# Patient Record
Sex: FEMALE | Race: WHITE | NOT HISPANIC OR LATINO | Employment: OTHER | URBAN - METROPOLITAN AREA
[De-identification: names, ages, dates, MRNs, and addresses within clinical notes are randomized per-mention and may not be internally consistent; named-entity substitution may affect disease eponyms.]

---

## 2023-02-10 PROBLEM — I69.90 LATE EFFECTS OF CEREBROVASCULAR DISEASE: Status: ACTIVE | Noted: 2019-11-13

## 2023-02-10 PROBLEM — F41.1 ANXIETY STATE: Status: ACTIVE | Noted: 2019-11-13

## 2023-02-10 PROBLEM — E66.3 OVERWEIGHT (BMI 25.0-29.9): Status: ACTIVE | Noted: 2023-02-10

## 2023-02-10 PROBLEM — I10 ESSENTIAL HYPERTENSION: Status: ACTIVE | Noted: 2019-11-13

## 2023-02-10 PROBLEM — K21.9 GASTROESOPHAGEAL REFLUX DISEASE: Status: ACTIVE | Noted: 2019-11-13

## 2023-02-10 PROBLEM — J44.9 CHRONIC OBSTRUCTIVE LUNG DISEASE (MULTI): Status: ACTIVE | Noted: 2019-11-13

## 2023-02-10 PROBLEM — E78.2 MIXED HYPERLIPIDEMIA: Status: ACTIVE | Noted: 2019-11-13

## 2023-02-10 RX ORDER — ALLOPURINOL 100 MG/1
TABLET ORAL
COMMUNITY
Start: 2019-11-13 | End: 2023-03-08 | Stop reason: SDUPTHER

## 2023-02-10 RX ORDER — TIOTROPIUM BROMIDE 18 UG/1
CAPSULE ORAL; RESPIRATORY (INHALATION)
COMMUNITY
Start: 2019-11-13 | End: 2023-03-08 | Stop reason: SDUPTHER

## 2023-02-10 RX ORDER — SERTRALINE HYDROCHLORIDE 100 MG/1
TABLET, FILM COATED ORAL
COMMUNITY
Start: 2020-04-28 | End: 2023-03-08 | Stop reason: ALTCHOICE

## 2023-02-10 RX ORDER — ALENDRONATE SODIUM 70 MG/1
TABLET ORAL
COMMUNITY
Start: 2020-02-04 | End: 2023-03-08 | Stop reason: SDUPTHER

## 2023-02-10 RX ORDER — POTASSIUM CHLORIDE 1500 MG/1
TABLET, EXTENDED RELEASE ORAL
COMMUNITY
Start: 2019-11-13 | End: 2023-04-07 | Stop reason: SDUPTHER

## 2023-02-10 RX ORDER — DIBASIC SODIUM PHOSPHATE, MONOBASIC POTASSIUM PHOSPHATE AND MONOBASIC SODIUM PHOSPHATE 852; 155; 130 MG/1; MG/1; MG/1
1 TABLET ORAL 2 TIMES DAILY
Status: ON HOLD | COMMUNITY
Start: 2020-02-13 | End: 2024-04-30 | Stop reason: ENTERED-IN-ERROR

## 2023-02-10 RX ORDER — METOPROLOL SUCCINATE 100 MG/1
TABLET, EXTENDED RELEASE ORAL
COMMUNITY
Start: 2019-11-13 | End: 2023-04-07 | Stop reason: SDUPTHER

## 2023-02-10 RX ORDER — LOSARTAN POTASSIUM 100 MG/1
TABLET ORAL
COMMUNITY
Start: 2019-11-13 | End: 2023-03-08 | Stop reason: SDUPTHER

## 2023-02-10 RX ORDER — TIZANIDINE HYDROCHLORIDE 2 MG/1
CAPSULE, GELATIN COATED ORAL
COMMUNITY
Start: 2019-11-13 | End: 2023-03-08 | Stop reason: SDUPTHER

## 2023-02-10 RX ORDER — ASCORBIC ACID 500 MG
500 TABLET ORAL DAILY
Status: ON HOLD | COMMUNITY
Start: 2019-11-13 | End: 2024-04-30 | Stop reason: ENTERED-IN-ERROR

## 2023-02-10 RX ORDER — CHOLECALCIFEROL (VITAMIN D3) 50 MCG
1 TABLET ORAL DAILY
Status: ON HOLD | COMMUNITY
Start: 2019-11-13 | End: 2024-04-30 | Stop reason: ENTERED-IN-ERROR

## 2023-02-10 RX ORDER — TRIAMCINOLONE ACETONIDE 1 MG/G
CREAM TOPICAL
Status: ON HOLD | COMMUNITY
Start: 2020-11-10 | End: 2024-04-30 | Stop reason: ENTERED-IN-ERROR

## 2023-02-10 RX ORDER — ASPIRIN 81 MG/1
1 TABLET ORAL DAILY
COMMUNITY
Start: 2019-11-13

## 2023-02-10 RX ORDER — ATORVASTATIN CALCIUM 10 MG/1
TABLET, FILM COATED ORAL
COMMUNITY
Start: 2019-11-13 | End: 2023-03-08 | Stop reason: SDUPTHER

## 2023-02-10 RX ORDER — ALBUTEROL SULFATE 90 UG/1
AEROSOL, METERED RESPIRATORY (INHALATION)
COMMUNITY
Start: 2019-11-13 | End: 2023-10-04 | Stop reason: SDUPTHER

## 2023-02-10 RX ORDER — FUROSEMIDE 40 MG/1
TABLET ORAL
COMMUNITY
Start: 2021-09-02 | End: 2023-06-13 | Stop reason: SDUPTHER

## 2023-02-10 RX ORDER — CALCIUM CARBONATE/VITAMIN D3 600MG-5MCG
TABLET ORAL
Status: ON HOLD | COMMUNITY
Start: 2019-11-13 | End: 2024-04-30 | Stop reason: ENTERED-IN-ERROR

## 2023-02-10 RX ORDER — LEVOFLOXACIN 500 MG/1
TABLET, FILM COATED ORAL
COMMUNITY
Start: 2023-01-10 | End: 2023-03-08 | Stop reason: WASHOUT

## 2023-02-10 RX ORDER — BUPROPION HYDROCHLORIDE 150 MG/1
TABLET ORAL
COMMUNITY
Start: 2019-11-13 | End: 2023-03-08 | Stop reason: SDUPTHER

## 2023-02-15 PROBLEM — I63.9 CVA (CEREBRAL VASCULAR ACCIDENT) (MULTI): Status: ACTIVE | Noted: 2023-02-15

## 2023-02-15 PROBLEM — E78.2 MIXED HYPERLIPIDEMIA: Status: RESOLVED | Noted: 2019-11-13 | Resolved: 2023-02-15

## 2023-02-15 PROBLEM — R60.9 2+ PITTING EDEMA: Status: ACTIVE | Noted: 2023-02-15

## 2023-02-15 RX ORDER — ACETAMINOPHEN, DIPHENHYDRAMINE HCL, PHENYLEPHRINE HCL 325; 25; 5 MG/1; MG/1; MG/1
TABLET ORAL
Status: ON HOLD | COMMUNITY
End: 2024-04-30 | Stop reason: ENTERED-IN-ERROR

## 2023-02-15 RX ORDER — EPINEPHRINE 0.3 MG/.3ML
0.3 INJECTION SUBCUTANEOUS ONCE AS NEEDED
COMMUNITY
Start: 2019-11-13

## 2023-02-15 RX ORDER — LOPERAMIDE HYDROCHLORIDE 2 MG/1
CAPSULE ORAL
Status: ON HOLD | COMMUNITY
End: 2024-04-30 | Stop reason: ENTERED-IN-ERROR

## 2023-02-15 RX ORDER — IPRATROPIUM BROMIDE AND ALBUTEROL 20; 100 UG/1; UG/1
SPRAY, METERED RESPIRATORY (INHALATION)
Status: ON HOLD | COMMUNITY
End: 2024-04-30 | Stop reason: ENTERED-IN-ERROR

## 2023-02-15 RX ORDER — HYDROCHLOROTHIAZIDE 12.5 MG/1
CAPSULE ORAL
COMMUNITY
End: 2023-03-08 | Stop reason: WASHOUT

## 2023-02-15 RX ORDER — SERTRALINE HYDROCHLORIDE 50 MG/1
TABLET, FILM COATED ORAL
COMMUNITY
End: 2023-03-08 | Stop reason: ALTCHOICE

## 2023-03-03 LAB — SARS-COV-2 RESULT: NOT DETECTED

## 2023-03-08 ENCOUNTER — LAB (OUTPATIENT)
Dept: LAB | Facility: LAB | Age: 78
End: 2023-03-08
Payer: MEDICARE

## 2023-03-08 ENCOUNTER — OFFICE VISIT (OUTPATIENT)
Dept: PRIMARY CARE | Facility: CLINIC | Age: 78
End: 2023-03-08
Payer: MEDICARE

## 2023-03-08 VITALS
HEIGHT: 62 IN | TEMPERATURE: 97.2 F | SYSTOLIC BLOOD PRESSURE: 124 MMHG | WEIGHT: 137 LBS | OXYGEN SATURATION: 97 % | BODY MASS INDEX: 25.21 KG/M2 | DIASTOLIC BLOOD PRESSURE: 70 MMHG | HEART RATE: 64 BPM

## 2023-03-08 DIAGNOSIS — F17.201 TOBACCO ABUSE, IN REMISSION: ICD-10-CM

## 2023-03-08 DIAGNOSIS — E78.2 MIXED HYPERLIPIDEMIA: ICD-10-CM

## 2023-03-08 DIAGNOSIS — I63.9 CEREBROVASCULAR ACCIDENT (CVA), UNSPECIFIED MECHANISM (MULTI): Primary | ICD-10-CM

## 2023-03-08 DIAGNOSIS — J41.0 SIMPLE CHRONIC BRONCHITIS (MULTI): ICD-10-CM

## 2023-03-08 DIAGNOSIS — I10 PRIMARY HYPERTENSION: ICD-10-CM

## 2023-03-08 DIAGNOSIS — F17.211 CIGARETTE NICOTINE DEPENDENCE IN REMISSION: ICD-10-CM

## 2023-03-08 DIAGNOSIS — F33.42 RECURRENT MAJOR DEPRESSIVE DISORDER, IN FULL REMISSION (CMS-HCC): ICD-10-CM

## 2023-03-08 DIAGNOSIS — M81.0 AGE-RELATED OSTEOPOROSIS WITHOUT CURRENT PATHOLOGICAL FRACTURE: ICD-10-CM

## 2023-03-08 DIAGNOSIS — M1A.9XX0 CHRONIC GOUT WITHOUT TOPHUS, UNSPECIFIED CAUSE, UNSPECIFIED SITE: ICD-10-CM

## 2023-03-08 DIAGNOSIS — M15.9 PRIMARY OSTEOARTHRITIS INVOLVING MULTIPLE JOINTS: ICD-10-CM

## 2023-03-08 LAB
ALANINE AMINOTRANSFERASE (SGPT) (U/L) IN SER/PLAS: 16 U/L (ref 7–45)
ALBUMIN (G/DL) IN SER/PLAS: 3.9 G/DL (ref 3.4–5)
ALKALINE PHOSPHATASE (U/L) IN SER/PLAS: 73 U/L (ref 33–136)
ANION GAP IN SER/PLAS: 14 MMOL/L (ref 10–20)
ASPARTATE AMINOTRANSFERASE (SGOT) (U/L) IN SER/PLAS: 13 U/L (ref 9–39)
BASOPHILS (10*3/UL) IN BLOOD BY AUTOMATED COUNT: 0.09 X10E9/L (ref 0–0.1)
BASOPHILS/100 LEUKOCYTES IN BLOOD BY AUTOMATED COUNT: 0.6 % (ref 0–2)
BILIRUBIN TOTAL (MG/DL) IN SER/PLAS: 0.3 MG/DL (ref 0–1.2)
CALCIUM (MG/DL) IN SER/PLAS: 8.8 MG/DL (ref 8.6–10.3)
CARBON DIOXIDE, TOTAL (MMOL/L) IN SER/PLAS: 26 MMOL/L (ref 21–32)
CHLORIDE (MMOL/L) IN SER/PLAS: 105 MMOL/L (ref 98–107)
CREATININE (MG/DL) IN SER/PLAS: 0.99 MG/DL (ref 0.5–1.05)
EOSINOPHILS (10*3/UL) IN BLOOD BY AUTOMATED COUNT: 0.09 X10E9/L (ref 0–0.4)
EOSINOPHILS/100 LEUKOCYTES IN BLOOD BY AUTOMATED COUNT: 0.6 % (ref 0–6)
ERYTHROCYTE DISTRIBUTION WIDTH (RATIO) BY AUTOMATED COUNT: 14.8 % (ref 11.5–14.5)
ERYTHROCYTE MEAN CORPUSCULAR HEMOGLOBIN CONCENTRATION (G/DL) BY AUTOMATED: 30.8 G/DL (ref 32–36)
ERYTHROCYTE MEAN CORPUSCULAR VOLUME (FL) BY AUTOMATED COUNT: 97 FL (ref 80–100)
ERYTHROCYTES (10*6/UL) IN BLOOD BY AUTOMATED COUNT: 4.46 X10E12/L (ref 4–5.2)
GFR FEMALE: 59 ML/MIN/1.73M2
GLUCOSE (MG/DL) IN SER/PLAS: 71 MG/DL (ref 74–99)
HEMATOCRIT (%) IN BLOOD BY AUTOMATED COUNT: 43.2 % (ref 36–46)
HEMOGLOBIN (G/DL) IN BLOOD: 13.3 G/DL (ref 12–16)
IMMATURE GRANULOCYTES/100 LEUKOCYTES IN BLOOD BY AUTOMATED COUNT: 3 % (ref 0–0.9)
LEUKOCYTES (10*3/UL) IN BLOOD BY AUTOMATED COUNT: 14.7 X10E9/L (ref 4.4–11.3)
LYMPHOCYTES (10*3/UL) IN BLOOD BY AUTOMATED COUNT: 3.82 X10E9/L (ref 0.8–3)
LYMPHOCYTES/100 LEUKOCYTES IN BLOOD BY AUTOMATED COUNT: 26 % (ref 13–44)
MONOCYTES (10*3/UL) IN BLOOD BY AUTOMATED COUNT: 2.06 X10E9/L (ref 0.05–0.8)
MONOCYTES/100 LEUKOCYTES IN BLOOD BY AUTOMATED COUNT: 14 % (ref 2–10)
NEUTROPHILS (10*3/UL) IN BLOOD BY AUTOMATED COUNT: 8.21 X10E9/L (ref 1.6–5.5)
NEUTROPHILS/100 LEUKOCYTES IN BLOOD BY AUTOMATED COUNT: 55.8 % (ref 40–80)
PLATELETS (10*3/UL) IN BLOOD AUTOMATED COUNT: 334 X10E9/L (ref 150–450)
POTASSIUM (MMOL/L) IN SER/PLAS: 4.7 MMOL/L (ref 3.5–5.3)
PROTEIN TOTAL: 6.4 G/DL (ref 6.4–8.2)
SODIUM (MMOL/L) IN SER/PLAS: 140 MMOL/L (ref 136–145)
UREA NITROGEN (MG/DL) IN SER/PLAS: 27 MG/DL (ref 6–23)

## 2023-03-08 PROCEDURE — G0439 PPPS, SUBSEQ VISIT: HCPCS | Performed by: FAMILY MEDICINE

## 2023-03-08 PROCEDURE — 80053 COMPREHEN METABOLIC PANEL: CPT

## 2023-03-08 PROCEDURE — 1036F TOBACCO NON-USER: CPT | Performed by: FAMILY MEDICINE

## 2023-03-08 PROCEDURE — 1170F FXNL STATUS ASSESSED: CPT | Performed by: FAMILY MEDICINE

## 2023-03-08 PROCEDURE — 3078F DIAST BP <80 MM HG: CPT | Performed by: FAMILY MEDICINE

## 2023-03-08 PROCEDURE — 85025 COMPLETE CBC W/AUTO DIFF WBC: CPT

## 2023-03-08 PROCEDURE — 99214 OFFICE O/P EST MOD 30 MIN: CPT | Performed by: FAMILY MEDICINE

## 2023-03-08 PROCEDURE — 36415 COLL VENOUS BLD VENIPUNCTURE: CPT

## 2023-03-08 PROCEDURE — 1159F MED LIST DOCD IN RCRD: CPT | Performed by: FAMILY MEDICINE

## 2023-03-08 PROCEDURE — 3074F SYST BP LT 130 MM HG: CPT | Performed by: FAMILY MEDICINE

## 2023-03-08 RX ORDER — BUPROPION HYDROCHLORIDE 150 MG/1
150 TABLET ORAL EVERY MORNING
Qty: 90 TABLET | Refills: 3 | Status: SHIPPED | OUTPATIENT
Start: 2023-03-08 | End: 2024-04-18 | Stop reason: SDUPTHER

## 2023-03-08 RX ORDER — ATORVASTATIN CALCIUM 10 MG/1
10 TABLET, FILM COATED ORAL DAILY
Qty: 90 TABLET | Refills: 3 | Status: SHIPPED | OUTPATIENT
Start: 2023-03-08 | End: 2023-10-04 | Stop reason: SDUPTHER

## 2023-03-08 RX ORDER — ALLOPURINOL 100 MG/1
100 TABLET ORAL 2 TIMES DAILY
Qty: 180 TABLET | Refills: 3 | Status: SHIPPED | OUTPATIENT
Start: 2023-03-08 | End: 2023-10-04 | Stop reason: SDUPTHER

## 2023-03-08 RX ORDER — TIZANIDINE HYDROCHLORIDE 2 MG/1
2 CAPSULE, GELATIN COATED ORAL 3 TIMES DAILY
Qty: 45 CAPSULE | Refills: 1 | Status: SHIPPED | OUTPATIENT
Start: 2023-03-08 | End: 2024-05-10 | Stop reason: HOSPADM

## 2023-03-08 RX ORDER — ALENDRONATE SODIUM 70 MG/1
70 TABLET ORAL
Qty: 12 TABLET | Refills: 3 | Status: SHIPPED | OUTPATIENT
Start: 2023-03-08 | End: 2023-10-04 | Stop reason: SDUPTHER

## 2023-03-08 RX ORDER — SERTRALINE HYDROCHLORIDE 100 MG/1
100 TABLET, FILM COATED ORAL DAILY
Qty: 90 TABLET | Refills: 3 | Status: SHIPPED | OUTPATIENT
Start: 2023-03-08 | End: 2024-04-18 | Stop reason: SDUPTHER

## 2023-03-08 RX ORDER — LOSARTAN POTASSIUM 100 MG/1
100 TABLET ORAL DAILY
Qty: 90 TABLET | Refills: 3 | Status: SHIPPED | OUTPATIENT
Start: 2023-03-08 | End: 2023-10-04 | Stop reason: SDUPTHER

## 2023-03-08 RX ORDER — TIOTROPIUM BROMIDE 18 UG/1
1 CAPSULE ORAL; RESPIRATORY (INHALATION)
Qty: 90 CAPSULE | Refills: 3 | Status: SHIPPED
Start: 2023-03-08 | End: 2023-10-02 | Stop reason: WASHOUT

## 2023-03-08 ASSESSMENT — ACTIVITIES OF DAILY LIVING (ADL)
DOING_HOUSEWORK: INDEPENDENT
TAKING_MEDICATION: INDEPENDENT
DRESSING: INDEPENDENT
GROCERY_SHOPPING: INDEPENDENT
BATHING: INDEPENDENT
MANAGING_FINANCES: INDEPENDENT

## 2023-03-08 ASSESSMENT — PATIENT HEALTH QUESTIONNAIRE - PHQ9
2. FEELING DOWN, DEPRESSED OR HOPELESS: NOT AT ALL
SUM OF ALL RESPONSES TO PHQ9 QUESTIONS 1 AND 2: 0
1. LITTLE INTEREST OR PLEASURE IN DOING THINGS: NOT AT ALL

## 2023-03-08 NOTE — PROGRESS NOTES
Subjective   Patient ID: Brooklyn Read is a 77 y.o. female who presents for Annual Wellness Exam.  HTN: well controlled    Gout: stable    Depression: stable, taking sertraline.    GERD: well controlled    Lipids: takings meds    OP: stable    Preparing meals - independent  Using telephone - independent  Bladder - continent  Bowel - continent    Recent hospital stays - none    ADLs - able to dress, walk and bath independently  Instrumental ADL's - able to shop, maintain finances, managing medications, housekeeping independently    Depression: PHQ-2 (screening 2 mins) - negative    Opioid use -   none  Exercise -  intermittent  Diet - well balanced  Pain score - mild    Providers - pulmonary    MiniCOG dementia screen - 4/5    Education - educated pt on healthy eating, exercise, weight maintenance    Falls - fell 1x at home.  No issues getting up    MiniCO/5    Counseled pt on living will: pt has the document at home and hasn't signed it yet.    CV screening: checking BW    Colonoscopy: normal in 2018    Diabetes screening:  Negative    Glaucoma screen: declines, has no fhx    CT chest tob screen: screening CT ordered    Mammo:  NA    DEXA: dx w/ osteoporosis, son fosamax    PAP/pelvis: decline    Vaccines: had shingles vaccine, one pneumonia shot.  got flu and COVID shot.  Recommended booster.        Current Outpatient Medications on File Prior to Visit   Medication Sig Dispense Refill    albuterol 90 mcg/actuation inhaler       alendronate (Fosamax) 70 mg tablet       allopurinol (Zyloprim) 100 mg tablet       aspirin 81 mg EC tablet       atorvastatin (Lipitor) 10 mg tablet       buPROPion XL (Wellbutrin XL) 150 mg 24 hr tablet       EPINEPHrine 0.3 mg/0.3 mL injection syringe       furosemide (Lasix) 40 mg tablet       hydroCHLOROthiazide (Microzide) 12.5 mg capsule       ipratropium-albuteroL (Combivent Respimat)  mcg/actuation inhaler       loperamide (Imodium) 2 mg capsule       losartan (Cozaar)  100 mg tablet       melatonin 10 mg tablet       metoprolol succinate XL (Toprol-XL) 100 mg 24 hr tablet       potassium chloride CR (K-Tab) 20 mEq ER tablet       sertraline (Zoloft) 100 mg tablet       tiZANidine (Zanaflex) 2 mg capsule       triamcinolone (Kenalog) 0.1 % cream       ascorbic acid (Vitamin C) 500 mg tablet       calcium carbonate-vitamin D3 600 mg-5 mcg (200 unit) tablet Take by mouth.      cholecalciferol (Vitamin D-3) 50 MCG (2000 UT) tablet Take 1 tablet (50 mcg) by mouth once daily.      levoFLOXacin (Levaquin) 500 mg tablet       sertraline (Zoloft) 50 mg tablet       sod phos di, mono-K phos mono (Phospha 250 Neutral) tablet Take 1 tablet (250 mg) by mouth in the morning and 1 tablet (250 mg) before bedtime.      tiotropium (Spiriva with HandiHaler) 18 mcg inhalation capsule        No current facility-administered medications on file prior to visit.        Review of Systems    Objective   Physical Exam    Orders Only on 03/03/2023   Component Date Value Ref Range Status    SARS-COV-2 RESULT 03/02/2023 NOT DETECTED  Not Detected Final    Comment: .  This assay is designed to detect the ORF1a/b and E genes of SARS-CoV-2 via   nucleic acid amplification. A Not Detected result does not preclude   2019-nCoV infection since the adequacy of sample collection and/or low viral   burden may result in presence of viral nucleic acids below the clinical   sensitivity of this test method.     Fact sheet for providers: https://www.fda.gov/media/043929/download   Fact sheet for patients: https://www.fda.gov/media/918074/download     This test has received FDA Emergency Use Authorization (EUA) and has been   verified for use by [facility] ([facility]).   This test is only authorized for the duration of time that circumstances   exist to justify the authorization of the emergency use of in vitro   diagnostic tests for the detection of SARS-CoV-2 virus and/or diagnosis of    COVID-19 infection under section 564(b)(1) of the Act, 21 U.S.C.   360bbb-3(b)(1), unless the authorization is terminated or revoked sooner.                               [facility] is certified under CLIA-88 as   qualified to perform high complexity testing. Testing is performed in the   [facility] laboratories located at [address] [place] [address].         Assessment/Plan

## 2023-03-09 ENCOUNTER — TELEPHONE (OUTPATIENT)
Dept: PRIMARY CARE | Facility: CLINIC | Age: 78
End: 2023-03-09
Payer: MEDICARE

## 2023-03-09 NOTE — TELEPHONE ENCOUNTER
----- Message from Gianluca Macario MD sent at 3/9/2023  5:47 AM EST -----  Please call the patient regarding her labs.  Her blood count seemed to show some elevations in her white blood count.  Could be something viral.  I'd like to check her blood count again in 2 weeks to make sure it has normalized.  BW orders in.

## 2023-04-03 ENCOUNTER — TELEPHONE (OUTPATIENT)
Dept: PRIMARY CARE | Facility: CLINIC | Age: 78
End: 2023-04-03
Payer: MEDICARE

## 2023-04-03 ENCOUNTER — LAB (OUTPATIENT)
Dept: LAB | Facility: LAB | Age: 78
End: 2023-04-03
Payer: MEDICARE

## 2023-04-03 DIAGNOSIS — J41.0 SIMPLE CHRONIC BRONCHITIS (MULTI): ICD-10-CM

## 2023-04-03 DIAGNOSIS — Z87.891 PERSONAL HISTORY OF TOBACCO USE: Primary | ICD-10-CM

## 2023-04-03 LAB
BASOPHILS (10*3/UL) IN BLOOD BY AUTOMATED COUNT: 0.05 X10E9/L (ref 0–0.1)
BASOPHILS/100 LEUKOCYTES IN BLOOD BY AUTOMATED COUNT: 0.4 % (ref 0–2)
EOSINOPHILS (10*3/UL) IN BLOOD BY AUTOMATED COUNT: 0.5 X10E9/L (ref 0–0.4)
EOSINOPHILS/100 LEUKOCYTES IN BLOOD BY AUTOMATED COUNT: 4.4 % (ref 0–6)
ERYTHROCYTE DISTRIBUTION WIDTH (RATIO) BY AUTOMATED COUNT: 15.8 % (ref 11.5–14.5)
ERYTHROCYTE MEAN CORPUSCULAR HEMOGLOBIN CONCENTRATION (G/DL) BY AUTOMATED: 30.2 G/DL (ref 32–36)
ERYTHROCYTE MEAN CORPUSCULAR VOLUME (FL) BY AUTOMATED COUNT: 98 FL (ref 80–100)
ERYTHROCYTES (10*6/UL) IN BLOOD BY AUTOMATED COUNT: 3.85 X10E12/L (ref 4–5.2)
HEMATOCRIT (%) IN BLOOD BY AUTOMATED COUNT: 37.8 % (ref 36–46)
HEMOGLOBIN (G/DL) IN BLOOD: 11.4 G/DL (ref 12–16)
IMMATURE GRANULOCYTES/100 LEUKOCYTES IN BLOOD BY AUTOMATED COUNT: 1.3 % (ref 0–0.9)
LEUKOCYTES (10*3/UL) IN BLOOD BY AUTOMATED COUNT: 11.3 X10E9/L (ref 4.4–11.3)
LYMPHOCYTES (10*3/UL) IN BLOOD BY AUTOMATED COUNT: 2.59 X10E9/L (ref 0.8–3)
LYMPHOCYTES/100 LEUKOCYTES IN BLOOD BY AUTOMATED COUNT: 23 % (ref 13–44)
MONOCYTES (10*3/UL) IN BLOOD BY AUTOMATED COUNT: 1.15 X10E9/L (ref 0.05–0.8)
MONOCYTES/100 LEUKOCYTES IN BLOOD BY AUTOMATED COUNT: 10.2 % (ref 2–10)
NEUTROPHILS (10*3/UL) IN BLOOD BY AUTOMATED COUNT: 6.84 X10E9/L (ref 1.6–5.5)
NEUTROPHILS/100 LEUKOCYTES IN BLOOD BY AUTOMATED COUNT: 60.7 % (ref 40–80)
PLATELETS (10*3/UL) IN BLOOD AUTOMATED COUNT: 347 X10E9/L (ref 150–450)

## 2023-04-03 PROCEDURE — 85025 COMPLETE CBC W/AUTO DIFF WBC: CPT

## 2023-04-03 PROCEDURE — 36415 COLL VENOUS BLD VENIPUNCTURE: CPT

## 2023-04-03 NOTE — TELEPHONE ENCOUNTER
Per fax from [facility], CT chest lung ca screen has been denied and peer to peer is needed by calling #[phone] option #1 using ID #[id number].  Please advise.  Thanks

## 2023-04-04 ENCOUNTER — TELEPHONE (OUTPATIENT)
Dept: PRIMARY CARE | Facility: CLINIC | Age: 78
End: 2023-04-04
Payer: MEDICARE

## 2023-04-04 DIAGNOSIS — D64.9 ANEMIA, UNSPECIFIED TYPE: Primary | ICD-10-CM

## 2023-04-04 NOTE — TELEPHONE ENCOUNTER
----- Message from Gianluca Macario MD sent at 4/3/2023  8:47 PM EDT -----  Please let pt know her repeat BW showed her white blood count normalized, but now she's a bit anemic compared to 2 weeks ago.  Has she had any bleeding that she knows about?  Black stools?    ----- Message -----  From: Lab, Background User  Sent: 4/3/2023   3:30 PM EDT  To: Gianluca Macario MD

## 2023-04-04 NOTE — TELEPHONE ENCOUNTER
Spoke to pt and informed of results, pt states he has had no bleeding or dark stools [facility] [place] [facility]

## 2023-04-05 NOTE — TELEPHONE ENCOUNTER
Okay, I am going to check her blood count again in 1 month to see if the levels have gone back up or not.  Orders are in  - C      Called Jolene and informed of above [facility]

## 2023-04-07 ENCOUNTER — TELEPHONE (OUTPATIENT)
Dept: PRIMARY CARE | Facility: CLINIC | Age: 78
End: 2023-04-07
Payer: MEDICARE

## 2023-04-07 DIAGNOSIS — I10 HYPERTENSION, UNSPECIFIED TYPE: ICD-10-CM

## 2023-04-07 DIAGNOSIS — E87.6 HYPOKALEMIA: Primary | ICD-10-CM

## 2023-04-07 RX ORDER — POTASSIUM CHLORIDE 1500 MG/1
20 TABLET, EXTENDED RELEASE ORAL DAILY
Qty: 90 TABLET | Refills: 1 | Status: SHIPPED | OUTPATIENT
Start: 2023-04-07 | End: 2023-10-04 | Stop reason: SDUPTHER

## 2023-04-07 RX ORDER — METOPROLOL SUCCINATE 100 MG/1
100 TABLET, EXTENDED RELEASE ORAL DAILY
Qty: 90 TABLET | Refills: 0 | Status: SHIPPED | OUTPATIENT
Start: 2023-04-07 | End: 2023-06-13 | Stop reason: SDUPTHER

## 2023-04-07 NOTE — TELEPHONE ENCOUNTER
Pt was just seen.  These 2 RX were not sent in.  Pt is out of meds.  OK for refill for MKC? Thanks, CG

## 2023-04-17 NOTE — TELEPHONE ENCOUNTER
Per letter from [facility], Low dose CT chest was denied since it has been less than a year since the last low dose CT so pt is not due for yearly check at this time.  I looked on portal and in pt's chart and do not see that we ordered one for her last year.  I do not see option for peer to peer in letter, only appeal process.  Please advise.  Thanks

## 2023-05-17 ENCOUNTER — LAB (OUTPATIENT)
Dept: LAB | Facility: LAB | Age: 78
End: 2023-05-17
Payer: MEDICARE

## 2023-05-17 ENCOUNTER — TELEPHONE (OUTPATIENT)
Dept: PRIMARY CARE | Facility: CLINIC | Age: 78
End: 2023-05-17

## 2023-05-17 DIAGNOSIS — D64.9 ANEMIA, UNSPECIFIED TYPE: ICD-10-CM

## 2023-05-17 LAB
BASOPHILS (10*3/UL) IN BLOOD BY AUTOMATED COUNT: 0.06 X10E9/L (ref 0–0.1)
BASOPHILS/100 LEUKOCYTES IN BLOOD BY AUTOMATED COUNT: 0.8 % (ref 0–2)
EOSINOPHILS (10*3/UL) IN BLOOD BY AUTOMATED COUNT: 0.42 X10E9/L (ref 0–0.4)
EOSINOPHILS/100 LEUKOCYTES IN BLOOD BY AUTOMATED COUNT: 5.5 % (ref 0–6)
ERYTHROCYTE DISTRIBUTION WIDTH (RATIO) BY AUTOMATED COUNT: 14.4 % (ref 11.5–14.5)
ERYTHROCYTE MEAN CORPUSCULAR HEMOGLOBIN CONCENTRATION (G/DL) BY AUTOMATED: 30.2 G/DL (ref 32–36)
ERYTHROCYTE MEAN CORPUSCULAR VOLUME (FL) BY AUTOMATED COUNT: 100 FL (ref 80–100)
ERYTHROCYTES (10*6/UL) IN BLOOD BY AUTOMATED COUNT: 4.05 X10E12/L (ref 4–5.2)
FERRITIN (UG/LL) IN SER/PLAS: 45 UG/L (ref 8–150)
HEMATOCRIT (%) IN BLOOD BY AUTOMATED COUNT: 40.4 % (ref 36–46)
HEMOGLOBIN (G/DL) IN BLOOD: 12.2 G/DL (ref 12–16)
IMMATURE GRANULOCYTES/100 LEUKOCYTES IN BLOOD BY AUTOMATED COUNT: 0.4 % (ref 0–0.9)
IRON (UG/DL) IN SER/PLAS: 89 UG/DL (ref 35–150)
IRON BINDING CAPACITY (UG/DL) IN SER/PLAS: 430 UG/DL (ref 240–445)
IRON SATURATION (%) IN SER/PLAS: 21 % (ref 25–45)
LEUKOCYTES (10*3/UL) IN BLOOD BY AUTOMATED COUNT: 7.7 X10E9/L (ref 4.4–11.3)
LYMPHOCYTES (10*3/UL) IN BLOOD BY AUTOMATED COUNT: 1.92 X10E9/L (ref 0.8–3)
LYMPHOCYTES/100 LEUKOCYTES IN BLOOD BY AUTOMATED COUNT: 25 % (ref 13–44)
MONOCYTES (10*3/UL) IN BLOOD BY AUTOMATED COUNT: 1.03 X10E9/L (ref 0.05–0.8)
MONOCYTES/100 LEUKOCYTES IN BLOOD BY AUTOMATED COUNT: 13.4 % (ref 2–10)
NEUTROPHILS (10*3/UL) IN BLOOD BY AUTOMATED COUNT: 4.21 X10E9/L (ref 1.6–5.5)
NEUTROPHILS/100 LEUKOCYTES IN BLOOD BY AUTOMATED COUNT: 54.9 % (ref 40–80)
PLATELETS (10*3/UL) IN BLOOD AUTOMATED COUNT: 218 X10E9/L (ref 150–450)

## 2023-05-17 PROCEDURE — 36415 COLL VENOUS BLD VENIPUNCTURE: CPT

## 2023-05-17 PROCEDURE — 82728 ASSAY OF FERRITIN: CPT

## 2023-05-17 PROCEDURE — 83550 IRON BINDING TEST: CPT

## 2023-05-17 PROCEDURE — 83540 ASSAY OF IRON: CPT

## 2023-05-17 PROCEDURE — 85025 COMPLETE CBC W/AUTO DIFF WBC: CPT

## 2023-06-13 DIAGNOSIS — I10 HYPERTENSION, UNSPECIFIED TYPE: ICD-10-CM

## 2023-06-13 RX ORDER — METOPROLOL SUCCINATE 100 MG/1
100 TABLET, EXTENDED RELEASE ORAL DAILY
Qty: 90 TABLET | Refills: 0 | Status: SHIPPED | OUTPATIENT
Start: 2023-06-13 | End: 2023-10-04 | Stop reason: SDUPTHER

## 2023-06-13 RX ORDER — FUROSEMIDE 40 MG/1
40 TABLET ORAL DAILY
Qty: 90 TABLET | Refills: 1 | Status: SHIPPED | OUTPATIENT
Start: 2023-06-13 | End: 2024-04-17 | Stop reason: SDUPTHER

## 2023-09-11 ENCOUNTER — APPOINTMENT (OUTPATIENT)
Dept: PRIMARY CARE | Facility: CLINIC | Age: 78
End: 2023-09-11
Payer: MEDICARE

## 2023-10-02 ENCOUNTER — OFFICE VISIT (OUTPATIENT)
Dept: PRIMARY CARE | Facility: CLINIC | Age: 78
End: 2023-10-02
Payer: MEDICARE

## 2023-10-02 VITALS
TEMPERATURE: 98.2 F | OXYGEN SATURATION: 90 % | DIASTOLIC BLOOD PRESSURE: 82 MMHG | HEART RATE: 97 BPM | SYSTOLIC BLOOD PRESSURE: 146 MMHG

## 2023-10-02 DIAGNOSIS — J44.1 CHRONIC OBSTRUCTIVE PULMONARY DISEASE WITH (ACUTE) EXACERBATION (MULTI): Primary | ICD-10-CM

## 2023-10-02 DIAGNOSIS — R68.83 CHILLS: ICD-10-CM

## 2023-10-02 DIAGNOSIS — R05.1 ACUTE COUGH: ICD-10-CM

## 2023-10-02 DIAGNOSIS — J40 BRONCHITIS: ICD-10-CM

## 2023-10-02 DIAGNOSIS — Z20.822 SUSPECTED COVID-19 VIRUS INFECTION: ICD-10-CM

## 2023-10-02 PROCEDURE — 1159F MED LIST DOCD IN RCRD: CPT | Performed by: PHYSICIAN ASSISTANT

## 2023-10-02 PROCEDURE — 1160F RVW MEDS BY RX/DR IN RCRD: CPT | Performed by: PHYSICIAN ASSISTANT

## 2023-10-02 PROCEDURE — 99214 OFFICE O/P EST MOD 30 MIN: CPT | Performed by: PHYSICIAN ASSISTANT

## 2023-10-02 PROCEDURE — 87636 SARSCOV2 & INF A&B AMP PRB: CPT

## 2023-10-02 PROCEDURE — 1036F TOBACCO NON-USER: CPT | Performed by: PHYSICIAN ASSISTANT

## 2023-10-02 RX ORDER — LEVOFLOXACIN 500 MG/1
500 TABLET, FILM COATED ORAL DAILY
Qty: 7 TABLET | Refills: 0 | Status: SHIPPED | OUTPATIENT
Start: 2023-10-02 | End: 2023-10-09

## 2023-10-02 RX ORDER — FLUTICASONE FUROATE, UMECLIDINIUM BROMIDE AND VILANTEROL TRIFENATATE 200; 62.5; 25 UG/1; UG/1; UG/1
1 POWDER RESPIRATORY (INHALATION)
COMMUNITY
Start: 2023-09-03 | End: 2024-04-18 | Stop reason: SDUPTHER

## 2023-10-02 RX ORDER — PREDNISONE 20 MG/1
40 TABLET ORAL DAILY
Qty: 10 TABLET | Refills: 0 | Status: SHIPPED
Start: 2023-10-02 | End: 2023-10-04 | Stop reason: ALTCHOICE

## 2023-10-02 ASSESSMENT — ENCOUNTER SYMPTOMS
SHORTNESS OF BREATH: 1
FEVER: 0
COUGH: 1
VOMITING: 0
DIARRHEA: 0

## 2023-10-02 NOTE — PROGRESS NOTES
"Subjective   Patient ID: Brooklyn Read is a 78 y.o. female who presents for Cough, Nasal Congestion (X 3 days), and Shortness of Breath.    Cough  Associated symptoms include shortness of breath. Pertinent negatives include no fever.   Shortness of Breath  Pertinent negatives include no fever or vomiting.        Patient c/o cough, nasal discharge. Denies fever and sore throat. Getting some chills. No sinus pain.  Present for 3 days. Has worsened since onset.   Cough: Cough is productive of greenish sputum.   Nasal discharge: Described as greening.   Denies associated diarrhea, earache and vomiting.   No \"flu aches.\"  Hasn't taken any meds for this other than her inhalers (Trelegy and albuterol).     Has COPD and feels like it is flared up as a result of illness. . Using Trelegy.     Patient denies recent known COVID exposure or international travel.     Got COVID vaccine.      reports that she quit smoking about 21 months ago. Her smoking use included cigarettes. She started smoking about 63 years ago. She smoked an average of 1 pack per day. She has never used smokeless tobacco.    Review of Systems   Constitutional:  Negative for fever.   Respiratory:  Positive for cough and shortness of breath.    Gastrointestinal:  Negative for diarrhea and vomiting.       Objective   /82   Pulse 97   Temp 36.8 °C (98.2 °F)   SpO2 90%     Physical Exam  Constitutional:       General: She is not in acute distress.     Appearance: Normal appearance.   HENT:      Head: Normocephalic.      Right Ear: Tympanic membrane, ear canal and external ear normal.      Left Ear: Tympanic membrane, ear canal and external ear normal.      Nose: Rhinorrhea present.      Right Sinus: No maxillary sinus tenderness or frontal sinus tenderness.      Left Sinus: No maxillary sinus tenderness or frontal sinus tenderness.      Mouth/Throat:      Mouth: Mucous membranes are moist.      Pharynx: No oropharyngeal exudate or posterior " oropharyngeal erythema.   Eyes:      General: No scleral icterus.  Cardiovascular:      Rate and Rhythm: Normal rate and regular rhythm.   Pulmonary:      Effort: Pulmonary effort is normal.      Breath sounds: Wheezing and rhonchi (diffuse) present.   Lymphadenopathy:      Cervical: No cervical adenopathy.   Neurological:      Mental Status: She is alert.         Assessment/Plan   Diagnoses and all orders for this visit:  Chronic obstructive pulmonary disease with (acute) exacerbation (CMS/HCC)  -     levoFLOXacin (Levaquin) 500 mg tablet; Take 1 tablet (500 mg) by mouth once daily for 7 days.  -     predniSONE (Deltasone) 20 mg tablet; Take 2 tablets (40 mg) by mouth once daily for 5 days.  -     Sars-CoV-2 PCR, Symptomatic; Future  -     Influenza A, and B PCR; Future  Bronchitis  -     levoFLOXacin (Levaquin) 500 mg tablet; Take 1 tablet (500 mg) by mouth once daily for 7 days.  -     predniSONE (Deltasone) 20 mg tablet; Take 2 tablets (40 mg) by mouth once daily for 5 days.  -     Sars-CoV-2 PCR, Symptomatic; Future  -     Influenza A, and B PCR; Future  Acute cough  -     Sars-CoV-2 PCR, Symptomatic; Future  -     Influenza A, and B PCR; Future  Chills  -     Sars-CoV-2 PCR, Symptomatic; Future  -     Influenza A, and B PCR; Future  Suspected COVID-19 virus infection  -     Sars-CoV-2 PCR, Symptomatic; Future  -     Influenza A, and B PCR; Future       Rx levofloxacin 500 mg daily x7 days  Rx prednisone 20 mg 2 daily x5 days.  Can use OTC Mucinex.   Hydrate.  Use her inhalers.   Send COVID and flu tests.   Go to ER if significantly worsens.  Next follow-up with PCP Dr. Valera in 2 days and will be able to recheck at that point.

## 2023-10-03 ENCOUNTER — TELEPHONE (OUTPATIENT)
Dept: PRIMARY CARE | Facility: CLINIC | Age: 78
End: 2023-10-03
Payer: MEDICARE

## 2023-10-03 LAB
FLUAV RNA RESP QL NAA+PROBE: NOT DETECTED
FLUBV RNA RESP QL NAA+PROBE: NOT DETECTED
SARS-COV-2 RNA RESP QL NAA+PROBE: NOT DETECTED

## 2023-10-04 ENCOUNTER — OFFICE VISIT (OUTPATIENT)
Dept: PRIMARY CARE | Facility: CLINIC | Age: 78
End: 2023-10-04
Payer: MEDICARE

## 2023-10-04 VITALS
BODY MASS INDEX: 26.34 KG/M2 | DIASTOLIC BLOOD PRESSURE: 64 MMHG | SYSTOLIC BLOOD PRESSURE: 126 MMHG | OXYGEN SATURATION: 96 % | TEMPERATURE: 98.3 F | WEIGHT: 144 LBS | HEART RATE: 64 BPM

## 2023-10-04 DIAGNOSIS — Z00.00 ROUTINE ADULT HEALTH MAINTENANCE: ICD-10-CM

## 2023-10-04 DIAGNOSIS — J96.01 ACUTE RESPIRATORY FAILURE WITH HYPOXIA (MULTI): ICD-10-CM

## 2023-10-04 DIAGNOSIS — M1A.9XX0 CHRONIC GOUT WITHOUT TOPHUS, UNSPECIFIED CAUSE, UNSPECIFIED SITE: ICD-10-CM

## 2023-10-04 DIAGNOSIS — I10 HYPERTENSION, UNSPECIFIED TYPE: ICD-10-CM

## 2023-10-04 DIAGNOSIS — E78.2 MIXED HYPERLIPIDEMIA: ICD-10-CM

## 2023-10-04 DIAGNOSIS — F03.A0 MILD DEMENTIA WITHOUT BEHAVIORAL DISTURBANCE, PSYCHOTIC DISTURBANCE, MOOD DISTURBANCE, OR ANXIETY, UNSPECIFIED DEMENTIA TYPE (MULTI): ICD-10-CM

## 2023-10-04 DIAGNOSIS — Z00.00 MEDICARE ANNUAL WELLNESS VISIT, SUBSEQUENT: ICD-10-CM

## 2023-10-04 DIAGNOSIS — J18.9 PNEUMONIA OF BOTH LOWER LOBES DUE TO INFECTIOUS ORGANISM: Primary | ICD-10-CM

## 2023-10-04 DIAGNOSIS — Z13.6 SCREENING FOR HEART DISEASE: ICD-10-CM

## 2023-10-04 DIAGNOSIS — R53.1 WEAKNESS: ICD-10-CM

## 2023-10-04 DIAGNOSIS — M81.0 AGE-RELATED OSTEOPOROSIS WITHOUT CURRENT PATHOLOGICAL FRACTURE: ICD-10-CM

## 2023-10-04 DIAGNOSIS — E87.6 HYPOKALEMIA: ICD-10-CM

## 2023-10-04 DIAGNOSIS — I10 PRIMARY HYPERTENSION: ICD-10-CM

## 2023-10-04 DIAGNOSIS — E55.9 VITAMIN D DEFICIENCY, UNSPECIFIED: ICD-10-CM

## 2023-10-04 PROBLEM — R60.9 2+ PITTING EDEMA: Status: RESOLVED | Noted: 2023-02-15 | Resolved: 2023-10-04

## 2023-10-04 PROCEDURE — 1159F MED LIST DOCD IN RCRD: CPT | Performed by: FAMILY MEDICINE

## 2023-10-04 PROCEDURE — 99214 OFFICE O/P EST MOD 30 MIN: CPT | Performed by: FAMILY MEDICINE

## 2023-10-04 PROCEDURE — 3074F SYST BP LT 130 MM HG: CPT | Performed by: FAMILY MEDICINE

## 2023-10-04 PROCEDURE — 3078F DIAST BP <80 MM HG: CPT | Performed by: FAMILY MEDICINE

## 2023-10-04 PROCEDURE — 1160F RVW MEDS BY RX/DR IN RCRD: CPT | Performed by: FAMILY MEDICINE

## 2023-10-04 PROCEDURE — 99397 PER PM REEVAL EST PAT 65+ YR: CPT | Performed by: FAMILY MEDICINE

## 2023-10-04 PROCEDURE — 1036F TOBACCO NON-USER: CPT | Performed by: FAMILY MEDICINE

## 2023-10-04 RX ORDER — METOPROLOL SUCCINATE 100 MG/1
100 TABLET, EXTENDED RELEASE ORAL DAILY
Qty: 90 TABLET | Refills: 0 | Status: SHIPPED | OUTPATIENT
Start: 2023-10-04 | End: 2024-01-04 | Stop reason: SDUPTHER

## 2023-10-04 RX ORDER — ALLOPURINOL 100 MG/1
100 TABLET ORAL 2 TIMES DAILY
Qty: 180 TABLET | Refills: 3 | Status: SHIPPED | OUTPATIENT
Start: 2023-10-04 | End: 2024-04-17 | Stop reason: SDUPTHER

## 2023-10-04 RX ORDER — ALBUTEROL SULFATE 90 UG/1
2 AEROSOL, METERED RESPIRATORY (INHALATION) EVERY 6 HOURS PRN
Qty: 18 G | Refills: 3 | Status: SHIPPED | OUTPATIENT
Start: 2023-10-04 | End: 2024-10-03

## 2023-10-04 RX ORDER — ALENDRONATE SODIUM 70 MG/1
70 TABLET ORAL
Qty: 12 TABLET | Refills: 3 | Status: SHIPPED | OUTPATIENT
Start: 2023-10-04 | End: 2024-04-17 | Stop reason: SDUPTHER

## 2023-10-04 RX ORDER — PREDNISONE 10 MG/1
TABLET ORAL
Qty: 42 TABLET | Refills: 0 | Status: SHIPPED | OUTPATIENT
Start: 2023-10-04 | End: 2023-10-16

## 2023-10-04 RX ORDER — LOSARTAN POTASSIUM 100 MG/1
100 TABLET ORAL DAILY
Qty: 90 TABLET | Refills: 3 | Status: SHIPPED | OUTPATIENT
Start: 2023-10-04 | End: 2024-04-17 | Stop reason: SDUPTHER

## 2023-10-04 RX ORDER — ATORVASTATIN CALCIUM 10 MG/1
10 TABLET, FILM COATED ORAL DAILY
Qty: 90 TABLET | Refills: 3 | Status: SHIPPED
Start: 2023-10-04 | End: 2023-10-26 | Stop reason: ALTCHOICE

## 2023-10-04 RX ORDER — BENZONATATE 100 MG/1
100 CAPSULE ORAL 3 TIMES DAILY PRN
Qty: 42 CAPSULE | Refills: 0 | Status: SHIPPED | OUTPATIENT
Start: 2023-10-04 | End: 2023-11-03

## 2023-10-04 RX ORDER — POTASSIUM CHLORIDE 20 MEQ/1
20 TABLET, EXTENDED RELEASE ORAL DAILY
Qty: 90 TABLET | Refills: 1 | Status: SHIPPED
Start: 2023-10-04 | End: 2023-12-01

## 2023-10-04 ASSESSMENT — ENCOUNTER SYMPTOMS
SHORTNESS OF BREATH: 1
CHEST TIGHTNESS: 1
HYPERTENSION: 1

## 2023-10-04 NOTE — PROGRESS NOTES
Subjective   Patient ID: Brooklyn Read is a 78 y.o. female who presents for Hypertension (Recheck) and Hyperlipidemia.  Hypertension  Associated symptoms include shortness of breath.   Hyperlipidemia  Associated symptoms include shortness of breath.     HPI: Annual Wellness visit. The patient has not felt down over the past 6 weeks. No limitation of movement. No falls.  The home has functioning smoke alarms. The home does not have grab bars in the bathroom, poor lighting or rugs in the hallway. Denies hearing change in the ears bilaterally. No diet restrictions.   Eating healthy,     HTN: well controlled    Gout: stable    Depression: stable, taking sertraline.    GERD: well controlled    Lipids: takings meds    OP: stable, taking fosamax    COPD/PNA: dx yesterday, still wheezing and coughing a lot.      Preparing meals - independent  Using telephone - independent  Bladder - continent  Bowel - continent    Recent hospital stays - none    ADLs - able to dress, walk and bath independently  Instrumental ADL's - able to shop, maintain finances, managing medications, housekeeping independently    Depression: PHQ-2 (screening 2 mins) - negative    Opioid use -   none  Exercise -  not very active  Diet - well balanced  Pain score - minimal    Providers    LORAINE: 13/22    Education - educated pt on healthy eating, exercise, weight loss        Counseled pt on living will: pt has the document at home and hasn't signed it yet.    CV screening: CA score was +    Colonoscopy: normal in 2018    Diabetes screening:  Negative    Glaucoma screen: declines, has no fhx    CT chest tob screen: screening CT ordered    Mammo:  NA    DEXA: dx w/ osteoporosis, starting fosamax for pt    PAP/pelvis: decline    Vaccines: had shingles vaccine, one pneumonia shot.  got flu and COVID shot.  Recommended booster.      Patient Active Problem List   Diagnosis    Chronic obstructive lung disease (CMS/HCC)    CVA (cerebral vascular accident)  (CMS/[facility])       Social Connections: Not on file       Current Outpatient Medications on File Prior to Visit   Medication Sig Dispense Refill    albuterol 90 mcg/actuation inhaler       alendronate (Fosamax) 70 mg tablet Take 1 tablet (70 mg) by mouth every 7 days. 12 tablet 3    allopurinol (Zyloprim) 100 mg tablet Take 1 tablet (100 mg) by mouth in the morning and 1 tablet (100 mg) before bedtime. 180 tablet 3    ascorbic acid (Vitamin C) 500 mg tablet       aspirin 81 mg EC tablet       atorvastatin (Lipitor) 10 mg tablet Take 1 tablet (10 mg) by mouth once daily. 90 tablet 3    buPROPion XL (Wellbutrin XL) 150 mg 24 hr tablet Take 1 tablet (150 mg) by mouth once daily in the morning. 90 tablet 3    calcium carbonate-vitamin D3 600 mg-5 mcg (200 unit) tablet Take by mouth.      cholecalciferol (Vitamin D-3) 50 MCG (2000 UT) tablet Take 1 tablet (50 mcg) by mouth once daily.      EPINEPHrine 0.3 mg/0.3 mL injection syringe       furosemide (Lasix) 40 mg tablet Take 1 tablet (40 mg) by mouth once daily. 90 tablet 1    ipratropium-albuteroL (Combivent Respimat)  mcg/actuation inhaler       levoFLOXacin (Levaquin) 500 mg tablet Take 1 tablet (500 mg) by mouth once daily for 7 days. 7 tablet 0    loperamide (Imodium) 2 mg capsule       melatonin 10 mg tablet       metoprolol succinate XL (Toprol-XL) 100 mg 24 hr tablet Take 1 tablet (100 mg) by mouth once daily. 90 tablet 0    potassium chloride CR (K-Tab) 20 mEq ER tablet Take 1 tablet (20 mEq) by mouth once daily. 90 tablet 1    predniSONE (Deltasone) 20 mg tablet Take 2 tablets (40 mg) by mouth once daily for 5 days. 10 tablet 0    sertraline (Zoloft) 100 mg tablet Take 1 tablet (100 mg) by mouth once daily. 90 tablet 3    sod phos di, mono-K phos mono (Phospha 250 Neutral) tablet Take 1 tablet (250 mg) by mouth 2 times a day.      tiZANidine (Zanaflex) 2 mg capsule Take 1 capsule (2 mg) by mouth in the morning and 1 capsule (2 mg) in the evening and 1 capsule  (2 mg) before bedtime. 45 capsule 1    Trelegy Ellipta 200-62.5-25 mcg blister with device 1 puff.      triamcinolone (Kenalog) 0.1 % cream       losartan (Cozaar) 100 mg tablet Take 1 tablet (100 mg) by mouth once daily. (Patient not taking: Reported on 10/4/2023) 90 tablet 3    [DISCONTINUED] tiotropium (Spiriva with HandiHaler) 18 mcg inhalation capsule Place 1 capsule (18 mcg) into inhaler and inhale once daily. 90 capsule 3     No current facility-administered medications on file prior to visit.        Vitals:    10/04/23 1033   BP: 126/64   Pulse: 64   Temp: 36.8 °C (98.3 °F)   SpO2: 96%     Vitals:    10/04/23 1033   Weight: 65.3 kg (144 lb)       Review of Systems   Respiratory:  Positive for chest tightness and shortness of breath.    All other systems reviewed and are negative.      Objective     Physical Exam  Vitals reviewed.   Constitutional:       General: She is not in acute distress.     Appearance: Normal appearance. She is well-developed. She is not diaphoretic.   HENT:      Head: Normocephalic and atraumatic.      Right Ear: Tympanic membrane normal.      Left Ear: Tympanic membrane normal.      Nose: Nose normal.      Mouth/Throat:      Mouth: Mucous membranes are moist.   Eyes:      Pupils: Pupils are equal, round, and reactive to light.   Cardiovascular:      Rate and Rhythm: Normal rate and regular rhythm.      Heart sounds: Normal heart sounds. No murmur heard.     No friction rub. No gallop.   Pulmonary:      Effort: Pulmonary effort is normal.      Breath sounds: Wheezing, rhonchi and rales present.   Abdominal:      General: Bowel sounds are normal.      Palpations: Abdomen is soft.      Tenderness: There is no abdominal tenderness.   Musculoskeletal:      Cervical back: Normal range of motion and neck supple.   Skin:     General: Skin is warm and dry.   Neurological:      Mental Status: She is alert.   Psychiatric:         Mood and Affect: Mood normal.         Office Visit on 10/02/2023    Component Date Value Ref Range Status    Flu A Result 10/02/2023 Not Detected  Not Detected Final    Flu B Result 10/02/2023 Not Detected  Not Detected Final    Coronavirus 2019, PCR 10/02/2023 Not Detected  Not Detected Final       Assessment/Plan   Problem List Items Addressed This Visit    None  Visit Diagnoses         Codes    Pneumonia of both lower lobes due to infectious organism    -  Primary J18.9    Relevant Medications    albuterol 90 mcg/actuation inhaler    predniSONE (Deltasone) 10 mg tablet    benzonatate (Tessalon) 100 mg capsule    Hypokalemia     E87.6    Relevant Medications    potassium chloride CR 20 mEq ER tablet    Primary hypertension     I10    Relevant Medications    losartan (Cozaar) 100 mg tablet    Hypertension, unspecified type     I10    Relevant Medications    metoprolol succinate XL (Toprol-XL) 100 mg 24 hr tablet    Chronic gout without tophus, unspecified cause, unspecified site     M1A.[id number]    Relevant Medications    allopurinol (Zyloprim) 100 mg tablet    Mixed hyperlipidemia     E78.2    Relevant Medications    atorvastatin (Lipitor) 10 mg tablet    Age-related osteoporosis without current pathological fracture     M81.0    Relevant Medications    alendronate (Fosamax) 70 mg tablet    Screening for heart disease     Z13.6    Relevant Orders    CT cardiac scoring wo IV contrast    Routine adult health maintenance     Z00.00    Medicare annual wellness visit, subsequent     Z00.00          Increasing length of prednisone burst and taper.  Checking screening studies.  Fu in 6 mo

## 2023-10-05 ENCOUNTER — TELEPHONE (OUTPATIENT)
Dept: PRIMARY CARE | Facility: CLINIC | Age: 78
End: 2023-10-05
Payer: MEDICARE

## 2023-10-09 ENCOUNTER — TELEPHONE (OUTPATIENT)
Dept: PRIMARY CARE | Facility: CLINIC | Age: 78
End: 2023-10-09
Payer: MEDICARE

## 2023-10-09 NOTE — TELEPHONE ENCOUNTER
Jolene called back and was informed of provider messages (mild dementia). Pt's will have BW done today. Thanks. NAEEM

## 2023-10-10 ENCOUNTER — TELEPHONE (OUTPATIENT)
Dept: PRIMARY CARE | Facility: CLINIC | Age: 78
End: 2023-10-10

## 2023-10-10 ENCOUNTER — LAB (OUTPATIENT)
Dept: LAB | Facility: LAB | Age: 78
End: 2023-10-10
Payer: MEDICARE

## 2023-10-10 DIAGNOSIS — R53.1 WEAKNESS: ICD-10-CM

## 2023-10-10 DIAGNOSIS — E55.9 VITAMIN D DEFICIENCY, UNSPECIFIED: ICD-10-CM

## 2023-10-10 DIAGNOSIS — F03.A0 MILD DEMENTIA WITHOUT BEHAVIORAL DISTURBANCE, PSYCHOTIC DISTURBANCE, MOOD DISTURBANCE, OR ANXIETY, UNSPECIFIED DEMENTIA TYPE (MULTI): ICD-10-CM

## 2023-10-10 LAB
25(OH)D3 SERPL-MCNC: 30 NG/ML (ref 30–100)
ALBUMIN SERPL BCP-MCNC: 3.8 G/DL (ref 3.4–5)
ALP SERPL-CCNC: 75 U/L (ref 33–136)
ALT SERPL W P-5'-P-CCNC: 11 U/L (ref 7–45)
ANION GAP SERPL CALC-SCNC: 9 MMOL/L (ref 10–20)
AST SERPL W P-5'-P-CCNC: 15 U/L (ref 9–39)
BASOPHILS # BLD MANUAL: 0.13 X10*3/UL (ref 0–0.1)
BASOPHILS NFR BLD MANUAL: 1 %
BILIRUB SERPL-MCNC: 0.3 MG/DL (ref 0–1.2)
BUN SERPL-MCNC: 29 MG/DL (ref 6–23)
CALCIUM SERPL-MCNC: 9.3 MG/DL (ref 8.6–10.3)
CHLORIDE SERPL-SCNC: 107 MMOL/L (ref 98–107)
CO2 SERPL-SCNC: 28 MMOL/L (ref 21–32)
CREAT SERPL-MCNC: 1.16 MG/DL (ref 0.5–1.05)
EOSINOPHIL # BLD MANUAL: 0 X10*3/UL (ref 0–0.4)
EOSINOPHIL NFR BLD MANUAL: 0 %
ERYTHROCYTE [DISTWIDTH] IN BLOOD BY AUTOMATED COUNT: 14.9 % (ref 11.5–14.5)
GFR SERPL CREATININE-BSD FRML MDRD: 49 ML/MIN/1.73M*2
GLUCOSE SERPL-MCNC: 96 MG/DL (ref 74–99)
HCT VFR BLD AUTO: 41.7 % (ref 36–46)
HGB BLD-MCNC: 12.6 G/DL (ref 12–16)
IMM GRANULOCYTES # BLD AUTO: 1.86 X10*3/UL (ref 0–0.5)
IMM GRANULOCYTES NFR BLD AUTO: 14.3 % (ref 0–0.9)
LYMPHOCYTES # BLD MANUAL: 3.9 X10*3/UL (ref 0.8–3)
LYMPHOCYTES NFR BLD MANUAL: 30 %
MAGNESIUM SERPL-MCNC: 2.22 MG/DL (ref 1.6–2.4)
MCH RBC QN AUTO: 30.3 PG (ref 26–34)
MCHC RBC AUTO-ENTMCNC: 30.2 G/DL (ref 32–36)
MCV RBC AUTO: 100 FL (ref 80–100)
MONOCYTES # BLD MANUAL: 1.56 X10*3/UL (ref 0.05–0.8)
MONOCYTES NFR BLD MANUAL: 12 %
NEUTROPHILS # BLD MANUAL: 7.41 X10*3/UL (ref 1.6–5.5)
NEUTS BAND # BLD MANUAL: 0.52 X10*3/UL (ref 0–0.5)
NEUTS BAND NFR BLD MANUAL: 4 %
NEUTS SEG # BLD MANUAL: 6.89 X10*3/UL (ref 1.6–5)
NEUTS SEG NFR BLD MANUAL: 53 %
NRBC BLD-RTO: 0 /100 WBCS (ref 0–0)
OVALOCYTES BLD QL SMEAR: ABNORMAL
PLATELET # BLD AUTO: 292 X10*3/UL (ref 150–450)
PMV BLD AUTO: 10.4 FL (ref 7.5–11.5)
POTASSIUM SERPL-SCNC: 5.3 MMOL/L (ref 3.5–5.3)
PROT SERPL-MCNC: 6 G/DL (ref 6.4–8.2)
RBC # BLD AUTO: 4.16 X10*6/UL (ref 4–5.2)
RBC MORPH BLD: ABNORMAL
SODIUM SERPL-SCNC: 139 MMOL/L (ref 136–145)
TOTAL CELLS COUNTED BLD: 100
TSH SERPL-ACNC: 1.61 MIU/L (ref 0.44–3.98)
VIT B12 SERPL-MCNC: >7500 PG/ML (ref 211–911)
WBC # BLD AUTO: 13 X10*3/UL (ref 4.4–11.3)

## 2023-10-10 PROCEDURE — 85007 BL SMEAR W/DIFF WBC COUNT: CPT

## 2023-10-10 PROCEDURE — 83735 ASSAY OF MAGNESIUM: CPT

## 2023-10-10 PROCEDURE — 36415 COLL VENOUS BLD VENIPUNCTURE: CPT

## 2023-10-10 PROCEDURE — 82306 VITAMIN D 25 HYDROXY: CPT

## 2023-10-10 PROCEDURE — 82607 VITAMIN B-12: CPT

## 2023-10-10 PROCEDURE — 84443 ASSAY THYROID STIM HORMONE: CPT

## 2023-10-10 PROCEDURE — 80053 COMPREHEN METABOLIC PANEL: CPT

## 2023-10-10 PROCEDURE — 85027 COMPLETE CBC AUTOMATED: CPT

## 2023-10-10 NOTE — TELEPHONE ENCOUNTER
"[place] [facility] [place]: \"How is pt feeling, breathing wise? \"    Called pt but mailbox full  Message was written in lab results but no comment on lab results given  "

## 2023-10-10 NOTE — RESULT ENCOUNTER NOTE
Please let pt know her B12 levels are too high.  Lets hold her supplementation for 2 weeks and then restart at 1/4 the frequency.

## 2023-10-11 ENCOUNTER — TELEPHONE (OUTPATIENT)
Dept: PRIMARY CARE | Facility: CLINIC | Age: 78
End: 2023-10-11
Payer: MEDICARE

## 2023-10-11 NOTE — TELEPHONE ENCOUNTER
Gianluca Macario MD  P Do [id number] Clinical Support Staff  Please let pt know her B12 levels are too high.  Lets hold her supplementation for 2 weeks and then restart at 1/4 the frequency.

## 2023-10-23 ENCOUNTER — HOSPITAL ENCOUNTER (OUTPATIENT)
Dept: RADIOLOGY | Facility: HOSPITAL | Age: 78
Discharge: HOME | End: 2023-10-23
Payer: MEDICARE

## 2023-10-23 DIAGNOSIS — Z13.6 SCREENING FOR HEART DISEASE: ICD-10-CM

## 2023-10-23 PROCEDURE — 75571 CT HRT W/O DYE W/CA TEST: CPT | Mod: MG

## 2023-10-26 ENCOUNTER — TELEPHONE (OUTPATIENT)
Dept: PRIMARY CARE | Facility: CLINIC | Age: 78
End: 2023-10-26
Payer: MEDICARE

## 2023-10-26 DIAGNOSIS — I25.10 CORONARY ARTERY DISEASE INVOLVING NATIVE HEART WITHOUT ANGINA PECTORIS, UNSPECIFIED VESSEL OR LESION TYPE: Primary | ICD-10-CM

## 2023-10-26 RX ORDER — ROSUVASTATIN CALCIUM 40 MG/1
40 TABLET, COATED ORAL DAILY
Qty: 90 TABLET | Refills: 1 | Status: SHIPPED | OUTPATIENT
Start: 2023-10-26 | End: 2024-04-18 | Stop reason: SDUPTHER

## 2023-10-26 NOTE — TELEPHONE ENCOUNTER
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  I think we need to titrate her cholesterol medicine to a higher potency 1 to reduce her risk.  I also referred her to cardiology for evaluation.

## 2023-10-26 NOTE — RESULT ENCOUNTER NOTE
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  I think we need to titrate her cholesterol medicine to a higher potency 1 to reduce her risk.  I also referred her to cardiology for evaluation.

## 2023-12-01 ENCOUNTER — OFFICE VISIT (OUTPATIENT)
Dept: CARDIOLOGY | Facility: CLINIC | Age: 78
End: 2023-12-01
Payer: MEDICARE

## 2023-12-01 VITALS
DIASTOLIC BLOOD PRESSURE: 74 MMHG | WEIGHT: 141 LBS | HEART RATE: 66 BPM | HEIGHT: 62 IN | BODY MASS INDEX: 25.95 KG/M2 | SYSTOLIC BLOOD PRESSURE: 130 MMHG

## 2023-12-01 DIAGNOSIS — R93.1 ELEVATED CORONARY ARTERY CALCIUM SCORE: ICD-10-CM

## 2023-12-01 DIAGNOSIS — R60.0 LOCALIZED EDEMA: ICD-10-CM

## 2023-12-01 DIAGNOSIS — I35.1 AORTIC VALVE INSUFFICIENCY, ETIOLOGY OF CARDIAC VALVE DISEASE UNSPECIFIED: ICD-10-CM

## 2023-12-01 DIAGNOSIS — I50.32 CHRONIC DIASTOLIC (CONGESTIVE) HEART FAILURE (MULTI): ICD-10-CM

## 2023-12-01 DIAGNOSIS — J44.9 CHRONIC OBSTRUCTIVE PULMONARY DISEASE, UNSPECIFIED COPD TYPE (MULTI): ICD-10-CM

## 2023-12-01 DIAGNOSIS — I25.10 CORONARY ARTERY DISEASE INVOLVING NATIVE HEART WITHOUT ANGINA PECTORIS, UNSPECIFIED VESSEL OR LESION TYPE: ICD-10-CM

## 2023-12-01 DIAGNOSIS — R06.02 SHORTNESS OF BREATH: ICD-10-CM

## 2023-12-01 DIAGNOSIS — I63.9 CEREBROVASCULAR ACCIDENT (CVA), UNSPECIFIED MECHANISM (MULTI): Primary | ICD-10-CM

## 2023-12-01 DIAGNOSIS — R06.01 ORTHOPNEA: ICD-10-CM

## 2023-12-01 PROCEDURE — 1159F MED LIST DOCD IN RCRD: CPT | Performed by: INTERNAL MEDICINE

## 2023-12-01 PROCEDURE — 1160F RVW MEDS BY RX/DR IN RCRD: CPT | Performed by: INTERNAL MEDICINE

## 2023-12-01 PROCEDURE — 99205 OFFICE O/P NEW HI 60 MIN: CPT | Performed by: INTERNAL MEDICINE

## 2023-12-01 PROCEDURE — 93000 ELECTROCARDIOGRAM COMPLETE: CPT | Performed by: INTERNAL MEDICINE

## 2023-12-01 PROCEDURE — 1036F TOBACCO NON-USER: CPT | Performed by: INTERNAL MEDICINE

## 2023-12-01 RX ORDER — SPIRONOLACTONE 25 MG/1
25 TABLET ORAL DAILY
Status: DISCONTINUED | OUTPATIENT
Start: 2023-12-01 | End: 2024-05-01 | Stop reason: ALTCHOICE

## 2023-12-01 ASSESSMENT — ENCOUNTER SYMPTOMS
DEPRESSION: 0
OCCASIONAL FEELINGS OF UNSTEADINESS: 0
LOSS OF SENSATION IN FEET: 0

## 2023-12-01 NOTE — PROGRESS NOTES
"Chief Complaint:   New Patient Visit     History Of Present Illness:    Brooklyn Read is a 78 y.o. 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    She states that she quit smoking about a year ago.  She can walk half a mile but after that she becomes short of breath.  She has chronic ankle swelling and has been on diuretics for several years although never been told that she has congestive heart failure.    Does not have any chest pain.  She does have orthopnea type of symptoms but no PND.    Her sister  of MI at the age of 50 and daughter developed coronary artery disease in her 40s and subsequently  from it.    Her EKG today shows normal sinus rhythm and is within normal limits.  Personally reviewed recent echocardiogram which shows normal LV function normal left atrial volume index, indeterminate diastology normal PA pressure, mild to moderate aortic valve regurgitation with a trileaflet aortic valve.         Last Recorded Vitals:  Vitals:    23 1049   BP: 130/74   BP Location: Left arm   Pulse: 66   Weight: 64 kg (141 lb)   Height: 1.575 m (5' 2\")       Past Medical History:  She has no past medical history on file.    Past Surgical History:  She has no past surgical history on file.      Social History:  She reports that she quit smoking about 22 months ago. Her smoking use included cigarettes. She started smoking about 63 years ago. She smoked an average of 1 pack per day. She has never used smokeless tobacco. She reports that she does not drink alcohol and does not use drugs.    Family History:  No family history on file.     Allergies:  Epinephrine-chlorpheniramine and Egg derived    Outpatient Medications:  Current Outpatient Medications   Medication Instructions    albuterol 90 mcg/actuation inhaler 2 puffs, inhalation, " Every 6 hours PRN    alendronate (FOSAMAX) 70 mg, oral, Every 7 days    allopurinol (ZYLOPRIM) 100 mg, oral, 2 times daily    ascorbic acid (Vitamin C) 500 mg tablet No dose, route, or frequency recorded.    aspirin 81 mg EC tablet No dose, route, or frequency recorded.    buPROPion XL (WELLBUTRIN XL) 150 mg, oral, Every morning    calcium carbonate-vitamin D3 600 mg-5 mcg (200 unit) tablet oral    cholecalciferol (Vitamin D-3) 50 MCG (2000 UT) tablet 1 tablet, oral, Daily    EPINEPHrine 0.3 mg/0.3 mL injection syringe     furosemide (LASIX) 40 mg, oral, Daily    ipratropium-albuteroL (Combivent Respimat)  mcg/actuation inhaler No dose, route, or frequency recorded.    loperamide (Imodium) 2 mg capsule No dose, route, or frequency recorded.    losartan (COZAAR) 100 mg, oral, Daily    melatonin 10 mg tablet No dose, route, or frequency recorded.    metoprolol succinate XL (TOPROL-XL) 100 mg, oral, Daily    potassium chloride CR 20 mEq ER tablet 20 mEq, oral, Daily    rosuvastatin (CRESTOR) 40 mg, oral, Daily    sertraline (ZOLOFT) 100 mg, oral, Daily    sod phos di, mono-K phos mono (Phospha 250 Neutral) tablet 1 tablet, oral, 2 times daily    tiZANidine (ZANAFLEX) 2 mg, oral, 3 times daily    Trelegy Ellipta 200-62.5-25 mcg blister with device 1 puff    triamcinolone (Kenalog) 0.1 % cream No dose, route, or frequency recorded.       Physical Exam:  Physical Exam  Vitals reviewed.   Constitutional:       Appearance: Normal appearance.   Neck:      Vascular: No carotid bruit or JVD.   Cardiovascular:      Rate and Rhythm: Normal rate and regular rhythm.      Pulses: Normal pulses.      Heart sounds: Normal heart sounds, S1 normal and S2 normal.   Pulmonary:      Effort: Pulmonary effort is normal. No respiratory distress.      Breath sounds: No wheezing or rales.   Abdominal:      General: Abdomen is flat.      Palpations: Abdomen is soft.   Musculoskeletal:      Right lower le+ Pitting Edema present.       "Left lower le+ Pitting Edema present.   Skin:     General: Skin is warm.   Neurological:      Mental Status: She is alert and oriented to person, place, and time. Mental status is at baseline.   Psychiatric:         Mood and Affect: Mood normal.         Behavior: Behavior normal.           Last Labs:  CBC -  Lab Results   Component Value Date    WBC 13.0 (H) 10/10/2023    HGB 12.6 10/10/2023    HCT 41.7 10/10/2023     10/10/2023     10/10/2023       CMP -  Lab Results   Component Value Date    CALCIUM 9.3 10/10/2023    PHOS 3.8 2020    PROT 6.0 (L) 10/10/2023    ALBUMIN 3.8 10/10/2023    AST 15 10/10/2023    ALT 11 10/10/2023    ALKPHOS 75 10/10/2023    BILITOT 0.3 10/10/2023       LIPID PANEL -   Lab Results   Component Value Date    CHOL 126 2021    TRIG 56 2021    HDL 50.2 2021    CHHDL 2.5 2021    LDLF 65 2021    VLDL 11 2021       RENAL FUNCTION PANEL -   Lab Results   Component Value Date    GLUCOSE 96 10/10/2023     10/10/2023    K 5.3 10/10/2023     10/10/2023    CO2 28 10/10/2023    ANIONGAP 9 (L) 10/10/2023    BUN 29 (H) 10/10/2023    CREATININE 1.16 (H) 10/10/2023    CALCIUM 9.3 10/10/2023    PHOS 3.8 2020    ALBUMIN 3.8 10/10/2023        Lab Results   Component Value Date     (H) 10/31/2019    HGBA1C 5.8 2020       Last Cardiology Tests:  ECG:  No results found for this or any previous visit from the past 1095 days.      Echo:  No results found for this or any previous visit from the past 1095 days.      Ejection Fractions:  No results found for: \"EF\"    Cath:  No results found for this or any previous visit from the past 1095 days.      Stress Test:  No results found for this or any previous visit from the past 1095 days.      Cardiac Imaging:  CT cardiac scoring wo IV contrast 10/23/2023          Assessment/Plan   1-acute on likely chronic diastolic heart failure-patient appears volume overloaded.  Even though " echo is indeterminate I suspect she has congestive heart failure.  I discussed sodium restriction with her.  I will add Jardiance and Aldactone and arrange close follow-up in CHF clinic for monitoring.  I suspended the potassium supplement.  We will see what her BMP is in a week.    2-elevated CT calcium score-with shortness of breath-symptoms of shortness of breath are multifactorial partly heart failure related and partly from severe COPD however she has multiple cardiovascular risk factors and I am recommending she undergoes a pharmacological stress test at this time, she is unable to exercise because of shortness of breath.    The-mild to moderate aortic regurgitation-monitor serially with echocardiogram.  Seda Mercer MD

## 2023-12-01 NOTE — PATIENT INSTRUCTIONS
"We recommend that you take less than 2 gram of sodium/day.   What should I do to reduce the amount of sodium in my diet? - Many people think that avoiding the salt shaker and not adding salt to their food means that they are eating a low-sodium diet. This is not true. Not adding salt at the table or when cooking will help a little. But almost all of the sodium you eat is already in the food you buy at the grocery store or at restaurants. The most important thing you can do to cut down on sodium is to eat less processed food. That means that you should avoid most foods that are sold in cans, boxes, jars, and bags. You should also eat in restaurants less often.  Instead of buying pre-made, processed foods, buy fresh or fresh-frozen fruits and vegetables. (Fresh-frozen foods are foods that are frozen without anything added to them.) Buy meats, fish, chicken, and turkey that are fresh instead of canned or sold at the [employer] counter. Then try making meals from scratch at home using these low-sodium ingredients.  If you must buy canned or packaged foods, choose ones that are labeled \"sodium free\" or \"very low sodium\". Or choose foods that have less than 400 milligrams of sodium in each serving. The amount of sodium in each serving appears on the nutrition label that is printed on canned or packaged foods.  What if I really like to eat out? - You can still eat in restaurants once in a while. But choose places that offer healthier choices. Fast-food places are almost always a bad idea. As an example, a typical meal of a hamburger and french fries from a popular fast-food chain has about 1,600 milligrams of sodium. That's more sodium than some people should eat in a day!  When choosing what to order:  1) Ask your  if your meal can be made without salt  2) Avoid foods that come with sauces or dips  3) Choose plain grilled meats or fish and steamed vegetables  4) Ask for oil and vinegar for your salad, rather than " dressing  What if food just does not taste as good without sodium? - First of all, give it time. Your taste buds can get used to having less sodium, but you have to give them a chance to adjust. Also try other flavorings, such as herbs and spices, lemon juice, and vinegar.

## 2023-12-07 ENCOUNTER — LAB (OUTPATIENT)
Dept: LAB | Facility: LAB | Age: 78
End: 2023-12-07
Payer: MEDICARE

## 2023-12-07 DIAGNOSIS — R93.1 ELEVATED CORONARY ARTERY CALCIUM SCORE: ICD-10-CM

## 2023-12-07 DIAGNOSIS — R06.01 ORTHOPNEA: ICD-10-CM

## 2023-12-07 LAB
ANION GAP SERPL CALC-SCNC: 11 MMOL/L (ref 10–20)
BUN SERPL-MCNC: 27 MG/DL (ref 6–23)
CALCIUM SERPL-MCNC: 8.7 MG/DL (ref 8.6–10.3)
CHLORIDE SERPL-SCNC: 108 MMOL/L (ref 98–107)
CO2 SERPL-SCNC: 28 MMOL/L (ref 21–32)
CREAT SERPL-MCNC: 1.05 MG/DL (ref 0.5–1.05)
GFR SERPL CREATININE-BSD FRML MDRD: 55 ML/MIN/1.73M*2
GLUCOSE SERPL-MCNC: 99 MG/DL (ref 74–99)
POTASSIUM SERPL-SCNC: 4.5 MMOL/L (ref 3.5–5.3)
SODIUM SERPL-SCNC: 142 MMOL/L (ref 136–145)

## 2023-12-07 PROCEDURE — 80048 BASIC METABOLIC PNL TOTAL CA: CPT

## 2023-12-07 PROCEDURE — 36415 COLL VENOUS BLD VENIPUNCTURE: CPT

## 2023-12-07 PROCEDURE — 83880 ASSAY OF NATRIURETIC PEPTIDE: CPT

## 2023-12-08 LAB — BNP SERPL-MCNC: 105 PG/ML (ref 0–99)

## 2023-12-26 ENCOUNTER — HOSPITAL ENCOUNTER (OUTPATIENT)
Dept: RADIOLOGY | Facility: HOSPITAL | Age: 78
Discharge: HOME | End: 2023-12-26
Payer: MEDICARE

## 2023-12-26 ENCOUNTER — HOSPITAL ENCOUNTER (OUTPATIENT)
Dept: CARDIOLOGY | Facility: HOSPITAL | Age: 78
Discharge: HOME | End: 2023-12-26
Payer: MEDICARE

## 2023-12-26 DIAGNOSIS — R93.1 ELEVATED CORONARY ARTERY CALCIUM SCORE: ICD-10-CM

## 2023-12-26 DIAGNOSIS — R06.02 SHORTNESS OF BREATH: ICD-10-CM

## 2023-12-26 PROCEDURE — 78452 HT MUSCLE IMAGE SPECT MULT: CPT

## 2023-12-26 PROCEDURE — A9502 TC99M TETROFOSMIN: HCPCS | Performed by: INTERNAL MEDICINE

## 2023-12-26 PROCEDURE — 93017 CV STRESS TEST TRACING ONLY: CPT

## 2023-12-26 PROCEDURE — 78452 HT MUSCLE IMAGE SPECT MULT: CPT | Performed by: INTERNAL MEDICINE

## 2023-12-26 PROCEDURE — 2500000004 HC RX 250 GENERAL PHARMACY W/ HCPCS (ALT 636 FOR OP/ED): Performed by: INTERNAL MEDICINE

## 2023-12-26 PROCEDURE — 93016 CV STRESS TEST SUPVJ ONLY: CPT | Performed by: INTERNAL MEDICINE

## 2023-12-26 PROCEDURE — 3430000001 HC RX 343 DIAGNOSTIC RADIOPHARMACEUTICALS: Performed by: INTERNAL MEDICINE

## 2023-12-26 RX ORDER — REGADENOSON 0.08 MG/ML
0.4 INJECTION, SOLUTION INTRAVENOUS ONCE
Status: COMPLETED | OUTPATIENT
Start: 2023-12-26 | End: 2023-12-26

## 2023-12-26 RX ADMIN — REGADENOSON 0.4 MG: 0.08 INJECTION, SOLUTION INTRAVENOUS at 09:42

## 2023-12-26 RX ADMIN — TETROFOSMIN 30 MILLICURIE: 0.23 INJECTION, POWDER, LYOPHILIZED, FOR SOLUTION INTRAVENOUS at 09:35

## 2023-12-26 RX ADMIN — TETROFOSMIN 10 MILLICURIE: 0.23 INJECTION, POWDER, LYOPHILIZED, FOR SOLUTION INTRAVENOUS at 07:45

## 2023-12-28 NOTE — RESULT ENCOUNTER NOTE
Low risk stress test. Results reviewed. RN to call patient. No critical results, follow up as usual to discuss in details.

## 2024-01-02 ENCOUNTER — TELEPHONE (OUTPATIENT)
Dept: CARDIOLOGY | Facility: CLINIC | Age: 79
End: 2024-01-02
Payer: MEDICARE

## 2024-01-02 NOTE — TELEPHONE ENCOUNTER
Stress test done on 12/26/3.  Low probability of ischemia.  Dr. Mercer wanted her to follow up closely in CHF clinic but I do not see that scheduled.  Called her with results of stress test.  She is feeling better and willing to schedule her follow up in CHF clinic.  Will message Armando to help her get that scheduled.

## 2024-01-02 NOTE — TELEPHONE ENCOUNTER
----- Message from Seda Mercer MD sent at 12/28/2023 10:45 AM EST -----  Low risk stress test. Results reviewed. RN to call patient. No critical results, follow up as usual to discuss in details.

## 2024-01-04 ENCOUNTER — TELEPHONE (OUTPATIENT)
Dept: PRIMARY CARE | Facility: CLINIC | Age: 79
End: 2024-01-04
Payer: MEDICARE

## 2024-01-04 DIAGNOSIS — I10 HYPERTENSION, UNSPECIFIED TYPE: ICD-10-CM

## 2024-01-04 RX ORDER — METOPROLOL SUCCINATE 100 MG/1
100 TABLET, EXTENDED RELEASE ORAL DAILY
Qty: 90 TABLET | Refills: 0 | Status: SHIPPED | OUTPATIENT
Start: 2024-01-04 | End: 2024-04-18 | Stop reason: SDUPTHER

## 2024-01-04 NOTE — TELEPHONE ENCOUNTER
Patient called Rx line needs refill only metoprolol succinate XL (Toprol-XL) 100 mg 24 hr tablet  Please send to [facility]

## 2024-01-08 DIAGNOSIS — D38.1 NEOPLASM OF UNCERTAIN BEHAVIOR OF LUNG: Primary | ICD-10-CM

## 2024-01-10 ENCOUNTER — TELEPHONE (OUTPATIENT)
Dept: PRIMARY CARE | Facility: CLINIC | Age: 79
End: 2024-01-10
Payer: MEDICARE

## 2024-01-10 NOTE — TELEPHONE ENCOUNTER
----- Message from Gianluca Macario MD sent at 1/10/2024 12:46 PM EST -----  Please check with pt if she plans to get her repeat CT scan to look at her lung nodule.  She has not been calling to schedule it.  thanks

## 2024-01-12 ENCOUNTER — TELEPHONE (OUTPATIENT)
Dept: PULMONOLOGY | Facility: HOSPITAL | Age: 79
End: 2024-01-12
Payer: MEDICARE

## 2024-02-06 ENCOUNTER — HOSPITAL ENCOUNTER (OUTPATIENT)
Dept: RADIOLOGY | Facility: HOSPITAL | Age: 79
Discharge: HOME | End: 2024-02-06
Payer: MEDICARE

## 2024-02-06 DIAGNOSIS — D38.1 NEOPLASM OF UNCERTAIN BEHAVIOR OF LUNG: ICD-10-CM

## 2024-02-06 PROCEDURE — 71250 CT THORAX DX C-: CPT | Performed by: RADIOLOGY

## 2024-02-06 PROCEDURE — 71250 CT THORAX DX C-: CPT

## 2024-02-07 ENCOUNTER — TELEPHONE (OUTPATIENT)
Dept: PRIMARY CARE | Facility: CLINIC | Age: 79
End: 2024-02-07
Payer: MEDICARE

## 2024-02-07 DIAGNOSIS — D38.1 NEOPLASM OF UNCERTAIN BEHAVIOR OF HILUS OF LUNG: Primary | ICD-10-CM

## 2024-02-07 NOTE — RESULT ENCOUNTER NOTE
Please let patient know that there are some nodules that are new on her CAT scan.  They recommend doing another CAT scan in 3 months to ensure that the changes are not concerning.  Placed another order

## 2024-02-07 NOTE — TELEPHONE ENCOUNTER
----- Message from Gianluca Macario MD sent at 2/7/2024 10:23 AM EST -----  Please let patient know that there are some nodules that are new on her CAT scan.  They recommend doing another CAT scan in 3 months to ensure that the changes are not concerning.  Placed another order

## 2024-02-09 ENCOUNTER — TELEPHONE (OUTPATIENT)
Dept: PRIMARY CARE | Facility: CLINIC | Age: 79
End: 2024-02-09
Payer: MEDICARE

## 2024-03-27 PROBLEM — F03.A0 UNSPECIFIED DEMENTIA, MILD, WITHOUT BEHAVIORAL DISTURBANCE, PSYCHOTIC DISTURBANCE, MOOD DISTURBANCE, AND ANXIETY (MULTI): Status: ACTIVE | Noted: 2023-10-10

## 2024-03-27 PROBLEM — I63.9 CEREBROVASCULAR ACCIDENT (CVA) (MULTI): Status: ACTIVE | Noted: 2023-02-13

## 2024-03-27 PROBLEM — M81.0 SENILE OSTEOPOROSIS: Status: ACTIVE | Noted: 2024-03-27

## 2024-03-27 PROBLEM — M1A.9XX0 CHRONIC GOUT WITHOUT TOPHUS: Status: ACTIVE | Noted: 2024-03-27

## 2024-03-27 PROBLEM — J18.9 PNEUMONIA DUE TO INFECTIOUS ORGANISM: Status: ACTIVE | Noted: 2024-03-27

## 2024-03-27 PROBLEM — F17.200 NICOTINE DEPENDENCE: Status: ACTIVE | Noted: 2023-06-27

## 2024-03-27 PROBLEM — E78.2 MIXED HYPERLIPIDEMIA: Status: ACTIVE | Noted: 2024-03-27

## 2024-03-27 PROBLEM — E55.9 VITAMIN D DEFICIENCY, UNSPECIFIED: Status: ACTIVE | Noted: 2023-10-10

## 2024-03-27 PROBLEM — J40 BRONCHITIS: Status: ACTIVE | Noted: 2024-03-27

## 2024-03-27 PROBLEM — E87.6 HYPOKALEMIA: Status: ACTIVE | Noted: 2024-03-27

## 2024-03-27 PROBLEM — D38.1 NEOPLASM OF UNCERTAIN BEHAVIOR OF LUNG: Status: ACTIVE | Noted: 2024-03-27

## 2024-03-27 PROBLEM — I25.10 ARTERIOSCLEROSIS OF CORONARY ARTERY: Status: ACTIVE | Noted: 2023-12-01

## 2024-03-27 PROBLEM — R68.83 CHILLS: Status: ACTIVE | Noted: 2024-03-27

## 2024-03-27 PROBLEM — R06.01 ORTHOPNEA: Status: ACTIVE | Noted: 2023-12-01

## 2024-03-27 PROBLEM — R53.1 WEAKNESS: Status: ACTIVE | Noted: 2023-10-10

## 2024-03-27 PROBLEM — Z20.822 SUSPECTED SEVERE ACUTE RESPIRATORY SYNDROME CORONAVIRUS 2 (SARS-COV-2) INFECTION: Status: ACTIVE | Noted: 2024-03-27

## 2024-03-27 PROBLEM — I10 PRIMARY HYPERTENSION: Status: ACTIVE | Noted: 2023-03-08

## 2024-03-27 PROBLEM — J96.00 ACUTE RESPIRATORY FAILURE (MULTI): Status: ACTIVE | Noted: 2023-10-04

## 2024-03-27 PROBLEM — R60.9 EDEMA, UNSPECIFIED: Status: ACTIVE | Noted: 2023-02-13

## 2024-03-27 PROBLEM — D64.9 ANEMIA: Status: ACTIVE | Noted: 2023-05-17

## 2024-03-27 PROBLEM — R05.1 ACUTE COUGH: Status: ACTIVE | Noted: 2024-03-27

## 2024-04-01 ENCOUNTER — APPOINTMENT (OUTPATIENT)
Dept: PRIMARY CARE | Facility: CLINIC | Age: 79
End: 2024-04-01
Payer: MEDICARE

## 2024-04-07 DIAGNOSIS — D38.1 NEOPLASM OF UNCERTAIN BEHAVIOR OF LUNG: Primary | ICD-10-CM

## 2024-04-07 DIAGNOSIS — Z87.891 PERSONAL HISTORY OF NICOTINE DEPENDENCE: ICD-10-CM

## 2024-04-17 ENCOUNTER — LAB (OUTPATIENT)
Dept: LAB | Facility: LAB | Age: 79
End: 2024-04-17
Payer: MEDICARE

## 2024-04-17 ENCOUNTER — OFFICE VISIT (OUTPATIENT)
Dept: PRIMARY CARE | Facility: CLINIC | Age: 79
End: 2024-04-17
Payer: MEDICARE

## 2024-04-17 VITALS
SYSTOLIC BLOOD PRESSURE: 126 MMHG | OXYGEN SATURATION: 98 % | HEART RATE: 97 BPM | TEMPERATURE: 98.1 F | WEIGHT: 160 LBS | BODY MASS INDEX: 29.26 KG/M2 | DIASTOLIC BLOOD PRESSURE: 84 MMHG

## 2024-04-17 DIAGNOSIS — M1A.9XX0 CHRONIC GOUT WITHOUT TOPHUS, UNSPECIFIED CAUSE, UNSPECIFIED SITE: ICD-10-CM

## 2024-04-17 DIAGNOSIS — M81.0 AGE-RELATED OSTEOPOROSIS WITHOUT CURRENT PATHOLOGICAL FRACTURE: ICD-10-CM

## 2024-04-17 DIAGNOSIS — Z00.00 MEDICARE ANNUAL WELLNESS VISIT, SUBSEQUENT: ICD-10-CM

## 2024-04-17 DIAGNOSIS — I50.32 CHRONIC DIASTOLIC HEART FAILURE (MULTI): ICD-10-CM

## 2024-04-17 DIAGNOSIS — F33.42 RECURRENT MAJOR DEPRESSIVE DISORDER, IN FULL REMISSION (CMS-HCC): ICD-10-CM

## 2024-04-17 DIAGNOSIS — L97.919: ICD-10-CM

## 2024-04-17 DIAGNOSIS — E55.9 VITAMIN D DEFICIENCY: ICD-10-CM

## 2024-04-17 DIAGNOSIS — S81.801A WOUND OF RIGHT LOWER EXTREMITY, INITIAL ENCOUNTER: ICD-10-CM

## 2024-04-17 DIAGNOSIS — I83.019: ICD-10-CM

## 2024-04-17 DIAGNOSIS — D64.9 ANEMIA, UNSPECIFIED TYPE: ICD-10-CM

## 2024-04-17 DIAGNOSIS — I10 HYPERTENSION, UNSPECIFIED TYPE: ICD-10-CM

## 2024-04-17 DIAGNOSIS — Z00.00 ROUTINE ADULT HEALTH MAINTENANCE: ICD-10-CM

## 2024-04-17 DIAGNOSIS — N18.31 STAGE 3A CHRONIC KIDNEY DISEASE (MULTI): ICD-10-CM

## 2024-04-17 DIAGNOSIS — F03.A0 MILD DEMENTIA WITHOUT BEHAVIORAL DISTURBANCE, PSYCHOTIC DISTURBANCE, MOOD DISTURBANCE, OR ANXIETY, UNSPECIFIED DEMENTIA TYPE (MULTI): ICD-10-CM

## 2024-04-17 DIAGNOSIS — J44.9 CHRONIC OBSTRUCTIVE PULMONARY DISEASE, UNSPECIFIED COPD TYPE (MULTI): Primary | ICD-10-CM

## 2024-04-17 DIAGNOSIS — I10 PRIMARY HYPERTENSION: ICD-10-CM

## 2024-04-17 DIAGNOSIS — D64.9 ANEMIA, UNSPECIFIED TYPE: Primary | ICD-10-CM

## 2024-04-17 DIAGNOSIS — J96.01 ACUTE RESPIRATORY FAILURE WITH HYPOXIA (MULTI): ICD-10-CM

## 2024-04-17 LAB
25(OH)D3 SERPL-MCNC: 21 NG/ML (ref 30–100)
ALBUMIN SERPL BCP-MCNC: 3.4 G/DL (ref 3.4–5)
ALP SERPL-CCNC: 69 U/L (ref 33–136)
ALT SERPL W P-5'-P-CCNC: 11 U/L (ref 7–45)
ANION GAP SERPL CALC-SCNC: 11 MMOL/L (ref 10–20)
AST SERPL W P-5'-P-CCNC: 18 U/L (ref 9–39)
BASOPHILS # BLD AUTO: 0.05 X10*3/UL (ref 0–0.1)
BASOPHILS NFR BLD AUTO: 0.5 %
BILIRUB SERPL-MCNC: 0.3 MG/DL (ref 0–1.2)
BNP SERPL-MCNC: 77 PG/ML (ref 0–99)
BUN SERPL-MCNC: 29 MG/DL (ref 6–23)
CALCIUM SERPL-MCNC: 8.4 MG/DL (ref 8.6–10.3)
CHLORIDE SERPL-SCNC: 109 MMOL/L (ref 98–107)
CO2 SERPL-SCNC: 21 MMOL/L (ref 21–32)
CREAT SERPL-MCNC: 0.94 MG/DL (ref 0.5–1.05)
EGFRCR SERPLBLD CKD-EPI 2021: 62 ML/MIN/1.73M*2
EOSINOPHIL # BLD AUTO: 0.35 X10*3/UL (ref 0–0.4)
EOSINOPHIL NFR BLD AUTO: 3.8 %
ERYTHROCYTE [DISTWIDTH] IN BLOOD BY AUTOMATED COUNT: 15.5 % (ref 11.5–14.5)
GLUCOSE SERPL-MCNC: 81 MG/DL (ref 74–99)
HCT VFR BLD AUTO: 29.7 % (ref 36–46)
HGB BLD-MCNC: 8.5 G/DL (ref 12–16)
IMM GRANULOCYTES # BLD AUTO: 0.08 X10*3/UL (ref 0–0.5)
IMM GRANULOCYTES NFR BLD AUTO: 0.9 % (ref 0–0.9)
LYMPHOCYTES # BLD AUTO: 1.66 X10*3/UL (ref 0.8–3)
LYMPHOCYTES NFR BLD AUTO: 18 %
MCH RBC QN AUTO: 27.3 PG (ref 26–34)
MCHC RBC AUTO-ENTMCNC: 28.6 G/DL (ref 32–36)
MCV RBC AUTO: 96 FL (ref 80–100)
MONOCYTES # BLD AUTO: 0.86 X10*3/UL (ref 0.05–0.8)
MONOCYTES NFR BLD AUTO: 9.3 %
NEUTROPHILS # BLD AUTO: 6.2 X10*3/UL (ref 1.6–5.5)
NEUTROPHILS NFR BLD AUTO: 67.5 %
NRBC BLD-RTO: 0 /100 WBCS (ref 0–0)
PLATELET # BLD AUTO: 294 X10*3/UL (ref 150–450)
POTASSIUM SERPL-SCNC: 4.2 MMOL/L (ref 3.5–5.3)
PROT SERPL-MCNC: 5.9 G/DL (ref 6.4–8.2)
RBC # BLD AUTO: 3.11 X10*6/UL (ref 4–5.2)
SODIUM SERPL-SCNC: 137 MMOL/L (ref 136–145)
TSH SERPL-ACNC: 1.99 MIU/L (ref 0.44–3.98)
WBC # BLD AUTO: 9.2 X10*3/UL (ref 4.4–11.3)

## 2024-04-17 PROCEDURE — 1123F ACP DISCUSS/DSCN MKR DOCD: CPT | Performed by: FAMILY MEDICINE

## 2024-04-17 PROCEDURE — 36415 COLL VENOUS BLD VENIPUNCTURE: CPT

## 2024-04-17 PROCEDURE — 3074F SYST BP LT 130 MM HG: CPT | Performed by: FAMILY MEDICINE

## 2024-04-17 PROCEDURE — 83540 ASSAY OF IRON: CPT

## 2024-04-17 PROCEDURE — G0439 PPPS, SUBSEQ VISIT: HCPCS | Performed by: FAMILY MEDICINE

## 2024-04-17 PROCEDURE — 83550 IRON BINDING TEST: CPT

## 2024-04-17 PROCEDURE — 85025 COMPLETE CBC W/AUTO DIFF WBC: CPT

## 2024-04-17 PROCEDURE — 99397 PER PM REEVAL EST PAT 65+ YR: CPT | Performed by: FAMILY MEDICINE

## 2024-04-17 PROCEDURE — 83880 ASSAY OF NATRIURETIC PEPTIDE: CPT

## 2024-04-17 PROCEDURE — 82306 VITAMIN D 25 HYDROXY: CPT

## 2024-04-17 PROCEDURE — 84443 ASSAY THYROID STIM HORMONE: CPT

## 2024-04-17 PROCEDURE — 3079F DIAST BP 80-89 MM HG: CPT | Performed by: FAMILY MEDICINE

## 2024-04-17 PROCEDURE — 87040 BLOOD CULTURE FOR BACTERIA: CPT

## 2024-04-17 PROCEDURE — 80053 COMPREHEN METABOLIC PANEL: CPT

## 2024-04-17 PROCEDURE — 87075 CULTR BACTERIA EXCEPT BLOOD: CPT

## 2024-04-17 PROCEDURE — 99214 OFFICE O/P EST MOD 30 MIN: CPT | Performed by: FAMILY MEDICINE

## 2024-04-17 PROCEDURE — 82746 ASSAY OF FOLIC ACID SERUM: CPT

## 2024-04-17 PROCEDURE — G0444 DEPRESSION SCREEN ANNUAL: HCPCS | Performed by: FAMILY MEDICINE

## 2024-04-17 PROCEDURE — 1170F FXNL STATUS ASSESSED: CPT | Performed by: FAMILY MEDICINE

## 2024-04-17 PROCEDURE — 82607 VITAMIN B-12: CPT

## 2024-04-17 PROCEDURE — 1158F ADVNC CARE PLAN TLK DOCD: CPT | Performed by: FAMILY MEDICINE

## 2024-04-17 RX ORDER — ALLOPURINOL 100 MG/1
100 TABLET ORAL 2 TIMES DAILY
Qty: 180 TABLET | Refills: 3 | Status: SHIPPED | OUTPATIENT
Start: 2024-04-17 | End: 2025-04-17

## 2024-04-17 RX ORDER — ALENDRONATE SODIUM 70 MG/1
70 TABLET ORAL
Qty: 12 TABLET | Refills: 3 | Status: SHIPPED
Start: 2024-04-17 | End: 2024-05-10 | Stop reason: HOSPADM

## 2024-04-17 RX ORDER — HYDROCODONE BITARTRATE AND ACETAMINOPHEN 5; 325 MG/1; MG/1
1 TABLET ORAL EVERY 6 HOURS PRN
Qty: 21 TABLET | Refills: 0 | Status: SHIPPED | OUTPATIENT
Start: 2024-04-17 | End: 2024-04-24 | Stop reason: SDUPTHER

## 2024-04-17 RX ORDER — LOSARTAN POTASSIUM 100 MG/1
100 TABLET ORAL DAILY
Qty: 90 TABLET | Refills: 3 | Status: SHIPPED | OUTPATIENT
Start: 2024-04-17 | End: 2025-04-17

## 2024-04-17 RX ORDER — AMOXICILLIN AND CLAVULANATE POTASSIUM 875; 125 MG/1; MG/1
875 TABLET, FILM COATED ORAL 2 TIMES DAILY
Qty: 28 TABLET | Refills: 0 | Status: SHIPPED
Start: 2024-04-17 | End: 2024-05-10 | Stop reason: HOSPADM

## 2024-04-17 RX ORDER — FUROSEMIDE 40 MG/1
40 TABLET ORAL 2 TIMES DAILY
Qty: 180 TABLET | Refills: 1 | Status: SHIPPED
Start: 2024-04-17 | End: 2024-05-10 | Stop reason: HOSPADM

## 2024-04-17 ASSESSMENT — ACTIVITIES OF DAILY LIVING (ADL)
TAKING_MEDICATION: INDEPENDENT
DOING_HOUSEWORK: INDEPENDENT
MANAGING_FINANCES: INDEPENDENT
GROCERY_SHOPPING: INDEPENDENT
BATHING: INDEPENDENT
DRESSING: INDEPENDENT

## 2024-04-17 ASSESSMENT — ENCOUNTER SYMPTOMS
LEG PAIN: 1
HYPERTENSION: 1

## 2024-04-17 NOTE — PROGRESS NOTES
Subjective   Patient ID: Brooklyn Read is a 79 y.o. female who presents for Hypertension (Recheck ), Hyperlipidemia, and Leg Pain (R leg pain and swelling, open wound on R lower leg x2 weeks. ).  HTN: well controlled    Gout: stable    Depression: stable, taking sertraline.    GERD: well controlled    Lipids: takings meds    OP: stable    Leg wound: opened up on leg x 2 weeks.  Has had some chills.       Preparing meals - independent  Using telephone - independent  Bladder - continent  Bowel - continent    Recent hospital stays - none    ADLs - able to dress, walk and bath independently  Instrumental ADL's - able to shop, maintain finances, managing medications, housekeeping independently    Depression: PHQ-2 (screening 2 mins) - negative    Opioid use -   none  Exercise -  intermittent  Diet - well balanced  Pain score - mild    Providers - pulmonary    MiniCOG dementia screen - 4/5    Education - educated pt on healthy eating, exercise, weight maintenance    Falls - fell 1x at home.  No issues getting up    MiniCO/5    Counseled pt on living will: pt has the document at home and hasn't signed it yet.    CV screening: checking BW    Colonoscopy: normal in 2018    Diabetes screening:  Negative    Glaucoma screen: declines, has no fhx    CT chest tob screen: screening CT ordered    Mammo:  NA    DEXA: dx w/ osteoporosis, fosamax    PAP/pelvis: decline    Vaccines: had shingles vaccine, one pneumonia shot.  got flu and COVID shot.  Recommended booster.    Hypertension    Hyperlipidemia  Associated symptoms include leg pain.   Leg Pain         Current Outpatient Medications on File Prior to Visit   Medication Sig Dispense Refill    albuterol 90 mcg/actuation inhaler Inhale 2 puffs every 6 hours if needed for wheezing. 18 g 3    alendronate (Fosamax) 70 mg tablet Take 1 tablet (70 mg) by mouth every 7 days. 12 tablet 3    allopurinol (Zyloprim) 100 mg tablet Take 1 tablet (100 mg) by mouth 2 times a day. 180  tablet 3    ascorbic acid (Vitamin C) 500 mg tablet Take 1 tablet (500 mg) by mouth once daily.      aspirin 81 mg EC tablet Take 1 tablet (81 mg) by mouth once daily.      atorvastatin (Lipitor) 10 mg tablet Take 1 tablet (10 mg) by mouth once daily.      buPROPion XL (Wellbutrin XL) 150 mg 24 hr tablet Take 1 tablet (150 mg) by mouth once daily in the morning. 90 tablet 3    calcium carbonate-vitamin D3 600 mg-5 mcg (200 unit) tablet Take by mouth.      cholecalciferol (Vitamin D-3) 50 MCG (2000 UT) tablet Take 1 tablet (2,000 Units) by mouth once daily.      empagliflozin (Jardiance) 10 mg Take 1 tablet (10 mg) by mouth once daily. 30 tablet 11    EPINEPHrine 0.3 mg/0.3 mL injection syringe Inject 0.3 mL (0.3 mg) into the muscle 1 time if needed for anaphylaxis.      furosemide (Lasix) 40 mg tablet Take 1 tablet (40 mg) by mouth once daily. 90 tablet 1    ipratropium-albuteroL (Combivent Respimat)  mcg/actuation inhaler       loperamide (Imodium) 2 mg capsule       losartan (Cozaar) 100 mg tablet Take 1 tablet (100 mg) by mouth once daily. 90 tablet 3    melatonin 10 mg tablet       metoprolol succinate XL (Toprol-XL) 100 mg 24 hr tablet Take 1 tablet (100 mg) by mouth once daily. 90 tablet 0    rosuvastatin (Crestor) 40 mg tablet Take 1 tablet (40 mg) by mouth once daily. 90 tablet 1    sertraline (Zoloft) 100 mg tablet Take 1 tablet (100 mg) by mouth once daily. 90 tablet 3    sod phos di, mono-K phos mono (Phospha 250 Neutral) tablet Take 1 tablet (250 mg) by mouth 2 times a day.      tiZANidine (Zanaflex) 2 mg capsule Take 1 capsule (2 mg) by mouth in the morning and 1 capsule (2 mg) in the evening and 1 capsule (2 mg) before bedtime. 45 capsule 1    Trelegy Ellipta 200-62.5-25 mcg blister with device 1 puff.      triamcinolone (Kenalog) 0.1 % cream        Current Facility-Administered Medications on File Prior to Visit   Medication Dose Route Frequency Provider Last Rate Last Admin    spironolactone  (Aldactone) tablet 25 mg  25 mg oral Daily Seda Mercer MD            Review of Systems   All other systems reviewed and are negative.      Objective   Physical Exam  Vitals reviewed.   Constitutional:       General: She is not in acute distress.     Appearance: Normal appearance. She is well-developed. She is not diaphoretic.   HENT:      Head: Normocephalic and atraumatic.      Right Ear: Tympanic membrane normal.      Left Ear: Tympanic membrane normal.      Nose: Nose normal.      Mouth/Throat:      Mouth: Mucous membranes are moist.   Eyes:      Pupils: Pupils are equal, round, and reactive to light.   Cardiovascular:      Rate and Rhythm: Normal rate and regular rhythm.      Heart sounds: Normal heart sounds. No murmur heard.     No friction rub. No gallop.   Pulmonary:      Effort: Pulmonary effort is normal.      Breath sounds: Normal breath sounds. No rales.   Abdominal:      General: Bowel sounds are normal.      Palpations: Abdomen is soft.      Tenderness: There is no abdominal tenderness.   Musculoskeletal:      Cervical back: Normal range of motion and neck supple.      Right lower leg: Edema present.      Left lower leg: Edema present.   Skin:     General: Skin is warm and dry.      Comments: Deep wound on R leg, abot 2.7 inches across and 1 cm deep.   Neurological:      Mental Status: She is alert.   Psychiatric:         Mood and Affect: Mood normal.         No visits with results within 2 Month(s) from this visit.   Latest known visit with results is:   Lab on 12/07/2023   Component Date Value Ref Range Status    Glucose 12/07/2023 99  74 - 99 mg/dL Final    Sodium 12/07/2023 142  136 - 145 mmol/L Final    Potassium 12/07/2023 4.5  3.5 - 5.3 mmol/L Final    Chloride 12/07/2023 108 (H)  98 - 107 mmol/L Final    Bicarbonate 12/07/2023 28  21 - 32 mmol/L Final    Anion Gap 12/07/2023 11  10 - 20 mmol/L Final    Urea Nitrogen 12/07/2023 27 (H)  6 - 23 mg/dL Final    Creatinine 12/07/2023 1.05   0.50 - 1.05 mg/dL Final    eGFR 12/07/2023 55 (L)  >60 mL/min/1.73m*2 Final    Calculations of estimated GFR are performed using the 2021 CKD-EPI Study Refit equation without the race variable for the IDMS-Traceable creatinine methods.  https://jasn.asnjournals.org/content/early/2021/09/22/ASN.6059132176    Calcium 12/07/2023 8.7  8.6 - 10.3 mg/dL Final    BNP 12/07/2023 105 (H)  0 - 99 pg/mL Final       Assessment/Plan     Treating wound with Augmentin x 14 days.  Ref to wound care.  If erythema gets worse despite abx, go to the ED.  Follow up in 2 weeks.  Otherwise, refilled pts meds.   Signed DNR sheet with patient.  Checking BW.  Fu in 2 weeks.

## 2024-04-18 ENCOUNTER — TELEPHONE (OUTPATIENT)
Dept: PRIMARY CARE | Facility: CLINIC | Age: 79
End: 2024-04-18
Payer: MEDICARE

## 2024-04-18 DIAGNOSIS — I10 HYPERTENSION, UNSPECIFIED TYPE: ICD-10-CM

## 2024-04-18 DIAGNOSIS — J44.9 CHRONIC OBSTRUCTIVE PULMONARY DISEASE, UNSPECIFIED COPD TYPE (MULTI): Primary | ICD-10-CM

## 2024-04-18 DIAGNOSIS — F33.42 RECURRENT MAJOR DEPRESSIVE DISORDER, IN FULL REMISSION (CMS-HCC): ICD-10-CM

## 2024-04-18 DIAGNOSIS — I25.10 CORONARY ARTERY DISEASE INVOLVING NATIVE HEART WITHOUT ANGINA PECTORIS, UNSPECIFIED VESSEL OR LESION TYPE: ICD-10-CM

## 2024-04-18 RX ORDER — METOPROLOL SUCCINATE 100 MG/1
100 TABLET, EXTENDED RELEASE ORAL DAILY
Qty: 90 TABLET | Refills: 0 | Status: SHIPPED
Start: 2024-04-18 | End: 2024-05-10 | Stop reason: HOSPADM

## 2024-04-18 RX ORDER — ATORVASTATIN CALCIUM 10 MG/1
10 TABLET, FILM COATED ORAL DAILY
Qty: 90 TABLET | Refills: 1 | Status: SHIPPED
Start: 2024-04-18 | End: 2024-05-10 | Stop reason: HOSPADM

## 2024-04-18 RX ORDER — SERTRALINE HYDROCHLORIDE 100 MG/1
100 TABLET, FILM COATED ORAL DAILY
Qty: 90 TABLET | Refills: 3 | Status: SHIPPED | OUTPATIENT
Start: 2024-04-18 | End: 2025-04-18

## 2024-04-18 RX ORDER — ROSUVASTATIN CALCIUM 40 MG/1
40 TABLET, COATED ORAL DAILY
Qty: 90 TABLET | Refills: 1 | Status: SHIPPED
Start: 2024-04-18 | End: 2024-05-10 | Stop reason: HOSPADM

## 2024-04-18 RX ORDER — BUPROPION HYDROCHLORIDE 150 MG/1
150 TABLET ORAL EVERY MORNING
Qty: 90 TABLET | Refills: 3 | Status: SHIPPED | OUTPATIENT
Start: 2024-04-18 | End: 2025-04-18

## 2024-04-18 RX ORDER — FLUTICASONE FUROATE, UMECLIDINIUM BROMIDE AND VILANTEROL TRIFENATATE 200; 62.5; 25 UG/1; UG/1; UG/1
1 POWDER RESPIRATORY (INHALATION) DAILY
Qty: 3 EACH | Refills: 2 | Status: SHIPPED | OUTPATIENT
Start: 2024-04-18

## 2024-04-18 NOTE — TELEPHONE ENCOUNTER
Patient was in to see [facility] [place] told to call back in if any medicine was missed she needs this sent in to [facility]  -.metoprolol succinate XL (Toprol-XL) 100 mg 24 hr tablet   -.atorvastatin (Lipitor) 10 mg tablet   -.rosuvastatin (Crestor) 40 mg tablet    -.buPROPion XL (Wellbutrin XL) 150 mg 24 hr tablet   -.Trelegy Ellipta 200-62.5-25 mcg blister with device   -.sertraline (Zoloft) 100 mg tablet

## 2024-04-18 NOTE — RESULT ENCOUNTER NOTE
Please let patient know that her blood counts were significantly lower than they were 6 months ago.  Has she had any bleeding that she has been aware of?  Her vitamin D levels are also low.  She can start taking 5000 units a day.  I am adding on additional blood work to try to understand where her anemia is coming from.

## 2024-04-18 NOTE — PROGRESS NOTES
Subjective   Patient ID: Brooklyn Read is a 79 y.o. female who presents for No chief complaint on file..  HPI    Patient Active Problem List   Diagnosis    Chronic obstructive pulmonary disease (Multi)    Cerebrovascular accident (CVA) (Multi)    Acute respiratory failure (Multi)    Edema, unspecified    Orthopnea    Chronic diastolic heart failure (Multi)    Arteriosclerosis of coronary artery    Aortic valve regurgitation    Unspecified dementia, mild, without behavioral disturbance, psychotic disturbance, mood disturbance, and anxiety (Multi)    Suspected severe acute respiratory syndrome coronavirus 2 (SARS-CoV-2) infection    Senile osteoporosis    Pneumonia due to infectious organism    Nicotine dependence    Mixed hyperlipidemia    Hypokalemia    Primary hypertension    Chronic gout without tophus    Chills    Bronchitis    Acute cough    Weakness    Anemia    Vitamin D deficiency, unspecified    Neoplasm of uncertain behavior of lung    Recurrent major depressive disorder, in full remission (CMS-[facility])    Stage 3a chronic kidney disease (Multi)       Social Connections: Unknown (5/10/2023)    Received from [facility]     Social Network: Not on file       Current Outpatient Medications on File Prior to Visit   Medication Sig Dispense Refill    albuterol 90 mcg/actuation inhaler Inhale 2 puffs every 6 hours if needed for wheezing. 18 g 3    alendronate (Fosamax) 70 mg tablet Take 1 tablet (70 mg) by mouth every 7 days. 12 tablet 3    allopurinol (Zyloprim) 100 mg tablet Take 1 tablet (100 mg) by mouth 2 times a day. 180 tablet 3    amoxicillin-pot clavulanate (Augmentin) 875-125 mg tablet Take 1 tablet (875 mg) by mouth 2 times a day for 14 days. 28 tablet 0    ascorbic acid (Vitamin C) 500 mg tablet Take 1 tablet (500 mg) by mouth once daily.      aspirin 81 mg EC tablet Take 1 tablet (81 mg) by mouth once daily.      atorvastatin (Lipitor) 10 mg tablet Take 1 tablet (10 mg) by mouth  once daily.      buPROPion XL (Wellbutrin XL) 150 mg 24 hr tablet Take 1 tablet (150 mg) by mouth once daily in the morning. 90 tablet 3    calcium carbonate-vitamin D3 600 mg-5 mcg (200 unit) tablet Take by mouth.      cholecalciferol (Vitamin D-3) 50 MCG (2000 UT) tablet Take 1 tablet (2,000 Units) by mouth once daily.      empagliflozin (Jardiance) 10 mg Take 1 tablet (10 mg) by mouth once daily. 30 tablet 11    EPINEPHrine 0.3 mg/0.3 mL injection syringe Inject 0.3 mL (0.3 mg) into the muscle 1 time if needed for anaphylaxis.      furosemide (Lasix) 40 mg tablet Take 1 tablet (40 mg) by mouth 2 times a day. 180 tablet 1    HYDROcodone-acetaminophen (Norco) 5-325 mg tablet Take 1 tablet by mouth every 6 hours if needed for severe pain (7 - 10) for up to 7 days. 21 tablet 0    ipratropium-albuteroL (Combivent Respimat)  mcg/actuation inhaler       loperamide (Imodium) 2 mg capsule       losartan (Cozaar) 100 mg tablet Take 1 tablet (100 mg) by mouth once daily. 90 tablet 3    melatonin 10 mg tablet       metoprolol succinate XL (Toprol-XL) 100 mg 24 hr tablet Take 1 tablet (100 mg) by mouth once daily. 90 tablet 0    rosuvastatin (Crestor) 40 mg tablet Take 1 tablet (40 mg) by mouth once daily. 90 tablet 1    sertraline (Zoloft) 100 mg tablet Take 1 tablet (100 mg) by mouth once daily. 90 tablet 3    sod phos di, mono-K phos mono (Phospha 250 Neutral) tablet Take 1 tablet (250 mg) by mouth 2 times a day.      tiZANidine (Zanaflex) 2 mg capsule Take 1 capsule (2 mg) by mouth in the morning and 1 capsule (2 mg) in the evening and 1 capsule (2 mg) before bedtime. 45 capsule 1    Trelegy Ellipta 200-62.5-25 mcg blister with device 1 puff.      triamcinolone (Kenalog) 0.1 % cream       [DISCONTINUED] alendronate (Fosamax) 70 mg tablet Take 1 tablet (70 mg) by mouth every 7 days. 12 tablet 3    [DISCONTINUED] allopurinol (Zyloprim) 100 mg tablet Take 1 tablet (100 mg) by mouth 2 times a day. 180 tablet 3     [DISCONTINUED] furosemide (Lasix) 40 mg tablet Take 1 tablet (40 mg) by mouth once daily. 90 tablet 1    [DISCONTINUED] losartan (Cozaar) 100 mg tablet Take 1 tablet (100 mg) by mouth once daily. 90 tablet 3     Current Facility-Administered Medications on File Prior to Visit   Medication Dose Route Frequency Provider Last Rate Last Admin    spironolactone (Aldactone) tablet 25 mg  25 mg oral Daily Seda Mercer MD            There were no vitals filed for this visit.  There were no vitals filed for this visit.    Review of Systems    Objective     Physical Exam    Lab on 04/17/2024   Component Date Value Ref Range Status    WBC 04/17/2024 9.2  4.4 - 11.3 x10*3/uL Final    nRBC 04/17/2024 0.0  0.0 - 0.0 /100 WBCs Final    RBC 04/17/2024 3.11 (L)  4.00 - 5.20 x10*6/uL Final    Hemoglobin 04/17/2024 8.5 (L)  12.0 - 16.0 g/dL Final    Hematocrit 04/17/2024 29.7 (L)  36.0 - 46.0 % Final    MCV 04/17/2024 96  80 - 100 fL Final    MCH 04/17/2024 27.3  26.0 - 34.0 pg Final    MCHC 04/17/2024 28.6 (L)  32.0 - 36.0 g/dL Final    RDW 04/17/2024 15.5 (H)  11.5 - 14.5 % Final    Platelets 04/17/2024 294  150 - 450 x10*3/uL Final    Neutrophils % 04/17/2024 67.5  40.0 - 80.0 % Final    Immature Granulocytes %, Automated 04/17/2024 0.9  0.0 - 0.9 % Final    Immature Granulocyte Count (IG) includes promyelocytes, myelocytes and metamyelocytes but does not include bands. Percent differential counts (%) should be interpreted in the context of the absolute cell counts (cells/UL).    Lymphocytes % 04/17/2024 18.0  13.0 - 44.0 % Final    Monocytes % 04/17/2024 9.3  2.0 - 10.0 % Final    Eosinophils % 04/17/2024 3.8  0.0 - 6.0 % Final    Basophils % 04/17/2024 0.5  0.0 - 2.0 % Final    Neutrophils Absolute 04/17/2024 6.20 (H)  1.60 - 5.50 x10*3/uL Final    Percent differential counts (%) should be interpreted in the context of the absolute cell counts (cells/uL).    Immature Granulocytes Absolute, Au* 04/17/2024 0.08  0.00 - 0.50  x10*3/uL Final    Lymphocytes Absolute 04/17/2024 1.66  0.80 - 3.00 x10*3/uL Final    Monocytes Absolute 04/17/2024 0.86 (H)  0.05 - 0.80 x10*3/uL Final    Eosinophils Absolute 04/17/2024 0.35  0.00 - 0.40 x10*3/uL Final    Basophils Absolute 04/17/2024 0.05  0.00 - 0.10 x10*3/uL Final    Glucose 04/17/2024 81  74 - 99 mg/dL Final    Sodium 04/17/2024 137  136 - 145 mmol/L Final    Potassium 04/17/2024 4.2  3.5 - 5.3 mmol/L Final    Chloride 04/17/2024 109 (H)  98 - 107 mmol/L Final    Bicarbonate 04/17/2024 21  21 - 32 mmol/L Final    Anion Gap 04/17/2024 11  10 - 20 mmol/L Final    Urea Nitrogen 04/17/2024 29 (H)  6 - 23 mg/dL Final    Creatinine 04/17/2024 0.94  0.50 - 1.05 mg/dL Final    eGFR 04/17/2024 62  >60 mL/min/1.73m*2 Final    Calculations of estimated GFR are performed using the 2021 CKD-EPI Study Refit equation without the race variable for the IDMS-Traceable creatinine methods.  https://jasn.asnjournals.org/content/early/2021/09/22/ASN.5164121050    Calcium 04/17/2024 8.4 (L)  8.6 - 10.3 mg/dL Final    Albumin 04/17/2024 3.4  3.4 - 5.0 g/dL Final    Alkaline Phosphatase 04/17/2024 69  33 - 136 U/L Final    Total Protein 04/17/2024 5.9 (L)  6.4 - 8.2 g/dL Final    AST 04/17/2024 18  9 - 39 U/L Final    Bilirubin, Total 04/17/2024 0.3  0.0 - 1.2 mg/dL Final    ALT 04/17/2024 11  7 - 45 U/L Final    Patients treated with Sulfasalazine may generate falsely decreased results for ALT.    Vitamin D, 25-Hydroxy, Total 04/17/2024 21 (L)  30 - 100 ng/mL Final    Thyroid Stimulating Hormone 04/17/2024 1.99  0.44 - 3.98 mIU/L Final    Blood Culture 04/17/2024 Loaded on Instrument - Culture in progress   Preliminary    BNP 04/17/2024 77  0 - 99 pg/mL Final       Assessment/Plan

## 2024-04-19 ENCOUNTER — TELEPHONE (OUTPATIENT)
Dept: PRIMARY CARE | Facility: CLINIC | Age: 79
End: 2024-04-19
Payer: MEDICARE

## 2024-04-19 NOTE — TELEPHONE ENCOUNTER
Called Jolene and informed of [facility] msg, she states pt will not go to ED, so forward msg to [facility] for Monday.

## 2024-04-19 NOTE — TELEPHONE ENCOUNTER
It looks like her recent labs from 2 days ago showed a significant drop in her blood count (hemoglobin dropped from 12.6 to 8.5). If patient is acutely weak, I would recommend going to ER for evaluation and recheck of labs. Otherwise will need to wait for Dr. Macario on Monday.

## 2024-04-19 NOTE — TELEPHONE ENCOUNTER
Daughter Jolene states her mom has been sleeping a lot thinks it from the anemia she may  need a blood transfusion. Also states she thinks she needs a nurse to check on her a couple times a week . Please Advise

## 2024-04-21 ENCOUNTER — DOCUMENTATION (OUTPATIENT)
Dept: HOME HEALTH SERVICES | Facility: HOME HEALTH | Age: 79
End: 2024-04-21
Payer: MEDICARE

## 2024-04-21 ENCOUNTER — HOME HEALTH ADMISSION (OUTPATIENT)
Dept: HOME HEALTH SERVICES | Facility: HOME HEALTH | Age: 79
End: 2024-04-21
Payer: MEDICARE

## 2024-04-21 LAB
BACTERIA BLD CULT: NORMAL
FOLATE SERPL-MCNC: 8.6 NG/ML
IRON SATN MFR SERPL: 6 % (ref 25–45)
IRON SERPL-MCNC: 23 UG/DL (ref 35–150)
TIBC SERPL-MCNC: 404 UG/DL (ref 240–445)
TSH SERPL-ACNC: 2.07 MIU/L (ref 0.44–3.98)
UIBC SERPL-MCNC: 381 UG/DL (ref 110–370)
VIT B12 SERPL-MCNC: 285 PG/ML (ref 211–911)

## 2024-04-21 NOTE — HH CARE COORDINATION
Home Care received a Referral for Nursing. We have processed the referral for a Start of Care on 24-48 HOURS.     If you have any questions or concerns, please feel free to contact us at [phone]. Follow the prompts, enter your five digit zip code, and you will be directed to your care team on [facility].

## 2024-04-21 NOTE — TELEPHONE ENCOUNTER
I am scheduled to see pt on Tuesday.  Agree that going to the ED is the best way to get a blood transfusion and to be more fully evaluated, but if she does not want to go I will see her Tuesday.  Placed home care order for her.

## 2024-04-22 NOTE — RESULT ENCOUNTER NOTE
Pts new BW shows her iron levels and B12 levels are low.  She can restart her B12 supplementation, 1000 mcg daily and 325 mg OTC iron daily as well.

## 2024-04-22 NOTE — TELEPHONE ENCOUNTER
----- Message from Gianluca Macario MD sent at 4/21/2024  9:07 PM EDT -----  Pts new BW shows her iron levels and B12 levels are low.  She can restart her B12 supplementation, 1000 mcg daily and 325 mg OTC iron daily as well.

## 2024-04-23 ENCOUNTER — OFFICE VISIT (OUTPATIENT)
Dept: WOUND CARE | Facility: CLINIC | Age: 79
End: 2024-04-23
Payer: MEDICARE

## 2024-04-23 PROCEDURE — 99204 OFFICE O/P NEW MOD 45 MIN: CPT | Performed by: CLINICAL NURSE SPECIALIST

## 2024-04-23 PROCEDURE — 99213 OFFICE O/P EST LOW 20 MIN: CPT

## 2024-04-23 PROCEDURE — 11042 DBRDMT SUBQ TIS 1ST 20SQCM/<: CPT | Mod: ZK

## 2024-04-23 PROCEDURE — 11042 DBRDMT SUBQ TIS 1ST 20SQCM/<: CPT | Performed by: CLINICAL NURSE SPECIALIST

## 2024-04-24 ENCOUNTER — HOME CARE VISIT (OUTPATIENT)
Dept: HOME HEALTH SERVICES | Facility: HOME HEALTH | Age: 79
End: 2024-04-24
Payer: MEDICARE

## 2024-04-24 ENCOUNTER — TELEPHONE (OUTPATIENT)
Dept: PRIMARY CARE | Facility: CLINIC | Age: 79
End: 2024-04-24
Payer: MEDICARE

## 2024-04-24 VITALS
WEIGHT: 140 LBS | SYSTOLIC BLOOD PRESSURE: 110 MMHG | RESPIRATION RATE: 12 BRPM | DIASTOLIC BLOOD PRESSURE: 54 MMHG | HEART RATE: 76 BPM | HEIGHT: 62 IN | TEMPERATURE: 97.8 F | BODY MASS INDEX: 25.76 KG/M2

## 2024-04-24 DIAGNOSIS — S81.801A WOUND OF RIGHT LOWER EXTREMITY, INITIAL ENCOUNTER: ICD-10-CM

## 2024-04-24 PROCEDURE — 1090000002 HH PPS REVENUE DEBIT

## 2024-04-24 PROCEDURE — G0299 HHS/HOSPICE OF RN EA 15 MIN: HCPCS | Mod: HHH

## 2024-04-24 PROCEDURE — 1090000001 HH PPS REVENUE CREDIT

## 2024-04-24 PROCEDURE — 0023 HH SOC

## 2024-04-24 PROCEDURE — 169592 NO-PAY CLAIM PROCEDURE

## 2024-04-24 RX ORDER — HYDROCODONE BITARTRATE AND ACETAMINOPHEN 5; 325 MG/1; MG/1
1 TABLET ORAL EVERY 6 HOURS PRN
Qty: 10 TABLET | Refills: 0 | Status: SHIPPED
Start: 2024-04-24 | End: 2024-05-10 | Stop reason: HOSPADM

## 2024-04-24 ASSESSMENT — ENCOUNTER SYMPTOMS
PAIN LOCATION - RELIEVING FACTORS: MEDICATION, REST
PAIN: 1
PAIN LOCATION - PAIN FREQUENCY: INTERMITTENT
APPETITE LEVEL: FAIR
PAIN LOCATION - PAIN QUALITY: ACHING, THROBBING
MUSCLE WEAKNESS: 1
FATIGUES EASILY: 1
LOWEST PAIN SEVERITY IN PAST 24 HOURS: 5/10
PAIN LOCATION - PAIN DURATION: VARIES
PAIN LOCATION - PAIN SEVERITY: 8/10
SHORTNESS OF BREATH: 1
DYSPNEA ACTIVITY LEVEL: AFTER AMBULATING 10 - 20 FT
PAIN LOCATION - RELIEVING FACTORS: MEDICATION REST
FATIGUE: 1
PAIN SEVERITY GOAL: 2/10
PAIN LOCATION - PAIN SEVERITY: 9/10
HIGHEST PAIN SEVERITY IN PAST 24 HOURS: 9/10
PAIN LOCATION - PAIN FREQUENCY: INTERMITTENT
PERSON REPORTING PAIN: PATIENT
SUBJECTIVE PAIN PROGRESSION: UNCHANGED
PAIN LOCATION - PAIN QUALITY: SORE, TENDER
PAIN LOCATION: RIGHT LEG
PAIN LOCATION - PAIN DURATION: VARIES
PAIN LOCATION: BACK

## 2024-04-24 ASSESSMENT — ACTIVITIES OF DAILY LIVING (ADL)
AMBULATION ASSISTANCE: INDEPENDENT
ENTERING_EXITING_HOME: SUPERVISION
AMBULATION ASSISTANCE: 1

## 2024-04-24 NOTE — TELEPHONE ENCOUNTER
Next OV 5/2/24  [facility] PT  Would you be willing to send in rx for pt?  Please advise. Thanks. JW

## 2024-04-24 NOTE — TELEPHONE ENCOUNTER
GHANSHYAM gave her pain medicine for her wound. They were out today to clean the wound and she is asking for a refill on  HYDROcodone-acetaminophen (Norco) 5-325 mg tablet  Robguillermo Coronado  Need another doctor to address since ENRIQUE is out of the office.

## 2024-04-25 ENCOUNTER — HOME CARE VISIT (OUTPATIENT)
Dept: HOME HEALTH SERVICES | Facility: HOME HEALTH | Age: 79
End: 2024-04-25
Payer: MEDICARE

## 2024-04-25 VITALS
DIASTOLIC BLOOD PRESSURE: 58 MMHG | TEMPERATURE: 97.1 F | SYSTOLIC BLOOD PRESSURE: 118 MMHG | HEART RATE: 68 BPM | RESPIRATION RATE: 12 BRPM

## 2024-04-25 PROCEDURE — G0299 HHS/HOSPICE OF RN EA 15 MIN: HCPCS | Mod: HHH

## 2024-04-25 PROCEDURE — 1090000002 HH PPS REVENUE DEBIT

## 2024-04-25 PROCEDURE — 1090000001 HH PPS REVENUE CREDIT

## 2024-04-25 SDOH — ECONOMIC STABILITY: GENERAL

## 2024-04-25 ASSESSMENT — ENCOUNTER SYMPTOMS
SUBJECTIVE PAIN PROGRESSION: UNCHANGED
FATIGUES EASILY: 1
SHORTNESS OF BREATH: 1
PAIN LOCATION - PAIN SEVERITY: 4/10
PAIN LOCATION - PAIN DURATION: VARIES
PAIN LOCATION - PAIN QUALITY: ACHING, THROBBING
PAIN SEVERITY GOAL: 1/10
FATIGUE: 1
LOWEST PAIN SEVERITY IN PAST 24 HOURS: 3/10
PAIN LOCATION - EXACERBATING FACTORS: WALKING, STANDING
CHANGE IN APPETITE: UNCHANGED
MUSCLE WEAKNESS: 1
APPETITE LEVEL: GOOD
DYSPNEA ACTIVITY LEVEL: AFTER AMBULATING 10 - 20 FT
HIGHEST PAIN SEVERITY IN PAST 24 HOURS: 6/10
PERSON REPORTING PAIN: PATIENT
PAIN LOCATION - PAIN FREQUENCY: INTERMITTENT
PAIN LOCATION - RELIEVING FACTORS: MEDICATION, REST
PAIN: 1
PAIN LOCATION: RIGHT LEG

## 2024-04-25 ASSESSMENT — ACTIVITIES OF DAILY LIVING (ADL)
MONEY MANAGEMENT (EXPENSES/BILLS): NEEDS ASSISTANCE
AMBULATION ASSISTANCE: 1
AMBULATION ASSISTANCE: INDEPENDENT

## 2024-04-26 ENCOUNTER — LAB (OUTPATIENT)
Dept: LAB | Facility: LAB | Age: 79
End: 2024-04-26
Payer: MEDICARE

## 2024-04-26 DIAGNOSIS — D64.9 ANEMIA, UNSPECIFIED TYPE: ICD-10-CM

## 2024-04-26 LAB
BASOPHILS # BLD AUTO: 0.05 X10*3/UL (ref 0–0.1)
BASOPHILS NFR BLD AUTO: 0.6 %
EOSINOPHIL # BLD AUTO: 0.36 X10*3/UL (ref 0–0.4)
EOSINOPHIL NFR BLD AUTO: 4.3 %
ERYTHROCYTE [DISTWIDTH] IN BLOOD BY AUTOMATED COUNT: 15.5 % (ref 11.5–14.5)
FERRITIN SERPL-MCNC: 17 NG/ML (ref 8–150)
FOLATE SERPL-MCNC: 13 NG/ML
HCT VFR BLD AUTO: 27.4 % (ref 36–46)
HGB BLD-MCNC: 7.9 G/DL (ref 12–16)
HGB RETIC QN: 21 PG (ref 28–38)
IMM GRANULOCYTES # BLD AUTO: 0.03 X10*3/UL (ref 0–0.5)
IMM GRANULOCYTES NFR BLD AUTO: 0.4 % (ref 0–0.9)
IMMATURE RETIC FRACTION: 22.7 %
IRON SATN MFR SERPL: 4 % (ref 25–45)
IRON SERPL-MCNC: 16 UG/DL (ref 35–150)
LDH SERPL L TO P-CCNC: 132 U/L (ref 84–246)
LYMPHOCYTES # BLD AUTO: 1.81 X10*3/UL (ref 0.8–3)
LYMPHOCYTES NFR BLD AUTO: 21.4 %
MCH RBC QN AUTO: 27 PG (ref 26–34)
MCHC RBC AUTO-ENTMCNC: 28.8 G/DL (ref 32–36)
MCV RBC AUTO: 94 FL (ref 80–100)
MONOCYTES # BLD AUTO: 1.11 X10*3/UL (ref 0.05–0.8)
MONOCYTES NFR BLD AUTO: 13.1 %
NEUTROPHILS # BLD AUTO: 5.11 X10*3/UL (ref 1.6–5.5)
NEUTROPHILS NFR BLD AUTO: 60.2 %
NRBC BLD-RTO: 0 /100 WBCS (ref 0–0)
PLATELET # BLD AUTO: 366 X10*3/UL (ref 150–450)
RBC # BLD AUTO: 2.93 X10*6/UL (ref 4–5.2)
RETICS #: 0.05 X10*6/UL (ref 0.02–0.11)
RETICS/RBC NFR AUTO: 1.8 % (ref 0.5–2)
TIBC SERPL-MCNC: 409 UG/DL (ref 240–445)
UIBC SERPL-MCNC: 393 UG/DL (ref 110–370)
VIT B12 SERPL-MCNC: 448 PG/ML (ref 211–911)
WBC # BLD AUTO: 8.5 X10*3/UL (ref 4.4–11.3)

## 2024-04-26 PROCEDURE — 83615 LACTATE (LD) (LDH) ENZYME: CPT

## 2024-04-26 PROCEDURE — 83550 IRON BINDING TEST: CPT

## 2024-04-26 PROCEDURE — 1090000002 HH PPS REVENUE DEBIT

## 2024-04-26 PROCEDURE — 85025 COMPLETE CBC W/AUTO DIFF WBC: CPT

## 2024-04-26 PROCEDURE — 1090000001 HH PPS REVENUE CREDIT

## 2024-04-26 PROCEDURE — 36415 COLL VENOUS BLD VENIPUNCTURE: CPT

## 2024-04-26 PROCEDURE — 83540 ASSAY OF IRON: CPT

## 2024-04-26 PROCEDURE — 82728 ASSAY OF FERRITIN: CPT

## 2024-04-26 PROCEDURE — 85045 AUTOMATED RETICULOCYTE COUNT: CPT

## 2024-04-26 PROCEDURE — 82607 VITAMIN B-12: CPT

## 2024-04-26 PROCEDURE — 82746 ASSAY OF FOLIC ACID SERUM: CPT

## 2024-04-27 ENCOUNTER — HOME CARE VISIT (OUTPATIENT)
Dept: HOME HEALTH SERVICES | Facility: HOME HEALTH | Age: 79
End: 2024-04-27
Payer: MEDICARE

## 2024-04-27 PROCEDURE — 1090000001 HH PPS REVENUE CREDIT

## 2024-04-27 PROCEDURE — G0299 HHS/HOSPICE OF RN EA 15 MIN: HCPCS | Mod: HHH

## 2024-04-27 PROCEDURE — 1090000002 HH PPS REVENUE DEBIT

## 2024-04-28 VITALS
OXYGEN SATURATION: 94 % | HEART RATE: 72 BPM | SYSTOLIC BLOOD PRESSURE: 96 MMHG | DIASTOLIC BLOOD PRESSURE: 72 MMHG | TEMPERATURE: 98.1 F

## 2024-04-28 PROCEDURE — 1090000001 HH PPS REVENUE CREDIT

## 2024-04-28 PROCEDURE — 1090000002 HH PPS REVENUE DEBIT

## 2024-04-28 ASSESSMENT — PAIN SCALES - PAIN ASSESSMENT IN ADVANCED DEMENTIA (PAINAD)
FACIALEXPRESSION: 0
CONSOLABILITY: 1
FACIALEXPRESSION: 0 - SMILING OR INEXPRESSIVE.
NEGVOCALIZATION: 1
TOTALSCORE: 2
BREATHING: 0
CONSOLABILITY: 1 - DISTRACTED OR REASSURED BY VOICE OR TOUCH.
BODYLANGUAGE: 0
BODYLANGUAGE: 0 - RELAXED.
NEGVOCALIZATION: 1 - OCCASIONAL MOAN OR GROAN. LOW-LEVEL SPEECH WITH A NEGATIVE OR DISAPPROVING QUALITY.

## 2024-04-28 ASSESSMENT — ACTIVITIES OF DAILY LIVING (ADL)
AMBULATION ASSISTANCE: STAND BY ASSIST
OASIS_M1830: 03
AMBULATION ASSISTANCE: 1

## 2024-04-28 ASSESSMENT — ENCOUNTER SYMPTOMS
PAIN LOCATION - EXACERBATING FACTORS: DRESSING CHANGES
PAIN LOCATION - PAIN DURATION: SHORT TERM
LOWEST PAIN SEVERITY IN PAST 24 HOURS: 6/10
SUBJECTIVE PAIN PROGRESSION: UNCHANGED
LAST BOWEL MOVEMENT: 66957
CHANGE IN APPETITE: UNCHANGED
PAIN SEVERITY GOAL: 0/10
HIGHEST PAIN SEVERITY IN PAST 24 HOURS: 9/10
PAIN LOCATION: RIGHT LEG
PAIN LOCATION - PAIN SEVERITY: 6/10
STOOL FREQUENCY: DAILY
APPETITE LEVEL: GOOD
PAIN LOCATION - PAIN FREQUENCY: INTERMITTENT
PAIN: 1
BOWEL PATTERN NORMAL: 1
PAIN LOCATION - RELIEVING FACTORS: REST
PAIN LOCATION - PAIN QUALITY: SHARP
OCCASIONAL FEELINGS OF UNSTEADINESS: 1

## 2024-04-29 PROCEDURE — 1090000001 HH PPS REVENUE CREDIT

## 2024-04-29 PROCEDURE — 1090000002 HH PPS REVENUE DEBIT

## 2024-04-30 ENCOUNTER — ANESTHESIA (OUTPATIENT)
Dept: OPERATING ROOM | Facility: HOSPITAL | Age: 79
End: 2024-04-30
Payer: MEDICARE

## 2024-04-30 ENCOUNTER — HOSPITAL ENCOUNTER (INPATIENT)
Facility: HOSPITAL | Age: 79
LOS: 10 days | Discharge: NURSING, REHAB OR LONG-TERM CARE | DRG: 377 | End: 2024-05-10
Attending: INTERNAL MEDICINE | Admitting: INTERNAL MEDICINE
Payer: MEDICARE

## 2024-04-30 ENCOUNTER — HOME CARE VISIT (OUTPATIENT)
Dept: HOME HEALTH SERVICES | Facility: HOME HEALTH | Age: 79
End: 2024-04-30
Payer: MEDICARE

## 2024-04-30 ENCOUNTER — HOSPITAL ENCOUNTER (OUTPATIENT)
Facility: HOSPITAL | Age: 79
Setting detail: OBSERVATION
Discharge: ANOTHER HOSPITAL | End: 2024-04-30
Attending: STUDENT IN AN ORGANIZED HEALTH CARE EDUCATION/TRAINING PROGRAM | Admitting: INTERNAL MEDICINE
Payer: MEDICARE

## 2024-04-30 ENCOUNTER — APPOINTMENT (OUTPATIENT)
Dept: WOUND CARE | Facility: CLINIC | Age: 79
End: 2024-04-30
Payer: MEDICARE

## 2024-04-30 ENCOUNTER — TELEPHONE (OUTPATIENT)
Dept: PHARMACY | Facility: HOSPITAL | Age: 79
End: 2024-04-30

## 2024-04-30 ENCOUNTER — APPOINTMENT (OUTPATIENT)
Dept: OPERATING ROOM | Facility: HOSPITAL | Age: 79
End: 2024-04-30
Payer: MEDICARE

## 2024-04-30 ENCOUNTER — TELEPHONE (OUTPATIENT)
Dept: PRIMARY CARE | Facility: CLINIC | Age: 79
End: 2024-04-30

## 2024-04-30 ENCOUNTER — ANESTHESIA EVENT (OUTPATIENT)
Dept: OPERATING ROOM | Facility: HOSPITAL | Age: 79
End: 2024-04-30
Payer: MEDICARE

## 2024-04-30 ENCOUNTER — APPOINTMENT (OUTPATIENT)
Dept: CARDIOLOGY | Facility: HOSPITAL | Age: 79
End: 2024-04-30
Payer: MEDICARE

## 2024-04-30 ENCOUNTER — APPOINTMENT (OUTPATIENT)
Dept: RADIOLOGY | Facility: HOSPITAL | Age: 79
End: 2024-04-30
Payer: MEDICARE

## 2024-04-30 VITALS
DIASTOLIC BLOOD PRESSURE: 78 MMHG | BODY MASS INDEX: 28.52 KG/M2 | TEMPERATURE: 100.5 F | HEIGHT: 62 IN | WEIGHT: 154.98 LBS | RESPIRATION RATE: 25 BRPM | HEART RATE: 76 BPM | SYSTOLIC BLOOD PRESSURE: 157 MMHG | OXYGEN SATURATION: 100 %

## 2024-04-30 DIAGNOSIS — M79.89 LEFT ARM SWELLING: ICD-10-CM

## 2024-04-30 DIAGNOSIS — I73.9 PAD (PERIPHERAL ARTERY DISEASE) (CMS-HCC): ICD-10-CM

## 2024-04-30 DIAGNOSIS — K92.2 UPPER GI BLEEDING: ICD-10-CM

## 2024-04-30 DIAGNOSIS — D64.9 ANEMIA REQUIRING TRANSFUSIONS: ICD-10-CM

## 2024-04-30 DIAGNOSIS — D62 ACUTE BLOOD LOSS ANEMIA (ABLA): ICD-10-CM

## 2024-04-30 DIAGNOSIS — I73.9 PVD (PERIPHERAL VASCULAR DISEASE) (CMS-HCC): ICD-10-CM

## 2024-04-30 DIAGNOSIS — R60.0 LOCALIZED EDEMA: ICD-10-CM

## 2024-04-30 DIAGNOSIS — R22.33 LOCALIZED SWELLING, MASS AND LUMP, UPPER LIMB, BILATERAL: ICD-10-CM

## 2024-04-30 DIAGNOSIS — K92.2 UPPER GI BLEED: Primary | ICD-10-CM

## 2024-04-30 DIAGNOSIS — R60.9 EDEMA, UNSPECIFIED TYPE: ICD-10-CM

## 2024-04-30 PROBLEM — R93.1 AGATSTON CORONARY ARTERY CALCIUM SCORE GREATER THAN 400: Status: ACTIVE | Noted: 2024-04-30

## 2024-04-30 PROBLEM — N17.9 ACUTE KIDNEY INJURY (CMS-HCC): Status: ACTIVE | Noted: 2024-04-30

## 2024-04-30 LAB
ABO GROUP (TYPE) IN BLOOD: NORMAL
ABO GROUP (TYPE) IN BLOOD: NORMAL
ALBUMIN SERPL BCP-MCNC: 2.9 G/DL (ref 3.4–5)
ALP SERPL-CCNC: 80 U/L (ref 33–136)
ALT SERPL W P-5'-P-CCNC: 14 U/L (ref 7–45)
AMMONIA PLAS-SCNC: 16 UMOL/L (ref 16–53)
ANION GAP SERPL CALC-SCNC: 15 MMOL/L (ref 10–20)
ANTIBODY SCREEN: NORMAL
APTT PPP: 25 SECONDS (ref 27–38)
AST SERPL W P-5'-P-CCNC: 23 U/L (ref 9–39)
ATRIAL RATE: 79 BPM
BASOPHILS # BLD AUTO: 0.04 X10*3/UL (ref 0–0.1)
BASOPHILS NFR BLD AUTO: 0.4 %
BILIRUB SERPL-MCNC: 0.3 MG/DL (ref 0–1.2)
BLOOD EXPIRATION DATE: NORMAL
BUN SERPL-MCNC: 36 MG/DL (ref 6–23)
CALCIUM SERPL-MCNC: 8.1 MG/DL (ref 8.6–10.3)
CARDIAC TROPONIN I PNL SERPL HS: 16 NG/L (ref 0–13)
CHLORIDE SERPL-SCNC: 103 MMOL/L (ref 98–107)
CO2 SERPL-SCNC: 24 MMOL/L (ref 21–32)
CREAT SERPL-MCNC: 1.59 MG/DL (ref 0.5–1.05)
DISPENSE STATUS: NORMAL
EGFRCR SERPLBLD CKD-EPI 2021: 33 ML/MIN/1.73M*2
EOSINOPHIL # BLD AUTO: 0.37 X10*3/UL (ref 0–0.4)
EOSINOPHIL NFR BLD AUTO: 4.1 %
ERYTHROCYTE [DISTWIDTH] IN BLOOD BY AUTOMATED COUNT: 15.7 % (ref 11.5–14.5)
GLUCOSE BLD MANUAL STRIP-MCNC: 131 MG/DL (ref 74–99)
GLUCOSE SERPL-MCNC: 170 MG/DL (ref 74–99)
HCT VFR BLD AUTO: 18.2 % (ref 36–46)
HCT VFR BLD AUTO: 22.3 % (ref 36–46)
HGB BLD-MCNC: 5.9 G/DL (ref 12–16)
HGB BLD-MCNC: 6.7 G/DL (ref 12–16)
IMM GRANULOCYTES # BLD AUTO: 0.05 X10*3/UL (ref 0–0.5)
IMM GRANULOCYTES NFR BLD AUTO: 0.6 % (ref 0–0.9)
INR PPP: 1.1 (ref 0.9–1.1)
LACTATE SERPL-SCNC: 2.1 MMOL/L (ref 0.4–2)
LACTATE SERPL-SCNC: 2.2 MMOL/L (ref 0.4–2)
LIPASE SERPL-CCNC: 27 U/L (ref 9–82)
LYMPHOCYTES # BLD AUTO: 2.09 X10*3/UL (ref 0.8–3)
LYMPHOCYTES NFR BLD AUTO: 23.4 %
MAGNESIUM SERPL-MCNC: 2 MG/DL (ref 1.6–2.4)
MCH RBC QN AUTO: 27 PG (ref 26–34)
MCHC RBC AUTO-ENTMCNC: 30 G/DL (ref 32–36)
MCV RBC AUTO: 90 FL (ref 80–100)
MONOCYTES # BLD AUTO: 0.89 X10*3/UL (ref 0.05–0.8)
MONOCYTES NFR BLD AUTO: 10 %
NEUTROPHILS # BLD AUTO: 5.48 X10*3/UL (ref 1.6–5.5)
NEUTROPHILS NFR BLD AUTO: 61.5 %
NRBC BLD-RTO: 0 /100 WBCS (ref 0–0)
P AXIS: 78 DEGREES
PHOSPHATE SERPL-MCNC: 4.6 MG/DL (ref 2.5–4.9)
PLATELET # BLD AUTO: 341 X10*3/UL (ref 150–450)
POTASSIUM SERPL-SCNC: 3.5 MMOL/L (ref 3.5–5.3)
PR INTERVAL: 164 MS
PRODUCT BLOOD TYPE: 5100
PRODUCT BLOOD TYPE: 5100
PRODUCT BLOOD TYPE: 9500
PRODUCT CODE: NORMAL
PROT SERPL-MCNC: 5.7 G/DL (ref 6.4–8.2)
PROTHROMBIN TIME: 12.5 SECONDS (ref 9.8–12.8)
Q ONSET: 249 MS
QRS COUNT: 13 BEATS
QRS DURATION: 123 MS
QT INTERVAL: 412 MS
QTC CALCULATION(BAZETT): 470 MS
QTC FREDERICIA: 449 MS
R AXIS: 65 DEGREES
RBC # BLD AUTO: 2.48 X10*6/UL (ref 4–5.2)
RBC MORPH BLD: NORMAL
RH FACTOR (ANTIGEN D): NORMAL
RH FACTOR (ANTIGEN D): NORMAL
SODIUM SERPL-SCNC: 138 MMOL/L (ref 136–145)
T AXIS: 59 DEGREES
T OFFSET: 455 MS
UNIT ABO: NORMAL
UNIT NUMBER: NORMAL
UNIT RH: NORMAL
UNIT VOLUME: 350
VENTRICULAR RATE: 78 BPM
WBC # BLD AUTO: 8.9 X10*3/UL (ref 4.4–11.3)
XM INTEP: NORMAL

## 2024-04-30 PROCEDURE — 82140 ASSAY OF AMMONIA: CPT | Performed by: STUDENT IN AN ORGANIZED HEALTH CARE EDUCATION/TRAINING PROGRAM

## 2024-04-30 PROCEDURE — 7100000001 HC RECOVERY ROOM TIME - INITIAL BASE CHARGE: Performed by: NURSE ANESTHETIST, CERTIFIED REGISTERED

## 2024-04-30 PROCEDURE — 7100000002 HC RECOVERY ROOM TIME - EACH INCREMENTAL 1 MINUTE: Performed by: NURSE ANESTHETIST, CERTIFIED REGISTERED

## 2024-04-30 PROCEDURE — 3600000007 HC OR TIME - EACH INCREMENTAL 1 MINUTE - PROCEDURE LEVEL TWO: Performed by: NURSE ANESTHETIST, CERTIFIED REGISTERED

## 2024-04-30 PROCEDURE — 85025 COMPLETE CBC W/AUTO DIFF WBC: CPT | Mod: 91 | Performed by: STUDENT IN AN ORGANIZED HEALTH CARE EDUCATION/TRAINING PROGRAM

## 2024-04-30 PROCEDURE — C9113 INJ PANTOPRAZOLE SODIUM, VIA: HCPCS | Performed by: STUDENT IN AN ORGANIZED HEALTH CARE EDUCATION/TRAINING PROGRAM

## 2024-04-30 PROCEDURE — 99285 EMERGENCY DEPT VISIT HI MDM: CPT | Mod: 25

## 2024-04-30 PROCEDURE — 2500000004 HC RX 250 GENERAL PHARMACY W/ HCPCS (ALT 636 FOR OP/ED): Performed by: STUDENT IN AN ORGANIZED HEALTH CARE EDUCATION/TRAINING PROGRAM

## 2024-04-30 PROCEDURE — 36415 COLL VENOUS BLD VENIPUNCTURE: CPT | Performed by: STUDENT IN AN ORGANIZED HEALTH CARE EDUCATION/TRAINING PROGRAM

## 2024-04-30 PROCEDURE — 84484 ASSAY OF TROPONIN QUANT: CPT | Performed by: STUDENT IN AN ORGANIZED HEALTH CARE EDUCATION/TRAINING PROGRAM

## 2024-04-30 PROCEDURE — 99291 CRITICAL CARE FIRST HOUR: CPT | Performed by: INTERNAL MEDICINE

## 2024-04-30 PROCEDURE — 2500000004 HC RX 250 GENERAL PHARMACY W/ HCPCS (ALT 636 FOR OP/ED)

## 2024-04-30 PROCEDURE — 3700000001 HC GENERAL ANESTHESIA TIME - INITIAL BASE CHARGE: Performed by: NURSE ANESTHETIST, CERTIFIED REGISTERED

## 2024-04-30 PROCEDURE — G0378 HOSPITAL OBSERVATION PER HR: HCPCS

## 2024-04-30 PROCEDURE — 2550000001 HC RX 255 CONTRASTS: Performed by: EMERGENCY MEDICINE

## 2024-04-30 PROCEDURE — 83605 ASSAY OF LACTIC ACID: CPT | Performed by: STUDENT IN AN ORGANIZED HEALTH CARE EDUCATION/TRAINING PROGRAM

## 2024-04-30 PROCEDURE — 84100 ASSAY OF PHOSPHORUS: CPT | Performed by: STUDENT IN AN ORGANIZED HEALTH CARE EDUCATION/TRAINING PROGRAM

## 2024-04-30 PROCEDURE — 1090000002 HH PPS REVENUE DEBIT

## 2024-04-30 PROCEDURE — 2500000005 HC RX 250 GENERAL PHARMACY W/O HCPCS: Performed by: NURSE ANESTHETIST, CERTIFIED REGISTERED

## 2024-04-30 PROCEDURE — 36430 TRANSFUSION BLD/BLD COMPNT: CPT

## 2024-04-30 PROCEDURE — 80053 COMPREHEN METABOLIC PANEL: CPT | Performed by: STUDENT IN AN ORGANIZED HEALTH CARE EDUCATION/TRAINING PROGRAM

## 2024-04-30 PROCEDURE — 85025 COMPLETE CBC W/AUTO DIFF WBC: CPT | Performed by: STUDENT IN AN ORGANIZED HEALTH CARE EDUCATION/TRAINING PROGRAM

## 2024-04-30 PROCEDURE — 2500000005 HC RX 250 GENERAL PHARMACY W/O HCPCS: Performed by: ANESTHESIOLOGY

## 2024-04-30 PROCEDURE — P9016 RBC LEUKOCYTES REDUCED: HCPCS

## 2024-04-30 PROCEDURE — 43235 EGD DIAGNOSTIC BRUSH WASH: CPT | Performed by: INTERNAL MEDICINE

## 2024-04-30 PROCEDURE — 86923 COMPATIBILITY TEST ELECTRIC: CPT | Mod: 91

## 2024-04-30 PROCEDURE — 86920 COMPATIBILITY TEST SPIN: CPT | Mod: 91

## 2024-04-30 PROCEDURE — 99291 CRITICAL CARE FIRST HOUR: CPT | Performed by: STUDENT IN AN ORGANIZED HEALTH CARE EDUCATION/TRAINING PROGRAM

## 2024-04-30 PROCEDURE — 82947 ASSAY GLUCOSE BLOOD QUANT: CPT

## 2024-04-30 PROCEDURE — 2500000004 HC RX 250 GENERAL PHARMACY W/ HCPCS (ALT 636 FOR OP/ED): Performed by: ANESTHESIOLOGY

## 2024-04-30 PROCEDURE — 83605 ASSAY OF LACTIC ACID: CPT | Mod: 91 | Performed by: STUDENT IN AN ORGANIZED HEALTH CARE EDUCATION/TRAINING PROGRAM

## 2024-04-30 PROCEDURE — 96376 TX/PRO/DX INJ SAME DRUG ADON: CPT | Mod: 59

## 2024-04-30 PROCEDURE — 1090000001 HH PPS REVENUE CREDIT

## 2024-04-30 PROCEDURE — 96375 TX/PRO/DX INJ NEW DRUG ADDON: CPT | Mod: 59

## 2024-04-30 PROCEDURE — 2500000004 HC RX 250 GENERAL PHARMACY W/ HCPCS (ALT 636 FOR OP/ED): Performed by: INTERNAL MEDICINE

## 2024-04-30 PROCEDURE — 85610 PROTHROMBIN TIME: CPT | Performed by: STUDENT IN AN ORGANIZED HEALTH CARE EDUCATION/TRAINING PROGRAM

## 2024-04-30 PROCEDURE — 83690 ASSAY OF LIPASE: CPT | Performed by: STUDENT IN AN ORGANIZED HEALTH CARE EDUCATION/TRAINING PROGRAM

## 2024-04-30 PROCEDURE — 2500000005 HC RX 250 GENERAL PHARMACY W/O HCPCS

## 2024-04-30 PROCEDURE — 80069 RENAL FUNCTION PANEL: CPT | Performed by: STUDENT IN AN ORGANIZED HEALTH CARE EDUCATION/TRAINING PROGRAM

## 2024-04-30 PROCEDURE — 85730 THROMBOPLASTIN TIME PARTIAL: CPT | Performed by: STUDENT IN AN ORGANIZED HEALTH CARE EDUCATION/TRAINING PROGRAM

## 2024-04-30 PROCEDURE — 74177 CT ABD & PELVIS W/CONTRAST: CPT

## 2024-04-30 PROCEDURE — 99222 1ST HOSP IP/OBS MODERATE 55: CPT | Performed by: NURSE PRACTITIONER

## 2024-04-30 PROCEDURE — 99222 1ST HOSP IP/OBS MODERATE 55: CPT | Performed by: PATHOLOGY

## 2024-04-30 PROCEDURE — 85384 FIBRINOGEN ACTIVITY: CPT | Performed by: STUDENT IN AN ORGANIZED HEALTH CARE EDUCATION/TRAINING PROGRAM

## 2024-04-30 PROCEDURE — 93005 ELECTROCARDIOGRAM TRACING: CPT

## 2024-04-30 PROCEDURE — 86901 BLOOD TYPING SEROLOGIC RH(D): CPT | Performed by: STUDENT IN AN ORGANIZED HEALTH CARE EDUCATION/TRAINING PROGRAM

## 2024-04-30 PROCEDURE — 2500000004 HC RX 250 GENERAL PHARMACY W/ HCPCS (ALT 636 FOR OP/ED): Performed by: NURSE ANESTHETIST, CERTIFIED REGISTERED

## 2024-04-30 PROCEDURE — 2500000004 HC RX 250 GENERAL PHARMACY W/ HCPCS (ALT 636 FOR OP/ED): Performed by: NURSE PRACTITIONER

## 2024-04-30 PROCEDURE — 2020000001 HC ICU ROOM DAILY

## 2024-04-30 PROCEDURE — 85018 HEMOGLOBIN: CPT | Mod: 59,91 | Performed by: NURSE PRACTITIONER

## 2024-04-30 PROCEDURE — 96374 THER/PROPH/DIAG INJ IV PUSH: CPT | Mod: 59

## 2024-04-30 PROCEDURE — 3700000002 HC GENERAL ANESTHESIA TIME - EACH INCREMENTAL 1 MINUTE: Performed by: NURSE ANESTHETIST, CERTIFIED REGISTERED

## 2024-04-30 PROCEDURE — 74177 CT ABD & PELVIS W/CONTRAST: CPT | Performed by: RADIOLOGY

## 2024-04-30 PROCEDURE — 83735 ASSAY OF MAGNESIUM: CPT | Performed by: STUDENT IN AN ORGANIZED HEALTH CARE EDUCATION/TRAINING PROGRAM

## 2024-04-30 PROCEDURE — 3600000002 HC OR TIME - INITIAL BASE CHARGE - PROCEDURE LEVEL TWO: Performed by: NURSE ANESTHETIST, CERTIFIED REGISTERED

## 2024-04-30 RX ORDER — ONDANSETRON HYDROCHLORIDE 2 MG/ML
4 INJECTION, SOLUTION INTRAVENOUS ONCE
Status: COMPLETED | OUTPATIENT
Start: 2024-04-30 | End: 2024-04-30

## 2024-04-30 RX ORDER — METOCLOPRAMIDE HYDROCHLORIDE 5 MG/ML
10 INJECTION INTRAMUSCULAR; INTRAVENOUS ONCE
Status: COMPLETED | OUTPATIENT
Start: 2024-04-30 | End: 2024-04-30

## 2024-04-30 RX ORDER — MORPHINE SULFATE 2 MG/ML
INJECTION, SOLUTION INTRAMUSCULAR; INTRAVENOUS
Status: COMPLETED
Start: 2024-04-30 | End: 2024-04-30

## 2024-04-30 RX ORDER — IPRATROPIUM BROMIDE AND ALBUTEROL SULFATE 2.5; .5 MG/3ML; MG/3ML
3 SOLUTION RESPIRATORY (INHALATION) EVERY 6 HOURS PRN
Status: DISCONTINUED | OUTPATIENT
Start: 2024-04-30 | End: 2024-04-30 | Stop reason: HOSPADM

## 2024-04-30 RX ORDER — BUPROPION HYDROCHLORIDE 150 MG/1
150 TABLET ORAL DAILY
Status: DISCONTINUED | OUTPATIENT
Start: 2024-04-30 | End: 2024-04-30 | Stop reason: HOSPADM

## 2024-04-30 RX ORDER — ROSUVASTATIN CALCIUM 20 MG/1
40 TABLET, COATED ORAL NIGHTLY
Status: DISCONTINUED | OUTPATIENT
Start: 2024-04-30 | End: 2024-04-30

## 2024-04-30 RX ORDER — DEXTROSE 50 % IN WATER (D50W) INTRAVENOUS SYRINGE
12.5
Status: DISCONTINUED | OUTPATIENT
Start: 2024-04-30 | End: 2024-05-10 | Stop reason: HOSPADM

## 2024-04-30 RX ORDER — PHENYLEPHRINE HYDROCHLORIDE 10 MG/ML
INJECTION INTRAVENOUS AS NEEDED
Status: DISCONTINUED | OUTPATIENT
Start: 2024-04-30 | End: 2024-04-30

## 2024-04-30 RX ORDER — NOREPINEPHRINE BITARTRATE/D5W 8 MG/250ML
.01-.5 PLASTIC BAG, INJECTION (ML) INTRAVENOUS CONTINUOUS
Status: DISCONTINUED | OUTPATIENT
Start: 2024-04-30 | End: 2024-04-30 | Stop reason: HOSPADM

## 2024-04-30 RX ORDER — ROSUVASTATIN CALCIUM 40 MG/1
40 TABLET, COATED ORAL DAILY
Status: DISCONTINUED | OUTPATIENT
Start: 2024-05-01 | End: 2024-04-30

## 2024-04-30 RX ORDER — SODIUM CHLORIDE 9 MG/ML
50 INJECTION, SOLUTION INTRAVENOUS CONTINUOUS
Status: DISCONTINUED | OUTPATIENT
Start: 2024-04-30 | End: 2024-04-30

## 2024-04-30 RX ORDER — SUCCINYLCHOLINE CHLORIDE 100 MG/5ML
SYRINGE (ML) INTRAVENOUS AS NEEDED
Status: DISCONTINUED | OUTPATIENT
Start: 2024-04-30 | End: 2024-04-30

## 2024-04-30 RX ORDER — FENTANYL CITRATE 50 UG/ML
INJECTION, SOLUTION INTRAMUSCULAR; INTRAVENOUS AS NEEDED
Status: DISCONTINUED | OUTPATIENT
Start: 2024-04-30 | End: 2024-04-30

## 2024-04-30 RX ORDER — SERTRALINE HYDROCHLORIDE 100 MG/1
100 TABLET, FILM COATED ORAL DAILY
Status: DISCONTINUED | OUTPATIENT
Start: 2024-05-01 | End: 2024-04-30

## 2024-04-30 RX ORDER — ONDANSETRON HYDROCHLORIDE 2 MG/ML
4 INJECTION, SOLUTION INTRAVENOUS EVERY 8 HOURS PRN
Status: DISCONTINUED | OUTPATIENT
Start: 2024-04-30 | End: 2024-04-30 | Stop reason: HOSPADM

## 2024-04-30 RX ORDER — SODIUM CHLORIDE 9 MG/ML
75 INJECTION, SOLUTION INTRAVENOUS CONTINUOUS
Status: DISCONTINUED | OUTPATIENT
Start: 2024-04-30 | End: 2024-04-30 | Stop reason: HOSPADM

## 2024-04-30 RX ORDER — ROSUVASTATIN CALCIUM 10 MG/1
10 TABLET, COATED ORAL NIGHTLY
Status: DISCONTINUED | OUTPATIENT
Start: 2024-04-30 | End: 2024-04-30 | Stop reason: HOSPADM

## 2024-04-30 RX ORDER — ATORVASTATIN CALCIUM 20 MG/1
10 TABLET, FILM COATED ORAL NIGHTLY
Status: DISCONTINUED | OUTPATIENT
Start: 2024-05-01 | End: 2024-04-30

## 2024-04-30 RX ORDER — PANTOPRAZOLE SODIUM 40 MG/10ML
40 INJECTION, POWDER, LYOPHILIZED, FOR SOLUTION INTRAVENOUS 2 TIMES DAILY
Status: DISCONTINUED | OUTPATIENT
Start: 2024-04-30 | End: 2024-04-30 | Stop reason: HOSPADM

## 2024-04-30 RX ORDER — PROCHLORPERAZINE EDISYLATE 5 MG/ML
10 INJECTION INTRAMUSCULAR; INTRAVENOUS EVERY 6 HOURS PRN
Status: DISCONTINUED | OUTPATIENT
Start: 2024-04-30 | End: 2024-04-30 | Stop reason: HOSPADM

## 2024-04-30 RX ORDER — HYDROMORPHONE HYDROCHLORIDE 1 MG/ML
0.2 INJECTION, SOLUTION INTRAMUSCULAR; INTRAVENOUS; SUBCUTANEOUS EVERY 4 HOURS PRN
Status: DISCONTINUED | OUTPATIENT
Start: 2024-04-30 | End: 2024-05-01

## 2024-04-30 RX ORDER — PROPOFOL 10 MG/ML
INJECTION, EMULSION INTRAVENOUS AS NEEDED
Status: DISCONTINUED | OUTPATIENT
Start: 2024-04-30 | End: 2024-04-30

## 2024-04-30 RX ORDER — KETOROLAC TROMETHAMINE 30 MG/ML
15 INJECTION, SOLUTION INTRAMUSCULAR; INTRAVENOUS ONCE
Status: COMPLETED | OUTPATIENT
Start: 2024-04-30 | End: 2024-04-30

## 2024-04-30 RX ORDER — PANTOPRAZOLE SODIUM 40 MG/10ML
40 INJECTION, POWDER, LYOPHILIZED, FOR SOLUTION INTRAVENOUS DAILY
Status: DISCONTINUED | OUTPATIENT
Start: 2024-05-01 | End: 2024-04-30

## 2024-04-30 RX ORDER — BUPROPION HYDROCHLORIDE 150 MG/1
150 TABLET ORAL EVERY MORNING
Status: DISCONTINUED | OUTPATIENT
Start: 2024-05-01 | End: 2024-04-30

## 2024-04-30 RX ORDER — SODIUM CHLORIDE, SODIUM LACTATE, POTASSIUM CHLORIDE, CALCIUM CHLORIDE 600; 310; 30; 20 MG/100ML; MG/100ML; MG/100ML; MG/100ML
75 INJECTION, SOLUTION INTRAVENOUS CONTINUOUS
Status: DISCONTINUED | OUTPATIENT
Start: 2024-04-30 | End: 2024-05-01

## 2024-04-30 RX ORDER — PROCHLORPERAZINE MALEATE 10 MG
10 TABLET ORAL EVERY 6 HOURS PRN
Status: DISCONTINUED | OUTPATIENT
Start: 2024-04-30 | End: 2024-04-30 | Stop reason: HOSPADM

## 2024-04-30 RX ORDER — ONDANSETRON 4 MG/1
4 TABLET, ORALLY DISINTEGRATING ORAL EVERY 8 HOURS PRN
Status: DISCONTINUED | OUTPATIENT
Start: 2024-04-30 | End: 2024-04-30 | Stop reason: HOSPADM

## 2024-04-30 RX ORDER — FUROSEMIDE 10 MG/ML
20 INJECTION INTRAMUSCULAR; INTRAVENOUS ONCE
Status: DISCONTINUED | OUTPATIENT
Start: 2024-04-30 | End: 2024-04-30

## 2024-04-30 RX ORDER — PROCHLORPERAZINE 25 MG/1
25 SUPPOSITORY RECTAL EVERY 12 HOURS PRN
Status: DISCONTINUED | OUTPATIENT
Start: 2024-04-30 | End: 2024-04-30 | Stop reason: HOSPADM

## 2024-04-30 RX ORDER — PANTOPRAZOLE SODIUM 40 MG/10ML
40 INJECTION, POWDER, LYOPHILIZED, FOR SOLUTION INTRAVENOUS 2 TIMES DAILY
Status: DISCONTINUED | OUTPATIENT
Start: 2024-05-01 | End: 2024-05-01

## 2024-04-30 RX ORDER — FAMOTIDINE 10 MG/ML
20 INJECTION INTRAVENOUS ONCE
Status: COMPLETED | OUTPATIENT
Start: 2024-04-30 | End: 2024-04-30

## 2024-04-30 RX ORDER — PANTOPRAZOLE SODIUM 40 MG/10ML
80 INJECTION, POWDER, LYOPHILIZED, FOR SOLUTION INTRAVENOUS ONCE
Status: COMPLETED | OUTPATIENT
Start: 2024-04-30 | End: 2024-04-30

## 2024-04-30 RX ORDER — METOPROLOL TARTRATE 25 MG/1
25 TABLET, FILM COATED ORAL 2 TIMES DAILY
Status: DISCONTINUED | OUTPATIENT
Start: 2024-04-30 | End: 2024-04-30 | Stop reason: HOSPADM

## 2024-04-30 RX ORDER — INSULIN LISPRO 100 [IU]/ML
0-5 INJECTION, SOLUTION INTRAVENOUS; SUBCUTANEOUS
Status: DISCONTINUED | OUTPATIENT
Start: 2024-05-01 | End: 2024-05-10 | Stop reason: HOSPADM

## 2024-04-30 RX ORDER — ALBUTEROL SULFATE 90 UG/1
2 AEROSOL, METERED RESPIRATORY (INHALATION) EVERY 6 HOURS PRN
Status: DISCONTINUED | OUTPATIENT
Start: 2024-04-30 | End: 2024-05-10 | Stop reason: HOSPADM

## 2024-04-30 RX ORDER — FLUTICASONE FUROATE AND VILANTEROL 200; 25 UG/1; UG/1
1 POWDER RESPIRATORY (INHALATION)
Status: DISCONTINUED | OUTPATIENT
Start: 2024-04-30 | End: 2024-04-30 | Stop reason: HOSPADM

## 2024-04-30 RX ORDER — LIDOCAINE HCL/PF 100 MG/5ML
SYRINGE (ML) INTRAVENOUS AS NEEDED
Status: DISCONTINUED | OUTPATIENT
Start: 2024-04-30 | End: 2024-04-30

## 2024-04-30 RX ORDER — POTASSIUM CHLORIDE 20 MEQ/1
20 TABLET, EXTENDED RELEASE ORAL DAILY
COMMUNITY
End: 2024-05-10 | Stop reason: HOSPADM

## 2024-04-30 RX ADMIN — Medication 3 L/MIN: at 12:40

## 2024-04-30 RX ADMIN — FAMOTIDINE 20 MG: 10 INJECTION, SOLUTION INTRAVENOUS at 12:51

## 2024-04-30 RX ADMIN — MORPHINE SULFATE 2 MG: 2 INJECTION, SOLUTION INTRAMUSCULAR; INTRAVENOUS at 21:30

## 2024-04-30 RX ADMIN — LIDOCAINE HYDROCHLORIDE 100 MG: 20 INJECTION, SOLUTION INTRAVENOUS at 14:19

## 2024-04-30 RX ADMIN — NOREPINEPHRINE BITARTRATE 0.03 MCG/KG/MIN: 8 INJECTION, SOLUTION INTRAVENOUS at 20:00

## 2024-04-30 RX ADMIN — METOCLOPRAMIDE 10 MG: 5 INJECTION, SOLUTION INTRAMUSCULAR; INTRAVENOUS at 12:51

## 2024-04-30 RX ADMIN — PANTOPRAZOLE SODIUM 80 MG: 40 INJECTION, POWDER, FOR SOLUTION INTRAVENOUS at 06:26

## 2024-04-30 RX ADMIN — ONDANSETRON 4 MG: 2 INJECTION INTRAMUSCULAR; INTRAVENOUS at 18:26

## 2024-04-30 RX ADMIN — KETOROLAC TROMETHAMINE 15 MG: 30 INJECTION, SOLUTION INTRAMUSCULAR at 07:43

## 2024-04-30 RX ADMIN — PHENYLEPHRINE HYDROCHLORIDE 100 MCG: 10 INJECTION INTRAVENOUS at 14:26

## 2024-04-30 RX ADMIN — SODIUM CHLORIDE 50 ML/HR: 9 INJECTION, SOLUTION INTRAVENOUS at 12:54

## 2024-04-30 RX ADMIN — SODIUM CHLORIDE: 9 INJECTION, SOLUTION INTRAVENOUS at 14:09

## 2024-04-30 RX ADMIN — Medication 80 MG: at 14:19

## 2024-04-30 RX ADMIN — SODIUM CHLORIDE 75 ML/HR: 9 INJECTION, SOLUTION INTRAVENOUS at 16:20

## 2024-04-30 RX ADMIN — IOHEXOL 75 ML: 350 INJECTION, SOLUTION INTRAVENOUS at 10:00

## 2024-04-30 RX ADMIN — PROPOFOL 100 MG: 10 INJECTION, EMULSION INTRAVENOUS at 14:19

## 2024-04-30 RX ADMIN — NOREPINEPHRINE BITARTRATE 0.02 MCG/KG/MIN: 8 INJECTION, SOLUTION INTRAVENOUS at 21:06

## 2024-04-30 RX ADMIN — PHENYLEPHRINE HYDROCHLORIDE 100 MCG: 10 INJECTION INTRAVENOUS at 14:31

## 2024-04-30 RX ADMIN — FENTANYL CITRATE 100 MCG: 50 INJECTION INTRAMUSCULAR; INTRAVENOUS at 14:19

## 2024-04-30 RX ADMIN — ONDANSETRON 4 MG: 2 INJECTION INTRAMUSCULAR; INTRAVENOUS at 06:24

## 2024-04-30 SDOH — SOCIAL STABILITY: SOCIAL INSECURITY: WERE YOU ABLE TO COMPLETE ALL THE BEHAVIORAL HEALTH SCREENINGS?: YES

## 2024-04-30 SDOH — SOCIAL STABILITY: SOCIAL INSECURITY: ARE THERE ANY APPARENT SIGNS OF INJURIES/BEHAVIORS THAT COULD BE RELATED TO ABUSE/NEGLECT?: NO

## 2024-04-30 SDOH — SOCIAL STABILITY: SOCIAL INSECURITY: ABUSE: ADULT

## 2024-04-30 SDOH — SOCIAL STABILITY: SOCIAL INSECURITY: DO YOU FEEL ANYONE HAS EXPLOITED OR TAKEN ADVANTAGE OF YOU FINANCIALLY OR OF YOUR PERSONAL PROPERTY?: NO

## 2024-04-30 SDOH — HEALTH STABILITY: MENTAL HEALTH: CURRENT SMOKER: 0

## 2024-04-30 SDOH — SOCIAL STABILITY: SOCIAL INSECURITY: HAVE YOU HAD THOUGHTS OF HARMING ANYONE ELSE?: NO

## 2024-04-30 SDOH — SOCIAL STABILITY: SOCIAL INSECURITY: DO YOU FEEL UNSAFE GOING BACK TO THE PLACE WHERE YOU ARE LIVING?: NO

## 2024-04-30 SDOH — SOCIAL STABILITY: SOCIAL INSECURITY: ARE YOU OR HAVE YOU BEEN THREATENED OR ABUSED PHYSICALLY, EMOTIONALLY, OR SEXUALLY BY ANYONE?: NO

## 2024-04-30 SDOH — SOCIAL STABILITY: SOCIAL INSECURITY: HAS ANYONE EVER THREATENED TO HURT YOUR FAMILY OR YOUR PETS?: NO

## 2024-04-30 SDOH — SOCIAL STABILITY: SOCIAL INSECURITY: DOES ANYONE TRY TO KEEP YOU FROM HAVING/CONTACTING OTHER FRIENDS OR DOING THINGS OUTSIDE YOUR HOME?: NO

## 2024-04-30 ASSESSMENT — COGNITIVE AND FUNCTIONAL STATUS - GENERAL
MOBILITY SCORE: 22
PATIENT BASELINE BEDBOUND: NO
DAILY ACTIVITIY SCORE: 24
PATIENT BASELINE BEDBOUND: NO
MOBILITY SCORE: 24
WALKING IN HOSPITAL ROOM: A LITTLE
CLIMB 3 TO 5 STEPS WITH RAILING: A LITTLE
DAILY ACTIVITIY SCORE: 24

## 2024-04-30 ASSESSMENT — COLUMBIA-SUICIDE SEVERITY RATING SCALE - C-SSRS

## 2024-04-30 ASSESSMENT — PAIN DESCRIPTION - LOCATION
LOCATION: LEG
LOCATION: ABDOMEN

## 2024-04-30 ASSESSMENT — PAIN - FUNCTIONAL ASSESSMENT
PAIN_FUNCTIONAL_ASSESSMENT: 0-10

## 2024-04-30 ASSESSMENT — ENCOUNTER SYMPTOMS
PSYCHIATRIC NEGATIVE: 1
BLOOD IN STOOL: 1
ENDOCRINE NEGATIVE: 1
RESPIRATORY NEGATIVE: 1
CARDIOVASCULAR NEGATIVE: 1
CONSTITUTIONAL NEGATIVE: 1
MUSCULOSKELETAL NEGATIVE: 1
WEAKNESS: 1
ABDOMINAL PAIN: 1
EYES NEGATIVE: 1

## 2024-04-30 ASSESSMENT — ACTIVITIES OF DAILY LIVING (ADL)
DRESSING YOURSELF: INDEPENDENT
HEARING - LEFT EAR: FUNCTIONAL
DRESSING YOURSELF: INDEPENDENT
JUDGMENT_ADEQUATE_SAFELY_COMPLETE_DAILY_ACTIVITIES: YES
ADEQUATE_TO_COMPLETE_ADL: YES
PATIENT'S MEMORY ADEQUATE TO SAFELY COMPLETE DAILY ACTIVITIES?: YES
HEARING - RIGHT EAR: FUNCTIONAL
WALKS IN HOME: INDEPENDENT
LACK_OF_TRANSPORTATION: NO
PATIENT'S MEMORY ADEQUATE TO SAFELY COMPLETE DAILY ACTIVITIES?: YES
WALKS IN HOME: INDEPENDENT
BATHING: INDEPENDENT
ADEQUATE_TO_COMPLETE_ADL: YES
FEEDING YOURSELF: INDEPENDENT
GROOMING: INDEPENDENT
GROOMING: INDEPENDENT
HEARING - RIGHT EAR: FUNCTIONAL
TOILETING: INDEPENDENT
BATHING: INDEPENDENT
HEARING - LEFT EAR: FUNCTIONAL
TOILETING: INDEPENDENT
ASSISTIVE_DEVICE: CANE;WALKER
JUDGMENT_ADEQUATE_SAFELY_COMPLETE_DAILY_ACTIVITIES: YES
FEEDING YOURSELF: INDEPENDENT

## 2024-04-30 ASSESSMENT — LIFESTYLE VARIABLES
AUDIT-C TOTAL SCORE: 0
AUDIT-C TOTAL SCORE: 1
SKIP TO QUESTIONS 9-10: 1
SKIP TO QUESTIONS 9-10: 1
HOW OFTEN DO YOU HAVE 6 OR MORE DRINKS ON ONE OCCASION: NEVER
HOW MANY STANDARD DRINKS CONTAINING ALCOHOL DO YOU HAVE ON A TYPICAL DAY: PATIENT DOES NOT DRINK
AUDIT-C TOTAL SCORE: 0
HOW MANY STANDARD DRINKS CONTAINING ALCOHOL DO YOU HAVE ON A TYPICAL DAY: 1 OR 2
HOW OFTEN DO YOU HAVE A DRINK CONTAINING ALCOHOL: MONTHLY OR LESS
HOW OFTEN DO YOU HAVE A DRINK CONTAINING ALCOHOL: NEVER
AUDIT-C TOTAL SCORE: 1
HOW OFTEN DO YOU HAVE 6 OR MORE DRINKS ON ONE OCCASION: NEVER

## 2024-04-30 ASSESSMENT — PAIN SCALES - GENERAL
PAINLEVEL_OUTOF10: 8
PAINLEVEL_OUTOF10: 0 - NO PAIN
PAIN_LEVEL: 0
PAINLEVEL_OUTOF10: 10 - WORST POSSIBLE PAIN
PAINLEVEL_OUTOF10: 0 - NO PAIN
PAINLEVEL_OUTOF10: 8
PAINLEVEL_OUTOF10: 8
PAINLEVEL_OUTOF10: 0 - NO PAIN
PAINLEVEL_OUTOF10: 5 - MODERATE PAIN

## 2024-04-30 ASSESSMENT — PATIENT HEALTH QUESTIONNAIRE - PHQ9
1. LITTLE INTEREST OR PLEASURE IN DOING THINGS: NOT AT ALL
2. FEELING DOWN, DEPRESSED OR HOPELESS: NOT AT ALL
1. LITTLE INTEREST OR PLEASURE IN DOING THINGS: NOT AT ALL
2. FEELING DOWN, DEPRESSED OR HOPELESS: NOT AT ALL
SUM OF ALL RESPONSES TO PHQ9 QUESTIONS 1 & 2: 0
SUM OF ALL RESPONSES TO PHQ9 QUESTIONS 1 & 2: 0

## 2024-04-30 ASSESSMENT — PAIN DESCRIPTION - PAIN TYPE: TYPE: ACUTE PAIN

## 2024-04-30 NOTE — RESULT ENCOUNTER NOTE
Pts BW shows she is continuing to lose blood.  I think it would be best if she went to the ED to be evaluated.  I'm concerned for her safety.

## 2024-04-30 NOTE — PROGRESS NOTES
Brooklyn Read is a 79 y.o. female admitted for Upper GI bleeding. Pharmacy reviewed the patient's nnisu-hl-skoprdxkg medications and allergies for accuracy.    The list below reflects the PTA list prior to pharmacy medication history. A summary a changes to the PTA medication list has been listed below. Please review each medication in order reconciliation for additional clarification and justification.    Source of information:  DAUGHTER    Medications added:  POT 20MEQ 1 QD    Medications modified:  FUROSEMIDE 40MG  1 BID PT TAKES 1QD    Medications to be removed:  VIT C  CALCIUM WITH D  VIT D3  MELATONIN  COMBIVENT  LOPERAMIDE  PHOSPHA  TRIAMCINOLONE CREAM  SPIROLACTONE    Medications of concern:      Prior to Admission Medications   Prescriptions Last Dose Informant Patient Reported? Taking?   EPINEPHrine 0.3 mg/0.3 mL injection syringe   Yes No   Sig: Inject 0.3 mL (0.3 mg) into the muscle 1 time if needed for anaphylaxis.   HYDROcodone-acetaminophen (Norco) 5-325 mg tablet Not Taking  No No   Sig: Take 1 tablet by mouth every 6 hours if needed for severe pain (7 - 10).   Patient not taking: Reported on 4/30/2024   Trelegy Ellipta 200-62.5-25 mcg blister with device 4/29/2024  No Yes   Sig: Inhale 1 puff once daily.   albuterol 90 mcg/actuation inhaler   No No   Sig: Inhale 2 puffs every 6 hours if needed for wheezing.   alendronate (Fosamax) 70 mg tablet 4/29/2024  No Yes   Sig: Take 1 tablet (70 mg) by mouth every 7 days.   allopurinol (Zyloprim) 100 mg tablet 4/29/2024  No Yes   Sig: Take 1 tablet (100 mg) by mouth 2 times a day.   amoxicillin-pot clavulanate (Augmentin) 875-125 mg tablet   No No   Sig: Take 1 tablet (875 mg) by mouth 2 times a day for 14 days.   ascorbic acid (Vitamin C) 500 mg tablet   Yes No   Sig: Take 1 tablet (500 mg) by mouth once daily.   aspirin 81 mg EC tablet 4/29/2024  Yes Yes   Sig: Take 1 tablet (81 mg) by mouth once daily.   atorvastatin (Lipitor) 10 mg tablet 4/29/2024  No  Yes   Sig: Take 1 tablet (10 mg) by mouth once daily.   buPROPion XL (Wellbutrin XL) 150 mg 24 hr tablet 4/29/2024  No Yes   Sig: Take 1 tablet (150 mg) by mouth once daily in the morning.   calcium carbonate-vitamin D3 600 mg-5 mcg (200 unit) tablet   Yes No   Sig: Take by mouth.   cholecalciferol (Vitamin D-3) 50 MCG (2000 UT) tablet   Yes No   Sig: Take 1 tablet (2,000 Units) by mouth once daily.   empagliflozin (Jardiance) 10 mg 4/29/2024  No Yes   Sig: Take 1 tablet (10 mg) by mouth once daily.   fluticasone-umeclidin-vilanter (Trelegy Ellipta) 100-62.5-25 mcg blister with device   Yes No   Sig: Inhale 1 puff once daily. Indications: bronchospasm prevention with COPD   furosemide (Lasix) 40 mg tablet 4/29/2024  No Yes   Sig: Take 1 tablet (40 mg) by mouth 2 times a day.   ipratropium-albuteroL (Combivent Respimat)  mcg/actuation inhaler 4/29/2024  Yes Yes   loperamide (Imodium) 2 mg capsule Unknown  Yes No   losartan (Cozaar) 100 mg tablet 4/29/2024  No Yes   Sig: Take 1 tablet (100 mg) by mouth once daily.   melatonin 10 mg tablet 4/29/2024  Yes Yes   metoprolol succinate XL (Toprol-XL) 100 mg 24 hr tablet 4/29/2024  No Yes   Sig: Take 1 tablet (100 mg) by mouth once daily.   rosuvastatin (Crestor) 40 mg tablet 4/29/2024  No Yes   Sig: Take 1 tablet (40 mg) by mouth once daily.   sertraline (Zoloft) 100 mg tablet 4/29/2024  No Yes   Sig: Take 1 tablet (100 mg) by mouth once daily.   sod phos di, mono-K phos mono (Phospha 250 Neutral) tablet   Yes No   Sig: Take 1 tablet (250 mg) by mouth 2 times a day.   tiZANidine (Zanaflex) 2 mg capsule   No No   Sig: Take 1 capsule (2 mg) by mouth in the morning and 1 capsule (2 mg) in the evening and 1 capsule (2 mg) before bedtime.   triamcinolone (Kenalog) 0.1 % cream   Yes No      Facility-Administered Medications Last Administration Doses Remaining   spironolactone (Aldactone) tablet 25 mg None recorded           Kisha Rodriguez

## 2024-04-30 NOTE — TELEPHONE ENCOUNTER
----- Message from Gianluca Macario MD sent at 4/29/2024  9:53 PM EDT -----  Pts BW shows she is continuing to lose blood.  I think it would be best if she went to the ED to be evaluated.  I'm concerned for her safety.

## 2024-04-30 NOTE — PROGRESS NOTES
Emergency Medicine Transition of Care Note.    I received Brooklyn Read in signout from Dr. Pinon.  Please see the previous ED provider note for all HPI, PE and MDM up to the time of signout. This is in addition to the primary record.    In brief Brooklyn Read is an 79 y.o. female presenting for   Chief Complaint   Patient presents with   • Vomiting Blood     Pt states she went to bed fine and woke this morning with two episodes of vomiting blood. Pt states blood was bright red with clots. Pt with lower abdominal pain, states she is constipated.         Diagnoses as of 04/30/24 1046   Upper GI bleed   Anemia requiring transfusions       Medical Decision Making  Patient was signed out to me pending CAT scan.  It shows haziness around the inferior posterior margin of the gastric pylorus related to peptic ulcer disease.  This could be the source of her bleeding.  Blood pressure is now 106/50.  She has had no further evidence of bleeding here.  She already see blood and IV Protonix.  I discussed with the hospitalist KRYSTINA Lee for admission to the hospital.        Final diagnoses:   None           Procedure  Procedures    Quinton Jenkins MD

## 2024-04-30 NOTE — SIGNIFICANT EVENT
Notified that patient had more emesis of red blood, recent H/H 5.9    Pt seen, appears pale, slightly dyspneic  VS: 97.5 HR 82 RR 26  102/51  Heart RRR no appreciable murmur  Lungs w poor excursion, clear anteriorly,   Abd BS active non distended  Ext pallor noted    Another unit RBC is being transfused, EGD report reviewed no active bleeding but large ulcers w clots  GI anticipated need for repeat EGD in the AM, but there is no GI coverage here  Request to transfer patient to transfer to another facility placed

## 2024-04-30 NOTE — H&P
History Of Present Illness  Brooklyn Read is a 79 y.o. female presented to the emergency room today with complaints of recurrent bright red hematemesis x 3.  On presentation she was noted to be hypotensive and needed to be given IV fluids and also blood transfusion.  She also was noted to have a significant drop of her hemoglobin to 6.7 g%.  On interview she admitted to taking 6 to 8 tablets of ibuprofen per week for management of arthritis..     Past Medical History  She has a past medical history of Anemia, Anxiety, Cerebral vascular accident (Multi), CKD (chronic kidney disease), COPD (chronic obstructive pulmonary disease) (Multi), Depression, GI (gastrointestinal bleed), Hyperlipidemia, and Hypertension.    She has no past medical history of Diabetes mellitus (Multi) or Renal insufficiency.    Surgical History  She has a past surgical history that includes Hysterectomy and Cholecystectomy.     Social History  She reports that she quit smoking about 2 years ago. Her smoking use included cigarettes. She started smoking about 64 years ago. She has a 62 pack-year smoking history. She has never used smokeless tobacco. She reports that she does not drink alcohol and does not use drugs.    Family History  No family history on file.     Allergies  Epinephrine-chlorpheniramine    Review of Systems  Constitutional: no fever, no chills, fatigue,  not feeling tired and no recent weight loss. No reports of dizziness.  SKIN: No skin rash or lesions  EYE: No pain photophobia, diplopia or blurred vision  EAR: No discharge, pain or hearing loss  NOSE: No congestion, epistaxis or obstruction  RESPIRATORY: No cough , dyspnea or  chest pain   CV: No chest pain, lower extremity swelling or palpitations  GE: No abdominal pain, nausea, vomiting, dysphagia or odynophagia. Denied constipation , diarrhea  : No dysuria or hematuria, urinary incontinence or urine frequency  NEURO: Denied weakness, confusion, dizziness, or numbness  "  PSYCH : Denies anxiety, hallucinations or insomnia  HEME/ LYMPH : Denied bleeding , bruising or enlarged nodes  ENDOCRINE: No change in weight, polydipsia, or abnormal bleeding  .     Physical Exam  Sickle examination  Last Recorded Vitals  Blood pressure 106/84, pulse 75, temperature 37.4 °C (99.4 °F), temperature source Temporal, resp. rate (!) 24, height 1.58 m (5' 2.21\"), weight 70.3 kg (154 lb 15.7 oz), SpO2 94%.    Relevant Results             Assessment/Plan  79-year-old patient presented to the emergency department with complaints of recurrent bright hematemesis.  She denied abdominal pain but admitted to regular use of ibuprofen about 6 to 8 tablets/week.  She does take a baby aspirin daily also.  Her abdominal examination is benign.  Proceed with upper GI endoscopy.  Meir Bauer MD  "

## 2024-04-30 NOTE — ANESTHESIA PREPROCEDURE EVALUATION
Patient: Brooklyn Read    Procedure Information       Date/Time: 04/30/24 1300    Procedure: EGD    Location: [facility] OR            Relevant Problems   Anesthesia (within normal limits)      Cardiac   (+) Aortic valve regurgitation   (+) Arteriosclerosis of coronary artery   (+) Mixed hyperlipidemia   (+) Primary hypertension      Pulmonary   (+) Chronic obstructive pulmonary disease (Multi)   (+) Orthopnea   (+) Pneumonia due to infectious organism      Neuro   (+) Cerebrovascular accident (CVA) (Multi)   (+) Recurrent major depressive disorder, in full remission (CMS-HCC)   (+) Unspecified dementia, mild, without behavioral disturbance, psychotic disturbance, mood disturbance, and anxiety (Multi)      GI   (+) Upper GI bleeding      Hematology   (+) Acute blood loss anemia (ABLA)   (+) Anemia      Musculoskeletal  R leg wound with dressing, has been seeing wound care x 2 weeks per pt, still painful per pt      ID   (+) Pneumonia due to infectious organism       Clinical information reviewed:   Tobacco  Allergies  Meds  Problems  Med Hx  Surg Hx   Fam Hx  Soc   Hx        NPO Detail:  NPO/Void Status  Date of Last Liquid: 04/29/24  Date of Last Solid: 04/29/24         Physical Exam    Airway  Mallampati: II     Cardiovascular - normal exam     Dental    Pulmonary - normal exam     Abdominal      Other findings: Left dentures at home      Anesthesia Plan    History of general anesthesia?: yes  History of complications of general anesthesia?: no    ASA 4 - emergent     general     The patient is not a current smoker.    Anesthetic plan and risks discussed with patient.  Use of blood products discussed with who consented to blood products.

## 2024-04-30 NOTE — SIGNIFICANT EVENT
Reached out to the Transfer Center regarding update on call back for transfer.  Per Transfer Center we are still awaiting for a call back from a possible accepting facility within  for GI coverage  Upon reviewing the case and discussion with nursing and the critical care practitioner patient's vitals appear stable at this time with heart rate in the 80s and blood pressure in the low 100s/50s, patient does appear very pale and in notable distress somewhat diaphoretic as well, somewhat tachypneic as well with respiratory rate in the mid 20s.  She was spitting into an emesis bag and noted some blood-tinged sputum.  Patient is currently being transfused a total of 2 units of packed red blood cells but I informed the critical care team to go ahead and order 1/3 units and to give Lasix in between the transfusions.  Acute upper GI bleed, hypertension, history of COPD, CAD, OTONIEL  Earlier documentation from ED team, GI, medicine, critical care and nursing staff documentation was reviewed.  Lab work as noted in the EMR.  Imaging as noted in EMR.

## 2024-04-30 NOTE — ANESTHESIA POSTPROCEDURE EVALUATION
Patient: Brooklyn Read    Procedure Summary       Date: 04/30/24 Room / Location: [facility] OR    Anesthesia Start: 1409 Anesthesia Stop: 1454    Procedure: EGD Diagnosis: Upper GI bleed    Scheduled Providers:  Responsible Provider: SMITH Ibarra    Anesthesia Type: general ASA Status: 4 - Emergent            Anesthesia Type: general    Vitals Value Taken Time   /46 04/30/24 1521   Temp 37 °C (98.6 °F) 04/30/24 1450   Pulse 73 04/30/24 1522   Resp 21 04/30/24 1522   SpO2 100 % 04/30/24 1522   Vitals shown include unfiled device data.    Anesthesia Post Evaluation    Patient location during evaluation: PACU  Patient participation: complete - patient participated  Level of consciousness: awake and alert  Pain score: 0  Pain management: adequate  Airway patency: patent  Cardiovascular status: hemodynamically stable  Respiratory status: nasal cannula  Hydration status: acceptable  Postoperative Nausea and Vomiting: none    There were no known notable events for this encounter.

## 2024-04-30 NOTE — TELEPHONE ENCOUNTER
Wanted [facility] Jolene had to call squad she was vomiting blood she is in the hospital.

## 2024-04-30 NOTE — ED TRIAGE NOTES
Pt states she went to bed fine and woke this morning with two episodes of vomiting blood. Pt states blood was bright red with clots. Pt with lower abdominal pain, states she is constipated.

## 2024-04-30 NOTE — H&P
History Obtained From: Patient    History Of Present Illness:  Brooklyn Read is a 79 y.o. female with PMHx s/f osteopenia on oral bisphosphonate therapy, COPD presenting with hematic emesis and severe anemia.  The patient is 79-year-old female with history of osteopenia or osteoporosis on oral bisphosphonate therapy, she has a history of COPD and tobacco use, and presented to emergency department after episode of vomiting bright red blood on 2 occasions the patient tells me the emesis was bright red with clots.  Her baseline hemoglobin had been around 12 last fall recent lab work hemoglobin was 8.5 today it is 6.7.  Patient's blood pressure was under 100 systolic she was subsequently typed and crossed given any emergent blood transfusion of O- blood.  Her blood pressure has since improved over 100 systolic.  The patient is not aware of having any abdominal pain any nausea or vomiting prior to this she also denies any melena or blood in her stool.  The patient did receive IV Protonix 80 mg in the emergency department.  She is not having any chest pain or shortness of breath no dizziness or near syncope.  Her blood work shows elevation in her BUN of 36 and creatinine from 1.59 versus 0.94 for baseline CBC shows hemoglobin 6.7 hematocrit 22.3.  Patient is to be admitted for further treatment and evaluation of her anemia, she will be kept n.p.o. given IV hydration IV proton pump inhibitor will monitor serial H&H request gastroenterology consultation transfuse if needed.           ED Course:  Diagnoses as of 04/30/24 1126   Upper GI bleed   Anemia requiring transfusions     Relevant Results  Results for orders placed or performed during the hospital encounter of 04/30/24 (from the past 24 hour(s))   CBC and Auto Differential   Result Value Ref Range    WBC 8.9 4.4 - 11.3 x10*3/uL    nRBC 0.0 0.0 - 0.0 /100 WBCs    RBC 2.48 (L) 4.00 - 5.20 x10*6/uL    Hemoglobin 6.7 (L) 12.0 - 16.0 g/dL    Hematocrit 22.3 (L) 36.0 -  46.0 %    MCV 90 80 - 100 fL    MCH 27.0 26.0 - 34.0 pg    MCHC 30.0 (L) 32.0 - 36.0 g/dL    RDW 15.7 (H) 11.5 - 14.5 %    Platelets 341 150 - 450 x10*3/uL    Neutrophils % 61.5 40.0 - 80.0 %    Immature Granulocytes %, Automated 0.6 0.0 - 0.9 %    Lymphocytes % 23.4 13.0 - 44.0 %    Monocytes % 10.0 2.0 - 10.0 %    Eosinophils % 4.1 0.0 - 6.0 %    Basophils % 0.4 0.0 - 2.0 %    Neutrophils Absolute 5.48 1.60 - 5.50 x10*3/uL    Immature Granulocytes Absolute, Automated 0.05 0.00 - 0.50 x10*3/uL    Lymphocytes Absolute 2.09 0.80 - 3.00 x10*3/uL    Monocytes Absolute 0.89 (H) 0.05 - 0.80 x10*3/uL    Eosinophils Absolute 0.37 0.00 - 0.40 x10*3/uL    Basophils Absolute 0.04 0.00 - 0.10 x10*3/uL   Phosphorus   Result Value Ref Range    Phosphorus 4.6 2.5 - 4.9 mg/dL   Magnesium   Result Value Ref Range    Magnesium 2.00 1.60 - 2.40 mg/dL   Comprehensive metabolic panel   Result Value Ref Range    Glucose 170 (H) 74 - 99 mg/dL    Sodium 138 136 - 145 mmol/L    Potassium 3.5 3.5 - 5.3 mmol/L    Chloride 103 98 - 107 mmol/L    Bicarbonate 24 21 - 32 mmol/L    Anion Gap 15 10 - 20 mmol/L    Urea Nitrogen 36 (H) 6 - 23 mg/dL    Creatinine 1.59 (H) 0.50 - 1.05 mg/dL    eGFR 33 (L) >60 mL/min/1.73m*2    Calcium 8.1 (L) 8.6 - 10.3 mg/dL    Albumin 2.9 (L) 3.4 - 5.0 g/dL    Alkaline Phosphatase 80 33 - 136 U/L    Total Protein 5.7 (L) 6.4 - 8.2 g/dL    AST 23 9 - 39 U/L    Bilirubin, Total 0.3 0.0 - 1.2 mg/dL    ALT 14 7 - 45 U/L   Protime-INR   Result Value Ref Range    Protime 12.5 9.8 - 12.8 seconds    INR 1.1 0.9 - 1.1   aPTT   Result Value Ref Range    aPTT 25 (L) 27 - 38 seconds   Troponin I, High Sensitivity   Result Value Ref Range    Troponin I, High Sensitivity 16 (H) 0 - 13 ng/L   Type And Screen   Result Value Ref Range    ABO TYPE O     Rh TYPE POS     ANTIBODY SCREEN NEG    Lipase   Result Value Ref Range    Lipase 27 9 - 82 U/L   Lactate   Result Value Ref Range    Lactate 2.2 (H) 0.4 - 2.0 mmol/L   Ammonia    Result Value Ref Range    Ammonia 16 16 - 53 umol/L   Morphology   Result Value Ref Range    RBC Morphology No significant RBC morphology present    Prepare RBC: 1 Units   Result Value Ref Range    PRODUCT CODE P5268G28     Unit Number [id number]     Unit ABO O     Unit RH NEG     XM INTEP COMP     Dispense Status IS     Blood Expiration Date May 03, 2024 23:59 EDT     PRODUCT BLOOD TYPE 9500     UNIT VOLUME 350    Lactate   Result Value Ref Range    Lactate 2.1 (H) 0.4 - 2.0 mmol/L   VERIFY ABO/Rh Group Test   Result Value Ref Range    ABO TYPE O     Rh TYPE POS       CT abdomen pelvis w IV contrast    Result Date: 4/30/2024  Interpreted By:  Leon Winter, STUDY: CT ABDOMEN PELVIS W IV CONTRAST;  4/30/2024 10:07 am   INDICATION: Signs/Symptoms:vomiting blood.   COMPARISON: None.   ACCESSION NUMBER(S): [id number]   ORDERING CLINICIAN: BERNARD AHUJA   TECHNIQUE: Contiguous axial images were obtained through the abdomen and pelvis after the administration of  75 mL Omnipaque 350 intravenous contrast. Coronal and sagittal reformations were made.   FINDINGS: LOWER CHEST: Lung bases are clear.   ABDOMEN:   LIVER: Within normal limits.   BILE DUCTS: Nondilated.   GALLBLADDER: The gallbladder is not distended and without calcified stones.   PANCREAS: Within normal limits.   SPLEEN: Within normal limits.   ADRENAL GLANDS: Within normal limits.   KIDNEYS, URETERS, and BLADDER: The kidneys enhance symmetrically without focal lesion. Cortical scarring seen bilaterally, likely vascular. No hydroureteronephrosis bilaterally.Bladder unremarkable.   VESSELS: There is no aneurysmal dilatation of the abdominal aorta. Generalized atherosclerotic disease of the aortoiliac tree. The IVC is within normal limits.   BOWEL: Focal haziness along the inferior posterior aspect of the gastric pylorus. There is a tiny high density focus in this region. No marj perforation. High density amorphous material within the gastric lumen can be  seen with clot. No obstruction. Marked colonic diverticulosis about the sigmoid in particular.   PERITONEUM/RETROPERITONEUM/LYMPH NODES: No ascites or free air, no fluid collection.   No retroperitoneal fluid collection or lymphadenopathy.   REPRODUCTIVE ORGANS: Status post hysterectomy.   ABDOMINAL WALL: Unremarkable.   BONE AND SOFT TISSUE: Bones are intact.       1. Haziness along the inferior posterior margin of the gastric pylorus possibly related to focal peptic ulcer disease. No marj perforation. A tiny hyperdense focus in this region is identified which could relate to a small focus of extraluminal contrast. No discrete layering identified to confirm active bleeding, however, this is a single phase study. 2. Cortical scarring bilaterally likely vascular in nature.     Signed by: Leon Winter 4/30/2024 10:24 AM Dictation workstation:   [id number]     Scheduled medications:  tiotropium, 2 puff, inhalation, Daily   And  fluticasone furoate-vilanteroL, 1 puff, inhalation, Daily  [START ON 5/1/2024] pantoprazole, 40 mg, intravenous, Daily  spironolactone, 25 mg, oral, Daily      Continuous medications:  sodium chloride 0.9%, 100 mL/hr      PRN medications:  PRN medications: ondansetron ODT **OR** ondansetron, prochlorperazine **OR** prochlorperazine **OR** prochlorperazine      Past Medical History  She has a past medical history of Hypertension.    She has no past medical history of Diabetes mellitus (Multi) or Renal insufficiency.    Surgical History  She has no past surgical history on file.     Social History  She reports that she quit smoking about 2 years ago. Her smoking use included cigarettes. She started smoking about 64 years ago. She has a 62 pack-year smoking history. She has never used smokeless tobacco. She reports that she does not drink alcohol and does not use drugs.    Family History  No family history on file.     Allergies  Epinephrine-chlorpheniramine    Code Status  Full Code     Review  of Systems    Last Recorded Vitals  BP (!) 118/44   Pulse 78   Temp 36.6 °C (97.9 °F)   Resp 18   Wt 70.3 kg (154 lb 15.7 oz)   SpO2 (!) 92%      Physical Exam    Assessment/Plan   Principal Problem:    Upper GI bleeding  Active Problems:    Chronic obstructive pulmonary disease (Multi)    Cerebrovascular accident (CVA) (Multi)    Orthopnea    Arteriosclerosis of coronary artery    Acute blood loss anemia (ABLA)    Acute kidney injury (CMS-HCC)      Acute blood loss anemia secondary to upper GI bleeding  Patient with bright red blood in her emesis CT scan abnormality suggesting peptic ulcer disease  Patient is receiving 1 unit of packed red blood cells, will check H&H at 2:00 and then serial H&H every 6 hours  Continue patient on IV Protonix, hold aspirin and bisphosphonate therapy  Request GI consultation    Acute kidney injury  Likely related to hypotension and insult from GI blood loss  Hold nephrotoxic agents, support hemoglobin with transfusion as indicated, IV hydration  Consider nephrology consultation    COPD  At present patient is saturating very well in the low 90s without supplemental oxygen  She does not appear to have an exacerbation no cough or shortness of breath  Will continue her routine medications through reconciliation process    Old CVA  Patient appears to be doing well will need to hold her aspirin for now    Elevated Coronary calcium score  Recent stress test 12.2023, Pt deemed low risk  Resume statin therapy when GI status is more stable    No new Assessment & Plan notes have been filed under this hospital service since the last note was generated.  Service: Internal Medicine          Adama Munroe, APRN-CNP    Dragon dictation software was used to dictate this note and thus there may be minor errors in translation/transcription including garbled speech or misspellings. Please contact for clarification if needed.

## 2024-04-30 NOTE — CONSULTS
Critical Care Medicine Consult      Reason For Consult  Acute gastrointestinal bleed    History Of Present Illness  Brooklyn Read is a 79 y.o. female with PMHx s/f osteopenia on oral bisphosphonate therapy, COPD was transferred to the ICU status post EGD as there was a ulcer with acute blood with a high risk of GI bleed.  Patient presented to the hospital upper gastrointestinal bleed with the 2 episodes of hematemesis, 1 episode of melena and 1 episode of blood clots in the stool.  She denies recent NSAID use.  She is only on 81 mg aspirin. She is alert oriented x 4 capacity to make medical decisions.  Currently hemodynamic stable.     On admission 4/30/2024: She presented to the ER with hematic emesis and severe anemia.  The patient is 79-year-old female with history of osteopenia or osteoporosis on oral bisphosphonate therapy, she has a history of COPD and tobacco use, and presented to emergency department after episode of vomiting bright red blood on 2 occasions the patient tells me the emesis was bright red with clots.  Her baseline hemoglobin had been around 12 last fall recent lab work hemoglobin was 8.5 today it is 6.7.  Patient's blood pressure was under 100 systolic she was subsequently typed and crossed given any emergent blood transfusion of O- blood.  Her blood pressure has since improved over 100 systolic.  The patient is not aware of having any abdominal pain any nausea or vomiting prior to this she also denies any melena or blood in her stool.  The patient did receive IV Protonix 80 mg in the emergency department.  She is not having any chest pain or shortness of breath no dizziness or near syncope.  Her blood work shows elevation in her BUN of 36 and creatinine from 1.59 versus 0.94 for baseline CBC shows hemoglobin 6.7 hematocrit 22.3.  Patient is to be admitted for further treatment and evaluation of her anemia, she will be kept n.p.o. given IV hydration IV proton pump inhibitor will monitor  serial H&H .    Past Medical History:   Diagnosis Date    Anemia     Anxiety     Cerebral vascular accident (Multi)     CKD (chronic kidney disease)     COPD (chronic obstructive pulmonary disease) (Multi)     Depression     GI (gastrointestinal bleed)     Hyperlipidemia     Hypertension      Past Surgical History:   Procedure Laterality Date    CHOLECYSTECTOMY      HYSTERECTOMY       Facility-Administered Medications Prior to Admission   Medication Dose Route Frequency Provider Last Rate Last Admin    spironolactone (Aldactone) tablet 25 mg  25 mg oral Daily Seda Mercer MD         Medications Prior to Admission   Medication Sig Dispense Refill Last Dose    alendronate (Fosamax) 70 mg tablet Take 1 tablet (70 mg) by mouth every 7 days. 12 tablet 3 4/29/2024    allopurinol (Zyloprim) 100 mg tablet Take 1 tablet (100 mg) by mouth 2 times a day. 180 tablet 3 4/29/2024    aspirin 81 mg EC tablet Take 1 tablet (81 mg) by mouth once daily.   4/29/2024    atorvastatin (Lipitor) 10 mg tablet Take 1 tablet (10 mg) by mouth once daily. 90 tablet 1 4/29/2024    buPROPion XL (Wellbutrin XL) 150 mg 24 hr tablet Take 1 tablet (150 mg) by mouth once daily in the morning. 90 tablet 3 4/29/2024    empagliflozin (Jardiance) 10 mg Take 1 tablet (10 mg) by mouth once daily. 30 tablet 11 4/29/2024    furosemide (Lasix) 40 mg tablet Take 1 tablet (40 mg) by mouth 2 times a day. 180 tablet 1 4/29/2024    losartan (Cozaar) 100 mg tablet Take 1 tablet (100 mg) by mouth once daily. 90 tablet 3 4/29/2024    metoprolol succinate XL (Toprol-XL) 100 mg 24 hr tablet Take 1 tablet (100 mg) by mouth once daily. 90 tablet 0 4/29/2024    rosuvastatin (Crestor) 40 mg tablet Take 1 tablet (40 mg) by mouth once daily. 90 tablet 1 4/29/2024    sertraline (Zoloft) 100 mg tablet Take 1 tablet (100 mg) by mouth once daily. 90 tablet 3 4/29/2024    Trelegy Ellipta 200-62.5-25 mcg blister with device Inhale 1 puff once daily. 3 each 2 4/29/2024     albuterol 90 mcg/actuation inhaler Inhale 2 puffs every 6 hours if needed for wheezing. 18 g 3     amoxicillin-pot clavulanate (Augmentin) 875-125 mg tablet Take 1 tablet (875 mg) by mouth 2 times a day for 14 days. 28 tablet 0     EPINEPHrine 0.3 mg/0.3 mL injection syringe Inject 0.3 mL (0.3 mg) into the muscle 1 time if needed for anaphylaxis.       HYDROcodone-acetaminophen (Norco) 5-325 mg tablet Take 1 tablet by mouth every 6 hours if needed for severe pain (7 - 10). 10 tablet 0     potassium chloride CR 20 mEq ER tablet Take 1 tablet (20 mEq) by mouth once daily. Do not crush or chew.       tiZANidine (Zanaflex) 2 mg capsule Take 1 capsule (2 mg) by mouth in the morning and 1 capsule (2 mg) in the evening and 1 capsule (2 mg) before bedtime. 45 capsule 1      Epinephrine-chlorpheniramine  Social History     Tobacco Use    Smoking status: Former     Current packs/day: 0.00     Average packs/day: 1 pack/day for 62.0 years (62.0 ttl pk-yrs)     Types: Cigarettes     Start date:      Quit date:      Years since quittin.3    Smokeless tobacco: Never   Vaping Use    Vaping status: Never Used   Substance Use Topics    Alcohol use: Never    Drug use: Never     No family history on file.    Scheduled Medications:   buPROPion XL, 150 mg, oral, Daily  erythromycin, 250 mg, intravenous, Once  tiotropium, 2 puff, inhalation, Daily   And  fluticasone furoate-vilanteroL, 1 puff, inhalation, Daily  metoprolol tartrate, 25 mg, oral, BID  pantoprazole, 40 mg, intravenous, BID  rosuvastatin, 40 mg, oral, Nightly         Continuous Medications:   sodium chloride 0.9%, 75 mL/hr, Last Rate: 75 mL/hr (24 1620)         PRN Medications:   PRN medications: ipratropium-albuteroL, ondansetron ODT **OR** ondansetron, prochlorperazine **OR** prochlorperazine **OR** prochlorperazine    Review of Systems:  Review of Systems   Constitutional: Negative.    HENT: Negative.     Eyes: Negative.    Respiratory: Negative.      Cardiovascular: Negative.    Gastrointestinal:  Positive for abdominal pain and blood in stool.   Endocrine: Negative.    Genitourinary: Negative.    Musculoskeletal: Negative.    Skin: Negative.    Neurological:  Positive for weakness.   Psychiatric/Behavioral: Negative.     All other systems reviewed and are negative.       Objective   Vitals:  Most Recent:  Vitals:    04/30/24 1600   BP: (!) 110/48   Pulse: 73   Resp: 22   Temp:    SpO2: 99%       24hr Min/Max:  Temp  Min: 36.3 °C (97.3 °F)  Max: 37.4 °C (99.4 °F)  Pulse  Min: 71  Max: 82  BP  Min: 95/43  Max: 118/44  Resp  Min: 18  Max: 24  SpO2  Min: 91 %  Max: 100 %    LDA:          Vent settings:  FiO2 (%):  [21 %] 21 %    Hemodynamic parameters for last 24 hours:         Intake/Output Summary (Last 24 hours) at 4/30/2024 1703  Last data filed at 4/30/2024 1520  Gross per 24 hour   Intake 1150 ml   Output --   Net 1150 ml           Physical exam:    Physical Exam  Vitals reviewed.   Constitutional:       Appearance: Normal appearance.   HENT:      Head: Normocephalic and atraumatic.   Eyes:      Conjunctiva/sclera: Conjunctivae normal.      Pupils: Pupils are equal, round, and reactive to light.   Cardiovascular:      Rate and Rhythm: Normal rate and regular rhythm.   Pulmonary:      Effort: Pulmonary effort is normal.      Breath sounds: Normal breath sounds.   Abdominal:      General: Abdomen is flat.      Palpations: Abdomen is soft.   Musculoskeletal:         General: Normal range of motion.   Skin:     General: Skin is warm and dry.   Neurological:      General: No focal deficit present.      Mental Status: She is alert and oriented to person, place, and time. Mental status is at baseline.   Psychiatric:         Mood and Affect: Mood normal.         Behavior: Behavior normal.          Lab/Radiology/Diagnostic Review:  Results for orders placed or performed during the hospital encounter of 04/30/24 (from the past 24 hour(s))   CBC and Auto Differential    Result Value Ref Range    WBC 8.9 4.4 - 11.3 x10*3/uL    nRBC 0.0 0.0 - 0.0 /100 WBCs    RBC 2.48 (L) 4.00 - 5.20 x10*6/uL    Hemoglobin 6.7 (L) 12.0 - 16.0 g/dL    Hematocrit 22.3 (L) 36.0 - 46.0 %    MCV 90 80 - 100 fL    MCH 27.0 26.0 - 34.0 pg    MCHC 30.0 (L) 32.0 - 36.0 g/dL    RDW 15.7 (H) 11.5 - 14.5 %    Platelets 341 150 - 450 x10*3/uL    Neutrophils % 61.5 40.0 - 80.0 %    Immature Granulocytes %, Automated 0.6 0.0 - 0.9 %    Lymphocytes % 23.4 13.0 - 44.0 %    Monocytes % 10.0 2.0 - 10.0 %    Eosinophils % 4.1 0.0 - 6.0 %    Basophils % 0.4 0.0 - 2.0 %    Neutrophils Absolute 5.48 1.60 - 5.50 x10*3/uL    Immature Granulocytes Absolute, Automated 0.05 0.00 - 0.50 x10*3/uL    Lymphocytes Absolute 2.09 0.80 - 3.00 x10*3/uL    Monocytes Absolute 0.89 (H) 0.05 - 0.80 x10*3/uL    Eosinophils Absolute 0.37 0.00 - 0.40 x10*3/uL    Basophils Absolute 0.04 0.00 - 0.10 x10*3/uL   Phosphorus   Result Value Ref Range    Phosphorus 4.6 2.5 - 4.9 mg/dL   Magnesium   Result Value Ref Range    Magnesium 2.00 1.60 - 2.40 mg/dL   Comprehensive metabolic panel   Result Value Ref Range    Glucose 170 (H) 74 - 99 mg/dL    Sodium 138 136 - 145 mmol/L    Potassium 3.5 3.5 - 5.3 mmol/L    Chloride 103 98 - 107 mmol/L    Bicarbonate 24 21 - 32 mmol/L    Anion Gap 15 10 - 20 mmol/L    Urea Nitrogen 36 (H) 6 - 23 mg/dL    Creatinine 1.59 (H) 0.50 - 1.05 mg/dL    eGFR 33 (L) >60 mL/min/1.73m*2    Calcium 8.1 (L) 8.6 - 10.3 mg/dL    Albumin 2.9 (L) 3.4 - 5.0 g/dL    Alkaline Phosphatase 80 33 - 136 U/L    Total Protein 5.7 (L) 6.4 - 8.2 g/dL    AST 23 9 - 39 U/L    Bilirubin, Total 0.3 0.0 - 1.2 mg/dL    ALT 14 7 - 45 U/L   Protime-INR   Result Value Ref Range    Protime 12.5 9.8 - 12.8 seconds    INR 1.1 0.9 - 1.1   aPTT   Result Value Ref Range    aPTT 25 (L) 27 - 38 seconds   Troponin I, High Sensitivity   Result Value Ref Range    Troponin I, High Sensitivity 16 (H) 0 - 13 ng/L   Type And Screen   Result Value Ref Range    ABO  TYPE O     Rh TYPE POS     ANTIBODY SCREEN NEG    Lipase   Result Value Ref Range    Lipase 27 9 - 82 U/L   Lactate   Result Value Ref Range    Lactate 2.2 (H) 0.4 - 2.0 mmol/L   Ammonia   Result Value Ref Range    Ammonia 16 16 - 53 umol/L   Morphology   Result Value Ref Range    RBC Morphology No significant RBC morphology present    Prepare RBC: 1 Units   Result Value Ref Range    PRODUCT CODE Q0478T10     Unit Number [id number]     Unit ABO O     Unit RH NEG     XM INTEP COMP     Dispense Status TR     Blood Expiration Date May 03, 2024 23:59 EDT     PRODUCT BLOOD TYPE 9500     UNIT VOLUME 350    Lactate   Result Value Ref Range    Lactate 2.1 (H) 0.4 - 2.0 mmol/L   VERIFY ABO/Rh Group Test   Result Value Ref Range    ABO TYPE O     Rh TYPE POS    ECG 12 lead   Result Value Ref Range    Ventricular Rate 78 BPM    Atrial Rate 79 BPM    KS Interval 164 ms    QRS Duration 123 ms    QT Interval 412 ms    QTC Calculation(Bazett) 470 ms    P Axis 78 degrees    R Axis 65 degrees    T Axis 59 degrees    QRS Count 13 beats    Q Onset 249 ms    T Offset 455 ms    QTC Fredericia 449 ms     ECG 12 lead    Result Date: 4/30/2024  Sinus rhythm Confirmed by Víctor Regan ([id number]) on 4/30/2024 3:16:02 PM    EGD    Addendum Date: 4/30/2024    Impression The cricopharynx, upper third of the esophagus, middle third of the esophagus, lower third of the esophagus, GE junction, duodenal bulb, 1st part of the duodenum and 2nd part of the duodenum appeared normal. Ulcer in the prepyloric region with adherent clot (Boogie IIB) Large old clot in the Gastric fundus Findings The cricopharynx, upper third of the esophagus, middle third of the esophagus, lower third of the esophagus, GE junction, duodenal bulb, 1st part of the duodenum and 2nd part of the duodenum appeared normal. Deep, irregular ulcer in the prepyloric region with adherent clot (Boogie IIB). Large Deep Ulcer superior aspect of Pre-pyloric Antrum : with large adherent  blood clot . NOT actively bleeding Large old clot in the Gastric fundus Recommendation  Follow up with PCP  Admit to the ICU NPO Avoid use of  NSAIDs Protonix 40mg I.v. Q 12 hours Repeat EGD in a.m.  Indication Upper GI bleed Staff Staff Role Meir Bauer MD Proceduralist Medications See Anesthesia Record. Preprocedure A history and physical has been performed, and patient medication allergies have been reviewed. The patient's tolerance of previous anesthesia has been reviewed. The risks and benefits of the procedure and the sedation options and risks were discussed with the patient. All questions were answered and informed consent obtained. Details of the Procedure The patient underwent general anesthesia, which was administered by an anesthesia professional. The patient's blood pressure, ECG, ETCO2, heart rate, level of consciousness, respirations and oxygen were monitored throughout the procedure. The scope was introduced through the mouth and advanced to the second part of the duodenum. Retroflexion was performed in the cardia. Prior to the procedure, the patient's H. Pylori status was unknown. The patient experienced no blood loss. The procedure was not difficult. The patient tolerated the procedure well. There were no apparent adverse events. Events Procedure Events Event Event Time ENDO SCOPE IN TIME 4/30/2024  2:24 PM ENDO SCOPE OUT TIME 4/30/2024  2:35 PM Specimens No specimens collected Procedure Location [address] [address] [phone] Referring Provider No referring provider defined for this encounter. Procedure Provider No name on file    Result Date: 4/30/2024  Table formatting from the original result was not included. Impression The cricopharynx, upper third of the esophagus, middle third of the esophagus, lower third of the esophagus, GE junction, duodenal bulb, 1st part of the duodenum and 2nd part of the duodenum appeared  normal. Ulcer in the prepyloric region with adherent clot (Boogie IIB) Large old clot in the Gastric fundus Findings The cricopharynx, upper third of the esophagus, middle third of the esophagus, lower third of the esophagus, GE junction, duodenal bulb, 1st part of the duodenum and 2nd part of the duodenum appeared normal. Deep, irregular ulcer in the prepyloric region with adherent clot (Boogie IIB). Large Deep Ulcer superior aspect of Pre-pyloric Antrum : with large adherent blood clot . NOT actively bleeding Large old clot in the Gastric fundus Recommendation  Follow up with PCP  Admit to the ICU NPO Protonix 40mg I.v. Q 12 hours Repeat EGD in a.m.  Indication Upper GI bleed Staff Staff Role Meir Bauer MD Proceduralist Medications See Anesthesia Record. Preprocedure A history and physical has been performed, and patient medication allergies have been reviewed. The patient's tolerance of previous anesthesia has been reviewed. The risks and benefits of the procedure and the sedation options and risks were discussed with the patient. All questions were answered and informed consent obtained. Details of the Procedure The patient underwent general anesthesia, which was administered by an anesthesia professional. The patient's blood pressure, ECG, ETCO2, heart rate, level of consciousness, respirations and oxygen were monitored throughout the procedure. The scope was introduced through the mouth and advanced to the second part of the duodenum. Retroflexion was performed in the cardia. Prior to the procedure, the patient's H. Pylori status was unknown. The patient experienced no blood loss. The procedure was not difficult. The patient tolerated the procedure well. There were no apparent adverse events. Events Procedure Events Event Event Time ENDO SCOPE IN TIME 4/30/2024  2:24 PM ENDO SCOPE OUT TIME 4/30/2024  2:35 PM Specimens No specimens collected Procedure Location [facility]  [place] [address] [facility] [address] [phone] Referring Provider No referring provider defined for this encounter. Procedure Provider No name on file     CT abdomen pelvis w IV contrast    Result Date: 4/30/2024  Interpreted By:  Leon Winter, STUDY: CT ABDOMEN PELVIS W IV CONTRAST;  4/30/2024 10:07 am   INDICATION: Signs/Symptoms:vomiting blood.   COMPARISON: None.   ACCESSION NUMBER(S): [id number]   ORDERING CLINICIAN: BERNARD AHUJA   TECHNIQUE: Contiguous axial images were obtained through the abdomen and pelvis after the administration of  75 mL Omnipaque 350 intravenous contrast. Coronal and sagittal reformations were made.   FINDINGS: LOWER CHEST: Lung bases are clear.   ABDOMEN:   LIVER: Within normal limits.   BILE DUCTS: Nondilated.   GALLBLADDER: The gallbladder is not distended and without calcified stones.   PANCREAS: Within normal limits.   SPLEEN: Within normal limits.   ADRENAL GLANDS: Within normal limits.   KIDNEYS, URETERS, and BLADDER: The kidneys enhance symmetrically without focal lesion. Cortical scarring seen bilaterally, likely vascular. No hydroureteronephrosis bilaterally.Bladder unremarkable.   VESSELS: There is no aneurysmal dilatation of the abdominal aorta. Generalized atherosclerotic disease of the aortoiliac tree. The IVC is within normal limits.   BOWEL: Focal haziness along the inferior posterior aspect of the gastric pylorus. There is a tiny high density focus in this region. No marj perforation. High density amorphous material within the gastric lumen can be seen with clot. No obstruction. Marked colonic diverticulosis about the sigmoid in particular.   PERITONEUM/RETROPERITONEUM/LYMPH NODES: No ascites or free air, no fluid collection.   No retroperitoneal fluid collection or lymphadenopathy.   REPRODUCTIVE ORGANS: Status post hysterectomy.   ABDOMINAL WALL: Unremarkable.   BONE AND SOFT TISSUE: Bones are intact.       1. Haziness along the inferior posterior  margin of the gastric pylorus possibly related to focal peptic ulcer disease. No marj perforation. A tiny hyperdense focus in this region is identified which could relate to a small focus of extraluminal contrast. No discrete layering identified to confirm active bleeding, however, this is a single phase study. 2. Cortical scarring bilaterally likely vascular in nature.     Signed by: Leon Winter 4/30/2024 10:24 AM Dictation workstation:   [id number]      Assessment/Plan   Principal Problem:    Upper GI bleeding  Active Problems:    Chronic obstructive pulmonary disease (Multi)    Cerebrovascular accident (CVA) (Multi)    Orthopnea    Arteriosclerosis of coronary artery    Acute blood loss anemia (ABLA)    Acute kidney injury (CMS-HCC)    Agatston coronary artery calcium score greater than 400    Brooklyn Read is a 79 y.o. female with PMHx s/f osteopenia on oral bisphosphonate therapy, COPD was transferred to the ICU status post EGD as there was a ulcer with acute blood with a high risk of GI bleed.  Patient presented to the hospital upper gastrointestinal bleed with the 2 episodes of hematemesis, 1 episode of melena and 1 episode of blood clots in the stool.  She denies recent NSAID use.  She is only on 81 mg of aspirin. She is alert oriented x 4 capacity to make medical decisions.  Currently hemodynamic stable.     Acute upper gastrointestinal bleed  4/30/2024:  Transferred to the ICU status post EGD as there was a ulcer with acute blood with a high risk of GI bleed.    Patient presented to the hospital upper gastrointestinal bleed with the 2 episodes of hematemesis, 1 episode of melena and 1 episode of blood clots in the stool.  She denies recent NSAID use.  She is only on 81 mg of aspirin  Patient received 1 unit of packed RBC.  Placed 1 more unit of packed RBC on hold  Increased the frequency of IV Protonix 40 mg twice daily  Recommend to transfuse for hemoglobin less than 7 or active bleed with  hemodynamic instability    2.  Hypertension  4/30/2024: Resumed metoprolol 25 mg twice daily with recommendation to hold for MAP less than 65 or systolic less than 90  Held spironolactone Lasix and losartan because of borderline blood pressure    3.  History of COPD  Placed the patient on as needed breathing treatments  Ordered Spiriva and Breo Ellipta as an alternate you for her home scheduled inhalers    4.  Coronary artery disease  4/30/2024: Resumed metoprolol 25 mg twice daily with recommendation to hold for MAP less than 65 or systolic less than 90  Held spironolactone Lasix and losartan because of borderline blood pressure  Held aspirin because of GI bleed    5.  Acute kidney injury probably secondary to acute blood loss anemia and hypotension  Maintain MAP more than 65 to maintain perfusion to the kidney    6.  Counseling regarding advance care directives and goals of care  she is alert oriented x 4 capacity to make medical decisions.  Currently hemodynamic stable.   Explained the difference between full code versus DNR  Patient wants to be DNR but okay to intubate if required    I spent 40 minutes of cumulative critical care time with the patient.  Greater than 50% of that time was spent in the direct collaboration and or coordination of care of the patient.     Dragon dictation software was used to dictate this note and thus there may be minor errors in translation/transcription including garbled speech or misspellings. Please contact for clarification if needed.   Gurinder Castro MD

## 2024-04-30 NOTE — ED PROVIDER NOTES
"HPI   Chief Complaint   Patient presents with    Vomiting Blood     Pt states she went to bed fine and woke this morning with two episodes of vomiting blood. Pt states blood was bright red with clots. Pt with lower abdominal pain, states she is constipated.        This is a 79-year-old female with past medical history of depression, chronic right lower extremity wound presenting to the emergency department with chief complaint of vomiting blood.  Patient states that she felt well all throughout the day yesterday and was in her baseline state of health.  She woke up a couple of hours ago \"feeling hot\" and states that she went to the bathroom and had a bowel movement.  She laid back down and then felt hot again and when she got up she threw up into a trash can.  She states that the throw up was bright red and that there were a few small blood clots in the throw up.  She immediately called 911 and presented for evaluation.  Patient states that now she is feeling somewhat improved.  She feels slightly nauseated but denies any abdominal pain.  Denies lightheadedness, dizziness, chest pain, shortness of breath.  Denies any recent melena or hematochezia.  Denies anticoagulation use.  Denies any alcohol use.       History provided by:  Patient and EMS personnel   used: No                          No data recorded                Patient History   History reviewed. No pertinent past medical history.  History reviewed. No pertinent surgical history.  No family history on file.  Social History     Tobacco Use    Smoking status: Former     Current packs/day: 0.00     Average packs/day: 1 pack/day for 62.0 years (62.0 ttl pk-yrs)     Types: Cigarettes     Start date:      Quit date:      Years since quittin.3    Smokeless tobacco: Never   Substance Use Topics    Alcohol use: Never    Drug use: Never       Physical Exam     Physical Exam  Constitutional:       General: She is not in acute " distress.     Appearance: She is well-developed. She is not toxic-appearing.   HENT:      Head: Normocephalic and atraumatic.      Mouth/Throat:      Mouth: Mucous membranes are moist.   Eyes:      Conjunctiva/sclera: Conjunctivae normal.      Pupils: Pupils are equal, round, and reactive to light.   Cardiovascular:      Rate and Rhythm: Normal rate and regular rhythm.      Pulses: Normal pulses.      Heart sounds: Normal heart sounds.   Pulmonary:      Effort: Pulmonary effort is normal.      Breath sounds: Normal breath sounds.   Abdominal:      General: There is no distension.      Palpations: Abdomen is soft.      Tenderness: There is no abdominal tenderness. There is no guarding or rebound.      Comments: Soft brown bowel movement present in underwear, no melena or hematochezia    Musculoskeletal:         General: Normal range of motion.      Cervical back: Neck supple.   Skin:     General: Skin is warm and dry.   Neurological:      Mental Status: She is alert. Mental status is at baseline.      Motor: Motor function is intact.         Labs Reviewed   CBC WITH AUTO DIFFERENTIAL   PHOSPHORUS   MAGNESIUM   COMPREHENSIVE METABOLIC PANEL   PROTIME-INR   APTT   TROPONIN I, HIGH SENSITIVITY   TYPE AND SCREEN   LIPASE   LACTATE   AMMONIA       ED Course & MDM   Diagnoses as of 05/02/24 1353   Upper GI bleed   Anemia requiring transfusions       Medical Decision Making  This is a 79-year-old female with history of depression, chronic right lower extremity wound who presents to the emergency department with an episode of large-volume hematemesis with clots that occurred just prior to arrival.  Has never happened before, no history of cirrhosis or alcohol use.  On arrival to the emergency department, patient is hemodynamically stable with normal heart rate and BP.  She is afebrile.  Abdomen is soft, not clearly distended, no melena or hematochezia noted.  Labs however are notable for acute on chronic blood loss anemia  with initial hemoglobin of 6.7, down from her baseline.  No leukocytosis or thrombocytopenia present.  Lactate minimally elevated at 2.2.  Patient was consented and typed and crossed for 1 unit of PRBCs.  She was additionally given 4 mg of IV Zofran as well as 80 mg of IV Protonix for upper GI bleed.  She is currently alert, mentating appropriately and protecting her airway at this time.  We will obtain CT abdomen pelvis imaging for further evaluation of her upper GI bleed.  Patient signed out to oncoming physician at 0700 pending CT abdomen pelvis results, will require admission and GI consultation.          Procedure  Procedures    Radha Pinon,   Emergency Medicine     Radha Pinon DO  05/02/24 1733

## 2024-04-30 NOTE — ANESTHESIA PROCEDURE NOTES
Airway  Date/Time: 4/30/2024 2:21 PM  Urgency: elective    Airway not difficult    Staffing  Performed: CRNA   Authorized by: SMITH Ibarra    Performed by: SMITH Ibarra  Patient location during procedure: OR    Indications and Patient Condition  Indications for airway management: anesthesia  Sedation level: deep  Patient position: sniffing  Mask difficulty assessment: 0 - not attempted    Final Airway Details  Final airway type: endotracheal airway      Cuffed: yes   Successful intubation technique: video laryngoscopy  Facilitating devices/methods: intubating stylet  Endotracheal tube insertion site: oral  Blade type: gaming.  Blade size: #3  Cormack-Lehane Classification: grade I - full view of glottis  Placement verified by: chest auscultation, capnometry and palpation of cuff   Placement verification comments: (View of tube going through cords)  Measured from: lips  ETT to lips (cm): 21  Number of attempts at approach: 1  Number of other approaches attempted: 0

## 2024-05-01 ENCOUNTER — APPOINTMENT (OUTPATIENT)
Dept: VASCULAR MEDICINE | Facility: HOSPITAL | Age: 79
DRG: 377 | End: 2024-05-01
Payer: MEDICARE

## 2024-05-01 ENCOUNTER — APPOINTMENT (OUTPATIENT)
Dept: RADIOLOGY | Facility: HOSPITAL | Age: 79
DRG: 377 | End: 2024-05-01
Payer: MEDICARE

## 2024-05-01 ENCOUNTER — APPOINTMENT (OUTPATIENT)
Dept: GASTROENTEROLOGY | Facility: HOSPITAL | Age: 79
DRG: 377 | End: 2024-05-01
Payer: MEDICARE

## 2024-05-01 LAB
ABO GROUP (TYPE) IN BLOOD: NORMAL
ABO GROUP (TYPE) IN BLOOD: NORMAL
ALBUMIN SERPL BCP-MCNC: 2.3 G/DL (ref 3.4–5)
ALBUMIN SERPL BCP-MCNC: 2.4 G/DL (ref 3.4–5)
ANION GAP SERPL CALC-SCNC: 16 MMOL/L (ref 10–20)
ANION GAP SERPL CALC-SCNC: 21 MMOL/L (ref 10–20)
ANTIBODY SCREEN: NORMAL
APPEARANCE UR: CLEAR
APTT PPP: 17 SECONDS (ref 27–38)
BASOPHILS # BLD AUTO: 0.06 X10*3/UL (ref 0–0.1)
BASOPHILS NFR BLD AUTO: 0.2 %
BASOPHILS NFR BLD AUTO: 0.2 %
BASOPHILS NFR BLD AUTO: 0.3 %
BILIRUB UR STRIP.AUTO-MCNC: NEGATIVE MG/DL
BLOOD EXPIRATION DATE: NORMAL
BUN SERPL-MCNC: 62 MG/DL (ref 6–23)
BUN SERPL-MCNC: 62 MG/DL (ref 6–23)
CA-I BLD-SCNC: 0.97 MMOL/L (ref 1.1–1.33)
CALCIUM SERPL-MCNC: 7.3 MG/DL (ref 8.6–10.6)
CALCIUM SERPL-MCNC: 7.3 MG/DL (ref 8.6–10.6)
CHLORIDE SERPL-SCNC: 111 MMOL/L (ref 98–107)
CHLORIDE SERPL-SCNC: 114 MMOL/L (ref 98–107)
CO2 SERPL-SCNC: 16 MMOL/L (ref 21–32)
CO2 SERPL-SCNC: 20 MMOL/L (ref 21–32)
COLOR UR: ABNORMAL
CREAT SERPL-MCNC: 1.57 MG/DL (ref 0.5–1.05)
CREAT SERPL-MCNC: 1.76 MG/DL (ref 0.5–1.05)
DISPENSE STATUS: NORMAL
EGFRCR SERPLBLD CKD-EPI 2021: 29 ML/MIN/1.73M*2
EGFRCR SERPLBLD CKD-EPI 2021: 34 ML/MIN/1.73M*2
EOSINOPHIL # BLD AUTO: 0 X10*3/UL (ref 0–0.4)
EOSINOPHIL # BLD AUTO: 0.01 X10*3/UL (ref 0–0.4)
EOSINOPHIL # BLD AUTO: 0.02 X10*3/UL (ref 0–0.4)
EOSINOPHIL NFR BLD AUTO: 0 %
EOSINOPHIL NFR BLD AUTO: 0 %
EOSINOPHIL NFR BLD AUTO: 0.1 %
ERYTHROCYTE [DISTWIDTH] IN BLOOD BY AUTOMATED COUNT: 15.7 % (ref 11.5–14.5)
ERYTHROCYTE [DISTWIDTH] IN BLOOD BY AUTOMATED COUNT: 15.8 % (ref 11.5–14.5)
ERYTHROCYTE [DISTWIDTH] IN BLOOD BY AUTOMATED COUNT: 15.9 % (ref 11.5–14.5)
ERYTHROCYTE [DISTWIDTH] IN BLOOD BY AUTOMATED COUNT: 16.6 % (ref 11.5–14.5)
FIBRINOGEN PPP-MCNC: 306 MG/DL (ref 200–400)
GLUCOSE BLD MANUAL STRIP-MCNC: 121 MG/DL (ref 74–99)
GLUCOSE BLD MANUAL STRIP-MCNC: 122 MG/DL (ref 74–99)
GLUCOSE BLD MANUAL STRIP-MCNC: 126 MG/DL (ref 74–99)
GLUCOSE BLD MANUAL STRIP-MCNC: 87 MG/DL (ref 74–99)
GLUCOSE SERPL-MCNC: 108 MG/DL (ref 74–99)
GLUCOSE SERPL-MCNC: 108 MG/DL (ref 74–99)
GLUCOSE UR STRIP.AUTO-MCNC: ABNORMAL MG/DL
HCT VFR BLD AUTO: 19.5 % (ref 36–46)
HCT VFR BLD AUTO: 20.1 % (ref 36–46)
HCT VFR BLD AUTO: 23.7 % (ref 36–46)
HCT VFR BLD AUTO: 31.9 % (ref 36–46)
HGB BLD-MCNC: 11 G/DL (ref 12–16)
HGB BLD-MCNC: 6.3 G/DL (ref 12–16)
HGB BLD-MCNC: 7 G/DL (ref 12–16)
HGB BLD-MCNC: 8.3 G/DL (ref 12–16)
HOLD SPECIMEN: NORMAL
IMM GRANULOCYTES # BLD AUTO: 0.14 X10*3/UL (ref 0–0.5)
IMM GRANULOCYTES # BLD AUTO: 0.31 X10*3/UL (ref 0–0.5)
IMM GRANULOCYTES # BLD AUTO: 0.33 X10*3/UL (ref 0–0.5)
IMM GRANULOCYTES NFR BLD AUTO: 0.7 % (ref 0–0.9)
IMM GRANULOCYTES NFR BLD AUTO: 1.1 % (ref 0–0.9)
IMM GRANULOCYTES NFR BLD AUTO: 1.2 % (ref 0–0.9)
INR PPP: 1.2 (ref 0.9–1.1)
KETONES UR STRIP.AUTO-MCNC: ABNORMAL MG/DL
LACTATE SERPL-SCNC: 2.4 MMOL/L (ref 0.4–2)
LEUKOCYTE ESTERASE UR QL STRIP.AUTO: NEGATIVE
LYMPHOCYTES # BLD AUTO: 1.28 X10*3/UL (ref 0.8–3)
LYMPHOCYTES # BLD AUTO: 1.34 X10*3/UL (ref 0.8–3)
LYMPHOCYTES # BLD AUTO: 1.78 X10*3/UL (ref 0.8–3)
LYMPHOCYTES NFR BLD AUTO: 4.7 %
LYMPHOCYTES NFR BLD AUTO: 4.7 %
LYMPHOCYTES NFR BLD AUTO: 8.8 %
MAGNESIUM SERPL-MCNC: 2.05 MG/DL (ref 1.6–2.4)
MCH RBC QN AUTO: 28.8 PG (ref 26–34)
MCH RBC QN AUTO: 29 PG (ref 26–34)
MCH RBC QN AUTO: 29.1 PG (ref 26–34)
MCH RBC QN AUTO: 29.2 PG (ref 26–34)
MCHC RBC AUTO-ENTMCNC: 32.3 G/DL (ref 32–36)
MCHC RBC AUTO-ENTMCNC: 34.5 G/DL (ref 32–36)
MCHC RBC AUTO-ENTMCNC: 34.8 G/DL (ref 32–36)
MCHC RBC AUTO-ENTMCNC: 35 G/DL (ref 32–36)
MCV RBC AUTO: 83 FL (ref 80–100)
MCV RBC AUTO: 84 FL (ref 80–100)
MCV RBC AUTO: 84 FL (ref 80–100)
MCV RBC AUTO: 89 FL (ref 80–100)
MONOCYTES # BLD AUTO: 1.62 X10*3/UL (ref 0.05–0.8)
MONOCYTES # BLD AUTO: 2.03 X10*3/UL (ref 0.05–0.8)
MONOCYTES # BLD AUTO: 2.3 X10*3/UL (ref 0.05–0.8)
MONOCYTES NFR BLD AUTO: 7.1 %
MONOCYTES NFR BLD AUTO: 8 %
MONOCYTES NFR BLD AUTO: 8.4 %
NEUTROPHILS # BLD AUTO: 16.61 X10*3/UL (ref 1.6–5.5)
NEUTROPHILS # BLD AUTO: 23.53 X10*3/UL (ref 1.6–5.5)
NEUTROPHILS # BLD AUTO: 24.69 X10*3/UL (ref 1.6–5.5)
NEUTROPHILS NFR BLD AUTO: 82.1 %
NEUTROPHILS NFR BLD AUTO: 85.5 %
NEUTROPHILS NFR BLD AUTO: 86.9 %
NITRITE UR QL STRIP.AUTO: NEGATIVE
NRBC BLD-RTO: 0 /100 WBCS (ref 0–0)
PH UR STRIP.AUTO: 6 [PH]
PHOSPHATE SERPL-MCNC: 4.5 MG/DL (ref 2.5–4.9)
PHOSPHATE SERPL-MCNC: 4.9 MG/DL (ref 2.5–4.9)
PLATELET # BLD AUTO: 146 X10*3/UL (ref 150–450)
PLATELET # BLD AUTO: 164 X10*3/UL (ref 150–450)
PLATELET # BLD AUTO: 171 X10*3/UL (ref 150–450)
PLATELET # BLD AUTO: 185 X10*3/UL (ref 150–450)
POTASSIUM SERPL-SCNC: 4.1 MMOL/L (ref 3.5–5.3)
POTASSIUM SERPL-SCNC: 4.3 MMOL/L (ref 3.5–5.3)
PRODUCT BLOOD TYPE: 5100
PRODUCT BLOOD TYPE: 6200
PRODUCT BLOOD TYPE: 6200
PRODUCT CODE: NORMAL
PROT UR STRIP.AUTO-MCNC: ABNORMAL MG/DL
PROTHROMBIN TIME: 13.6 SECONDS (ref 9.8–12.8)
RBC # BLD AUTO: 2.19 X10*6/UL (ref 4–5.2)
RBC # BLD AUTO: 2.41 X10*6/UL (ref 4–5.2)
RBC # BLD AUTO: 2.84 X10*6/UL (ref 4–5.2)
RBC # BLD AUTO: 3.78 X10*6/UL (ref 4–5.2)
RBC # UR STRIP.AUTO: ABNORMAL /UL
RBC #/AREA URNS AUTO: NORMAL /HPF
RH FACTOR (ANTIGEN D): NORMAL
RH FACTOR (ANTIGEN D): NORMAL
SODIUM SERPL-SCNC: 143 MMOL/L (ref 136–145)
SODIUM SERPL-SCNC: 147 MMOL/L (ref 136–145)
SP GR UR STRIP.AUTO: 1.03
UNIT ABO: NORMAL
UNIT NUMBER: NORMAL
UNIT RH: NORMAL
UNIT VOLUME: 244
UNIT VOLUME: 272
UNIT VOLUME: 288
UNIT VOLUME: 350
UROBILINOGEN UR STRIP.AUTO-MCNC: NORMAL MG/DL
WBC # BLD AUTO: 20.2 X10*3/UL (ref 4.4–11.3)
WBC # BLD AUTO: 21.7 X10*3/UL (ref 4.4–11.3)
WBC # BLD AUTO: 27.5 X10*3/UL (ref 4.4–11.3)
WBC # BLD AUTO: 28.4 X10*3/UL (ref 4.4–11.3)
WBC #/AREA URNS AUTO: NORMAL /HPF
XM INTEP: NORMAL

## 2024-05-01 PROCEDURE — 82947 ASSAY GLUCOSE BLOOD QUANT: CPT

## 2024-05-01 PROCEDURE — 94640 AIRWAY INHALATION TREATMENT: CPT

## 2024-05-01 PROCEDURE — P9017 PLASMA 1 DONOR FRZ W/IN 8 HR: HCPCS

## 2024-05-01 PROCEDURE — 36415 COLL VENOUS BLD VENIPUNCTURE: CPT

## 2024-05-01 PROCEDURE — 83735 ASSAY OF MAGNESIUM: CPT

## 2024-05-01 PROCEDURE — P9016 RBC LEUKOCYTES REDUCED: HCPCS

## 2024-05-01 PROCEDURE — C9113 INJ PANTOPRAZOLE SODIUM, VIA: HCPCS | Performed by: STUDENT IN AN ORGANIZED HEALTH CARE EDUCATION/TRAINING PROGRAM

## 2024-05-01 PROCEDURE — 87449 NOS EACH ORGANISM AG IA: CPT

## 2024-05-01 PROCEDURE — P9073 PLATELETS PHERESIS PATH REDU: HCPCS

## 2024-05-01 PROCEDURE — 99223 1ST HOSP IP/OBS HIGH 75: CPT | Performed by: SURGERY

## 2024-05-01 PROCEDURE — 85025 COMPLETE CBC W/AUTO DIFF WBC: CPT | Performed by: STUDENT IN AN ORGANIZED HEALTH CARE EDUCATION/TRAINING PROGRAM

## 2024-05-01 PROCEDURE — 81001 URINALYSIS AUTO W/SCOPE: CPT | Performed by: STUDENT IN AN ORGANIZED HEALTH CARE EDUCATION/TRAINING PROGRAM

## 2024-05-01 PROCEDURE — 71045 X-RAY EXAM CHEST 1 VIEW: CPT

## 2024-05-01 PROCEDURE — 2500000001 HC RX 250 WO HCPCS SELF ADMINISTERED DRUGS (ALT 637 FOR MEDICARE OP): Performed by: STUDENT IN AN ORGANIZED HEALTH CARE EDUCATION/TRAINING PROGRAM

## 2024-05-01 PROCEDURE — 1090000002 HH PPS REVENUE DEBIT

## 2024-05-01 PROCEDURE — 36430 TRANSFUSION BLD/BLD COMPNT: CPT

## 2024-05-01 PROCEDURE — 71045 X-RAY EXAM CHEST 1 VIEW: CPT | Performed by: STUDENT IN AN ORGANIZED HEALTH CARE EDUCATION/TRAINING PROGRAM

## 2024-05-01 PROCEDURE — 2720000007 HC OR 272 NO HCPCS

## 2024-05-01 PROCEDURE — 2500000004 HC RX 250 GENERAL PHARMACY W/ HCPCS (ALT 636 FOR OP/ED)

## 2024-05-01 PROCEDURE — 82330 ASSAY OF CALCIUM: CPT

## 2024-05-01 PROCEDURE — 85025 COMPLETE CBC W/AUTO DIFF WBC: CPT | Mod: 91

## 2024-05-01 PROCEDURE — 0W3P8ZZ CONTROL BLEEDING IN GASTROINTESTINAL TRACT, VIA NATURAL OR ARTIFICIAL OPENING ENDOSCOPIC: ICD-10-PCS | Performed by: PATHOLOGY

## 2024-05-01 PROCEDURE — 80069 RENAL FUNCTION PANEL: CPT

## 2024-05-01 PROCEDURE — 43235 EGD DIAGNOSTIC BRUSH WASH: CPT | Performed by: INTERNAL MEDICINE

## 2024-05-01 PROCEDURE — 1090000001 HH PPS REVENUE CREDIT

## 2024-05-01 PROCEDURE — 82947 ASSAY GLUCOSE BLOOD QUANT: CPT | Mod: 91

## 2024-05-01 PROCEDURE — 86901 BLOOD TYPING SEROLOGIC RH(D): CPT

## 2024-05-01 PROCEDURE — 99291 CRITICAL CARE FIRST HOUR: CPT

## 2024-05-01 PROCEDURE — 85027 COMPLETE CBC AUTOMATED: CPT

## 2024-05-01 PROCEDURE — 2020000001 HC ICU ROOM DAILY

## 2024-05-01 PROCEDURE — 2500000004 HC RX 250 GENERAL PHARMACY W/ HCPCS (ALT 636 FOR OP/ED): Performed by: STUDENT IN AN ORGANIZED HEALTH CARE EDUCATION/TRAINING PROGRAM

## 2024-05-01 RX ORDER — MULTIVIT-MIN/IRON FUM/FOLIC AC 7.5 MG-4
1 TABLET ORAL DAILY
COMMUNITY

## 2024-05-01 RX ORDER — HYDROMORPHONE HYDROCHLORIDE 1 MG/ML
0.1 INJECTION, SOLUTION INTRAMUSCULAR; INTRAVENOUS; SUBCUTANEOUS ONCE
Status: COMPLETED | OUTPATIENT
Start: 2024-05-01 | End: 2024-05-01

## 2024-05-01 RX ORDER — FLUTICASONE FUROATE AND VILANTEROL 200; 25 UG/1; UG/1
1 POWDER RESPIRATORY (INHALATION)
Status: DISCONTINUED | OUTPATIENT
Start: 2024-05-01 | End: 2024-05-10 | Stop reason: HOSPADM

## 2024-05-01 RX ORDER — MIDAZOLAM HYDROCHLORIDE 1 MG/ML
3 INJECTION INTRAMUSCULAR; INTRAVENOUS ONCE
Status: COMPLETED | OUTPATIENT
Start: 2024-05-01 | End: 2024-05-01

## 2024-05-01 RX ORDER — ACETAMINOPHEN 500 MG
1 TABLET ORAL DAILY
COMMUNITY

## 2024-05-01 RX ORDER — PHENYLEPHRINE HCL IN 0.9% NACL 0.4MG/10ML
SYRINGE (ML) INTRAVENOUS
Status: DISPENSED
Start: 2024-05-01 | End: 2024-05-02

## 2024-05-01 RX ORDER — HYDROMORPHONE HYDROCHLORIDE 1 MG/ML
0.4 INJECTION, SOLUTION INTRAMUSCULAR; INTRAVENOUS; SUBCUTANEOUS EVERY 4 HOURS PRN
Status: DISCONTINUED | OUTPATIENT
Start: 2024-05-01 | End: 2024-05-02

## 2024-05-01 RX ORDER — CALCIUM GLUCONATE 20 MG/ML
1 INJECTION, SOLUTION INTRAVENOUS EVERY 4 HOURS
Status: DISCONTINUED | OUTPATIENT
Start: 2024-05-01 | End: 2024-05-01

## 2024-05-01 RX ORDER — MIDAZOLAM HYDROCHLORIDE 1 MG/ML
INJECTION INTRAMUSCULAR; INTRAVENOUS
Status: COMPLETED
Start: 2024-05-01 | End: 2024-05-01

## 2024-05-01 RX ORDER — FENTANYL CITRATE 50 UG/ML
75 INJECTION, SOLUTION INTRAMUSCULAR; INTRAVENOUS ONCE
Status: COMPLETED | OUTPATIENT
Start: 2024-05-01 | End: 2024-05-01

## 2024-05-01 RX ORDER — CALCIUM CHLORIDE INJECTION 100 MG/ML
1 INJECTION, SOLUTION INTRAVENOUS ONCE
Status: DISCONTINUED | OUTPATIENT
Start: 2024-05-01 | End: 2024-05-01

## 2024-05-01 RX ORDER — HYDROMORPHONE HYDROCHLORIDE 1 MG/ML
0.2 INJECTION, SOLUTION INTRAMUSCULAR; INTRAVENOUS; SUBCUTANEOUS ONCE
Status: COMPLETED | OUTPATIENT
Start: 2024-05-01 | End: 2024-05-01

## 2024-05-01 RX ORDER — PANTOPRAZOLE SODIUM 40 MG/10ML
80 INJECTION, POWDER, LYOPHILIZED, FOR SOLUTION INTRAVENOUS ONCE
Status: COMPLETED | OUTPATIENT
Start: 2024-05-01 | End: 2024-05-01

## 2024-05-01 RX ORDER — FENTANYL CITRATE 50 UG/ML
INJECTION, SOLUTION INTRAMUSCULAR; INTRAVENOUS
Status: COMPLETED
Start: 2024-05-01 | End: 2024-05-01

## 2024-05-01 RX ADMIN — CALCIUM GLUCONATE 3 G: 98 INJECTION, SOLUTION INTRAVENOUS at 13:08

## 2024-05-01 RX ADMIN — PANTOPRAZOLE SODIUM 80 MG: 40 INJECTION, POWDER, FOR SOLUTION INTRAVENOUS at 02:54

## 2024-05-01 RX ADMIN — HYDROMORPHONE HYDROCHLORIDE 0.4 MG: 1 INJECTION, SOLUTION INTRAMUSCULAR; INTRAVENOUS; SUBCUTANEOUS at 18:21

## 2024-05-01 RX ADMIN — SODIUM CHLORIDE 8 MG/HR: 9 INJECTION, SOLUTION INTRAVENOUS at 20:00

## 2024-05-01 RX ADMIN — TIOTROPIUM BROMIDE INHALATION SPRAY 2 PUFF: 3.12 SPRAY, METERED RESPIRATORY (INHALATION) at 07:00

## 2024-05-01 RX ADMIN — HYDROMORPHONE HYDROCHLORIDE 0.2 MG: 1 INJECTION, SOLUTION INTRAMUSCULAR; INTRAVENOUS; SUBCUTANEOUS at 00:01

## 2024-05-01 RX ADMIN — FENTANYL CITRATE 75 MCG: 50 INJECTION, SOLUTION INTRAMUSCULAR; INTRAVENOUS at 17:17

## 2024-05-01 RX ADMIN — HYDROMORPHONE HYDROCHLORIDE 0.2 MG: 1 INJECTION, SOLUTION INTRAMUSCULAR; INTRAVENOUS; SUBCUTANEOUS at 11:55

## 2024-05-01 RX ADMIN — SODIUM CHLORIDE 8 MG/HR: 9 INJECTION, SOLUTION INTRAVENOUS at 09:34

## 2024-05-01 RX ADMIN — FLUTICASONE FUROATE AND VILANTEROL TRIFENATATE 1 PUFF: 200; 25 POWDER RESPIRATORY (INHALATION) at 07:00

## 2024-05-01 RX ADMIN — HYDROMORPHONE HYDROCHLORIDE 0.2 MG: 1 INJECTION, SOLUTION INTRAMUSCULAR; INTRAVENOUS; SUBCUTANEOUS at 04:19

## 2024-05-01 RX ADMIN — MIDAZOLAM HYDROCHLORIDE 3 MG: 1 INJECTION, SOLUTION INTRAMUSCULAR; INTRAVENOUS at 17:16

## 2024-05-01 RX ADMIN — FENTANYL CITRATE 75 MCG: 50 INJECTION INTRAMUSCULAR; INTRAVENOUS at 17:17

## 2024-05-01 RX ADMIN — CALCIUM GLUCONATE 3 G: 98 INJECTION, SOLUTION INTRAVENOUS at 17:00

## 2024-05-01 RX ADMIN — HYDROMORPHONE HYDROCHLORIDE 0.4 MG: 1 INJECTION, SOLUTION INTRAMUSCULAR; INTRAVENOUS; SUBCUTANEOUS at 23:31

## 2024-05-01 RX ADMIN — HYDROMORPHONE HYDROCHLORIDE 0.1 MG: 1 INJECTION, SOLUTION INTRAMUSCULAR; INTRAVENOUS; SUBCUTANEOUS at 06:57

## 2024-05-01 RX ADMIN — SODIUM CHLORIDE 8 MG/HR: 9 INJECTION, SOLUTION INTRAVENOUS at 02:51

## 2024-05-01 RX ADMIN — HYDROMORPHONE HYDROCHLORIDE 0.2 MG: 1 INJECTION, SOLUTION INTRAMUSCULAR; INTRAVENOUS; SUBCUTANEOUS at 10:16

## 2024-05-01 RX ADMIN — MIDAZOLAM HYDROCHLORIDE 3 MG: 1 INJECTION INTRAMUSCULAR; INTRAVENOUS at 17:16

## 2024-05-01 SDOH — ECONOMIC STABILITY: HOUSING INSECURITY
IN THE LAST 12 MONTHS, WAS THERE A TIME WHEN YOU DID NOT HAVE A STEADY PLACE TO SLEEP OR SLEPT IN A SHELTER (INCLUDING NOW)?: NO

## 2024-05-01 SDOH — ECONOMIC STABILITY: INCOME INSECURITY: IN THE PAST 12 MONTHS, HAS THE ELECTRIC, GAS, OIL, OR WATER COMPANY THREATENED TO SHUT OFF SERVICE IN YOUR HOME?: NO

## 2024-05-01 SDOH — ECONOMIC STABILITY: FOOD INSECURITY: WITHIN THE PAST 12 MONTHS, YOU WORRIED THAT YOUR FOOD WOULD RUN OUT BEFORE YOU GOT MONEY TO BUY MORE.: NEVER TRUE

## 2024-05-01 SDOH — ECONOMIC STABILITY: FOOD INSECURITY: HOW HARD IS IT FOR YOU TO PAY FOR THE VERY BASICS LIKE FOOD, HOUSING, MEDICAL CARE, AND HEATING?: NOT HARD AT ALL

## 2024-05-01 SDOH — ECONOMIC STABILITY: HOUSING INSECURITY: IN THE LAST 12 MONTHS, HOW MANY PLACES HAVE YOU LIVED?: 1

## 2024-05-01 SDOH — SOCIAL STABILITY: SOCIAL INSECURITY
WITHIN THE LAST YEAR, HAVE YOU BEEN KICKED, HIT, SLAPPED, OR OTHERWISE PHYSICALLY HURT BY YOUR PARTNER OR EX-PARTNER?: NO

## 2024-05-01 SDOH — ECONOMIC STABILITY: TRANSPORTATION INSECURITY: IN THE PAST 12 MONTHS, HAS LACK OF TRANSPORTATION KEPT YOU FROM MEDICAL APPOINTMENTS OR FROM GETTING MEDICATIONS?: NO

## 2024-05-01 SDOH — SOCIAL STABILITY: SOCIAL INSECURITY: WITHIN THE LAST YEAR, HAVE YOU BEEN HUMILIATED OR EMOTIONALLY ABUSED IN OTHER WAYS BY YOUR PARTNER OR EX-PARTNER?: NO

## 2024-05-01 SDOH — ECONOMIC STABILITY: FOOD INSECURITY: WITHIN THE PAST 12 MONTHS, THE FOOD YOU BOUGHT JUST DIDN'T LAST AND YOU DIDN'T HAVE MONEY TO GET MORE.: NEVER TRUE

## 2024-05-01 SDOH — SOCIAL STABILITY: SOCIAL INSECURITY
WITHIN THE LAST YEAR, HAVE YOU BEEN RAPED OR FORCED TO HAVE ANY KIND OF SEXUAL ACTIVITY BY YOUR PARTNER OR EX-PARTNER?: NO

## 2024-05-01 SDOH — SOCIAL STABILITY: SOCIAL INSECURITY
WITHIN THE LAST YEAR, HAVE TO BEEN RAPED OR FORCED TO HAVE ANY KIND OF SEXUAL ACTIVITY BY YOUR PARTNER OR EX-PARTNER?: NO

## 2024-05-01 SDOH — SOCIAL STABILITY: SOCIAL INSECURITY: WITHIN THE LAST YEAR, HAVE YOU BEEN AFRAID OF YOUR PARTNER OR EX-PARTNER?: NO

## 2024-05-01 SDOH — ECONOMIC STABILITY: INCOME INSECURITY: IN THE LAST 12 MONTHS, WAS THERE A TIME WHEN YOU WERE NOT ABLE TO PAY THE MORTGAGE OR RENT ON TIME?: NO

## 2024-05-01 SDOH — ECONOMIC STABILITY: INCOME INSECURITY: IN THE PAST 12 MONTHS HAS THE ELECTRIC, GAS, OIL, OR WATER COMPANY THREATENED TO SHUT OFF SERVICES IN YOUR HOME?: NO

## 2024-05-01 SDOH — ECONOMIC STABILITY: INCOME INSECURITY: HOW HARD IS IT FOR YOU TO PAY FOR THE VERY BASICS LIKE FOOD, HOUSING, MEDICAL CARE, AND HEATING?: NOT HARD AT ALL

## 2024-05-01 SDOH — ECONOMIC STABILITY: TRANSPORTATION INSECURITY
IN THE PAST 12 MONTHS, HAS THE LACK OF TRANSPORTATION KEPT YOU FROM MEDICAL APPOINTMENTS OR FROM GETTING MEDICATIONS?: NO

## 2024-05-01 SDOH — ECONOMIC STABILITY: TRANSPORTATION INSECURITY
IN THE PAST 12 MONTHS, HAS LACK OF TRANSPORTATION KEPT YOU FROM MEETINGS, WORK, OR FROM GETTING THINGS NEEDED FOR DAILY LIVING?: NO

## 2024-05-01 SDOH — ECONOMIC STABILITY: HOUSING INSECURITY: IN THE LAST 12 MONTHS, WAS THERE A TIME WHEN YOU WERE NOT ABLE TO PAY THE MORTGAGE OR RENT ON TIME?: NO

## 2024-05-01 SDOH — ECONOMIC STABILITY: FOOD INSECURITY: WITHIN THE PAST 12 MONTHS, YOU WORRIED THAT YOUR FOOD WOULD RUN OUT BEFORE YOU GOT THE MONEY TO BUY MORE.: NEVER TRUE

## 2024-05-01 ASSESSMENT — PAIN SCALES - GENERAL
PAINLEVEL_OUTOF10: 6
PAINLEVEL_OUTOF10: 10 - WORST POSSIBLE PAIN
PAINLEVEL_OUTOF10: 8
PAINLEVEL_OUTOF10: 8
PAINLEVEL_OUTOF10: 0 - NO PAIN
PAINLEVEL_OUTOF10: 0 - NO PAIN
PAINLEVEL_OUTOF10: 8
PAINLEVEL_OUTOF10: 0 - NO PAIN
PAINLEVEL_OUTOF10: 8
PAINLEVEL_OUTOF10: 8
PAINLEVEL_OUTOF10: 7

## 2024-05-01 ASSESSMENT — PAIN DESCRIPTION - LOCATION
LOCATION: LEG

## 2024-05-01 ASSESSMENT — ENCOUNTER SYMPTOMS
FATIGUE: 1
COLOR CHANGE: 1
EYES NEGATIVE: 1
NAUSEA: 1
ABDOMINAL PAIN: 1
WOUND: 1
SHORTNESS OF BREATH: 0

## 2024-05-01 ASSESSMENT — PAIN - FUNCTIONAL ASSESSMENT
PAIN_FUNCTIONAL_ASSESSMENT: 0-10

## 2024-05-01 ASSESSMENT — ACTIVITIES OF DAILY LIVING (ADL): LACK_OF_TRANSPORTATION: NO

## 2024-05-01 ASSESSMENT — PAIN DESCRIPTION - ORIENTATION: ORIENTATION: RIGHT

## 2024-05-01 NOTE — TELEPHONE ENCOUNTER
Population Health: Outreach by Ambulatory Pharmacy Team    Payor: Ayanna VALDEZ  Reason: Adherence  Medication: Jardiance 10mg  Outcome: No Answer/Invalid Number    Noemi Cunha RPh

## 2024-05-01 NOTE — PROGRESS NOTES
Pharmacy Medication History Review    Brooklyn Read is a 79 y.o. female admitted for Upper GI bleed. Pharmacy reviewed the patient's [id number] medications and allergies for accuracy.    The list below reflects the updated PTA list. Comments regarding how patient may be taking medications differently can be found in the Admit Orders Activity  Prior to Admission Medications   Prescriptions Last Dose Informant Patient Reported? Taking?   EPINEPHrine 0.3 mg/0.3 mL injection syringe  Self Yes Yes   Sig: Inject 0.3 mL (0.3 mg) into the muscle 1 time if needed for anaphylaxis.   HYDROcodone-acetaminophen (Norco) 5-325 mg tablet Not Taking at completes course fo therpay Self No No   Sig: Take 1 tablet by mouth every 6 hours if needed for severe pain (7 - 10).   Patient not taking: Reported on 5/1/2024   Trelegy Ellipta 200-62.5-25 mcg blister with device 4/30/2024 Self No Yes   Sig: Inhale 1 puff once daily.   albuterol 90 mcg/actuation inhaler 5/1/2024 Self No Yes   Sig: Inhale 2 puffs every 6 hours if needed for wheezing.   alendronate (Fosamax) 70 mg tablet 4/29/2024 Self No Yes   Sig: Take 1 tablet (70 mg) by mouth every 7 days.   Patient taking differently: Take 1 tablet (70 mg) by mouth every 7 days. Takes on Mondays   allopurinol (Zyloprim) 100 mg tablet 4/30/2024 Self No Yes   Sig: Take 1 tablet (100 mg) by mouth 2 times a day.   amoxicillin-pot clavulanate (Augmentin) 875-125 mg tablet  Self No Yes   Sig: Take 1 tablet (875 mg) by mouth 2 times a day for 14 days.   aspirin 81 mg EC tablet 4/30/2024 Self Yes Yes   Sig: Take 1 tablet (81 mg) by mouth once daily.   atorvastatin (Lipitor) 10 mg tablet Not Taking Self No No   Sig: Take 1 tablet (10 mg) by mouth once daily.   Patient not taking: Reported on 5/1/2024   buPROPion XL (Wellbutrin XL) 150 mg 24 hr tablet 4/30/2024 Self No Yes   Sig: Take 1 tablet (150 mg) by mouth once daily in the morning.   cholecalciferol (Vitamin D-3) 5,000 Units tablet  4/30/2024 Self Yes Yes   Sig: Take 1 tablet (5,000 Units) by mouth once daily.   empagliflozin (Jardiance) 10 mg 4/30/2024 Self No Yes   Sig: Take 1 tablet (10 mg) by mouth once daily.   furosemide (Lasix) 40 mg tablet 4/30/2024 Self No Yes   Sig: Take 1 tablet (40 mg) by mouth 2 times a day.   losartan (Cozaar) 100 mg tablet 4/30/2024 Self No Yes   Sig: Take 1 tablet (100 mg) by mouth once daily.   metoprolol succinate XL (Toprol-XL) 100 mg 24 hr tablet 4/30/2024 Self No Yes   Sig: Take 1 tablet (100 mg) by mouth once daily.   multivitamin with minerals tablet Unknown Self Yes Yes   Sig: Take 1 tablet by mouth once daily.   potassium chloride CR 20 mEq ER tablet  Self Yes Yes   Sig: Take 1 tablet (20 mEq) by mouth once daily. Do not crush or chew.   rosuvastatin (Crestor) 40 mg tablet 4/30/2024 Self No Yes   Sig: Take 1 tablet (40 mg) by mouth once daily.   sertraline (Zoloft) 100 mg tablet 4/30/2024 Self No Yes   Sig: Take 1 tablet (100 mg) by mouth once daily.   tiZANidine (Zanaflex) 2 mg capsule Not Taking  No No   Sig: Take 1 capsule (2 mg) by mouth in the morning and 1 capsule (2 mg) in the evening and 1 capsule (2 mg) before bedtime.   Patient not taking: Reported on 5/1/2024      Facility-Administered Medications: None        The list below reflects the updated allergy list. Please review each documented allergy for additional clarification and justification.  Allergies  Reviewed by Reyna Persaud RN on 4/30/2024        Severity Reactions Comments    Epinephrine-chlorpheniramine High Anaphylaxis Throat swells and hives, passed out            Patient declines M2B at discharge. Pharmacy has been updated to [facility] in [place], [place].    Sources used to complete the med history include   Patient Interview - good historian able to confirm all medications from verbalyl presented list, and independently state accurate directions for administration  Admission MedRec Grid  [facility] - hydrocodone-acetaminophen 5-325mg  LF: 4/24/24, #10, 2DS  [facility] medication dispense report    Below are additional concerns with the patient's PTA list.  None    Kamala Collins, PharmD   Transitions of Care Pharmacist  [facility]  Please reach out via Secure Chat for questions, or if no response call [facility] or [facility]

## 2024-05-01 NOTE — PROGRESS NOTES
[facility]  [facility] [place]  CONSULT FOLLOW-UP         SUBJECTIVE     Reason for Consult: Hematemesis/Melena      Interval Events/Subjective:   Received 2U pRBC overnight. Had 4 BM. Remained HDS.     ROS: Complete ROS obtained, negative unless otherwise indicated above.     Medications:  tiotropium, 2 puff, inhalation, Daily   And  fluticasone furoate-vilanteroL, 1 puff, inhalation, Daily  insulin lispro, 0-5 Units, subcutaneous, TID with meals      PRN medications: albuterol, dextrose, glucagon, HYDROmorphone      EXAM     Physical Exam       5/1/2024     5:00 AM 5/1/2024     6:12 AM 5/1/2024     6:27 AM 5/1/2024     6:28 AM 5/1/2024     6:45 AM 5/1/2024     6:46 AM 5/1/2024     7:11 AM   Vitals   Systolic 113 146 119 119 107 107 118   Diastolic 52 53 82 82 57 57 41   Heart Rate 83 87 89 87 89 90 85   Temp  36.4 °C (97.5 °F) 36.4 °C (97.5 °F) 36.4 °C (97.5 °F) 36.8 °C (98.2 °F) 36.8 °C (98.2 °F) 36.4 °C (97.5 °F)   Resp 22 18 18 13 29 14 25   Weight (lb) 155.42         BMI 27.53 kg/m2         BSA (m2) 1.77 m2             General: NAD, AA&O x 3  Eyes: EOMI, PERRLA  ENT: MMM  Heart: RRR  Lungs: No respiratory distress  Abdomen: Soft, non tender, non distended  Skin: No jaundice   Neuro: Appropriately responds to questions/commands         DATA                                                                            Labs     Results for orders placed or performed during the hospital encounter of 04/30/24 (from the past 24 hour(s))   Prepare RBC: 2 Units   Result Value Ref Range    PRODUCT CODE F6566H88     Unit Number [id number]     Unit ABO O     Unit RH POS     XM INTEP COMP     Dispense Status XM     Blood Expiration Date May 07, 2024 23:59 EDT     PRODUCT BLOOD TYPE 5100     UNIT VOLUME 350     PRODUCT CODE F1016V77     Unit Number [id number]     Unit ABO O     Unit RH POS     XM INTEP COMP     Dispense Status XM     Blood Expiration Date May 14, 2024  23:59 EDT     PRODUCT BLOOD TYPE 5100     UNIT VOLUME 275    CBC and Auto Differential   Result Value Ref Range    WBC 27.5 (H) 4.4 - 11.3 x10*3/uL    nRBC 0.0 0.0 - 0.0 /100 WBCs    RBC 3.78 (L) 4.00 - 5.20 x10*6/uL    Hemoglobin 11.0 (L) 12.0 - 16.0 g/dL    Hematocrit 31.9 (L) 36.0 - 46.0 %    MCV 84 80 - 100 fL    MCH 29.1 26.0 - 34.0 pg    MCHC 34.5 32.0 - 36.0 g/dL    RDW 15.9 (H) 11.5 - 14.5 %    Platelets 171 150 - 450 x10*3/uL    Neutrophils % 85.5 40.0 - 80.0 %    Immature Granulocytes %, Automated 1.2 (H) 0.0 - 0.9 %    Lymphocytes % 4.7 13.0 - 44.0 %    Monocytes % 8.4 2.0 - 10.0 %    Eosinophils % 0.0 0.0 - 6.0 %    Basophils % 0.2 0.0 - 2.0 %    Neutrophils Absolute 23.53 (H) 1.60 - 5.50 x10*3/uL    Immature Granulocytes Absolute, Automated 0.33 0.00 - 0.50 x10*3/uL    Lymphocytes Absolute 1.28 0.80 - 3.00 x10*3/uL    Monocytes Absolute 2.30 (H) 0.05 - 0.80 x10*3/uL    Eosinophils Absolute 0.01 0.00 - 0.40 x10*3/uL    Basophils Absolute 0.06 0.00 - 0.10 x10*3/uL   Type and Screen   Result Value Ref Range    ABO TYPE O     Rh TYPE POS     ANTIBODY SCREEN NEG    Coagulation Screen   Result Value Ref Range    Protime 13.6 (H) 9.8 - 12.8 seconds    INR 1.2 (H) 0.9 - 1.1    aPTT 17 (L) 27 - 38 seconds   Fibrinogen   Result Value Ref Range    Fibrinogen 306 200 - 400 mg/dL   Renal function panel   Result Value Ref Range    Glucose 108 (H) 74 - 99 mg/dL    Sodium 143 136 - 145 mmol/L    Potassium 4.3 3.5 - 5.3 mmol/L    Chloride 111 (H) 98 - 107 mmol/L    Bicarbonate 20 (L) 21 - 32 mmol/L    Anion Gap 16 10 - 20 mmol/L    Urea Nitrogen 62 (H) 6 - 23 mg/dL    Creatinine 1.57 (H) 0.50 - 1.05 mg/dL    eGFR 34 (L) >60 mL/min/1.73m*2    Calcium 7.3 (L) 8.6 - 10.6 mg/dL    Phosphorus 4.5 2.5 - 4.9 mg/dL    Albumin 2.4 (L) 3.4 - 5.0 g/dL   Lactate   Result Value Ref Range    Lactate 2.4 (H) 0.4 - 2.0 mmol/L   POCT GLUCOSE   Result Value Ref Range    POCT Glucose 131 (H) 74 - 99 mg/dL   Prepare Platelets: 2 Units    Result Value Ref Range    PRODUCT CODE U4810J48     Unit Number [id number]     Unit ABO A     Unit RH POS     Dispense Status TR     Blood Expiration Date May 02, 2024 23:59 EDT     PRODUCT BLOOD TYPE 6200     UNIT VOLUME 244     PRODUCT CODE E2591P68     Unit Number [id number]     Unit ABO A     Unit RH POS     Dispense Status IS     Blood Expiration Date May 02, 2024 23:59 EDT     PRODUCT BLOOD TYPE 6200     UNIT VOLUME 272    Prepare Plasma: 4 Units   Result Value Ref Range    PRODUCT CODE V3695Q53     Unit Number [id number]     Unit ABO O     Unit RH POS     Dispense Status TR     Blood Expiration Date May 02, 2024 20:45 EDT     PRODUCT BLOOD TYPE 5100     UNIT VOLUME 216     PRODUCT CODE P7429I78     Unit Number [id number]     Unit ABO O     Unit RH POS     Dispense Status IS     Blood Expiration Date May 02, 2024 20:45 EDT     PRODUCT BLOOD TYPE 5100     UNIT VOLUME 215     PRODUCT CODE D8749R37     Unit Number [id number]     Unit ABO O     Unit RH POS     Dispense Status XM     Blood Expiration Date May 05, 2024 13:56 EDT     PRODUCT BLOOD TYPE 5100     UNIT VOLUME 310     PRODUCT CODE Z7978G51     Unit Number [id number]     Unit ABO O     Unit RH NEG     Dispense Status XM     Blood Expiration Date May 05, 2024 13:46 EDT     PRODUCT BLOOD TYPE 9500     UNIT VOLUME 322    ABO/Rh   Result Value Ref Range    ABO TYPE O     Rh TYPE POS    Urinalysis with Reflex Culture and Microscopic   Result Value Ref Range    Color, Urine Light-Yellow Light-Yellow, Yellow, Dark-Yellow    Appearance, Urine Clear Clear    Specific Gravity, Urine 1.031 1.005 - 1.035    pH, Urine 6.0 5.0, 5.5, 6.0, 6.5, 7.0, 7.5, 8.0    Protein, Urine 30 (1+) (A) NEGATIVE, 10 (TRACE), 20 (TRACE) mg/dL    Glucose, Urine 500 (3+) (A) Normal mg/dL    Blood, Urine 0.06 (1+) (A) NEGATIVE    Ketones, Urine TRACE (A) NEGATIVE mg/dL    Bilirubin, Urine NEGATIVE NEGATIVE    Urobilinogen, Urine Normal Normal mg/dL     Nitrite, Urine NEGATIVE NEGATIVE    Leukocyte Esterase, Urine NEGATIVE NEGATIVE   Urinalysis Microscopic   Result Value Ref Range    WBC, Urine NONE 1-5, NONE /HPF    RBC, Urine 1-2 NONE, 1-2, 3-5 /HPF                                                                                Imaging           XR chest 1 view    Result Date: 5/1/2024  Interpreted By:  Riley Singer and Marshall Colin STUDY: XR CHEST 1 VIEW;  5/1/2024 1:46 am   INDICATION: Signs/Symptoms:C/f PNA vs fluid overload.   COMPARISON: Chest radiograph dated 10/31/2019.CT abdomen and pelvis dated 04/30/2024.   ACCESSION NUMBER(S): [id number]   ORDERING CLINICIAN: JOHN SIMMONS   FINDINGS: AP radiograph of the chest was provided.   CARDIOMEDIASTINAL SILHOUETTE: Cardiomediastinal silhouette is normal in size and configuration. Aortic knob calcifications again noted.   LUNGS: No focal consolidation, pleural effusion, or pneumothorax.   ABDOMEN: No remarkable upper abdominal findings.   BONES: No acute osseous changes.       No evidence of acute cardiopulmonary process.   I personally reviewed the images/study and I agree with the resident findings as stated. This study was interpreted at [place], [place].   MACRO: None   Signed by: Riley Singer 5/1/2024 8:54 AM Dictation workstation:   [id number]    ECG 12 lead    Result Date: 4/30/2024  Sinus rhythm Confirmed by Víctor Regan ([id number]) on 4/30/2024 3:16:02 PM    EGD    Addendum Date: 4/30/2024    Impression The cricopharynx, upper third of the esophagus, middle third of the esophagus, lower third of the esophagus, GE junction, duodenal bulb, 1st part of the duodenum and 2nd part of the duodenum appeared normal. Ulcer in the prepyloric region with adherent clot (Boogie IIB) Large old clot in the Gastric fundus Findings The cricopharynx, upper third of the esophagus, middle third of the esophagus, lower third of the esophagus, GE junction, duodenal bulb, 1st  part of the duodenum and 2nd part of the duodenum appeared normal. Deep, irregular ulcer in the prepyloric region with adherent clot (Boogie IIB). Large Deep Ulcer superior aspect of Pre-pyloric Antrum : with large adherent blood clot . NOT actively bleeding Large old clot in the Gastric fundus Recommendation  Follow up with PCP  Admit to the ICU NPO Avoid use of  NSAIDs Protonix 40mg I.v. Q 12 hours Repeat EGD in a.m.  Indication Upper GI bleed Staff Staff Role Meir Bauer MD Proceduralist Medications See Anesthesia Record. Preprocedure A history and physical has been performed, and patient medication allergies have been reviewed. The patient's tolerance of previous anesthesia has been reviewed. The risks and benefits of the procedure and the sedation options and risks were discussed with the patient. All questions were answered and informed consent obtained. Details of the Procedure The patient underwent general anesthesia, which was administered by an anesthesia professional. The patient's blood pressure, ECG, ETCO2, heart rate, level of consciousness, respirations and oxygen were monitored throughout the procedure. The scope was introduced through the mouth and advanced to the second part of the duodenum. Retroflexion was performed in the cardia. Prior to the procedure, the patient's H. Pylori status was unknown. The patient experienced no blood loss. The procedure was not difficult. The patient tolerated the procedure well. There were no apparent adverse events. Events Procedure Events Event Event Time ENDO SCOPE IN TIME 4/30/2024  2:24 PM ENDO SCOPE OUT TIME 4/30/2024  2:35 PM Specimens No specimens collected Procedure Location [facility] [place] [address] [facility] [address] [phone] Referring Provider No referring provider defined for this encounter. Procedure Provider No name on file    Result Date: 4/30/2024  Table formatting from the original result was  not included. Impression The cricopharynx, upper third of the esophagus, middle third of the esophagus, lower third of the esophagus, GE junction, duodenal bulb, 1st part of the duodenum and 2nd part of the duodenum appeared normal. Ulcer in the prepyloric region with adherent clot (Boogie IIB) Large old clot in the Gastric fundus Findings The cricopharynx, upper third of the esophagus, middle third of the esophagus, lower third of the esophagus, GE junction, duodenal bulb, 1st part of the duodenum and 2nd part of the duodenum appeared normal. Deep, irregular ulcer in the prepyloric region with adherent clot (Boogie IIB). Large Deep Ulcer superior aspect of Pre-pyloric Antrum : with large adherent blood clot . NOT actively bleeding Large old clot in the Gastric fundus Recommendation  Follow up with PCP  Admit to the ICU NPO Protonix 40mg I.v. Q 12 hours Repeat EGD in a.m.  Indication Upper GI bleed Staff Staff Role Meir Bauer MD Proceduralist Medications See Anesthesia Record. Preprocedure A history and physical has been performed, and patient medication allergies have been reviewed. The patient's tolerance of previous anesthesia has been reviewed. The risks and benefits of the procedure and the sedation options and risks were discussed with the patient. All questions were answered and informed consent obtained. Details of the Procedure The patient underwent general anesthesia, which was administered by an anesthesia professional. The patient's blood pressure, ECG, ETCO2, heart rate, level of consciousness, respirations and oxygen were monitored throughout the procedure. The scope was introduced through the mouth and advanced to the second part of the duodenum. Retroflexion was performed in the cardia. Prior to the procedure, the patient's H. Pylori status was unknown. The patient experienced no blood loss. The procedure was not difficult. The patient tolerated the procedure well. There were no apparent  adverse events. Events Procedure Events Event Event Time ENDO SCOPE IN TIME 4/30/2024  2:24 PM ENDO SCOPE OUT TIME 4/30/2024  2:35 PM Specimens No specimens collected Procedure Location [facility] [place] [address] [place] [id number] [phone] Referring Provider No referring provider defined for this encounter. Procedure Provider No name on file     CT abdomen pelvis w IV contrast    Result Date: 4/30/2024  Interpreted By:  Leon Winter, STUDY: CT ABDOMEN PELVIS W IV CONTRAST;  4/30/2024 10:07 am   INDICATION: Signs/Symptoms:vomiting blood.   COMPARISON: None.   ACCESSION NUMBER(S): [id number]   ORDERING CLINICIAN: BERNARD AHUJA   TECHNIQUE: Contiguous axial images were obtained through the abdomen and pelvis after the administration of  75 mL Omnipaque 350 intravenous contrast. Coronal and sagittal reformations were made.   FINDINGS: LOWER CHEST: Lung bases are clear.   ABDOMEN:   LIVER: Within normal limits.   BILE DUCTS: Nondilated.   GALLBLADDER: The gallbladder is not distended and without calcified stones.   PANCREAS: Within normal limits.   SPLEEN: Within normal limits.   ADRENAL GLANDS: Within normal limits.   KIDNEYS, URETERS, and BLADDER: The kidneys enhance symmetrically without focal lesion. Cortical scarring seen bilaterally, likely vascular. No hydroureteronephrosis bilaterally.Bladder unremarkable.   VESSELS: There is no aneurysmal dilatation of the abdominal aorta. Generalized atherosclerotic disease of the aortoiliac tree. The IVC is within normal limits.   BOWEL: Focal haziness along the inferior posterior aspect of the gastric pylorus. There is a tiny high density focus in this region. No marj perforation. High density amorphous material within the gastric lumen can be seen with clot. No obstruction. Marked colonic diverticulosis about the sigmoid in particular.   PERITONEUM/RETROPERITONEUM/LYMPH NODES: No ascites or free air, no fluid collection.   No  retroperitoneal fluid collection or lymphadenopathy.   REPRODUCTIVE ORGANS: Status post hysterectomy.   ABDOMINAL WALL: Unremarkable.   BONE AND SOFT TISSUE: Bones are intact.       1. Haziness along the inferior posterior margin of the gastric pylorus possibly related to focal peptic ulcer disease. No marj perforation. A tiny hyperdense focus in this region is identified which could relate to a small focus of extraluminal contrast. No discrete layering identified to confirm active bleeding, however, this is a single phase study. 2. Cortical scarring bilaterally likely vascular in nature.     Signed by: Leon Winter 4/30/2024 10:24 AM Dictation workstation:   [id number]                                                                            GI Procedures     4/30/2024   Impression  The cricopharynx, upper third of the esophagus, middle third of the esophagus, lower third of the esophagus, GE junction, duodenal bulb, 1st part of the duodenum and 2nd part of the duodenum appeared normal.  Ulcer in the prepyloric region with adherent clot (Boogie IIB)  Large old clot in the Gastric fundus     5/1/2024   Findings  The esophagus appeared normal. No esophageal varices. No signs of GI bleed.  Regular Z-line 35 cm from the incisors  Single deep, irregular ulcer in the prepyloric region with adherent clot (Boogie IIB); injected epinephrine to address bleeding. Large deep ulcer with large adherent blood clot (20~25mm) at inferior posterior aspect of pre-pylorus. Epinephrine was injected into four quadrants around the ulcer. Clot retrieval with snares were attempted but unsuccessful due to difficult location. One clip was placed for identification.  The duodenal bulb, 1st part of the duodenum and 2nd part of the duodenum appeared normal. No ulcers or AVM. No signs of GI bleed.       5/2/2024   The esophagus appeared normal. No evidence of esophagitis, hiatal hernia, or Lugo's esophagus.  Moderate, patchy atrophic and  erythematous mucosa in the body of the stomach and antrum, consistent with gastritis; performed cold forceps biopsy to rule out H. pylori;  Single large, cratered, round ulcer in the prepyloric region with adherent clot (Boogie IIB); placed 1 clip successfully (clip is MRI compatible); hemostasis achieved; induced coagulation and hemostasis achieved with 4 applications of with heater probe; injected 5.5 mL of epinephrine to address bleeding; hemostasis achieved. A previously placed clip was seen the outside margin of the ulcer. Another previously placed clip was seen at the interior margin of the ulcer. A hemoclip was used to guillotine the adherent clot off the ulcer by placing it at the white fibrin base and pulling off. There was mild oozing noted consistent with underlying vessel. Epinephrine injection was used on either side of the oozing with significant slowing of the bleeding. Heater probe coagulation resulted in complete cessation. A Doppler probe was used to interrogate the ulcer and area treated with no signal at low and medium settings.  The cardia and fundus of the stomach appeared normal.  Mild, patchy erythematous mucosa in the duodenal bulb;  The 2nd part of the duodenum appeared normal.  Due to [employer] - Epic error with account number, several of the images from the procedure were not attached to this procedure.       ASSESSMENT / PLAN     Assessment and Recommendations:     Brooklyn Read is a 79 y.o. female w/PMH of COPD, osteopenia, HTN and HLD who is transferred from [facility] for upper GI bleeding.      #Upper GI bleeding   #Acute blood loss anemia   Ms Read initially presented to OSH with symptoms of upper GI bleeding including hematemesis and melena. She was hemodynamically unstable and  Her laboratory work up was pertinent for acute blood loss anemia with hgb 6.7 which failed to correct with one unit of PRBC. EGD (4/30) done at [facility] appeared to show two large forest IIB ulcers in  the prepyloric/antral area. No active bleeding was noted and no intervention was done.  She was transferred to [facility] after she experienced an additional episode of hematemesis associated with hypotension and requiring pressors. Following transfer, her pressors were weaned off and repeat Hgb was 11 (appropriately corrected). Her PUD likely due to NSAID use. Pt underwent EGD 5/1/2024 which showed single deep, irregular ulcer in the prepyloric region with adherent clot (Boogie IIB). Unable to retrieve clot. Epinephrine was injected. Patient continued to have signs of active GI bleed. Repeat EGD 5/2/2024 again showed single large, cratered, round ulcer in the prepyloric region with adherent clot (Boogie IIB). A hemoclip was used to guillotine the adherent clot off the ulcer by placing it at the white fibrin base and pulling off. There was mild oozing noted consistent with underlying vessel. Epinephrine injection was used on either side of the oozing with significant slowing of the bleeding. Heater probe coagulation resulted in complete cessation. A Doppler probe was used to interrogate the ulcer and area treated with no signal at low and medium settings.     Recommendations:   - Continue with PPI IV   - Can resume CLD. Do not advance until GI notifies.   - Follow stool HP studies/gastric biopsies   - Trend CBC, transfuse to goal Hgb >7 and platelets >50   - Maintain two large bore IVs and active type and screen   - Hold anticoagulation and antiplatelet agents; Avoid NSAIDs    - Monitor for bleeding, if patient develops significant bleeding with hemodynamic instability, please call GI fellow on call   - Ensure Hb > 7, platelets > 50, K > 4, and Mg > 2 on day of procedure     BRITTA per primary team     ------------------------------------------------------------------------  Flora Ryan MD  Gastroenterology Fellow  After 5PM and on Weekends, please page on-call fellow.    Case discussed with service attending Dr. Abbott

## 2024-05-01 NOTE — PROGRESS NOTES
Occupational Therapy                 Therapy Communication Note    Patient Name: Brooklyn Read  MRN: [id number]  Today's Date: 5/1/2024     Discipline: Occupational Therapy    Missed Visit Reason: Missed Visit Reason:  (Patient admitted with UGIB, pending GI scope today; will attempt OT next visit as appropriate.)    Missed Time: Attempt    Comment:  Kisha Walker OTR/L  Inpatient Occupational Therapist   Rehab Office: [phone]

## 2024-05-01 NOTE — SIGNIFICANT EVENT
EGD again showed arge deep ulcer with large adherent blood clot (20~25mm) at inferior posterior aspect of pre-pylorus. Epinephrine was injected into four quadrants around the ulcer. Clot retrieval with snares were attempted but unsuccessful due to difficult location. One clip was placed for identification. Esophagus/duodenum were unremarkable.    Plan  -c/w IV PPI BID  -send stool H. Pylori  -CLD for now  -Please call GI with signs of active GI bleed in which case we will potentially re-scope. If failed plan for IR embolization.

## 2024-05-01 NOTE — CONSULTS
Reason For Consult  Concern for ischemic limb    History Of Present Illness  Brooklyn Read is a 79 y.o. female with history of HTN, HLD, COPD (no baseline oxygen requirement) and tobacco use, DM, CKD, prior CVA, osteopenia on oral bisphosphonates, arthritis with recent heavy Ibuprofen use, who presents as a transfer from [facility] with upper GI bleed. Vascular surgery consulted due to no dopplerable pulses in BL LE (R>L).     Patient reports that on 4/30 she got up to use the restroom and began feeling diaphoretic. She had a bout of bright red hematemesis which brought her to [place] [facility]. She received multiple units of pRBCs and underwent EGD which showed evidence of an ulcer with clots but no active bleeding. After EGD patient had multiple subsequent episodes of hematemesis, became hypotensive, and required multiple units of PRBCs as well as pressor support. Patient was transferred to [facility] for further management. On arrival to [facility] it was noted that patient did not have signals on doppler in BL LE.     Patient reports no previous PAD. Patient does have a chronic wound on her RLE that has been there several weeks and managed by wound care per patient. Patient does reports history of 3 strokes, denies cardiac history. She denies SOB, cough, fever, chills.     Past Medical History  She has a past medical history of Anemia, Anxiety, Cerebral vascular accident (Multi), CKD (chronic kidney disease), COPD (chronic obstructive pulmonary disease) (Multi), Depression, GI (gastrointestinal bleed), Hyperlipidemia, and Hypertension.    She has no past medical history of Diabetes mellitus (Multi) or Renal insufficiency.    Surgical History  She has a past surgical history that includes Hysterectomy and Cholecystectomy.     Social History  She reports that she quit smoking about 2 years ago. Her smoking use included cigarettes. She started smoking about 64 years ago. She has a 62 pack-year smoking history. She  "has never used smokeless tobacco. She reports that she does not drink alcohol and does not use drugs.    Family History  No family history on file.     Allergies  Epinephrine-chlorpheniramine    Review of Systems  ROS negative except for what is stated in HPI     Physical Exam  Physical Exam  Constitutional:       General: She is not in acute distress.     Appearance: She is not toxic-appearing.   HENT:      Head: Normocephalic and atraumatic.      Mouth/Throat:      Mouth: Mucous membranes are dry.   Eyes:      Conjunctiva/sclera: Conjunctivae normal.   Cardiovascular:      Rate and Rhythm: Normal rate.      Comments: Not currently on pressors  Pulmonary:      Effort: Pulmonary effort is normal.   Abdominal:      General: There is no distension.      Palpations: Abdomen is soft.      Tenderness: There is no abdominal tenderness.   Musculoskeletal:      Comments: RLE chronic wound approximately 4 cm in diameter at lateral lower leg. BL feet warm to touch, L >R. Some pallor noted in R toes. BL palpable femoral pulses. L AT detected on doppler, unable to detect R AT, DP, PT. BL LE motor and sensation intact.    Skin:     General: Skin is warm and dry.   Neurological:      General: No focal deficit present.      Mental Status: She is alert.   Psychiatric:         Mood and Affect: Mood normal.         Behavior: Behavior normal.            Last Recorded Vitals  Blood pressure (!) 113/48, pulse 79, temperature 36.2 °C (97.2 °F), resp. rate 18, height 1.6 m (5' 3\"), weight 70.5 kg (155 lb 6.8 oz), SpO2 97%.    Relevant Results  Results for orders placed or performed during the hospital encounter of 04/30/24 (from the past 24 hour(s))   Coagulation Screen   Result Value Ref Range    Protime 13.6 (H) 9.8 - 12.8 seconds    INR 1.2 (H) 0.9 - 1.1    aPTT 17 (L) 27 - 38 seconds   Fibrinogen   Result Value Ref Range    Fibrinogen 306 200 - 400 mg/dL   POCT GLUCOSE   Result Value Ref Range    POCT Glucose 131 (H) 74 - 99 mg/dL "          Assessment/Plan     Brooklyn Read is a 79 y.o. female with history of HTN, HLD, COPD, DM, CKD, prior CVA, osteopenia, arthritis who presents as a transfer from [facility] with upper GI bleed. Vascular surgery consulted due to no dopplerable pulses in BL LE (R>L) noted on arrival to [facility]. BL LE motor and sensation is intact, both feet are warm, unable to detect signals in RLE. Suspect hypotension and recent pressor support along with chronic disease is contributing to the diminished signals. Will reassess tomorrow AM. Would not recommend anticoagulation at this time as patient is being actively treated for GI bleed requiring blood products and pressor support. Please contact the vascular team if there are further changes to the appearance of the RLE or worsening exam.       Patient discussed with chief resident Dr. Mckee, further plans pending discussion with attending Dr. Landeros.       Nikia Jamil MD  Vascular Surgery [id number]    3 AM update: Patient re-examined. Pulse exam is unchanged with AT signal found in L and unable to detect in R. Motor and sensation intact bilaterally. Patient R side is overall weaker but patient states this is her baseline. Both feet equally warm.     Nikia Jamil MD  General Surgery

## 2024-05-01 NOTE — PROGRESS NOTES
Physical Therapy                 Therapy Communication Note    Patient Name: Brooklyn Read  MRN: [id number]  Today's Date: 5/1/2024     Discipline: Physical Therapy    Missed Visit Reason: Missed Visit Reason:  (presents with UGIB; plan for scope this date. PT will hold and re-attempt as time and schedule allows and as medically appropriate.)    Missed Time: Attempt    Comment:

## 2024-05-01 NOTE — CONSULTS
Wound Care Consult     Visit Date: 5/1/2024      Patient Name: Brooklyn Read         MRN: [id number]           YOB: 1945     Reason for Consult: RLE wound        Pertinent Labs:   Albumin   Date Value Ref Range Status   05/01/2024 2.3 (L) 3.4 - 5.0 g/dL Final       Wound Assessment:  Wound 04/24/24 Venous Ulcer Leg Other (Comment) (Active)   Wound Image   04/30/24 9272   Site Assessment Hyperpigmentation;[place] 05/01/24 1200   Ca-Wound Assessment Yellow-brown (Hemosiderin staining);Red 05/01/24 1200   Non-staged Wound Description Full thickness 05/01/24 1200   Wound Length (cm) 4 cm 05/01/24 1200   Wound Width (cm) 8 cm 05/01/24 1200   Wound Surface Area (cm^2) 32 cm^2 05/01/24 1200   State of Healing Slough 05/01/24 1200   Margins Well-defined edges 05/01/24 1200   Drainage Description None 05/01/24 1200   Drainage Amount None 05/01/24 1200   Dressing Other (Comment) 05/01/24 1200   Dressing Changed Changed 05/01/24 1200   Dressing Status Clean;Dry 05/01/24 1200       Wound Team Summary Assessment: Wound is dry with old drainage.  Due to concern for vascular insufficiency, recommend to keep wound dry.  Paint wound with betadine, cover with gauze and wrap with kerlix.  Change daily and as needed.  Recommend ABIs/PVRs to rule out vascular insufficiency.  Recommend continued care with vascular surgery.    Provider: If you agree with recommendations above, please write orders for care.      Wound Team Plan: Wound care to sign off at this time.  Wound care may be reconsulted if wound worsens or new area of concern appears.      Reyna Salamanca, MSN, RN, CWCN, COCN   5/1/2024  1:29 PM

## 2024-05-01 NOTE — H&P
Brooklyn Read is a 79 y.o. female on day 1 of admission presenting with Upper GI bleed.    Subjective   The pt is a 78 yo woman presenting from [facility] due to GI bleed. Pt has a PMH of osteopenia, COPD, anemia, CVA, CKD, Depression, HLD and HTN, Diastolic HF, chronic RLE wound. Pt presented to [place] ED yesterday due to hematemesis that started on 4/30 in the early morning. In the ED she was noted to have an anemia of 6.7 and new OTONIEL. Was given blood and admitted to the floor for GI consultation. GI saw the patient and suspected the GI bleed was 2/2 to ibuprofen use and planned to proceed with UGI endoscopy while inpatient. Pt become unstable overnight requiring MTP, and vasopressors thus was transferred to [facility] for further care.     Here she states that she is having significant RLE pain and some abdominal pain however her RLE pain is worse. She states she has has a sore on the extremity for 3 wks. She has been taking ibuprofen 6-8 pills/wk to treat this pain. She states that she woke up having abdominal pain with large volume hematemesis yesterday morning. This had never happened to this degree before. Has a had a small amount of hematemesis several months prior after a bout of food poisoning. No frequent alcohol use, only a drink every few months. No recent falls or traumas.     Review of Systems   Constitutional:  Positive for fatigue.   HENT: Negative.     Eyes: Negative.    Respiratory:  Negative for shortness of breath.    Cardiovascular:  Negative for chest pain.   Gastrointestinal:  Positive for abdominal pain and nausea.   Genitourinary: Negative.    Musculoskeletal:         RLE pain    Skin:  Positive for color change and wound.     OSH Course:  Interventions:   - 7u pRBCs  - Pantoprazole 40mg BID     Results:  - H/H: 6.7/22.3 --> 5.9/18.2  - Cr: (bl 0.9) 1.59   - CT A/P w/ con: Haziness along the inferior posterior margin of the gastric pylorus ?PUD.No marj perforation. A tiny hyperdense  "focus identified which could relate to a small focus of extraluminal contrast. No discrete layering identified to confirm active bleeding  - Endoscopy:   The cricopharynx, upper third of the esophagus, middle third of the esophagus, lower third of the esophagus, GE junction, duodenal bulb, 1st part of the duodenum and 2nd part of the duodenum appeared normal.  Deep, irregular ulcer in the prepyloric region with adherent clot (Boogie IIB). Large Deep Ulcer superior aspect of Pre-pyloric Antrum : with large adherent blood clot . NOT actively bleeding  Large old clot in the Gastric fundus      Past Medical History:  She has a past medical history of Anemia, Anxiety, Cerebral vascular accident (Multi), CKD (chronic kidney disease), COPD (chronic obstructive pulmonary disease) (Multi), Depression, GI (gastrointestinal bleed), Hyperlipidemia, and Hypertension.     Surgical History:  She has a past surgical history that includes Hysterectomy and Cholecystectomy.    Social History:  Tobacco use,     Allergies:  Allergies   Allergen Reactions    Epinephrine-Chlorpheniramine Anaphylaxis     Throat swells and hives, passed out           Objective   Vitals 24 hour ranges:  Heart Rate:  [67-88]   Temp:  [35.7 °C (96.3 °F)-38.1 °C (100.5 °F)]   Resp:  [18-37]   BP: ()/(25-84)   Height:  [158 cm (5' 2.21\")-160 cm (5' 3\")]   Weight:  [70.3 kg (154 lb 15.7 oz)-70.5 kg (155 lb 6.8 oz)]   SpO2:  [91 %-100 %]     Physical Exam:  Physical Exam  Vitals and nursing note reviewed.   Constitutional:       General: She is not in acute distress.     Appearance: Normal appearance. She is not toxic-appearing.   HENT:      Head: Normocephalic.      Mouth/Throat:      Mouth: Mucous membranes are moist.   Eyes:      Conjunctiva/sclera: Conjunctivae normal.      Pupils: Pupils are equal, round, and reactive to light.   Cardiovascular:      Rate and Rhythm: Normal rate and regular rhythm.      Pulses:           Radial pulses are 2+ on the " right side and 2+ on the left side.   Pulmonary:      Effort: Pulmonary effort is normal. No respiratory distress.      Breath sounds: Normal breath sounds.   Abdominal:      General: Abdomen is flat. There is no distension.      Palpations: Abdomen is soft.      Tenderness: There is abdominal tenderness. There is no guarding or rebound.   Musculoskeletal:      Cervical back: Normal range of motion and neck supple.      Right lower leg: No edema.      Left lower leg: No edema.      Comments: Sore on the posterior aspect of the RLE per image below, Unable to palpate pulses on bl DPs however, dopplerable pulse on L DP, no dopplerable pulse on R foot, R toes pale and discolored compared to L, R toes with decreased sensation, normal temperature.    Skin:     General: Skin is warm.      Capillary Refill: Capillary refill takes less than 2 seconds.   Neurological:      Mental Status: She is alert and oriented to person, place, and time.   Psychiatric:         Mood and Affect: Mood normal.         Intake/Output last 3 Shifts:  No intake or output data in the 24 hours ending 05/01/24 0125    Medications  tiotropium, 2 puff, inhalation, Daily   And  fluticasone furoate-vilanteroL, 1 puff, inhalation, Daily  insulin lispro, 0-5 Units, subcutaneous, TID with meals  pantoprazole, 40 mg, intravenous, BID       PRN medications: albuterol, dextrose, glucagon, HYDROmorphone    Lab Results  Results for orders placed or performed during the hospital encounter of 04/30/24 (from the past 24 hour(s))   CBC and Auto Differential   Result Value Ref Range    WBC 8.9 4.4 - 11.3 x10*3/uL    nRBC 0.0 0.0 - 0.0 /100 WBCs    RBC 2.48 (L) 4.00 - 5.20 x10*6/uL    Hemoglobin 6.7 (L) 12.0 - 16.0 g/dL    Hematocrit 22.3 (L) 36.0 - 46.0 %    MCV 90 80 - 100 fL    MCH 27.0 26.0 - 34.0 pg    MCHC 30.0 (L) 32.0 - 36.0 g/dL    RDW 15.7 (H) 11.5 - 14.5 %    Platelets 341 150 - 450 x10*3/uL    Neutrophils % 61.5 40.0 - 80.0 %    Immature Granulocytes %,  Automated 0.6 0.0 - 0.9 %    Lymphocytes % 23.4 13.0 - 44.0 %    Monocytes % 10.0 2.0 - 10.0 %    Eosinophils % 4.1 0.0 - 6.0 %    Basophils % 0.4 0.0 - 2.0 %    Neutrophils Absolute 5.48 1.60 - 5.50 x10*3/uL    Immature Granulocytes Absolute, Automated 0.05 0.00 - 0.50 x10*3/uL    Lymphocytes Absolute 2.09 0.80 - 3.00 x10*3/uL    Monocytes Absolute 0.89 (H) 0.05 - 0.80 x10*3/uL    Eosinophils Absolute 0.37 0.00 - 0.40 x10*3/uL    Basophils Absolute 0.04 0.00 - 0.10 x10*3/uL   Phosphorus   Result Value Ref Range    Phosphorus 4.6 2.5 - 4.9 mg/dL   Magnesium   Result Value Ref Range    Magnesium 2.00 1.60 - 2.40 mg/dL   Comprehensive metabolic panel   Result Value Ref Range    Glucose 170 (H) 74 - 99 mg/dL    Sodium 138 136 - 145 mmol/L    Potassium 3.5 3.5 - 5.3 mmol/L    Chloride 103 98 - 107 mmol/L    Bicarbonate 24 21 - 32 mmol/L    Anion Gap 15 10 - 20 mmol/L    Urea Nitrogen 36 (H) 6 - 23 mg/dL    Creatinine 1.59 (H) 0.50 - 1.05 mg/dL    eGFR 33 (L) >60 mL/min/1.73m*2    Calcium 8.1 (L) 8.6 - 10.3 mg/dL    Albumin 2.9 (L) 3.4 - 5.0 g/dL    Alkaline Phosphatase 80 33 - 136 U/L    Total Protein 5.7 (L) 6.4 - 8.2 g/dL    AST 23 9 - 39 U/L    Bilirubin, Total 0.3 0.0 - 1.2 mg/dL    ALT 14 7 - 45 U/L   Protime-INR   Result Value Ref Range    Protime 12.5 9.8 - 12.8 seconds    INR 1.1 0.9 - 1.1   aPTT   Result Value Ref Range    aPTT 25 (L) 27 - 38 seconds   Troponin I, High Sensitivity   Result Value Ref Range    Troponin I, High Sensitivity 16 (H) 0 - 13 ng/L   Type And Screen   Result Value Ref Range    ABO TYPE O     Rh TYPE POS     ANTIBODY SCREEN NEG    Lipase   Result Value Ref Range    Lipase 27 9 - 82 U/L   Lactate   Result Value Ref Range    Lactate 2.2 (H) 0.4 - 2.0 mmol/L   Ammonia   Result Value Ref Range    Ammonia 16 16 - 53 umol/L      Lactate   Result Value Ref Range    Lactate 2.1 (H) 0.4 - 2.0 mmol/L   VERIFY ABO/Rh Group Test   Result Value Ref Range    ABO TYPE O     Rh TYPE POS             Imaging Results:       Assessment/Plan     Principal Problem:    Upper GI bleed    The pt is a 78 yo woman with a PMH of COPD, osteopenia, CVA, CKD, Depression, HTN, HLD, diastolic HF presenting due to multiple episodes of hematemesis with hypotension and anemia requiring MTP and vasopressors. Arrives via [place] on RA and off pressors.      NEUROLOGIC  #Hx of CVA, Depression  - hold home aspirin iso GI bleed  - Hold home sertraline and Bupropion  - Pain control with PRN Dilaudid    CARDIOVASCULAR  #hx of HTN, HLD, diastolic HF  #hemorrhagic shock  - hold home losartan, atorvastatin, rosuvastatin  - hold home jardiance, spironolactone  - Off norepinephrine on arrival   - Goal MAP >65    PULMONARY  #Hx of COPD, tobacco use  - cont home PRN albuterol and trelegy ellipta  - breathing comfortably on RA    RENAL/  #hx of CKD with bl Cr 0.94   # OTONIEL likely 2/2 shock  - Cr 1.57 on admission   - daily RFP    FEN/GI  #Peptic ulcer with hematemesis   - EGD done 4/30 in [place] with multiple deep ulcers, no active bleeding  - worsening hematemesis, hypotension 4/30 ovn   - GI consulted, appreciate recs  - continue protonix BID  - NPO     ENDOCRINE  - Hold home jardiance (for diastolic HF)   - insulin SSI     HEME/ONC  #hemorrhagic shock   - s/p 7 pRBCs at OSH   - will transfuse 2 platelet, 4 FFP  - 2u pRBC on hold  - Q6H RFP  - follow fibrinogen and coags  - maintain active T&S, last 4/30/24    INFECTIOUS DISEASE  #Leukocytosis  - WBC 27.5  - no active signs of infection, likely 2/2 shock  - continue monitor for signs of infection     MSK  # RLE ulcer, and pain   - unable to doppler pulses on R foot, discolored skin, normal temperature, decreased sensation.   - Vascular surgery consulted, appreciate recs    N: NPO  A: PIV x2  DVT ppx: SCDs, hold heparin in setting of GIB  GI ppx: Protonix    Code Status: DNR/DNI, on hold for procedure   Surrogate Medical Decision-maker: Jolene Waldrop (child) [phone]       Annamarie  MD Jamie  [facility] Emergency Medicine   PGY-3

## 2024-05-01 NOTE — PROGRESS NOTES
PATIENT NAME: Brooklyn Read  MRN: [id number]    SERVICE DATE:  5/1/2024  SERVICE TIME:  7:04 AM        Brooklyn eRad is a 79 y.o. female on day 1 of admission presenting with Upper GI bleed.      SUBJECTIVE    Seen and examined. Pt noted multiple marj red bloody stool with clots this morning.     OBJECTIVE    Vitals:    05/01/24 0627 05/01/24 0628 05/01/24 0645 05/01/24 0646   BP: 119/82 119/82 107/57 107/57   Pulse: 89 87 89 90   Resp: 18 13 (!) 29 14   Temp: 36.4 °C (97.5 °F) 36.4 °C (97.5 °F) 36.8 °C (98.2 °F) 36.8 °C (98.2 °F)   TempSrc: Temporal Temporal  Temporal   SpO2: 97% 97% 96% 96%   Weight:       Height:          Results from last 7 days   Lab Units 04/30/24  2320   WBC AUTO x10*3/uL 27.5*   HEMOGLOBIN g/dL 11.0*   HEMATOCRIT % 31.9*   PLATELETS AUTO x10*3/uL 171   NEUTROS PCT AUTO % 85.5   LYMPHS PCT AUTO % 4.7   MONOS PCT AUTO % 8.4   EOS PCT AUTO % 0.0     Results from last 7 days   Lab Units 04/30/24  2322 04/30/24  0612   SODIUM mmol/L 143 138   POTASSIUM mmol/L 4.3 3.5   CHLORIDE mmol/L 111* 103   CO2 mmol/L 20* 24   BUN mg/dL 62* 36*   CREATININE mg/dL 1.57* 1.59*   CALCIUM mg/dL 7.3* 8.1*   PROTEIN TOTAL g/dL  --  5.7*   BILIRUBIN TOTAL mg/dL  --  0.3   ALK PHOS U/L  --  80   ALT U/L  --  14   AST U/L  --  23   GLUCOSE mg/dL 108* 170*       Scheduled Medications  tiotropium, 2 puff, inhalation, Daily   And  fluticasone furoate-vilanteroL, 1 puff, inhalation, Daily  insulin lispro, 0-5 Units, subcutaneous, TID with meals         Physical Exam  Physical Exam  Constitutional:       Appearance: She is ill-appearing.   HENT:      Mouth/Throat:      Mouth: Mucous membranes are dry.   Eyes:      Extraocular Movements: Extraocular movements intact.      Pupils: Pupils are equal, round, and reactive to light.   Cardiovascular:      Rate and Rhythm: Normal rate and regular rhythm.   Pulmonary:      Effort: Pulmonary effort is normal.      Breath sounds: Normal breath sounds.   Abdominal:      General:  There is no distension.      Palpations: Abdomen is soft.      Tenderness: There is no abdominal tenderness. There is no rebound.   Musculoskeletal:         General: Normal range of motion.   Skin:     General: Skin is dry.      Capillary Refill: Capillary refill takes 2 to 3 seconds.      Comments: Diminished R pedal pulse    Neurological:      General: No focal deficit present.      Mental Status: She is alert.            ASSESSMENT & PLAN    The pt is a 80 yo woman with a PMH of COPD, osteopenia, CVA, CKD, Depression, HTN, HLD, diastolic HF presenting due to multiple episodes of hematemesis with hypotension and anemia requiring MTP and vasopressors. Arrives via [place] on RA and off pressors.      5/1: Pt received 7 units PRBC at [facility]. This AM receiving platelets , FFP  . Plan for repeat EGD with GI today.      NEUROLOGIC  #Hx of CVA, Depression  - hold home aspirin iso GI bleed  - Hold home sertraline and Bupropion  - Pain control with PRN Dilaudid     CARDIOVASCULAR  #hx of HTN, HLD, diastolic HF  #hemorrhagic shock, resolved   - hold home losartan, atorvastatin, rosuvastatin  - hold home jardiance, spironolactone  - Off norepinephrine on arrival   - Goal MAP >65     PULMONARY  #Hx of COPD, tobacco use  - cont home PRN albuterol and trelegy ellipta  - breathing comfortably on RA     RENAL/  #hx of CKD with bl Cr 0.94   # OTONIEL likely 2/2 shock  - Cr 1.57 on admission   - daily RFP     FEN/GI  #Peptic ulcer with hematemesis   - EGD done 4/30 in [place] with multiple deep ulcers, no active bleeding  - worsening hematemesis, hypotension 4/30 ovn   - GI consulted, Plan for EGD today   - continue protonix BID  - NPO      ENDOCRINE  - Hold home jardiance (for diastolic HF)   - insulin SSI      HEME/ONC  #hemorrhagic shock , resolved   - s/p 7 pRBCs at OSH   - will transfuse 2 platelet, 4 FFP  - 2u pRBC on hold  - Q6H RFP  - Fibrinogen 306. INR: 1.2, PT 13.6 , PTT: 17   - maintain active T&S, last  5/1/24     INFECTIOUS DISEASE  #Leukocytosis  - WBC 27.5  - no active signs of infection, likely 2/2 shock  - continue monitor for signs of infection      MSK/Vascular  # RLE ulcer, and pain   #Concern for limb ischemia   - unable to doppler pulses on R foot, discolored skin, normal temperature, decreased sensation.   -Continue with neurovascular check q1h to ensure there is no signs of acute limb ischemia   - Vascular surgery consulted, Team is aware pulses are not appreciated in right foot. Currently no acute changes in her skin findings. Will keep the area warm and notify vascular with any acute changes.   -Pt will need revascularization but given her acute GIB cannot safely anticoagulate the patient at this time.   -Will check RONALD and PVR     #Wound RLE at ankle   -Wound care consulted       Vent/O2: FiO2 (%):  [21 %] 21 %   Lines/Devices: Ext catheter, 20g x 2 , 18g x 1   Drips: PPI drip    ATBx: -  Fluids: -  Diet: NPO  PPX: Protonix  DVT PPX: -  Code status: DNR/DNI - on hold for procedures   Dispo: ICU      Dr. Blade Bartlett  Internal Medicine PGY-2  May 1, 2024 7:04 AM

## 2024-05-01 NOTE — PROGRESS NOTES
SW intern met with patient at bedside. See flowsheet for assessment details. Patient confirmed she lives at home with her daughter. Patient reports having DME at home including a cane, walker and a rollater. When prompted, patient does not report SW needs or concerns. SW will continue to follow.    LYN Hernandez

## 2024-05-01 NOTE — PROGRESS NOTES
Was notified by nursing staff that patient's blood pressures were in the 60s/40s  I then received a call about 20 minutes ago from the gastroenterologist ([facility] = Dr. Castillo) and had discussed the case and and it was recommended that the patient be admitted to the MICU which I feel is an absolute necessity at this time.  Given the patient's acute now hemodynamic instability massive transfusion protocol was activated with the third unit being transfused and the fourth unit pending from the blood bank.  Critical care practitioner was notified of the need for additional IV access.  There may be a need for central access as well.  About 5 to 10 minutes after receiving a call from gastroenterology I did receive a call from pulmonary/critical care ([facility] = Dr. Velasquez).  She had recommending having at least 2 stable minimum 18-gauge IV access and further central access if needed.  Patient to be transferred to the MICU by critical care transport.  With accepting physician being the MICU attending.  Upon reevaluation of the patient's blood pressure was 104/49, heart rate 70s  Patient was noted to be nauseous but not necessarily dry heaving at this time.  AOx4, appears anxious, somewhat diaphoretic, dry mucous membranes, very pale and ill-appearing, pale mucous membranes, regular rate and rhythm without any noted murmurs, clear to auscultation throughout lung fields, abdomen is soft and nontender with audible bowel sounds, range of motion/strength appears symmetric but globally diminished likely around 4 out of 5, finger-nose/heel-to-shin, intact sensation to light touch, fluent speech, cranial nerves appear grossly intact, no lateralizing motor/sensory deficits, no noted facial droop, no noted limb drift, patient was able to hold bilateral lower extremities against gravity for greater than 10 seconds, anxious in appearance  Patient is currently awaiting critical care transfer to [facility] MICU  Patient is currently  receiving her third unit of packed red blood cells per massive transfusion protocol, she is also about to start her fourth unit once received from the blood bank.  Will ensure that the minimum 2-3 IV access with 18-gauge prior to transfer.  If not stable access then we will obtain central access via IJ or femoral line.  I did discuss the case with the critical care attending ([facility] = Dr. Castro) with regards to the case and he recommended obtaining a CTA abdomen.  But likely in the setting of her OTONIEL and hemodynamic instability this was held until the patient could be further stabilized and given her renal dysfunction.  Acute upper GI bleed, hypotension suspect in setting of blood loss, diuretics on hold, antihypertensives on hold, OTONIEL likely in setting of acute blood loss anemia and hypotension, CAD, COPD.  Will try to maintain maps of greater than 65 to help with perfusion of the and kidney and continue with aggressive transfusion protocol.  Should the patient have any worsening hemodynamics then we will likely need to be started on pressor support.  Patient currently on Levophed as ordered by the Critical Care Team with them pending placement of a central line. Unfortunately Critical Care could not place the line in time prior to arrival of the helicopter, but they are placing an additional peripheral access via ultrasound. Critical Care at [facility] attending was notified by myself of need for pressor support as originally ordered by the np and this was also communicated to the [facility] MICU attending as well.  .   Notified Transfer Center and [facility] Dr. Velasquez of the need for pressor support and it was elected that the patient by transported by [facility] to expedite transfer.   Critical care time: 70 minutes

## 2024-05-01 NOTE — DISCHARGE SUMMARY
Discharge Diagnosis  Upper GI Bleeding  Hemorrhagic shock secondary to UGIB  Acute blood loss anemia due to UGIB     Issues Requiring Follow-Up  Per [facility]    Discharge Meds  Per [facility]    Test Results Pending At Discharge  Per [facility]    Hospital Course   79-year-old female with a past medical history significant for HTN, HLD, COPD (no baseline oxygen requirement) and tobacco use, DM, CKD, prior CVA, osteopenia on oral bisphosphonates, arthritis with recent heavy Ibuprofen use, who presented to [facility] on 04/30/2024 with recurrent bright red hematemesis x 3 and associated acute blood loss anemia.  Her hemoglobin on presentation was 6.7 (down from 8.5 on 04/17/2024, and had been in the 12's prior to that).  She was initially hypotensive on presentation and subsequently transfused 1 unit PRBCs, given a liter of fluids, and given 80 mg IV pantoprazole and started on twice daily dosing.  Her blood pressure had improved following this.  She underwent an EGD which showed evidence of an ulcer with clots but no overt active bleeding; post EGD she was transferred to the ICU for closer monitoring.  At that time it was recommended the patient undergo repeat EGD in the morning; however, due to unforeseen circumstances with staffing availability at this facility, we suddenly lost coverage for GI until 05/06/2024.  Transfer request was placed for higher level of care with GI coverage at that time. Shortly after arrival to the ICU post EGD and request for transfer, the patient had multiple subsequent episodes of hematemesis, became hypotensive, and unfortunately dropped her hemoglobin once again.  She subsequently received 2 more units of PRBCs (units 2 and 3), and despite that she continued to become hypotensive with a blood pressure 60s over 40s; transfer line was reengaged and the patient was accepted at the MICU.  In the interim, patient received an additional fourth unit of blood, but was also started on vasopressor  support due to hypotension and shock per discussion and review of chart per critical care NP and Dr. Correa.  Transfer line was notified of this change by Dr. Correa given that the patient was was now requiring pressor support.  Patient was subsequently taken via air transport to [facility] MICU under Dr. Velasquez ~2200 04/30/24.     Primary Service: General Medicine   Consultants: GI, Critical Care   Procedures: EGD (results in procedures tab in the chart)  Interventions: Total 4 units PRBCs, started on Levophed, 80mg IV Protonix, 20mg IV Pepcid, 4mg Zofran, 10mg Reglan, 1x dose 15mg Toradol (in the ED)    Pertinent Physical Exam At Time of Discharge  Please see 04/30/24 H&P and subsequent progress notes and consults for the physical exams and evaluations    Outpatient Follow-Up  Future Appointments   Date Time Provider Department Center   5/2/2024 10:10 AM Gianluca Macario MD [id number] [facility]   5/2/2024 To Be Determined Maria De Jesus Martines RN [facility]   5/4/2024 To Be Determined Maria De Jesus Martines RN [facility]   5/9/2024 To Be Determined Caridad Ruano LPN [facility]   5/11/2024 To Be Determined Maria De Jesus Martines RN [facility]   5/16/2024 To Be Determined Maria De Jesus Martines RN [facility]   5/18/2024 To Be Determined Maria De Jesus Martines RN [facility]   5/23/2024 To Be Determined Maria De Jesus Martines RN [facility]   5/25/2024 To Be Determined Maria De Jesus Martines RN [facility]   5/30/2024 To Be Determined Maria De Jesus Martines RN [facility]   6/1/2024 To Be Determined Maria De Jesus Martines RN [facility]   6/6/2024 To Be Determined Maria De Jesus Martines RN [facility]   6/8/2024 To Be Determined Maria De Jesus Martines RN [facility]   6/12/2024 To Be Determined Maria De Jesus Martines RN [facility]   6/15/2024 To Be Determined Maria De Jesus Martines RN [facility]       Laura Buckley PA-C    *Disclaimer: please refer to H&P, consult notes, progress notes, and procedure notes for formal review of rationale behind orders and discussions in addition to getting the most up to date information about transfer.

## 2024-05-01 NOTE — SIGNIFICANT EVENT
Patient with continued hematemesis and melenic stools with hemodynamic instability  Hemoglobin 5.9  Suspect likely ongoing acute upper GI bleeding, hypovolemic shock  Dr. Correa at bedside. Discussed case. Plan to transfer patient to [facility] for recurrent GI bleeding and no GI coverage available at  [place]. Patient understanding and agreeable  Discussed case with on call ICU attending (Dr. Castro). Recommend consideration of CTA abdomen and consult to IR for possible need for embolization. Dr. Castro discussed recommendations with Dr. Correa.   Patient A+Ox4, pale, diaphroetic, slightly anxious. BP initially 100s/50s, dropping to 60s-70s/40s-50s  Currently receiving 2nd unit of PRBC  Will place order for 2 additional PRBCs to be rapidly transfused for a total of 4 units of PRBCs.  Patient with 2 x 20g peripheral IV; notified nursing to attempt to obtain additional peripheral IV access  Will place order for vasopressors to maintain MAP>65  Patient accepted to [facility] MICU. Air transport en route with ETA 20-25 minutes  Patient was started on norepinephrine via peripheral IV.  Discussed with ICU attending over the phone the potential need for CVC if patient remains on pressors.   Patient hemodynamics continuing to improve with administration of PRBCs with MAP 60-70s. Pressors additionally being weaned down.  Will hold on placement of CVC as patient with adequate peripheral IV access.  Will continue aggressive transfusion as needed. Continue to wean pressors for MAP>65.

## 2024-05-01 NOTE — CONSULTS
"    [facility]   [facility]  INITIAL CONSULT NOTE     Consult requested by: Service: MICU    Reason for Consult: Upper GI bleeding    Admission Chief Complaint: Hematemesis       SUBJECTIVE     HPI: Brooklyn Read is a 79 y.o. female w/PMH of COPD, osteopenia, HTN and HLD who is transferred from [facility] for upper GI bleeding.     Per chart review, Ms Read initially presented to [facility] after she had two episodes of bright red bloody emesis and one episode of dark stool. On presentation she was hemodynamically stable with BP 110s/50s and HR 70-80s.  Hgb was 6.7. She received 1 unit of PRBC and was started on IV PPI. Other laboratory work up was pertinent for Cr 1.59 (from 0.96), BUN 36, lactate 2.2 -> 2.1, lipase 27, ALT 14, AST 23, T bili 0.3 and INR 1.1. CT a/p at OSH (4/30) showed haziness along the inferior posterior margin of the gastric pylorus possibly related to focal peptic ulcer disease without marj perforation. Tiny hyperdense focus in this region was identified which could related to a small focus of extraluminal contrast  Repeat Hgb following transfusion was 5.9. EGD (4/30) which showed \"deep, irregular ulcer in the prepyloric region with adherent clot (wu IIB) and large deep ulcer in the superior aspect of Pre-pyloric antrum with large adherent clot. No active bleeding was noted. Patient had an additional hematemesis episode and per documented vitals her lowest BP at that time was  88/42. She may have had a BP as low as 60s/30s (not charted).  Patient was transferred to [facility] with additional 3 PRBC transfused and on pressors. No repeat Hgb since.    Following transfer to [facility] patient is HDS and off pressors. She denies abdominal pain, abdominal bloating, chills, dysphagia, odynophagia or additional episodes of nausea/vomiting. Her she claims that her last BM was yesterday evening.     ROS: Complete review of systems obtained, negative " unless otherwise indicated above.     Allergies   Allergen Reactions    Epinephrine-Chlorpheniramine Anaphylaxis     Throat swells and hives, passed out     Past Medical History:   Diagnosis Date    Anemia     Anxiety     Cerebral vascular accident (Multi)     CKD (chronic kidney disease)     COPD (chronic obstructive pulmonary disease) (Multi)     Depression     GI (gastrointestinal bleed)     Hyperlipidemia     Hypertension      Past Surgical History:   Procedure Laterality Date    CHOLECYSTECTOMY      HYSTERECTOMY       No family history on file.  Social History     Social History Narrative    Not on file     [unfilled]    Medications:  Scheduled medications  [START ON 5/1/2024] buPROPion XL, 150 mg, oral, q AM  [START ON 5/1/2024] insulin lispro, 0-5 Units, subcutaneous, TID with meals  [START ON 5/1/2024] pantoprazole, 40 mg, intravenous, BID  [START ON 5/1/2024] rosuvastatin, 40 mg, oral, Daily  [START ON 5/1/2024] sertraline, 100 mg, oral, Daily      Continuous medications  lactated Ringer's, 75 mL/hr      PRN medications  PRN medications: albuterol, dextrose, glucagon       EXAM     Vital signs:  [unfilled]    Physical Exam  Eyes:      General: No scleral icterus.     Pupils: Pupils are equal, round, and reactive to light.   Cardiovascular:      Rate and Rhythm: Normal rate and regular rhythm.      Pulses: Normal pulses.      Heart sounds: No murmur heard.  Pulmonary:      Effort: Pulmonary effort is normal.      Breath sounds: Normal breath sounds.   Abdominal:      General: Abdomen is flat. There is no distension.      Palpations: Abdomen is soft.      Tenderness: There is no abdominal tenderness.   Skin:     General: Skin is warm.      Capillary Refill: Capillary refill takes 2 to 3 seconds.   Neurological:      General: No focal deficit present.      Mental Status: She is alert.   Psychiatric:         Mood and Affect: Mood normal.         Thought Content: Thought content normal.         Judgment: Judgment  normal.           DATA                                                                            Labs     Lab Results   Component Value Date    WBC 8.9 04/30/2024    WBC 8.5 04/26/2024    WBC 9.2 04/17/2024    HGB 5.9 (LL) 04/30/2024    HGB 6.7 (L) 04/30/2024    HGB 7.9 (L) 04/26/2024    MCV 90 04/30/2024    MCV 94 04/26/2024    MCV 96 04/17/2024     04/30/2024     04/26/2024     04/17/2024       Lab Results   Component Value Date    GLUCOSE 170 (H) 04/30/2024    CALCIUM 8.1 (L) 04/30/2024     04/30/2024    K 3.5 04/30/2024    CO2 24 04/30/2024     04/30/2024    BUN 36 (H) 04/30/2024    CREATININE 1.59 (H) 04/30/2024       Lab Results   Component Value Date    ALT 14 04/30/2024    ALT 11 04/17/2024    ALT 11 10/10/2023    AST 23 04/30/2024    AST 18 04/17/2024    AST 15 10/10/2023    ALKPHOS 80 04/30/2024    ALKPHOS 69 04/17/2024    ALKPHOS 75 10/10/2023    BILITOT 0.3 04/30/2024    BILITOT 0.3 04/17/2024    BILITOT 0.3 10/10/2023                                                                                  Imaging             === 04/30/24 ===    CT ABDOMEN PELVIS W IV CONTRAST    - Impression -  1. Haziness along the inferior posterior margin of the gastric  pylorus possibly related to focal peptic ulcer disease. No marj  perforation. A tiny hyperdense focus in this region is identified  which could relate to a small focus of extraluminal contrast. No  discrete layering identified to confirm active bleeding, however,  this is a single phase study.  2. Cortical scarring bilaterally likely vascular in nature.      Signed by: Leon Winter 4/30/2024 10:24 AM  Dictation workstation:   [id number]                                                                           GI Procedures       ASSESSMENT / PLAN                  Brooklyn Read is a 79 y.o. female w/PMH of COPD, osteopenia, HTN and HLD who is transferred from [facility] for upper GI bleeding.     #Upper GI bleeding    #Acute blood loss anemia   Ms Read initially presented to OSH with symptoms of upper GI bleeding including hematemesis and melena. She was hemodynamically unstable and  Her laboratory work up was pertinent for acute blood loss anemia with hgb 6.7 which failed to correct with one unit of PRBC. EGD (4/30) done at  [facility] appeared to show two large forest IIB ulcers in the prepyloric/antral area. No active bleeding was noted and no intervention was done.  She was transferred to [facility] after she experienced an additional episode of hematemesis associated with hypotension and requiring pressors. Following transfer, her pressors were weaned off and repeat Hgb was 11 (appropriately corrected). Her PUD could be secondary to H pylori vs NSAID use. She will need repeat EGD for evaluation and treatment of ulcers.     Recommendations:   - Plan for EGD on 5/1  - Make NPO   - Recommend IV PPI drip   - Trend CBC, transfuse to goal Hgb >7 and platelets >50   - Maintain two large bore IVs and active type and screen   - Hold anticoagulation and antiplatelet agents; Avoid NSAIDs    - Monitor for bleeding, if patient develops significant bleeding with hemodynamic instability, please call GI fellow on call   - Ensure Hb > 7, platelets > 50, K > 4, and Mg > 2 on day of procedure     BRITTA per primary team.   ------------------------------------------------------------------------  Bulmaro Estrada MD   Gastroenterology Fellow    After 5PM and on Weekends, please page on-call fellow.    To be discussed with service attending Dr. Castillo  Final recommendations pending attending attestation.

## 2024-05-02 ENCOUNTER — APPOINTMENT (OUTPATIENT)
Dept: VASCULAR MEDICINE | Facility: HOSPITAL | Age: 79
DRG: 377 | End: 2024-05-02
Payer: MEDICARE

## 2024-05-02 ENCOUNTER — APPOINTMENT (OUTPATIENT)
Dept: PRIMARY CARE | Facility: CLINIC | Age: 79
End: 2024-05-02
Payer: MEDICARE

## 2024-05-02 ENCOUNTER — APPOINTMENT (OUTPATIENT)
Dept: GASTROENTEROLOGY | Facility: HOSPITAL | Age: 79
DRG: 377 | End: 2024-05-02
Payer: MEDICARE

## 2024-05-02 ENCOUNTER — HOME CARE VISIT (OUTPATIENT)
Dept: HOME HEALTH SERVICES | Facility: HOME HEALTH | Age: 79
End: 2024-05-02
Payer: MEDICARE

## 2024-05-02 ENCOUNTER — APPOINTMENT (OUTPATIENT)
Dept: RADIOLOGY | Facility: HOSPITAL | Age: 79
DRG: 377 | End: 2024-05-02
Payer: MEDICARE

## 2024-05-02 LAB
ALBUMIN SERPL BCP-MCNC: 2.3 G/DL (ref 3.4–5)
ANION GAP SERPL CALC-SCNC: 13 MMOL/L (ref 10–20)
APTT PPP: 22 SECONDS (ref 27–38)
BASOPHILS # BLD AUTO: 0.04 X10*3/UL (ref 0–0.1)
BASOPHILS # BLD AUTO: 0.04 X10*3/UL (ref 0–0.1)
BASOPHILS # BLD AUTO: 0.05 X10*3/UL (ref 0–0.1)
BASOPHILS # BLD AUTO: 0.06 X10*3/UL (ref 0–0.1)
BASOPHILS NFR BLD AUTO: 0.2 %
BASOPHILS NFR BLD AUTO: 0.2 %
BASOPHILS NFR BLD AUTO: 0.3 %
BASOPHILS NFR BLD AUTO: 0.3 %
BLOOD EXPIRATION DATE: NORMAL
BUN SERPL-MCNC: 60 MG/DL (ref 6–23)
CA-I BLD-SCNC: 1.19 MMOL/L (ref 1.1–1.33)
CALCIUM SERPL-MCNC: 7.8 MG/DL (ref 8.6–10.6)
CHLORIDE SERPL-SCNC: 113 MMOL/L (ref 98–107)
CHOLEST SERPL-MCNC: 65 MG/DL (ref 0–199)
CHOLESTEROL/HDL RATIO: 3.6
CK SERPL-CCNC: 1792 U/L (ref 0–215)
CO2 SERPL-SCNC: 23 MMOL/L (ref 21–32)
CREAT SERPL-MCNC: 1.38 MG/DL (ref 0.5–1.05)
DISPENSE STATUS: NORMAL
EGFRCR SERPLBLD CKD-EPI 2021: 39 ML/MIN/1.73M*2
EOSINOPHIL # BLD AUTO: 0.08 X10*3/UL (ref 0–0.4)
EOSINOPHIL # BLD AUTO: 0.18 X10*3/UL (ref 0–0.4)
EOSINOPHIL # BLD AUTO: 0.18 X10*3/UL (ref 0–0.4)
EOSINOPHIL # BLD AUTO: 0.19 X10*3/UL (ref 0–0.4)
EOSINOPHIL NFR BLD AUTO: 0.4 %
EOSINOPHIL NFR BLD AUTO: 1 %
EOSINOPHIL NFR BLD AUTO: 1.1 %
EOSINOPHIL NFR BLD AUTO: 1.1 %
ERYTHROCYTE [DISTWIDTH] IN BLOOD BY AUTOMATED COUNT: 15.7 % (ref 11.5–14.5)
ERYTHROCYTE [DISTWIDTH] IN BLOOD BY AUTOMATED COUNT: 15.8 % (ref 11.5–14.5)
ERYTHROCYTE [DISTWIDTH] IN BLOOD BY AUTOMATED COUNT: 15.8 % (ref 11.5–14.5)
ERYTHROCYTE [DISTWIDTH] IN BLOOD BY AUTOMATED COUNT: 16.2 % (ref 11.5–14.5)
ERYTHROCYTE [DISTWIDTH] IN BLOOD BY AUTOMATED COUNT: 17.3 % (ref 11.5–14.5)
EST. AVERAGE GLUCOSE BLD GHB EST-MCNC: 108 MG/DL
GLUCOSE BLD MANUAL STRIP-MCNC: 102 MG/DL (ref 74–99)
GLUCOSE BLD MANUAL STRIP-MCNC: 103 MG/DL (ref 74–99)
GLUCOSE BLD MANUAL STRIP-MCNC: 109 MG/DL (ref 74–99)
GLUCOSE BLD MANUAL STRIP-MCNC: 111 MG/DL (ref 74–99)
GLUCOSE SERPL-MCNC: 112 MG/DL (ref 74–99)
HBA1C MFR BLD: 5.4 %
HCT VFR BLD AUTO: 18.2 % (ref 36–46)
HCT VFR BLD AUTO: 20.1 % (ref 36–46)
HCT VFR BLD AUTO: 20.6 % (ref 36–46)
HCT VFR BLD AUTO: 21.7 % (ref 36–46)
HCT VFR BLD AUTO: 25 % (ref 36–46)
HDLC SERPL-MCNC: 18.3 MG/DL
HGB BLD-MCNC: 6.1 G/DL (ref 12–16)
HGB BLD-MCNC: 6.7 G/DL (ref 12–16)
HGB BLD-MCNC: 7 G/DL (ref 12–16)
HGB BLD-MCNC: 7.7 G/DL (ref 12–16)
HGB BLD-MCNC: 8 G/DL (ref 12–16)
IMM GRANULOCYTES # BLD AUTO: 0.25 X10*3/UL (ref 0–0.5)
IMM GRANULOCYTES # BLD AUTO: 0.29 X10*3/UL (ref 0–0.5)
IMM GRANULOCYTES # BLD AUTO: 0.4 X10*3/UL (ref 0–0.5)
IMM GRANULOCYTES # BLD AUTO: 0.41 X10*3/UL (ref 0–0.5)
IMM GRANULOCYTES NFR BLD AUTO: 1.4 % (ref 0–0.9)
IMM GRANULOCYTES NFR BLD AUTO: 1.4 % (ref 0–0.9)
IMM GRANULOCYTES NFR BLD AUTO: 2.2 % (ref 0–0.9)
IMM GRANULOCYTES NFR BLD AUTO: 2.4 % (ref 0–0.9)
INR PPP: 1.1 (ref 0.9–1.1)
LACTATE SERPL-SCNC: 1 MMOL/L (ref 0.4–2)
LDLC SERPL CALC-MCNC: 18 MG/DL
LYMPHOCYTES # BLD AUTO: 1.2 X10*3/UL (ref 0.8–3)
LYMPHOCYTES # BLD AUTO: 1.26 X10*3/UL (ref 0.8–3)
LYMPHOCYTES # BLD AUTO: 1.44 X10*3/UL (ref 0.8–3)
LYMPHOCYTES # BLD AUTO: 1.76 X10*3/UL (ref 0.8–3)
LYMPHOCYTES NFR BLD AUTO: 6.4 %
LYMPHOCYTES NFR BLD AUTO: 7.5 %
LYMPHOCYTES NFR BLD AUTO: 8.3 %
LYMPHOCYTES NFR BLD AUTO: 8.3 %
MAGNESIUM SERPL-MCNC: 1.84 MG/DL (ref 1.6–2.4)
MCH RBC QN AUTO: 27.6 PG (ref 26–34)
MCH RBC QN AUTO: 29 PG (ref 26–34)
MCH RBC QN AUTO: 29.1 PG (ref 26–34)
MCH RBC QN AUTO: 29.1 PG (ref 26–34)
MCH RBC QN AUTO: 29.3 PG (ref 26–34)
MCHC RBC AUTO-ENTMCNC: 32 G/DL (ref 32–36)
MCHC RBC AUTO-ENTMCNC: 32.5 G/DL (ref 32–36)
MCHC RBC AUTO-ENTMCNC: 33.5 G/DL (ref 32–36)
MCHC RBC AUTO-ENTMCNC: 34.8 G/DL (ref 32–36)
MCHC RBC AUTO-ENTMCNC: 35.5 G/DL (ref 32–36)
MCV RBC AUTO: 82 FL (ref 80–100)
MCV RBC AUTO: 82 FL (ref 80–100)
MCV RBC AUTO: 84 FL (ref 80–100)
MCV RBC AUTO: 89 FL (ref 80–100)
MCV RBC AUTO: 91 FL (ref 80–100)
MONOCYTES # BLD AUTO: 2.02 X10*3/UL (ref 0.05–0.8)
MONOCYTES # BLD AUTO: 2.06 X10*3/UL (ref 0.05–0.8)
MONOCYTES # BLD AUTO: 2.21 X10*3/UL (ref 0.05–0.8)
MONOCYTES # BLD AUTO: 2.53 X10*3/UL (ref 0.05–0.8)
MONOCYTES NFR BLD AUTO: 11.7 %
MONOCYTES NFR BLD AUTO: 11.8 %
MONOCYTES NFR BLD AUTO: 12 %
MONOCYTES NFR BLD AUTO: 12.3 %
NEUTROPHILS # BLD AUTO: 12.83 X10*3/UL (ref 1.6–5.5)
NEUTROPHILS # BLD AUTO: 13.35 X10*3/UL (ref 1.6–5.5)
NEUTROPHILS # BLD AUTO: 14.59 X10*3/UL (ref 1.6–5.5)
NEUTROPHILS # BLD AUTO: 16.38 X10*3/UL (ref 1.6–5.5)
NEUTROPHILS NFR BLD AUTO: 76.4 %
NEUTROPHILS NFR BLD AUTO: 77.3 %
NEUTROPHILS NFR BLD AUTO: 77.7 %
NEUTROPHILS NFR BLD AUTO: 78.3 %
NON HDL CHOLESTEROL: 47 MG/DL (ref 0–149)
NRBC BLD-RTO: 0 /100 WBCS (ref 0–0)
NRBC BLD-RTO: 0.1 /100 WBCS (ref 0–0)
NRBC BLD-RTO: 0.1 /100 WBCS (ref 0–0)
NRBC BLD-RTO: 0.2 /100 WBCS (ref 0–0)
NRBC BLD-RTO: 0.3 /100 WBCS (ref 0–0)
PHOSPHATE SERPL-MCNC: 3.3 MG/DL (ref 2.5–4.9)
PLATELET # BLD AUTO: 101 X10*3/UL (ref 150–450)
PLATELET # BLD AUTO: 110 X10*3/UL (ref 150–450)
PLATELET # BLD AUTO: 115 X10*3/UL (ref 150–450)
PLATELET # BLD AUTO: 119 X10*3/UL (ref 150–450)
PLATELET # BLD AUTO: 130 X10*3/UL (ref 150–450)
POTASSIUM SERPL-SCNC: 3.8 MMOL/L (ref 3.5–5.3)
PRODUCT BLOOD TYPE: 5100
PRODUCT CODE: NORMAL
PROTHROMBIN TIME: 12.2 SECONDS (ref 9.8–12.8)
RBC # BLD AUTO: 2.21 X10*6/UL (ref 4–5.2)
RBC # BLD AUTO: 2.31 X10*6/UL (ref 4–5.2)
RBC # BLD AUTO: 2.39 X10*6/UL (ref 4–5.2)
RBC # BLD AUTO: 2.65 X10*6/UL (ref 4–5.2)
RBC # BLD AUTO: 2.75 X10*6/UL (ref 4–5.2)
SODIUM SERPL-SCNC: 145 MMOL/L (ref 136–145)
TRIGL SERPL-MCNC: 146 MG/DL (ref 0–149)
UNIT ABO: NORMAL
UNIT NUMBER: NORMAL
UNIT RH: NORMAL
UNIT VOLUME: 275
UNIT VOLUME: 350
UNIT VOLUME: 350
VLDL: 29 MG/DL (ref 0–40)
WBC # BLD AUTO: 16.8 X10*3/UL (ref 4.4–11.3)
WBC # BLD AUTO: 17.3 X10*3/UL (ref 4.4–11.3)
WBC # BLD AUTO: 18.7 X10*3/UL (ref 4.4–11.3)
WBC # BLD AUTO: 19.3 X10*3/UL (ref 4.4–11.3)
WBC # BLD AUTO: 21.1 X10*3/UL (ref 4.4–11.3)
XM INTEP: NORMAL

## 2024-05-02 PROCEDURE — 0W3P8ZZ CONTROL BLEEDING IN GASTROINTESTINAL TRACT, VIA NATURAL OR ARTIFICIAL OPENING ENDOSCOPIC: ICD-10-PCS | Performed by: STUDENT IN AN ORGANIZED HEALTH CARE EDUCATION/TRAINING PROGRAM

## 2024-05-02 PROCEDURE — 0753T DGTZ GLS MCRSCP SLD LEVEL IV: CPT | Mod: TC,SUR | Performed by: INTERNAL MEDICINE

## 2024-05-02 PROCEDURE — 99291 CRITICAL CARE FIRST HOUR: CPT

## 2024-05-02 PROCEDURE — 75635 CT ANGIO ABDOMINAL ARTERIES: CPT

## 2024-05-02 PROCEDURE — 82947 ASSAY GLUCOSE BLOOD QUANT: CPT

## 2024-05-02 PROCEDURE — 85025 COMPLETE CBC W/AUTO DIFF WBC: CPT | Mod: 91

## 2024-05-02 PROCEDURE — 2720000007 HC OR 272 NO HCPCS

## 2024-05-02 PROCEDURE — 36415 COLL VENOUS BLD VENIPUNCTURE: CPT | Performed by: STUDENT IN AN ORGANIZED HEALTH CARE EDUCATION/TRAINING PROGRAM

## 2024-05-02 PROCEDURE — 2500000004 HC RX 250 GENERAL PHARMACY W/ HCPCS (ALT 636 FOR OP/ED)

## 2024-05-02 PROCEDURE — 36430 TRANSFUSION BLD/BLD COMPNT: CPT

## 2024-05-02 PROCEDURE — 83605 ASSAY OF LACTIC ACID: CPT

## 2024-05-02 PROCEDURE — 83735 ASSAY OF MAGNESIUM: CPT

## 2024-05-02 PROCEDURE — 85027 COMPLETE CBC AUTOMATED: CPT | Performed by: STUDENT IN AN ORGANIZED HEALTH CARE EDUCATION/TRAINING PROGRAM

## 2024-05-02 PROCEDURE — 93922 UPR/L XTREMITY ART 2 LEVELS: CPT | Performed by: SURGERY

## 2024-05-02 PROCEDURE — 43239 EGD BIOPSY SINGLE/MULTIPLE: CPT | Performed by: INTERNAL MEDICINE

## 2024-05-02 PROCEDURE — 36415 COLL VENOUS BLD VENIPUNCTURE: CPT

## 2024-05-02 PROCEDURE — 2500000004 HC RX 250 GENERAL PHARMACY W/ HCPCS (ALT 636 FOR OP/ED): Performed by: STUDENT IN AN ORGANIZED HEALTH CARE EDUCATION/TRAINING PROGRAM

## 2024-05-02 PROCEDURE — 85610 PROTHROMBIN TIME: CPT

## 2024-05-02 PROCEDURE — 1090000001 HH PPS REVENUE CREDIT

## 2024-05-02 PROCEDURE — 94640 AIRWAY INHALATION TREATMENT: CPT

## 2024-05-02 PROCEDURE — C9113 INJ PANTOPRAZOLE SODIUM, VIA: HCPCS

## 2024-05-02 PROCEDURE — 99232 SBSQ HOSP IP/OBS MODERATE 35: CPT | Performed by: STUDENT IN AN ORGANIZED HEALTH CARE EDUCATION/TRAINING PROGRAM

## 2024-05-02 PROCEDURE — 93922 UPR/L XTREMITY ART 2 LEVELS: CPT

## 2024-05-02 PROCEDURE — 88342 IMHCHEM/IMCYTCHM 1ST ANTB: CPT | Performed by: PATHOLOGY

## 2024-05-02 PROCEDURE — 85025 COMPLETE CBC W/AUTO DIFF WBC: CPT

## 2024-05-02 PROCEDURE — 2020000001 HC ICU ROOM DAILY

## 2024-05-02 PROCEDURE — 75635 CT ANGIO ABDOMINAL ARTERIES: CPT | Performed by: RADIOLOGY

## 2024-05-02 PROCEDURE — 83718 ASSAY OF LIPOPROTEIN: CPT

## 2024-05-02 PROCEDURE — 88305 TISSUE EXAM BY PATHOLOGIST: CPT | Performed by: PATHOLOGY

## 2024-05-02 PROCEDURE — 1090000002 HH PPS REVENUE DEBIT

## 2024-05-02 PROCEDURE — 83036 HEMOGLOBIN GLYCOSYLATED A1C: CPT

## 2024-05-02 PROCEDURE — 82550 ASSAY OF CK (CPK): CPT

## 2024-05-02 PROCEDURE — P9016 RBC LEUKOCYTES REDUCED: HCPCS

## 2024-05-02 PROCEDURE — 2550000001 HC RX 255 CONTRASTS: Performed by: STUDENT IN AN ORGANIZED HEALTH CARE EDUCATION/TRAINING PROGRAM

## 2024-05-02 PROCEDURE — C9113 INJ PANTOPRAZOLE SODIUM, VIA: HCPCS | Performed by: STUDENT IN AN ORGANIZED HEALTH CARE EDUCATION/TRAINING PROGRAM

## 2024-05-02 PROCEDURE — 84100 ASSAY OF PHOSPHORUS: CPT

## 2024-05-02 PROCEDURE — 82330 ASSAY OF CALCIUM: CPT

## 2024-05-02 RX ORDER — HYDROMORPHONE HYDROCHLORIDE 1 MG/ML
INJECTION, SOLUTION INTRAMUSCULAR; INTRAVENOUS; SUBCUTANEOUS
Status: COMPLETED
Start: 2024-05-02 | End: 2024-05-02

## 2024-05-02 RX ORDER — FENTANYL CITRATE 50 UG/ML
INJECTION, SOLUTION INTRAMUSCULAR; INTRAVENOUS CODE/TRAUMA/SEDATION MEDICATION
Status: COMPLETED | OUTPATIENT
Start: 2024-05-02 | End: 2024-05-02

## 2024-05-02 RX ORDER — MORPHINE SULFATE 4 MG/ML
2 INJECTION INTRAVENOUS ONCE
Status: DISCONTINUED | OUTPATIENT
Start: 2024-04-30 | End: 2024-05-02

## 2024-05-02 RX ORDER — POTASSIUM CHLORIDE 14.9 MG/ML
20 INJECTION INTRAVENOUS ONCE
Status: COMPLETED | OUTPATIENT
Start: 2024-05-02 | End: 2024-05-02

## 2024-05-02 RX ORDER — ACETAMINOPHEN 10 MG/ML
1000 INJECTION, SOLUTION INTRAVENOUS EVERY 6 HOURS SCHEDULED
Status: COMPLETED | OUTPATIENT
Start: 2024-05-02 | End: 2024-05-03

## 2024-05-02 RX ORDER — PANTOPRAZOLE SODIUM 40 MG/10ML
40 INJECTION, POWDER, LYOPHILIZED, FOR SOLUTION INTRAVENOUS 2 TIMES DAILY
Status: DISCONTINUED | OUTPATIENT
Start: 2024-05-02 | End: 2024-05-06

## 2024-05-02 RX ORDER — MORPHINE SULFATE 2 MG/ML
2 INJECTION, SOLUTION INTRAMUSCULAR; INTRAVENOUS ONCE
Status: ACTIVE | OUTPATIENT
Start: 2024-04-30

## 2024-05-02 RX ORDER — HYDROMORPHONE HYDROCHLORIDE 1 MG/ML
0.4 INJECTION, SOLUTION INTRAMUSCULAR; INTRAVENOUS; SUBCUTANEOUS EVERY 2 HOUR PRN
Status: DISCONTINUED | OUTPATIENT
Start: 2024-05-02 | End: 2024-05-03

## 2024-05-02 RX ORDER — HYDROMORPHONE HYDROCHLORIDE 1 MG/ML
0.4 INJECTION, SOLUTION INTRAMUSCULAR; INTRAVENOUS; SUBCUTANEOUS
Status: DISCONTINUED | OUTPATIENT
Start: 2024-05-02 | End: 2024-05-02

## 2024-05-02 RX ORDER — MAGNESIUM SULFATE HEPTAHYDRATE 40 MG/ML
2 INJECTION, SOLUTION INTRAVENOUS ONCE
Status: COMPLETED | OUTPATIENT
Start: 2024-05-02 | End: 2024-05-02

## 2024-05-02 RX ORDER — MIDAZOLAM HYDROCHLORIDE 1 MG/ML
INJECTION INTRAMUSCULAR; INTRAVENOUS CODE/TRAUMA/SEDATION MEDICATION
Status: COMPLETED | OUTPATIENT
Start: 2024-05-02 | End: 2024-05-02

## 2024-05-02 RX ORDER — HYDROMORPHONE HYDROCHLORIDE 1 MG/ML
0.4 INJECTION, SOLUTION INTRAMUSCULAR; INTRAVENOUS; SUBCUTANEOUS ONCE
Status: COMPLETED | OUTPATIENT
Start: 2024-05-02 | End: 2024-05-02

## 2024-05-02 RX ORDER — FENTANYL CITRATE 50 UG/ML
100 INJECTION, SOLUTION INTRAMUSCULAR; INTRAVENOUS ONCE
Status: DISCONTINUED | OUTPATIENT
Start: 2024-05-02 | End: 2024-05-03

## 2024-05-02 RX ORDER — MIDAZOLAM HYDROCHLORIDE 1 MG/ML
8 INJECTION INTRAMUSCULAR; INTRAVENOUS ONCE
Status: DISCONTINUED | OUTPATIENT
Start: 2024-05-02 | End: 2024-05-03

## 2024-05-02 RX ADMIN — HYDROMORPHONE HYDROCHLORIDE 0.4 MG: 1 INJECTION, SOLUTION INTRAMUSCULAR; INTRAVENOUS; SUBCUTANEOUS at 02:10

## 2024-05-02 RX ADMIN — MAGNESIUM SULFATE HEPTAHYDRATE 2 G: 40 INJECTION, SOLUTION INTRAVENOUS at 07:40

## 2024-05-02 RX ADMIN — HYDROMORPHONE HYDROCHLORIDE 0.4 MG: 1 INJECTION, SOLUTION INTRAMUSCULAR; INTRAVENOUS; SUBCUTANEOUS at 20:39

## 2024-05-02 RX ADMIN — FENTANYL CITRATE 25 MCG: 50 INJECTION, SOLUTION INTRAMUSCULAR; INTRAVENOUS at 15:22

## 2024-05-02 RX ADMIN — HYDROMORPHONE HYDROCHLORIDE 0.4 MG: 1 INJECTION, SOLUTION INTRAMUSCULAR; INTRAVENOUS; SUBCUTANEOUS at 10:11

## 2024-05-02 RX ADMIN — PANTOPRAZOLE SODIUM 40 MG: 40 INJECTION, POWDER, FOR SOLUTION INTRAVENOUS at 20:39

## 2024-05-02 RX ADMIN — SODIUM CHLORIDE 8 MG/HR: 9 INJECTION, SOLUTION INTRAVENOUS at 03:00

## 2024-05-02 RX ADMIN — MIDAZOLAM HYDROCHLORIDE 1 MG: 1 INJECTION, SOLUTION INTRAMUSCULAR; INTRAVENOUS at 15:22

## 2024-05-02 RX ADMIN — ACETAMINOPHEN 1000 MG: 10 INJECTION INTRAVENOUS at 23:00

## 2024-05-02 RX ADMIN — IOHEXOL 120 ML: 350 INJECTION, SOLUTION INTRAVENOUS at 10:09

## 2024-05-02 RX ADMIN — HYDROMORPHONE HYDROCHLORIDE 0.4 MG: 1 INJECTION, SOLUTION INTRAMUSCULAR; INTRAVENOUS; SUBCUTANEOUS at 10:28

## 2024-05-02 RX ADMIN — MIDAZOLAM HYDROCHLORIDE 1 MG: 1 INJECTION, SOLUTION INTRAMUSCULAR; INTRAVENOUS at 15:26

## 2024-05-02 RX ADMIN — FLUTICASONE FUROATE AND VILANTEROL TRIFENATATE 1 PUFF: 200; 25 POWDER RESPIRATORY (INHALATION) at 07:31

## 2024-05-02 RX ADMIN — HYDROMORPHONE HYDROCHLORIDE 0.4 MG: 1 INJECTION, SOLUTION INTRAMUSCULAR; INTRAVENOUS; SUBCUTANEOUS at 07:41

## 2024-05-02 RX ADMIN — METHOCARBAMOL 500 MG: 1000 INJECTION, SOLUTION INTRAMUSCULAR; INTRAVENOUS at 12:11

## 2024-05-02 RX ADMIN — ACETAMINOPHEN 1000 MG: 10 INJECTION INTRAVENOUS at 11:24

## 2024-05-02 RX ADMIN — SODIUM CHLORIDE, POTASSIUM CHLORIDE, SODIUM LACTATE AND CALCIUM CHLORIDE 500 ML: 600; 310; 30; 20 INJECTION, SOLUTION INTRAVENOUS at 10:29

## 2024-05-02 RX ADMIN — POTASSIUM CHLORIDE 20 MEQ: 14.9 INJECTION, SOLUTION INTRAVENOUS at 07:40

## 2024-05-02 RX ADMIN — FENTANYL CITRATE 25 MCG: 50 INJECTION, SOLUTION INTRAMUSCULAR; INTRAVENOUS at 15:18

## 2024-05-02 RX ADMIN — HYDROMORPHONE HYDROCHLORIDE 0.4 MG: 1 INJECTION, SOLUTION INTRAMUSCULAR; INTRAVENOUS; SUBCUTANEOUS at 12:30

## 2024-05-02 RX ADMIN — FENTANYL CITRATE 25 MCG: 50 INJECTION, SOLUTION INTRAMUSCULAR; INTRAVENOUS at 15:26

## 2024-05-02 RX ADMIN — TIOTROPIUM BROMIDE INHALATION SPRAY 2 PUFF: 3.12 SPRAY, METERED RESPIRATORY (INHALATION) at 07:32

## 2024-05-02 RX ADMIN — ACETAMINOPHEN 1000 MG: 10 INJECTION INTRAVENOUS at 17:27

## 2024-05-02 RX ADMIN — MIDAZOLAM HYDROCHLORIDE 1 MG: 1 INJECTION, SOLUTION INTRAMUSCULAR; INTRAVENOUS at 15:18

## 2024-05-02 ASSESSMENT — PAIN DESCRIPTION - ORIENTATION: ORIENTATION: RIGHT

## 2024-05-02 ASSESSMENT — PAIN SCALES - PAIN ASSESSMENT IN ADVANCED DEMENTIA (PAINAD)
TOTALSCORE: MEDICATION (SEE MAR)
TOTALSCORE: MEDICATION (SEE MAR)
TOTALSCORE: MEDICATION (SEE MAR);HEAT APPLIED;ELEVATED

## 2024-05-02 ASSESSMENT — PAIN SCALES - GENERAL
PAINLEVEL_OUTOF10: 5 - MODERATE PAIN
PAINLEVEL_OUTOF10: 10 - WORST POSSIBLE PAIN
PAINLEVEL_OUTOF10: 7
PAINLEVEL_OUTOF10: 6

## 2024-05-02 ASSESSMENT — PAIN - FUNCTIONAL ASSESSMENT
PAIN_FUNCTIONAL_ASSESSMENT: 0-10
PAIN_FUNCTIONAL_ASSESSMENT: CPOT (CRITICAL CARE PAIN OBSERVATION TOOL)
PAIN_FUNCTIONAL_ASSESSMENT: 0-10

## 2024-05-02 ASSESSMENT — PAIN DESCRIPTION - LOCATION: LOCATION: FOOT

## 2024-05-02 NOTE — SIGNIFICANT EVENT
Pt will require a CTA of abdomen today for GIB . Per IR recs recommending a CTA this AM to evaluate for identifying source of bleed and determine if there is an area to target from IR stand point. Pt labs reviewed will give her fluids to optimize her for contrast studies.

## 2024-05-02 NOTE — PROGRESS NOTES
SW intern met with team for updates. Patient admitted for GI bleed and is expected to get a scope today. Pending therapy evals. Patient is not medically ready for discharge. KB will continue to follow.    LYN Hernandez

## 2024-05-02 NOTE — PROGRESS NOTES
PATIENT NAME: Brooklyn Read  MRN: [id number]    SERVICE DATE:  5/2/2024  SERVICE TIME:  11:21 AM        Brooklyn Read is a 79 y.o. female on day 2 of admission presenting with Upper GI bleed.      SUBJECTIVE    Seen and examined. Pt noted multiple marj red bloody stool with clots this morning.     OBJECTIVE    Vitals:    05/02/24 0900 05/02/24 0913 05/02/24 0928 05/02/24 1100   BP:  169/70 162/86 155/59   Pulse: 92 90 89 97   Resp: 20 23 (!) 2 20   Temp:  36.8 °C (98.2 °F) 36.9 °C (98.4 °F)    TempSrc:  Temporal Temporal    SpO2: 95% 96% 96% 91%   Weight:       Height:          Results from last 7 days   Lab Units 05/02/24  0730   WBC AUTO x10*3/uL 17.3*   HEMOGLOBIN g/dL 6.7*   HEMATOCRIT % 20.6*   PLATELETS AUTO x10*3/uL 115*   NEUTROS PCT AUTO % 77.3   LYMPHS PCT AUTO % 8.3   MONOS PCT AUTO % 11.7   EOS PCT AUTO % 1.1     Results from last 7 days   Lab Units 05/02/24  0535 04/30/24  2322 04/30/24  0612   SODIUM mmol/L 145   < > 138   POTASSIUM mmol/L 3.8   < > 3.5   CHLORIDE mmol/L 113*   < > 103   CO2 mmol/L 23   < > 24   BUN mg/dL 60*   < > 36*   CREATININE mg/dL 1.38*   < > 1.59*   CALCIUM mg/dL 7.8*   < > 8.1*   PROTEIN TOTAL g/dL  --   --  5.7*   BILIRUBIN TOTAL mg/dL  --   --  0.3   ALK PHOS U/L  --   --  80   ALT U/L  --   --  14   AST U/L  --   --  23   GLUCOSE mg/dL 112*   < > 170*    < > = values in this interval not displayed.       Scheduled Medications  acetaminophen, 1,000 mg, intravenous, q6h JORDAN  tiotropium, 2 puff, inhalation, Daily   And  fluticasone furoate-vilanteroL, 1 puff, inhalation, Daily  insulin lispro, 0-5 Units, subcutaneous, TID with meals  lactated Ringer's, 500 mL, intravenous, Once  methocarbamol (Robaxin) 500 mg in sodium chloride 0.9% 100 mL IVPB, 500 mg, intravenous, Once         Physical Exam  Physical Exam  Constitutional:       Appearance: She is ill-appearing.   HENT:      Mouth/Throat:      Mouth: Mucous membranes are dry.   Eyes:      Extraocular Movements:  Extraocular movements intact.      Pupils: Pupils are equal, round, and reactive to light.   Cardiovascular:      Rate and Rhythm: Normal rate and regular rhythm.   Pulmonary:      Effort: Pulmonary effort is normal.      Breath sounds: Normal breath sounds.   Abdominal:      General: There is no distension.      Palpations: Abdomen is soft.      Tenderness: There is no abdominal tenderness. There is no rebound.   Musculoskeletal:         General: Normal range of motion.   Skin:     General: Skin is dry.      Capillary Refill: Capillary refill takes 2 to 3 seconds.      Comments: Diminished R pedal pulse    Neurological:      General: No focal deficit present.      Mental Status: She is alert.            ASSESSMENT & PLAN    The pt is a 80 yo woman with a PMH of COPD, osteopenia, CVA, CKD, Depression, HTN, HLD, diastolic HF presenting due to multiple episodes of hematemesis with hypotension and anemia requiring MTP and vasopressors. Arrives via [place] on RA and off pressors.      5/1: Pt received 7 units [facility]BC at [facility]. This AM receiving platelets , FFP  . Plan for repeat EGD with GI today.     5/2: Underwent EGD which showed large deep ulcer with large adherent blood however clot retreival was unsuccessful. Given pt still bleeding this AM plan for repeat EGD today. IR consulted for consideration for Embolization      NEUROLOGIC  #Hx of CVA, Depression  - hold home aspirin iso GI bleed  - Hold home sertraline and Bupropion  - Pain control with PRN Dilaudid, IV tylenol      CARDIOVASCULAR  #hx of HTN, HLD, diastolic HF  #hemorrhagic shock, resolved   - hold home losartan, atorvastatin, rosuvastatin  - hold home jardiance, spironolactone  - Off norepinephrine on arrival   - Goal MAP >65     PULMONARY  #Hx of COPD, tobacco use  - cont home PRN albuterol and trelegy ellipta  - breathing comfortably on RA     RENAL/  #hx of CKD with bl Cr 0.94   # OTONIEL likely 2/2 shock  - Cr 1.57 on admission   - daily  RFP     FEN/GI  #Peptic ulcer with hematemesis 2/2 to Ibuprofen use   ::EGD done 4/30 in [place] with multiple deep ulcers, no active bleeding  ::EGD done 5/1 shows Lrge deep ulcer with large adherent blood clot (20~25mm) at inferior posterior aspect of pre-pylorus. Clot retrieval with snares were attempted but unsuccessful due to difficult location.   -Plan to repeat EGD today as pt is still actively bleeding.   -Stool H. Pylori sent   -IR consulted as well for embolization if GI interventions unsuccessful.  - GI consulted, Plan for EGD today   - continue protonix BID  - NPO      ENDOCRINE  - Hold home jardiance (for diastolic HF)   - insulin SSI      HEME/ONC  #hemorrhagic shock , resolved   - s/p 7 pRBCs at OSH   - Q6H RFP  - Fibrinogen 306. INR: 1.2, PT 13.6 , PTT: 17   - maintain active T&S, last 5/1/24     INFECTIOUS DISEASE  #Leukocytosis  - WBC Down trending   - no active signs of infection, likely 2/2 shock  - continue monitor for signs of infection      MSK/Vascular  # RLE ulcer, and pain   #Concern for limb ischemia   - unable to doppler pulses on R foot, discolored skin, normal temperature, decreased sensation.   -Continue with neurovascular check q1h to ensure there is no signs of acute limb ischemia   - Vascular surgery consulted, Team is aware pulses are not appreciated in right foot. Will keep the area warm and notify vascular with any acute changes.   -Pt will need revascularization but given her acute GIB cannot safely anticoagulate the patient at this time.   -Right Lower PVR: Evidence of severe arterial occlusive disease in the right lower extremity at rest. Decreased digital perfusion noted. Biphasic flow is noted in the right common femoral artery.  Left Lower PVR: Evidence of moderate arterial occlusive disease in the left lower extremity at rest. Decreased digital perfusion noted. Monophasic flow is noted in the left dorsalis pedis artery and left posterior tibial artery. Biphasic flow is  noted in the left common femoral artery.     #Wound RLE at ankle   -Wound care consulted       Vent/O2: FiO2 (%):  Room Air   Lines/Devices: Ext catheter, 20g x 2 , 18g x 2   Drips: -  ATBx: -  Fluids: -  Diet: NPO  PPX: Protonix BID   DVT PPX: -  Code status: DNR/DNI - on hold for procedures   Dispo: ICU      Dr. Blade Bartlett  Internal Medicine PGY-2  May 2, 2024 11:21 AM

## 2024-05-02 NOTE — PROGRESS NOTES
Communication Note    Patient Name: Brooklyn Read  MRN: [id number]  Today's Date: 5/2/2024     Discipline: Physical Therapy      Missed Visit: Yes  Missed Visit Reason:  (PT evaluation received and reviewed, per team patient with possible re-bleed and pending EGD.  Will follow up as medically appropriate.)      05/02/24 at 8:27 AM   Tahir Gtz, PT   Rehab Office: [phone]

## 2024-05-02 NOTE — H&P
Pre Procedure History Of Present Illness  Brooklyn Read is a 79 y.o. female presenting with history of hematemesis with anemia acute blood loss in setting of ibuprofen use with recent EGD 5/1/24 revealing a large deep prepyloric ulcer with adherent clot treated with epinephrine for repeat inpatient EGD in MICU this afternoon.     Past Medical History  Past Medical History:   Diagnosis Date    Anemia     Anxiety     Cerebral vascular accident (Multi)     CKD (chronic kidney disease)     COPD (chronic obstructive pulmonary disease) (Multi)     Depression     GI (gastrointestinal bleed)     Hyperlipidemia     Hypertension      Surgical History  Past Surgical History:   Procedure Laterality Date    CHOLECYSTECTOMY      HYSTERECTOMY       Social History  She reports that she quit smoking about 2 years ago. Her smoking use included cigarettes. She started smoking about 64 years ago. She has a 62 pack-year smoking history. She has never used smokeless tobacco. She reports that she does not drink alcohol and does not use drugs.    Family History  No family history on file.     Allergies  Allergies   Allergen Reactions    Epinephrine-Chlorpheniramine Anaphylaxis     Throat swells and hives, passed out       Pre-sedation Evaluation:  ASA Classification - ASA 3 - Patient with moderate systemic disease with functional limitations  Mallampati Score - II (hard and soft palate, upper portion of tonsils and uvula visible)    Physical Exam  Constitutional:       Appearance: She is obese.   HENT:      Mouth/Throat:      Mouth: Mucous membranes are moist.   Eyes:      Conjunctiva/sclera: Conjunctivae normal.   Cardiovascular:      Rate and Rhythm: Normal rate.   Pulmonary:      Effort: Pulmonary effort is normal.   Abdominal:      Palpations: Abdomen is soft.   Skin:     General: Skin is warm.   Neurological:      Mental Status: She is oriented to person, place, and time.   Psychiatric:         Mood and Affect: Mood normal.         "  Last Recorded Vitals  Blood pressure (!) 166/150, pulse 96, temperature 36.3 °C (97.3 °F), resp. rate 19, height 1.6 m (5' 3\"), weight 70.6 kg (155 lb 10.3 oz), SpO2 94%.     Assessment/Plan   history of hematemesis with anemia acute blood loss in setting of ibuprofen use with recent EGD 5/1/24 revealing a large deep prepyloric ulcer with adherent clot treated with epinephrine for repeat inpatient EGD in MICU this afternoon     PTA/Current Medications:  Facility-Administered Medications Prior to Admission   Medication Dose Route Frequency Provider Last Rate Last Admin    [DISCONTINUED] spironolactone (Aldactone) tablet 25 mg  25 mg oral Daily Seda Mercer MD         Medications Prior to Admission   Medication Sig Dispense Refill Last Dose    albuterol 90 mcg/actuation inhaler Inhale 2 puffs every 6 hours if needed for wheezing. 18 g 3 5/1/2024    allopurinol (Zyloprim) 100 mg tablet Take 1 tablet (100 mg) by mouth 2 times a day. 180 tablet 3 4/30/2024    aspirin 81 mg EC tablet Take 1 tablet (81 mg) by mouth once daily.   4/30/2024    buPROPion XL (Wellbutrin XL) 150 mg 24 hr tablet Take 1 tablet (150 mg) by mouth once daily in the morning. 90 tablet 3 4/30/2024    cholecalciferol (Vitamin D-3) 5,000 Units tablet Take 1 tablet (5,000 Units) by mouth once daily.   4/30/2024    empagliflozin (Jardiance) 10 mg Take 1 tablet (10 mg) by mouth once daily. 30 tablet 11 4/30/2024    furosemide (Lasix) 40 mg tablet Take 1 tablet (40 mg) by mouth 2 times a day. 180 tablet 1 4/30/2024    losartan (Cozaar) 100 mg tablet Take 1 tablet (100 mg) by mouth once daily. 90 tablet 3 4/30/2024    metoprolol succinate XL (Toprol-XL) 100 mg 24 hr tablet Take 1 tablet (100 mg) by mouth once daily. 90 tablet 0 4/30/2024    multivitamin with minerals tablet Take 1 tablet by mouth once daily.   4/30/2024    rosuvastatin (Crestor) 40 mg tablet Take 1 tablet (40 mg) by mouth once daily. 90 tablet 1 4/30/2024    sertraline (Zoloft) 100 " mg tablet Take 1 tablet (100 mg) by mouth once daily. 90 tablet 3 2024    Trelegy Ellipta 200-62.5-25 mcg blister with device Inhale 1 puff once daily. 3 each 2 2024    alendronate (Fosamax) 70 mg tablet Take 1 tablet (70 mg) by mouth every 7 days. (Patient taking differently: Take 1 tablet (70 mg) by mouth every 7 days. Takes on ) 12 tablet 3 2024    [] amoxicillin-pot clavulanate (Augmentin) 875-125 mg tablet Take 1 tablet (875 mg) by mouth 2 times a day for 14 days. 28 tablet 0     atorvastatin (Lipitor) 10 mg tablet Take 1 tablet (10 mg) by mouth once daily. (Patient not taking: Reported on 2024) 90 tablet 1 Not Taking    EPINEPHrine 0.3 mg/0.3 mL injection syringe Inject 0.3 mL (0.3 mg) into the muscle 1 time if needed for anaphylaxis.       HYDROcodone-acetaminophen (Norco) 5-325 mg tablet Take 1 tablet by mouth every 6 hours if needed for severe pain (7 - 10). (Patient not taking: Reported on 2024) 10 tablet 0 Not Taking at completes course fo therpay    potassium chloride CR 20 mEq ER tablet Take 1 tablet (20 mEq) by mouth once daily. Do not crush or chew.       tiZANidine (Zanaflex) 2 mg capsule Take 1 capsule (2 mg) by mouth in the morning and 1 capsule (2 mg) in the evening and 1 capsule (2 mg) before bedtime. (Patient not taking: Reported on 2024) 45 capsule 1 Not Taking     Current Facility-Administered Medications   Medication Dose Route Frequency Provider Last Rate Last Admin    acetaminophen (Ofirmev) injection 1,000 mg  1,000 mg intravenous q6h [facility] Maeve Haas MD   Stopped at 24 1139    albuterol 90 mcg/actuation inhaler 2 puff  2 puff inhalation q6h PRN Annamarie Ayala MD        dextrose 50 % injection 12.5 g  12.5 g intravenous q15 min PRN Latasha Gross MD        fentaNYL PF (Sublimaze) injection 100 mcg  100 mcg intravenous Once Blade TriDO        tiotropium (Spiriva Respimat) 2.5 mcg/actuation inhaler 2 puff  2 puff inhalation Daily  Annamarie Ayala MD   2 puff at 05/02/24 0732    And    fluticasone furoate-vilanteroL (Breo Ellipta) 200-25 mcg/dose inhaler 1 puff  1 puff inhalation Daily Annamarie Ayala MD   1 puff at 05/02/24 0731    glucagon (Glucagen) injection 1 mg  1 mg intramuscular q15 min PRN Latasha Gross MD        HYDROmorphone (Dilaudid) injection 0.4 mg  0.4 mg intravenous q2h PRN Maeve Haas MD   0.4 mg at 05/02/24 1230    insulin lispro (HumaLOG) injection 0-5 Units  0-5 Units subcutaneous TID with meals Latasha Gross MD        midazolam (Versed) injection 8 mg  8 mg intravenous Once Blade Bartlett DO        pantoprazole (ProtoNix) injection 40 mg  40 mg intravenous BID DO Romel Monge MD

## 2024-05-02 NOTE — SIGNIFICANT EVENT
79 year-old female with history of COPD, CVA, CKD who presents with multiple episodes of hematemesis with hypotension and anemia s/p 7U pRBC and pressors at [facility]. S/p EGD on 4/30 and 5/1. EGD 5/1 showed large deep ulcer with large adherent blood clot at the inferior posterior aspect of pre-pylorus. The ulcer was treated with epinephrine, but unable to be retrieved. IR was consulted for possible embolization.    Discussed with team this morning to obtain CTA. CTA 5/2 demonstrated no evidence of active gastrointestinal bleeding. Patient is currently hemodynamically stable. Possible embolization of the gastric arteries could be attempted as a temporizing measure in the event of rebleeding with failed GI intervention, however, would be extremely difficult due to patient's vascular disease and anatomy. Please reach out with any questions or concerns.    Plan was discussed with Dr. Bradshaw.    Hal Walsh MD

## 2024-05-02 NOTE — CARE PLAN
The patient's goals for the shift include      The clinical goals for the shift include VS within MD ordered parameters    Over the shift, the patient did not make progress toward the following goals. Barriers to progression include critical illness. Recommendations to address these barriers include per MD order.

## 2024-05-02 NOTE — CARE PLAN
The patient's goals for the shift include      The clinical goals for the shift include patients hgb will be above 7      Problem: Pain  Goal: Takes deep breaths with improved pain control throughout the shift  Outcome: Progressing  Goal: Turns in bed with improved pain control throughout the shift  Outcome: Progressing  Goal: Walks with improved pain control throughout the shift  Outcome: Progressing  Goal: Performs ADL's with improved pain control throughout shift  Outcome: Progressing     Problem: Skin  Goal: Promote skin healing  Flowsheets (Taken 5/2/2024 0081)  Promote skin healing: Turn/reposition every 2 hours/use positioning/transfer devices

## 2024-05-03 LAB
ALBUMIN SERPL BCP-MCNC: 2.5 G/DL (ref 3.4–5)
ANION GAP SERPL CALC-SCNC: 14 MMOL/L (ref 10–20)
APTT PPP: 24 SECONDS (ref 27–38)
BASOPHILS # BLD AUTO: 0.04 X10*3/UL (ref 0–0.1)
BASOPHILS # BLD AUTO: 0.05 X10*3/UL (ref 0–0.1)
BASOPHILS NFR BLD AUTO: 0.2 %
BASOPHILS NFR BLD AUTO: 0.3 %
BLOOD EXPIRATION DATE: NORMAL
BLOOD EXPIRATION DATE: NORMAL
BUN SERPL-MCNC: 43 MG/DL (ref 6–23)
CA-I BLD-SCNC: 1.18 MMOL/L (ref 1.1–1.33)
CALCIUM SERPL-MCNC: 8 MG/DL (ref 8.6–10.6)
CHLORIDE SERPL-SCNC: 115 MMOL/L (ref 98–107)
CO2 SERPL-SCNC: 24 MMOL/L (ref 21–32)
CREAT SERPL-MCNC: 1.06 MG/DL (ref 0.5–1.05)
DISPENSE STATUS: NORMAL
DISPENSE STATUS: NORMAL
EGFRCR SERPLBLD CKD-EPI 2021: 54 ML/MIN/1.73M*2
EOSINOPHIL # BLD AUTO: 0.15 X10*3/UL (ref 0–0.4)
EOSINOPHIL # BLD AUTO: 0.16 X10*3/UL (ref 0–0.4)
EOSINOPHIL NFR BLD AUTO: 0.9 %
EOSINOPHIL NFR BLD AUTO: 1 %
ERYTHROCYTE [DISTWIDTH] IN BLOOD BY AUTOMATED COUNT: 16.4 % (ref 11.5–14.5)
ERYTHROCYTE [DISTWIDTH] IN BLOOD BY AUTOMATED COUNT: 17.2 % (ref 11.5–14.5)
ERYTHROCYTE [DISTWIDTH] IN BLOOD BY AUTOMATED COUNT: 17.4 % (ref 11.5–14.5)
FIBRINOGEN PPP-MCNC: 377 MG/DL (ref 200–400)
GLUCOSE BLD MANUAL STRIP-MCNC: 122 MG/DL (ref 74–99)
GLUCOSE BLD MANUAL STRIP-MCNC: 129 MG/DL (ref 74–99)
GLUCOSE BLD MANUAL STRIP-MCNC: 88 MG/DL (ref 74–99)
GLUCOSE SERPL-MCNC: 86 MG/DL (ref 74–99)
H PYLORI AG STL QL IA: NEGATIVE
HCT VFR BLD AUTO: 21.6 % (ref 36–46)
HCT VFR BLD AUTO: 21.6 % (ref 36–46)
HCT VFR BLD AUTO: 23.1 % (ref 36–46)
HGB BLD-MCNC: 7.2 G/DL (ref 12–16)
HGB BLD-MCNC: 7.6 G/DL (ref 12–16)
HGB BLD-MCNC: 8.1 G/DL (ref 12–16)
IMM GRANULOCYTES # BLD AUTO: 0.25 X10*3/UL (ref 0–0.5)
IMM GRANULOCYTES # BLD AUTO: 0.32 X10*3/UL (ref 0–0.5)
IMM GRANULOCYTES NFR BLD AUTO: 1.6 % (ref 0–0.9)
IMM GRANULOCYTES NFR BLD AUTO: 1.9 % (ref 0–0.9)
INR PPP: 1 (ref 0.9–1.1)
LYMPHOCYTES # BLD AUTO: 1.52 X10*3/UL (ref 0.8–3)
LYMPHOCYTES # BLD AUTO: 1.52 X10*3/UL (ref 0.8–3)
LYMPHOCYTES NFR BLD AUTO: 8.9 %
LYMPHOCYTES NFR BLD AUTO: 9.6 %
MAGNESIUM SERPL-MCNC: 2.15 MG/DL (ref 1.6–2.4)
MCH RBC QN AUTO: 29.1 PG (ref 26–34)
MCH RBC QN AUTO: 29.5 PG (ref 26–34)
MCH RBC QN AUTO: 29.7 PG (ref 26–34)
MCHC RBC AUTO-ENTMCNC: 33.3 G/DL (ref 32–36)
MCHC RBC AUTO-ENTMCNC: 35.1 G/DL (ref 32–36)
MCHC RBC AUTO-ENTMCNC: 35.2 G/DL (ref 32–36)
MCV RBC AUTO: 84 FL (ref 80–100)
MCV RBC AUTO: 85 FL (ref 80–100)
MCV RBC AUTO: 87 FL (ref 80–100)
MONOCYTES # BLD AUTO: 2.02 X10*3/UL (ref 0.05–0.8)
MONOCYTES # BLD AUTO: 2.26 X10*3/UL (ref 0.05–0.8)
MONOCYTES NFR BLD AUTO: 12.8 %
MONOCYTES NFR BLD AUTO: 13.2 %
NEUTROPHILS # BLD AUTO: 11.79 X10*3/UL (ref 1.6–5.5)
NEUTROPHILS # BLD AUTO: 12.87 X10*3/UL (ref 1.6–5.5)
NEUTROPHILS NFR BLD AUTO: 74.7 %
NEUTROPHILS NFR BLD AUTO: 74.9 %
NRBC BLD-RTO: 0.1 /100 WBCS (ref 0–0)
NRBC BLD-RTO: 0.1 /100 WBCS (ref 0–0)
NRBC BLD-RTO: 0.3 /100 WBCS (ref 0–0)
PHOSPHATE SERPL-MCNC: 3 MG/DL (ref 2.5–4.9)
PLATELET # BLD AUTO: 112 X10*3/UL (ref 150–450)
PLATELET # BLD AUTO: 129 X10*3/UL (ref 150–450)
PLATELET # BLD AUTO: 99 X10*3/UL (ref 150–450)
POTASSIUM SERPL-SCNC: 3.5 MMOL/L (ref 3.5–5.3)
PRODUCT BLOOD TYPE: 5100
PRODUCT BLOOD TYPE: 5100
PRODUCT CODE: NORMAL
PRODUCT CODE: NORMAL
PROTHROMBIN TIME: 10.8 SECONDS (ref 9.8–12.8)
RBC # BLD AUTO: 2.47 X10*6/UL (ref 4–5.2)
RBC # BLD AUTO: 2.58 X10*6/UL (ref 4–5.2)
RBC # BLD AUTO: 2.73 X10*6/UL (ref 4–5.2)
SODIUM SERPL-SCNC: 149 MMOL/L (ref 136–145)
UNIT ABO: NORMAL
UNIT ABO: NORMAL
UNIT NUMBER: NORMAL
UNIT NUMBER: NORMAL
UNIT RH: NORMAL
UNIT RH: NORMAL
UNIT VOLUME: 289
UNIT VOLUME: 350
WBC # BLD AUTO: 15.8 X10*3/UL (ref 4.4–11.3)
WBC # BLD AUTO: 17.2 X10*3/UL (ref 4.4–11.3)
WBC # BLD AUTO: 17.2 X10*3/UL (ref 4.4–11.3)
XM INTEP: NORMAL
XM INTEP: NORMAL

## 2024-05-03 PROCEDURE — 82947 ASSAY GLUCOSE BLOOD QUANT: CPT

## 2024-05-03 PROCEDURE — 80069 RENAL FUNCTION PANEL: CPT

## 2024-05-03 PROCEDURE — 99232 SBSQ HOSP IP/OBS MODERATE 35: CPT | Performed by: NURSE PRACTITIONER

## 2024-05-03 PROCEDURE — C9113 INJ PANTOPRAZOLE SODIUM, VIA: HCPCS

## 2024-05-03 PROCEDURE — 85730 THROMBOPLASTIN TIME PARTIAL: CPT

## 2024-05-03 PROCEDURE — 1090000002 HH PPS REVENUE DEBIT

## 2024-05-03 PROCEDURE — 1090000001 HH PPS REVENUE CREDIT

## 2024-05-03 PROCEDURE — 2500000004 HC RX 250 GENERAL PHARMACY W/ HCPCS (ALT 636 FOR OP/ED)

## 2024-05-03 PROCEDURE — 2500000001 HC RX 250 WO HCPCS SELF ADMINISTERED DRUGS (ALT 637 FOR MEDICARE OP): Performed by: NURSE PRACTITIONER

## 2024-05-03 PROCEDURE — 2500000006 HC RX 250 W HCPCS SELF ADMINISTERED DRUGS (ALT 637 FOR ALL PAYERS): Performed by: NURSE PRACTITIONER

## 2024-05-03 PROCEDURE — 85384 FIBRINOGEN ACTIVITY: CPT

## 2024-05-03 PROCEDURE — 85025 COMPLETE CBC W/AUTO DIFF WBC: CPT

## 2024-05-03 PROCEDURE — 36415 COLL VENOUS BLD VENIPUNCTURE: CPT

## 2024-05-03 PROCEDURE — 94640 AIRWAY INHALATION TREATMENT: CPT

## 2024-05-03 PROCEDURE — 85027 COMPLETE CBC AUTOMATED: CPT

## 2024-05-03 PROCEDURE — 2500000004 HC RX 250 GENERAL PHARMACY W/ HCPCS (ALT 636 FOR OP/ED): Performed by: STUDENT IN AN ORGANIZED HEALTH CARE EDUCATION/TRAINING PROGRAM

## 2024-05-03 PROCEDURE — 83735 ASSAY OF MAGNESIUM: CPT

## 2024-05-03 PROCEDURE — 85610 PROTHROMBIN TIME: CPT

## 2024-05-03 PROCEDURE — 2500000006 HC RX 250 W HCPCS SELF ADMINISTERED DRUGS (ALT 637 FOR ALL PAYERS): Mod: MUE

## 2024-05-03 PROCEDURE — 1100000001 HC PRIVATE ROOM DAILY

## 2024-05-03 PROCEDURE — C9113 INJ PANTOPRAZOLE SODIUM, VIA: HCPCS | Performed by: STUDENT IN AN ORGANIZED HEALTH CARE EDUCATION/TRAINING PROGRAM

## 2024-05-03 PROCEDURE — 82330 ASSAY OF CALCIUM: CPT

## 2024-05-03 PROCEDURE — 97162 PT EVAL MOD COMPLEX 30 MIN: CPT | Mod: GP

## 2024-05-03 PROCEDURE — 97165 OT EVAL LOW COMPLEX 30 MIN: CPT | Mod: GO

## 2024-05-03 PROCEDURE — 2500000001 HC RX 250 WO HCPCS SELF ADMINISTERED DRUGS (ALT 637 FOR MEDICARE OP)

## 2024-05-03 RX ORDER — HYDROMORPHONE HYDROCHLORIDE 1 MG/ML
0.4 INJECTION, SOLUTION INTRAMUSCULAR; INTRAVENOUS; SUBCUTANEOUS EVERY 4 HOURS PRN
Status: DISCONTINUED | OUTPATIENT
Start: 2024-05-03 | End: 2024-05-07

## 2024-05-03 RX ORDER — SERTRALINE HYDROCHLORIDE 100 MG/1
100 TABLET, FILM COATED ORAL DAILY
Status: DISCONTINUED | OUTPATIENT
Start: 2024-05-03 | End: 2024-05-10 | Stop reason: HOSPADM

## 2024-05-03 RX ORDER — BUPROPION HYDROCHLORIDE 150 MG/1
150 TABLET ORAL DAILY
Status: DISCONTINUED | OUTPATIENT
Start: 2024-05-03 | End: 2024-05-10 | Stop reason: HOSPADM

## 2024-05-03 RX ORDER — POVIDONE-IODINE 10 MG/G
OINTMENT TOPICAL DAILY
Status: DISCONTINUED | OUTPATIENT
Start: 2024-05-03 | End: 2024-05-10 | Stop reason: HOSPADM

## 2024-05-03 RX ORDER — POTASSIUM CHLORIDE 20 MEQ/1
40 TABLET, EXTENDED RELEASE ORAL ONCE
Status: COMPLETED | OUTPATIENT
Start: 2024-05-03 | End: 2024-05-03

## 2024-05-03 RX ORDER — OXYCODONE HYDROCHLORIDE 5 MG/1
5 TABLET ORAL EVERY 6 HOURS PRN
Status: DISCONTINUED | OUTPATIENT
Start: 2024-05-03 | End: 2024-05-10 | Stop reason: HOSPADM

## 2024-05-03 RX ADMIN — PANTOPRAZOLE SODIUM 40 MG: 40 INJECTION, POWDER, FOR SOLUTION INTRAVENOUS at 11:03

## 2024-05-03 RX ADMIN — FLUTICASONE FUROATE AND VILANTEROL TRIFENATATE 1 PUFF: 200; 25 POWDER RESPIRATORY (INHALATION) at 07:33

## 2024-05-03 RX ADMIN — OXYCODONE HYDROCHLORIDE 5 MG: 5 TABLET ORAL at 15:08

## 2024-05-03 RX ADMIN — SERTRALINE 100 MG: 100 TABLET, FILM COATED ORAL at 11:29

## 2024-05-03 RX ADMIN — PANTOPRAZOLE SODIUM 40 MG: 40 INJECTION, POWDER, FOR SOLUTION INTRAVENOUS at 21:15

## 2024-05-03 RX ADMIN — HYDROMORPHONE HYDROCHLORIDE 0.4 MG: 1 INJECTION, SOLUTION INTRAMUSCULAR; INTRAVENOUS; SUBCUTANEOUS at 21:14

## 2024-05-03 RX ADMIN — BUPROPION HYDROCHLORIDE 150 MG: 150 TABLET, EXTENDED RELEASE ORAL at 11:29

## 2024-05-03 RX ADMIN — ACETAMINOPHEN 1000 MG: 10 INJECTION INTRAVENOUS at 08:31

## 2024-05-03 RX ADMIN — TIOTROPIUM BROMIDE INHALATION SPRAY 2 PUFF: 3.12 SPRAY, METERED RESPIRATORY (INHALATION) at 07:33

## 2024-05-03 RX ADMIN — POTASSIUM CHLORIDE 40 MEQ: 1500 TABLET, EXTENDED RELEASE ORAL at 14:53

## 2024-05-03 RX ADMIN — HYDROMORPHONE HYDROCHLORIDE 0.4 MG: 1 INJECTION, SOLUTION INTRAMUSCULAR; INTRAVENOUS; SUBCUTANEOUS at 09:05

## 2024-05-03 RX ADMIN — POVIDONE-IODINE: 100 OINTMENT TOPICAL at 11:03

## 2024-05-03 ASSESSMENT — PAIN - FUNCTIONAL ASSESSMENT
PAIN_FUNCTIONAL_ASSESSMENT: 0-10

## 2024-05-03 ASSESSMENT — COGNITIVE AND FUNCTIONAL STATUS - GENERAL
DRESSING REGULAR LOWER BODY CLOTHING: A LOT
MOBILITY SCORE: 11
TOILETING: A LITTLE
MOVING FROM LYING ON BACK TO SITTING ON SIDE OF FLAT BED WITH BEDRAILS: A LITTLE
TURNING FROM BACK TO SIDE WHILE IN FLAT BAD: A LOT
PERSONAL GROOMING: A LITTLE
TOILETING: TOTAL
STANDING UP FROM CHAIR USING ARMS: A LOT
MOVING TO AND FROM BED TO CHAIR: A LOT
HELP NEEDED FOR BATHING: A LITTLE
MOVING FROM LYING ON BACK TO SITTING ON SIDE OF FLAT BED WITH BEDRAILS: A LITTLE
WALKING IN HOSPITAL ROOM: A LOT
HELP NEEDED FOR BATHING: A LOT
DRESSING REGULAR UPPER BODY CLOTHING: A LITTLE
PERSONAL GROOMING: A LITTLE
TOILETING: TOTAL
MOVING TO AND FROM BED TO CHAIR: A LOT
CLIMB 3 TO 5 STEPS WITH RAILING: A LOT
DRESSING REGULAR LOWER BODY CLOTHING: A LOT
CLIMB 3 TO 5 STEPS WITH RAILING: TOTAL
CLIMB 3 TO 5 STEPS WITH RAILING: TOTAL
DRESSING REGULAR LOWER BODY CLOTHING: A LOT
DAILY ACTIVITIY SCORE: 15
DAILY ACTIVITIY SCORE: 17
DRESSING REGULAR UPPER BODY CLOTHING: A LITTLE
PERSONAL GROOMING: A LITTLE
STANDING UP FROM CHAIR USING ARMS: A LOT
MOBILITY SCORE: 13
MOBILITY SCORE: 11
TURNING FROM BACK TO SIDE WHILE IN FLAT BAD: A LOT
DAILY ACTIVITIY SCORE: 15
WALKING IN HOSPITAL ROOM: TOTAL
STANDING UP FROM CHAIR USING ARMS: A LOT
WALKING IN HOSPITAL ROOM: TOTAL
MOVING TO AND FROM BED TO CHAIR: A LOT
HELP NEEDED FOR BATHING: A LOT
EATING MEALS: A LITTLE
TURNING FROM BACK TO SIDE WHILE IN FLAT BAD: A LOT
MOVING FROM LYING ON BACK TO SITTING ON SIDE OF FLAT BED WITH BEDRAILS: A LITTLE
DRESSING REGULAR UPPER BODY CLOTHING: A LITTLE

## 2024-05-03 ASSESSMENT — PAIN SCALES - GENERAL
PAINLEVEL_OUTOF10: 10 - WORST POSSIBLE PAIN
PAINLEVEL_OUTOF10: 8
PAINLEVEL_OUTOF10: 7
PAINLEVEL_OUTOF10: 0 - NO PAIN
PAINLEVEL_OUTOF10: 0 - NO PAIN
PAINLEVEL_OUTOF10: 8

## 2024-05-03 ASSESSMENT — ACTIVITIES OF DAILY LIVING (ADL): BATHING_ASSISTANCE: MODERATE

## 2024-05-03 ASSESSMENT — PAIN SCALES - WONG BAKER: WONGBAKER_NUMERICALRESPONSE: HURTS WHOLE LOT

## 2024-05-03 NOTE — PROGRESS NOTES
[facility]  [facility] [place]  CONSULT FOLLOW-UP         SUBJECTIVE     Reason for Consult: Hematemesis/Melena      Interval Events/Subjective:   Hb remained stable. No requirement of further blood products. No BM overnight.     ROS: Complete ROS obtained, negative unless otherwise indicated above.     Medications:  buPROPion XL, 150 mg, oral, Daily  tiotropium, 2 puff, inhalation, Daily   And  fluticasone furoate-vilanteroL, 1 puff, inhalation, Daily  insulin lispro, 0-5 Units, subcutaneous, TID with meals  pantoprazole, 40 mg, intravenous, BID  povidone-iodine, , Topical, Daily  sertraline, 100 mg, oral, Daily      PRN medications: albuterol, dextrose, glucagon, HYDROmorphone, oxyCODONE      EXAM     Physical Exam       5/3/2024     4:00 AM 5/3/2024     5:00 AM 5/3/2024     6:00 AM 5/3/2024     7:00 AM 5/3/2024     8:00 AM 5/3/2024     9:00 AM 5/3/2024    10:00 AM   Vitals   Systolic 154 157 143 153 148 140 123   Diastolic 63 76 72 67 73 51 60   Heart Rate 88 88 83 100 93 92 90   Temp 36.8 °C (98.2 °F)   37 °C (98.6 °F)      Resp 18 22 18 24 24 23 21   Weight (lb) 164.46         BMI 24.29 kg/m2         BSA (m2) 1.91 m2             General: NAD, AA&O x 3  Eyes: EOMI, PERRLA  ENT: MMM  Heart: RRR  Lungs: No respiratory distress  Abdomen: Soft, non tender, non distended  Skin: No jaundice   Neuro: Appropriately responds to questions/commands         DATA                                                                            Labs     Results for orders placed or performed during the hospital encounter of 04/30/24 (from the past 24 hour(s))   CBC and Auto Differential   Result Value Ref Range    WBC 16.8 (H) 4.4 - 11.3 x10*3/uL    nRBC 0.2 (H) 0.0 - 0.0 /100 WBCs    RBC 2.65 (L) 4.00 - 5.20 x10*6/uL    Hemoglobin 7.7 (L) 12.0 - 16.0 g/dL    Hematocrit 21.7 (L) 36.0 - 46.0 %    MCV 82 80 - 100 fL    MCH 29.1 26.0 - 34.0 pg    MCHC 35.5 32.0 - 36.0 g/dL    RDW 15.7 (H)  11.5 - 14.5 %    Platelets 101 (L) 150 - 450 x10*3/uL    Neutrophils % 76.4 40.0 - 80.0 %    Immature Granulocytes %, Automated 2.4 (H) 0.0 - 0.9 %    Lymphocytes % 7.5 13.0 - 44.0 %    Monocytes % 12.3 2.0 - 10.0 %    Eosinophils % 1.1 0.0 - 6.0 %    Basophils % 0.3 0.0 - 2.0 %    Neutrophils Absolute 12.83 (H) 1.60 - 5.50 x10*3/uL    Immature Granulocytes Absolute, Automated 0.40 0.00 - 0.50 x10*3/uL    Lymphocytes Absolute 1.26 0.80 - 3.00 x10*3/uL    Monocytes Absolute 2.06 (H) 0.05 - 0.80 x10*3/uL    Eosinophils Absolute 0.18 0.00 - 0.40 x10*3/uL    Basophils Absolute 0.05 0.00 - 0.10 x10*3/uL   Lactate   Result Value Ref Range    Lactate 1.0 0.4 - 2.0 mmol/L   Hemoglobin A1c   Result Value Ref Range    Hemoglobin A1C 5.4 see below %    Estimated Average Glucose 108 Not Established mg/dL   POCT GLUCOSE   Result Value Ref Range    POCT Glucose 103 (H) 74 - 99 mg/dL   POCT GLUCOSE   Result Value Ref Range    POCT Glucose 109 (H) 74 - 99 mg/dL   CBC and Auto Differential   Result Value Ref Range    WBC 18.7 (H) 4.4 - 11.3 x10*3/uL    nRBC 0.3 (H) 0.0 - 0.0 /100 WBCs    RBC 2.75 (L) 4.00 - 5.20 x10*6/uL    Hemoglobin 8.0 (L) 12.0 - 16.0 g/dL    Hematocrit 25.0 (L) 36.0 - 46.0 %    MCV 91 80 - 100 fL    MCH 29.1 26.0 - 34.0 pg    MCHC 32.0 32.0 - 36.0 g/dL    RDW 17.3 (H) 11.5 - 14.5 %    Platelets 110 (L) 150 - 450 x10*3/uL    Neutrophils % 78.3 40.0 - 80.0 %    Immature Granulocytes %, Automated 2.2 (H) 0.0 - 0.9 %    Lymphocytes % 6.4 13.0 - 44.0 %    Monocytes % 11.8 2.0 - 10.0 %    Eosinophils % 1.0 0.0 - 6.0 %    Basophils % 0.3 0.0 - 2.0 %    Neutrophils Absolute 14.59 (H) 1.60 - 5.50 x10*3/uL    Immature Granulocytes Absolute, Automated 0.41 0.00 - 0.50 x10*3/uL    Lymphocytes Absolute 1.20 0.80 - 3.00 x10*3/uL    Monocytes Absolute 2.21 (H) 0.05 - 0.80 x10*3/uL    Eosinophils Absolute 0.18 0.00 - 0.40 x10*3/uL    Basophils Absolute 0.06 0.00 - 0.10 x10*3/uL   POCT GLUCOSE   Result Value Ref Range    POCT  Glucose 102 (H) 74 - 99 mg/dL   CBC and Auto Differential   Result Value Ref Range    WBC 17.2 (H) 4.4 - 11.3 x10*3/uL    nRBC 0.3 (H) 0.0 - 0.0 /100 WBCs    RBC 2.58 (L) 4.00 - 5.20 x10*6/uL    Hemoglobin 7.6 (L) 12.0 - 16.0 g/dL    Hematocrit 21.6 (L) 36.0 - 46.0 %    MCV 84 80 - 100 fL    MCH 29.5 26.0 - 34.0 pg    MCHC 35.2 32.0 - 36.0 g/dL    RDW 16.4 (H) 11.5 - 14.5 %    Platelets 112 (L) 150 - 450 x10*3/uL    Neutrophils % 74.9 40.0 - 80.0 %    Immature Granulocytes %, Automated 1.9 (H) 0.0 - 0.9 %    Lymphocytes % 8.9 13.0 - 44.0 %    Monocytes % 13.2 2.0 - 10.0 %    Eosinophils % 0.9 0.0 - 6.0 %    Basophils % 0.2 0.0 - 2.0 %    Neutrophils Absolute 12.87 (H) 1.60 - 5.50 x10*3/uL    Immature Granulocytes Absolute, Automated 0.32 0.00 - 0.50 x10*3/uL    Lymphocytes Absolute 1.52 0.80 - 3.00 x10*3/uL    Monocytes Absolute 2.26 (H) 0.05 - 0.80 x10*3/uL    Eosinophils Absolute 0.15 0.00 - 0.40 x10*3/uL    Basophils Absolute 0.04 0.00 - 0.10 x10*3/uL   Calcium, ionized   Result Value Ref Range    POCT Calcium, Ionized 1.18 1.1 - 1.33 mmol/L   aPTT   Result Value Ref Range    aPTT 24 (L) 27 - 38 seconds   Fibrinogen   Result Value Ref Range    Fibrinogen 377 200 - 400 mg/dL   Protime-INR   Result Value Ref Range    Protime 10.8 9.8 - 12.8 seconds    INR 1.0 0.9 - 1.1   Magnesium   Result Value Ref Range    Magnesium 2.15 1.60 - 2.40 mg/dL   Renal Function Panel   Result Value Ref Range    Glucose 86 74 - 99 mg/dL    Sodium 149 (H) 136 - 145 mmol/L    Potassium 3.5 3.5 - 5.3 mmol/L    Chloride 115 (H) 98 - 107 mmol/L    Bicarbonate 24 21 - 32 mmol/L    Anion Gap 14 10 - 20 mmol/L    Urea Nitrogen 43 (H) 6 - 23 mg/dL    Creatinine 1.06 (H) 0.50 - 1.05 mg/dL    eGFR 54 (L) >60 mL/min/1.73m*2    Calcium 8.0 (L) 8.6 - 10.6 mg/dL    Phosphorus 3.0 2.5 - 4.9 mg/dL    Albumin 2.5 (L) 3.4 - 5.0 g/dL   CBC   Result Value Ref Range    WBC 17.2 (H) 4.4 - 11.3 x10*3/uL    nRBC 0.1 (H) 0.0 - 0.0 /100 WBCs    RBC 2.73 (L) 4.00  - 5.20 x10*6/uL    Hemoglobin 8.1 (L) 12.0 - 16.0 g/dL    Hematocrit 23.1 (L) 36.0 - 46.0 %    MCV 85 80 - 100 fL    MCH 29.7 26.0 - 34.0 pg    MCHC 35.1 32.0 - 36.0 g/dL    RDW 17.2 (H) 11.5 - 14.5 %    Platelets 129 (L) 150 - 450 x10*3/uL   POCT GLUCOSE   Result Value Ref Range    POCT Glucose 88 74 - 99 mg/dL                                                                                Imaging           XR chest 1 view    Result Date: 5/1/2024  Interpreted By:  Riley Singer  and Isaiah Ryan STUDY: XR CHEST 1 VIEW;  5/1/2024 1:46 am   INDICATION: Signs/Symptoms:C/f PNA vs fluid overload.   COMPARISON: Chest radiograph dated 10/31/2019.CT abdomen and pelvis dated 04/30/2024.   ACCESSION NUMBER(S): [id number]   ORDERING CLINICIAN: JOHN SIMMONS   FINDINGS: AP radiograph of the chest was provided.   CARDIOMEDIASTINAL SILHOUETTE: Cardiomediastinal silhouette is normal in size and configuration. Aortic knob calcifications again noted.   LUNGS: No focal consolidation, pleural effusion, or pneumothorax.   ABDOMEN: No remarkable upper abdominal findings.   BONES: No acute osseous changes.       No evidence of acute cardiopulmonary process.   I personally reviewed the images/study and I agree with the resident findings as stated. This study was interpreted at [place], [place].   MACRO: None   Signed by: Riley Singer 5/1/2024 8:54 AM Dictation workstation:   [id number]    ECG 12 lead    Result Date: 4/30/2024  Sinus rhythm Confirmed by Víctor Regan ([id number]) on 4/30/2024 3:16:02 PM    EGD    Addendum Date: 4/30/2024    Impression The cricopharynx, upper third of the esophagus, middle third of the esophagus, lower third of the esophagus, GE junction, duodenal bulb, 1st part of the duodenum and 2nd part of the duodenum appeared normal. Ulcer in the prepyloric region with adherent clot (Boogie IIB) Large old clot in the Gastric fundus Findings The cricopharynx, upper third  of the esophagus, middle third of the esophagus, lower third of the esophagus, GE junction, duodenal bulb, 1st part of the duodenum and 2nd part of the duodenum appeared normal. Deep, irregular ulcer in the prepyloric region with adherent clot (Boogie IIB). Large Deep Ulcer superior aspect of Pre-pyloric Antrum : with large adherent blood clot . NOT actively bleeding Large old clot in the Gastric fundus Recommendation  Follow up with PCP  Admit to the ICU NPO Avoid use of  NSAIDs Protonix 40mg I.v. Q 12 hours Repeat EGD in a.m.  Indication Upper GI bleed Staff Staff Role Meir Bauer MD Proceduralist Medications See Anesthesia Record. Preprocedure A history and physical has been performed, and patient medication allergies have been reviewed. The patient's tolerance of previous anesthesia has been reviewed. The risks and benefits of the procedure and the sedation options and risks were discussed with the patient. All questions were answered and informed consent obtained. Details of the Procedure The patient underwent general anesthesia, which was administered by an anesthesia professional. The patient's blood pressure, ECG, ETCO2, heart rate, level of consciousness, respirations and oxygen were monitored throughout the procedure. The scope was introduced through the mouth and advanced to the second part of the duodenum. Retroflexion was performed in the cardia. Prior to the procedure, the patient's H. Pylori status was unknown. The patient experienced no blood loss. The procedure was not difficult. The patient tolerated the procedure well. There were no apparent adverse events. Events Procedure Events Event Event Time ENDO SCOPE IN TIME 4/30/2024  2:24 PM ENDO SCOPE OUT TIME 4/30/2024  2:35 PM Specimens No specimens collected Procedure Location [facility] [place] [address] [facility] [id number] [phone] Referring Provider No referring provider defined for this  encounter. Procedure Provider No name on file    Result Date: 4/30/2024  Table formatting from the original result was not included. Impression The cricopharynx, upper third of the esophagus, middle third of the esophagus, lower third of the esophagus, GE junction, duodenal bulb, 1st part of the duodenum and 2nd part of the duodenum appeared normal. Ulcer in the prepyloric region with adherent clot (Boogie IIB) Large old clot in the Gastric fundus Findings The cricopharynx, upper third of the esophagus, middle third of the esophagus, lower third of the esophagus, GE junction, duodenal bulb, 1st part of the duodenum and 2nd part of the duodenum appeared normal. Deep, irregular ulcer in the prepyloric region with adherent clot (Boogie IIB). Large Deep Ulcer superior aspect of Pre-pyloric Antrum : with large adherent blood clot . NOT actively bleeding Large old clot in the Gastric fundus Recommendation  Follow up with PCP  Admit to the ICU NPO Protonix 40mg I.v. Q 12 hours Repeat EGD in a.m.  Indication Upper GI bleed Staff Staff Role Meir Bauer MD Proceduralist Medications See Anesthesia Record. Preprocedure A history and physical has been performed, and patient medication allergies have been reviewed. The patient's tolerance of previous anesthesia has been reviewed. The risks and benefits of the procedure and the sedation options and risks were discussed with the patient. All questions were answered and informed consent obtained. Details of the Procedure The patient underwent general anesthesia, which was administered by an anesthesia professional. The patient's blood pressure, ECG, ETCO2, heart rate, level of consciousness, respirations and oxygen were monitored throughout the procedure. The scope was introduced through the mouth and advanced to the second part of the duodenum. Retroflexion was performed in the cardia. Prior to the procedure, the patient's H. Pylori status was unknown. The patient  experienced no blood loss. The procedure was not difficult. The patient tolerated the procedure well. There were no apparent adverse events. Events Procedure Events Event Event Time ENDO SCOPE IN TIME 4/30/2024  2:24 PM ENDO SCOPE OUT TIME 4/30/2024  2:35 PM Specimens No specimens collected Procedure Location [facility] [place] [address] [facility] [address] [phone] Referring Provider No referring provider defined for this encounter. Procedure Provider No name on file     CT abdomen pelvis w IV contrast    Result Date: 4/30/2024  Interpreted By:  Leon Winter, STUDY: CT ABDOMEN PELVIS W IV CONTRAST;  4/30/2024 10:07 am   INDICATION: Signs/Symptoms:vomiting blood.   COMPARISON: None.   ACCESSION NUMBER(S): [id number]   ORDERING CLINICIAN: BERNARD AHUJA   TECHNIQUE: Contiguous axial images were obtained through the abdomen and pelvis after the administration of  75 mL Omnipaque 350 intravenous contrast. Coronal and sagittal reformations were made.   FINDINGS: LOWER CHEST: Lung bases are clear.   ABDOMEN:   LIVER: Within normal limits.   BILE DUCTS: Nondilated.   GALLBLADDER: The gallbladder is not distended and without calcified stones.   PANCREAS: Within normal limits.   SPLEEN: Within normal limits.   ADRENAL GLANDS: Within normal limits.   KIDNEYS, URETERS, and BLADDER: The kidneys enhance symmetrically without focal lesion. Cortical scarring seen bilaterally, likely vascular. No hydroureteronephrosis bilaterally.Bladder unremarkable.   VESSELS: There is no aneurysmal dilatation of the abdominal aorta. Generalized atherosclerotic disease of the aortoiliac tree. The IVC is within normal limits.   BOWEL: Focal haziness along the inferior posterior aspect of the gastric pylorus. There is a tiny high density focus in this region. No marj perforation. High density amorphous material within the gastric lumen can be seen with clot. No obstruction. Marked colonic diverticulosis  about the sigmoid in particular.   PERITONEUM/RETROPERITONEUM/LYMPH NODES: No ascites or free air, no fluid collection.   No retroperitoneal fluid collection or lymphadenopathy.   REPRODUCTIVE ORGANS: Status post hysterectomy.   ABDOMINAL WALL: Unremarkable.   BONE AND SOFT TISSUE: Bones are intact.       1. Haziness along the inferior posterior margin of the gastric pylorus possibly related to focal peptic ulcer disease. No marj perforation. A tiny hyperdense focus in this region is identified which could relate to a small focus of extraluminal contrast. No discrete layering identified to confirm active bleeding, however, this is a single phase study. 2. Cortical scarring bilaterally likely vascular in nature.     Signed by: Leon Winter 4/30/2024 10:24 AM Dictation workstation:   [id number]                                                                            GI Procedures     4/30/2024   Impression  The cricopharynx, upper third of the esophagus, middle third of the esophagus, lower third of the esophagus, GE junction, duodenal bulb, 1st part of the duodenum and 2nd part of the duodenum appeared normal.  Ulcer in the prepyloric region with adherent clot (Boogie IIB)  Large old clot in the Gastric fundus     5/1/2024   Findings  The esophagus appeared normal. No esophageal varices. No signs of GI bleed.  Regular Z-line 35 cm from the incisors  Single deep, irregular ulcer in the prepyloric region with adherent clot (Boogie IIB); injected epinephrine to address bleeding. Large deep ulcer with large adherent blood clot (20~25mm) at inferior posterior aspect of pre-pylorus. Epinephrine was injected into four quadrants around the ulcer. Clot retrieval with snares were attempted but unsuccessful due to difficult location. One clip was placed for identification.  The duodenal bulb, 1st part of the duodenum and 2nd part of the duodenum appeared normal. No ulcers or AVM. No signs of GI bleed.       5/2/2024   The  esophagus appeared normal. No evidence of esophagitis, hiatal hernia, or Lugo's esophagus.  Moderate, patchy atrophic and erythematous mucosa in the body of the stomach and antrum, consistent with gastritis; performed cold forceps biopsy to rule out H. pylori;  Single large, cratered, round ulcer in the prepyloric region with adherent clot (Boogie IIB); placed 1 clip successfully (clip is MRI compatible); hemostasis achieved; induced coagulation and hemostasis achieved with 4 applications of with heater probe; injected 5.5 mL of epinephrine to address bleeding; hemostasis achieved. A previously placed clip was seen the outside margin of the ulcer. Another previously placed clip was seen at the interior margin of the ulcer. A hemoclip was used to guillotine the adherent clot off the ulcer by placing it at the white fibrin base and pulling off. There was mild oozing noted consistent with underlying vessel. Epinephrine injection was used on either side of the oozing with significant slowing of the bleeding. Heater probe coagulation resulted in complete cessation. A Doppler probe was used to interrogate the ulcer and area treated with no signal at low and medium settings.  The cardia and fundus of the stomach appeared normal.  Mild, patchy erythematous mucosa in the duodenal bulb;  The 2nd part of the duodenum appeared normal.  Due to ARIEL - Epic error with account number, several of the images from the procedure were not attached to this procedure.       ASSESSMENT / PLAN     Assessment and Recommendations:     Brooklyn Read is a 79 y.o. female w/PMH of COPD, osteopenia, HTN and HLD who is transferred from [facility] for upper GI bleeding.      #Upper GI bleeding   #Acute blood loss anemia   Ms Read initially presented to OSH with symptoms of upper GI bleeding including hematemesis and melena. She was hemodynamically unstable and  Her laboratory work up was pertinent for acute blood loss anemia with hgb 6.7  which failed to correct with one unit of PRBC. EGD (4/30) done at  [place] appeared to show two large forest IIB ulcers in the prepyloric/antral area. No active bleeding was noted and no intervention was done.  She was transferred to [facility] after she experienced an additional episode of hematemesis associated with hypotension and requiring pressors. Following transfer, her pressors were weaned off and repeat Hgb was 11 (appropriately corrected). Her PUD likely due to NSAID use. Pt underwent EGD 5/1/2024 which showed single deep, irregular ulcer in the prepyloric region with adherent clot (Boogie IIB). Unable to retrieve clot. Epinephrine was injected. Patient continued to have signs of active GI bleed. Repeat EGD 5/2/2024 again showed single large, cratered, round ulcer in the prepyloric region with adherent clot (Boogie IIB). A hemoclip was used to guillotine the adherent clot off the ulcer by placing it at the white fibrin base and pulling off. There was mild oozing noted consistent with underlying vessel. Epinephrine injection was used on either side of the oozing with significant slowing of the bleeding. Heater probe coagulation resulted in complete cessation. A Doppler probe was used to interrogate the ulcer and area treated with no signal at low and medium settings. Post-EGD, patient has been doing great. No signs of GI bleed.     Recommendations:   - Continue with PPI IV   - Can advance diet   - Follow stool HP studies/gastric biopsies   - Trend CBC, transfuse to goal Hgb >7 and platelets >50   - Maintain two large bore IVs and active type and screen   - Hold anticoagulation and antiplatelet agents; Avoid NSAIDs    - Monitor for bleeding, if patient develops significant bleeding with hemodynamic instability, please call GI fellow on call   - Ensure Hb > 7, platelets > 50, K > 4, and Mg > 2 on day of procedure     BRITTA per primary team      ------------------------------------------------------------------------  Flora Ryan MD  Gastroenterology Fellow  After 5PM and on Weekends, please page on-call fellow.    Case discussed with service attending Dr. Castillo

## 2024-05-03 NOTE — CARE PLAN
The patient's goals for the shift include      The clinical goals for the shift include patients hgb will be above 7    Over the shift, the patient did not make progress toward the following goals. Barriers to progression include    Problem: Pain  Goal: My pain/discomfort is manageable  Outcome: Progressing      . Recommendations to address these barriers include   Problem: Pain  Goal: My pain/discomfort is manageable  Outcome: Progressing     Problem: Safety  Goal: Patient will be injury free during hospitalization  Outcome: Progressing  Goal: I will remain free of falls  Outcome: Progressing     Problem: Daily Care  Goal: Daily care needs are met  Outcome: Progressing   .

## 2024-05-03 NOTE — HOSPITAL COURSE
The pt is a 78 yo woman presenting from [facility] due to GI bleed. Pt has a PMH of osteopenia, COPD, anemia, CVA, CKD, Depression, HLD and HTN, Diastolic HF, chronic RLE wound. Pt presented to [place] ED yesterday due to hematemesis that started on 4/30 in the early morning. In the ED she was noted to have an anemia of 6.7 and new OTONIEL. Was given blood and admitted to the floor for GI consultation. GI saw the patient and suspected the GI bleed was 2/2 to ibuprofen use and planned to proceed with UGI endoscopy while inpatient. Pt become unstable overnight requiring MTP, and vasopressors thus was transferred to [facility]  MICU for further care.      Here she states that she is having significant RLE pain and some abdominal pain however her RLE pain is worse. She states she has has a sore on the extremity for 3 wks. She has been taking ibuprofen 6-8 pills/wk to treat this pain. Vascular surgery formally consulted 5/1 d/t no dopplerable pulses in BL LE (R>L) noted on arrival to [facility]. Re-engaged 5/6 after tx of GI bleed; recommended outpatient follow-up 6/3 for revascularization and discharge on oral anticoagulants.     EGD on 5/1 showed large deep ulcer with large adherent blood clot (20~25mm) at inferior posterior aspect of pre-pylorus. Epinephrine was injected into four quadrants around the ulcer. Clot retrieval with snares were attempted but unsuccessful due to difficult location. One clip was placed for identification. Esophagus/duodenum were unremarkable.      CTA 5/2 demonstrated no evidence of active gastrointestinal bleeding. Patient transferred to medicine floor on 5/3. Patient had Hgb drop from 7.2 to 6.9 5/4/2024 and received one unit pRBC. Swelling of the LUE was noted 5/4; subsequent LUE US unremarkable. 5/6 GI recommended 12 weeks of PO PPI and follow up outpt EGD in 3 months. GI approved anticoagulation trial 5/7 with IV heparin and aspirin for chronic ischemia of RLE, which pt tolerated well. Weaned off of  oxygen 5/8. RUE vascular US 5/8 revealed non-occlusive superficial venous thrombosis in the R basilic vein. 5/9 transitioned pt to PO anticoagulants per vascular surgery recs. Pt's hemoglobin and vitals remained stable. After 4 days of constipation, passed several non-bloody stools on 5/9, with Hgb and vitals remaining reassuring. Hgb 8.6 on day of discharge. Stopped atorvastatin upon admission d/t pt's increased CK, which remained elevated throughout her admission. Follow up restarting statin outpatient.

## 2024-05-03 NOTE — CARE PLAN
Problem: Pain  Goal: My pain/discomfort is manageable  Outcome: Progressing     Problem: Safety  Goal: Patient will be injury free during hospitalization  Outcome: Progressing  Goal: I will remain free of falls  Outcome: Progressing     Problem: Daily Care  Goal: Daily care needs are met  Outcome: Progressing   The patient's goals for the shift include      The clinical goals for the shift include VS within MD ordered parameters    Over the shift, the patient did not make progress toward the following goals. Barriers to progression include chronic illness. Recommendations to address these barriers include per MD order.

## 2024-05-03 NOTE — SIGNIFICANT EVENT
Floor Readiness Note       I, personally, evaluated Brooklyn Read prior to transfer to the floor, including reviewing all current laboratory and imaging studies. The patient remains appropriate for transfer to the floor. Bedside nurse and respiratory therapy are also in agreement of patient's readiness for the floor.     Brief summary:  Brooklyn Read is a 79 y.o. female who was admitted to the MICU on 4/30 for hemorrhagic shock. They have been treated with blood transfusion and EGD with epi injection.     Updated focused Physical Exam:  Constitutional: pt in NAD, alert and cooperative  Eyes: no icterus   ENMT: mucous membranes moist, no apparent injury, no lesions seen  Head/Neck: Neck supple, no apparent injury  Respiratory/Thorax: Lungs CTA bilaterally  Cardiovascular: Regular, rate and rhythm, no murmurs  Gastrointestinal: Nondistended, soft, non-tender, BS present x 4  Musculoskeletal: ROM intact, no joint swelling, normal strength  Extremities: no doppler signal RLE in DP, cool, cap refill <3sec  Neurological: alert and oriented x 3, speech clear, follows commands appropriately  Skin: Warm and dry, no lesions, no rashes       Current Vital Signs:  Heart Rate: 93 (05/03/24 1200 : Laureano Scott, RN)  BP: 153/60 (05/03/24 1200 : Laureano Scott, RN)  Temp: 36.4 °C (97.5 °F) (05/03/24 1500 : Anoop Reinoso)  Resp: 23 (05/03/24 1200 : Laureano Scott, RN)  SpO2: 100 % (05/03/24 1200 : Laureano Scott, ARLYN)    Relevant updates since rounds:  Advanced diet    Accepting team, Yue, received verbal sign out and the Provider Care team/Attending has been updated. Bedside nurse will now call accepting nurse for report and patient will be transferred to [place] [id number].    Maria De Jesus Sanchez, PEGGY-SANDRO

## 2024-05-03 NOTE — PROGRESS NOTES
Critical Care Daily Progress        Subjective   Brooklyn Read is a 79 y.o. female on day 3 of admission presenting with Upper GI bleed.     Interval History:   ICU to [facility] Transfer Summary     I:  ICU Admission Reason & Brief ICU Course:    The pt is a 80 yo woman presenting from [facility] due to GI bleed. Pt has a PMH of osteopenia, COPD, anemia, CVA, CKD, Depression, HLD and HTN, Diastolic HF, chronic RLE wound. Pt presented to [facility] ED yesterday due to hematemesis that started on 4/30 in the early morning. In the ED she was noted to have an anemia of 6.7 and new OTONIEL. Was given blood and admitted to the floor for GI consultation. GI saw the patient and suspected the GI bleed was 2/2 to ibuprofen use and planned to proceed with UGI endoscopy while inpatient. Pt become unstable overnight requiring MTP, and vasopressors thus was transferred to [facility]  MICU for further care.      Here she states that she is having significant RLE pain and some abdominal pain however her RLE pain is worse. She states she has has a sore on the extremity for 3 wks. She has been taking ibuprofen 6-8 pills/wk to treat this pain. Vascular surgery consulted due to no dopplerable pulses in BL LE (R>L) noted on arrival to [facility].    EGD on 5/1 showed large deep ulcer with large adherent blood clot (20~25mm) at inferior posterior aspect of pre-pylorus. Epinephrine was injected into four quadrants around the ulcer. Clot retrieval with snares were attempted but unsuccessful due to difficult location. One clip was placed for identification. Esophagus/duodenum were unremarkable.      CTA 5/2 demonstrated no evidence of active gastrointestinal bleeding.       C: Code Status/DPOA Info/Goals of Care/ACP Note    DNR  DPOA/Contact Number: Jolene Waldrop (daughter) [phone]    U: Unprescribing & Pertinent High-Risk Medications    Changes to home meds: holding home losartan, spironolactone, jardiance, rosuvastatin     Anticoagulation: No Reason  for no VTE prophylaxis:medical contraindication due to GIB    Antibiotics:   [x] N/A - no current planned antimicrobioals      P: Pending Tests at the Time of Transfer   NA      A: Active consultants, including Rehab:   [x]  Subspecialty Consultants: gastroenterology, vascular surgery  [x]  PT  [x]  OT  []  SLP  [x]  Wound Care    U: Uncertainty Measure/Diagnostic Pause:    Working diagnosis at the time of transfer acute blood loss anemia 2/2 UGIB     Diagnosis Degree of Certainty: 1. High degree of certainty about the clinical diagnosis.     S: Summary of Major Problems and To-Dos:   #slowly resume home meds  #follow up with vascular surgery what is needed for RLE limb ischemia    To-do list prior to transfer:  [x]Advance diet   [x]Oral pain management regimen     E: Exam, including Lines/Drains/Airways & Data Review:     Extremities: no doppler signal RLE in DP, cool, cap refill <3sec    Difficult airway? N/A  Lines/drains assessed for removal? No    Within 30 minutes of the patient physically leaving the floor, a Floor Readiness Note needs to be placed with updated vitals.       .     Complaints: has no complaint of pain in RLE..     Scheduled Medications:   buPROPion XL, 150 mg, oral, Daily  tiotropium, 2 puff, inhalation, Daily   And  fluticasone furoate-vilanteroL, 1 puff, inhalation, Daily  insulin lispro, 0-5 Units, subcutaneous, TID with meals  pantoprazole, 40 mg, intravenous, BID  povidone-iodine, , Topical, Daily  sertraline, 100 mg, oral, Daily         Continuous Medications:         PRN Medications:   PRN medications: albuterol, dextrose, glucagon, HYDROmorphone, oxyCODONE    Objective   Vitals:  Most Recent:  Vitals:    05/03/24 1100   BP:    Pulse:    Resp:    Temp: 37.1 °C (98.8 °F)   SpO2:        24hr Min/Max:  Temp  Min: 36.1 °C (97 °F)  Max: 37.1 °C (98.8 °F)  Pulse  Min: 83  Max: 106  BP  Min: 98/63  Max: 246/110  Resp  Min: 17  Max: 35  SpO2  Min: 90 %  Max: 100 %    LDA:         Vent  settings:       Hemodynamic parameters for last 24 hours:       I/O:    Intake/Output Summary (Last 24 hours) at 5/3/2024 1115  Last data filed at 5/3/2024 0600  Gross per 24 hour   Intake 778.5 ml   Output 400 ml   Net 378.5 ml       Physical Exam:   Constitutional: pt in NAD, alert and cooperative  Eyes: no icterus   ENMT: mucous membranes moist, no apparent injury, no lesions seen  Head/Neck: Neck supple, no apparent injury  Respiratory/Thorax: Lungs CTA bilaterally  Cardiovascular: Regular, rate and rhythm, no murmurs  Gastrointestinal: Nondistended, soft, non-tender, BS present x 4  Musculoskeletal: ROM intact, no joint swelling, normal strength  Extremities: no doppler signal RLE in DP, cool, cap refill <3sec  Neurological: alert and oriented x 3, speech clear, follows commands appropriately  Skin: Warm and dry, no lesions, no rashes    Lab/Radiology/Diagnostic Review:  Results for orders placed or performed during the hospital encounter of 04/30/24 (from the past 24 hour(s))   POCT GLUCOSE   Result Value Ref Range    POCT Glucose 103 (H) 74 - 99 mg/dL   POCT GLUCOSE   Result Value Ref Range    POCT Glucose 109 (H) 74 - 99 mg/dL   CBC and Auto Differential   Result Value Ref Range    WBC 18.7 (H) 4.4 - 11.3 x10*3/uL    nRBC 0.3 (H) 0.0 - 0.0 /100 WBCs    RBC 2.75 (L) 4.00 - 5.20 x10*6/uL    Hemoglobin 8.0 (L) 12.0 - 16.0 g/dL    Hematocrit 25.0 (L) 36.0 - 46.0 %    MCV 91 80 - 100 fL    MCH 29.1 26.0 - 34.0 pg    MCHC 32.0 32.0 - 36.0 g/dL    RDW 17.3 (H) 11.5 - 14.5 %    Platelets 110 (L) 150 - 450 x10*3/uL    Neutrophils % 78.3 40.0 - 80.0 %    Immature Granulocytes %, Automated 2.2 (H) 0.0 - 0.9 %    Lymphocytes % 6.4 13.0 - 44.0 %    Monocytes % 11.8 2.0 - 10.0 %    Eosinophils % 1.0 0.0 - 6.0 %    Basophils % 0.3 0.0 - 2.0 %    Neutrophils Absolute 14.59 (H) 1.60 - 5.50 x10*3/uL    Immature Granulocytes Absolute, Automated 0.41 0.00 - 0.50 x10*3/uL    Lymphocytes Absolute 1.20 0.80 - 3.00 x10*3/uL     Monocytes Absolute 2.21 (H) 0.05 - 0.80 x10*3/uL    Eosinophils Absolute 0.18 0.00 - 0.40 x10*3/uL    Basophils Absolute 0.06 0.00 - 0.10 x10*3/uL   POCT GLUCOSE   Result Value Ref Range    POCT Glucose 102 (H) 74 - 99 mg/dL   CBC and Auto Differential   Result Value Ref Range    WBC 17.2 (H) 4.4 - 11.3 x10*3/uL    nRBC 0.3 (H) 0.0 - 0.0 /100 WBCs    RBC 2.58 (L) 4.00 - 5.20 x10*6/uL    Hemoglobin 7.6 (L) 12.0 - 16.0 g/dL    Hematocrit 21.6 (L) 36.0 - 46.0 %    MCV 84 80 - 100 fL    MCH 29.5 26.0 - 34.0 pg    MCHC 35.2 32.0 - 36.0 g/dL    RDW 16.4 (H) 11.5 - 14.5 %    Platelets 112 (L) 150 - 450 x10*3/uL    Neutrophils % 74.9 40.0 - 80.0 %    Immature Granulocytes %, Automated 1.9 (H) 0.0 - 0.9 %    Lymphocytes % 8.9 13.0 - 44.0 %    Monocytes % 13.2 2.0 - 10.0 %    Eosinophils % 0.9 0.0 - 6.0 %    Basophils % 0.2 0.0 - 2.0 %    Neutrophils Absolute 12.87 (H) 1.60 - 5.50 x10*3/uL    Immature Granulocytes Absolute, Automated 0.32 0.00 - 0.50 x10*3/uL    Lymphocytes Absolute 1.52 0.80 - 3.00 x10*3/uL    Monocytes Absolute 2.26 (H) 0.05 - 0.80 x10*3/uL    Eosinophils Absolute 0.15 0.00 - 0.40 x10*3/uL    Basophils Absolute 0.04 0.00 - 0.10 x10*3/uL   Calcium, ionized   Result Value Ref Range    POCT Calcium, Ionized 1.18 1.1 - 1.33 mmol/L   aPTT   Result Value Ref Range    aPTT 24 (L) 27 - 38 seconds   Fibrinogen   Result Value Ref Range    Fibrinogen 377 200 - 400 mg/dL   Protime-INR   Result Value Ref Range    Protime 10.8 9.8 - 12.8 seconds    INR 1.0 0.9 - 1.1   Magnesium   Result Value Ref Range    Magnesium 2.15 1.60 - 2.40 mg/dL   Renal Function Panel   Result Value Ref Range    Glucose 86 74 - 99 mg/dL    Sodium 149 (H) 136 - 145 mmol/L    Potassium 3.5 3.5 - 5.3 mmol/L    Chloride 115 (H) 98 - 107 mmol/L    Bicarbonate 24 21 - 32 mmol/L    Anion Gap 14 10 - 20 mmol/L    Urea Nitrogen 43 (H) 6 - 23 mg/dL    Creatinine 1.06 (H) 0.50 - 1.05 mg/dL    eGFR 54 (L) >60 mL/min/1.73m*2    Calcium 8.0 (L) 8.6 - 10.6  mg/dL    Phosphorus 3.0 2.5 - 4.9 mg/dL    Albumin 2.5 (L) 3.4 - 5.0 g/dL   CBC   Result Value Ref Range    WBC 17.2 (H) 4.4 - 11.3 x10*3/uL    nRBC 0.1 (H) 0.0 - 0.0 /100 WBCs    RBC 2.73 (L) 4.00 - 5.20 x10*6/uL    Hemoglobin 8.1 (L) 12.0 - 16.0 g/dL    Hematocrit 23.1 (L) 36.0 - 46.0 %    MCV 85 80 - 100 fL    MCH 29.7 26.0 - 34.0 pg    MCHC 35.1 32.0 - 36.0 g/dL    RDW 17.2 (H) 11.5 - 14.5 %    Platelets 129 (L) 150 - 450 x10*3/uL   POCT GLUCOSE   Result Value Ref Range    POCT Glucose 88 74 - 99 mg/dL   POCT GLUCOSE   Result Value Ref Range    POCT Glucose 129 (H) 74 - 99 mg/dL                            Assessment/Plan   Principal Problem:    Upper GI bleed    79 year old female acute blood loss anemia 2/2 GIB, found to have peptic ulcer.  C/f RLE limb ischemia.    Neuro:  Hx of CVA, Depression.  Acute pain in RLE.  - start PRN oxy, Dilaudid for breakthrough  - PT/OT  - holding home ASA in setting of GIB  - resume home sertraline and bupropion    CV:  Hx of HTN, DLD, diastolic HF  - hold home losartan, jardiance, rosuvastatin, and spironolactone 1 more day    PULM:  Hx of COPD  - cont. Tiotropium/fluticasone  - PRN albuterol    GI:  Peptic ulcer disease with hematemesis.    - advance diet today  - replete electrolytes  - PPI BID    RENAL:  CKD  - strict I/O  - daily RFP    ENDO:  HgbA1c WNL  - SSI can be discontinued    HEME/ONC:  Acute blood loss anemia.  - daily CBC  - Hgb >7    MSK/VASC:  C/f RLE limb ischemia  - follow up vasc surgery recs  - not candidate for anticoag at this time    F PO  E Replete  N low fiber  G not indicated   DVT: SCDs  Abx: NA  Lines: PIVx2  Drips: None  Pressors: None  Code status: DNR/DNI  NoK:  Primary Emergency Contact Cary Waldrop [phone]    I spent 35 minutes in the professional and overall care of this patient.

## 2024-05-03 NOTE — PROGRESS NOTES
Occupational Therapy    Evaluation    Patient Name: Brooklyn Read  MRN: [id number]  Today's Date: 5/3/2024  Time Calculation  Start Time: 1128  Stop Time: 1156  Time Calculation (min): 28 min    Assessment  IP OT Assessment  OT Assessment: Patient requiring assist x2 for safe standing attempt; unsafe to progress mobility due to RLE pain. Recommend MOD intensity OT services when medically appropriate for discharge and continue OT services in acute care setting.  Prognosis: Good  Evaluation/Treatment Tolerance: Patient limited by pain  End of Session Communication: Bedside nurse  End of Session Patient Position: Bed, 3 rail up, Alarm on  Plan:  Treatment Interventions: ADL retraining, UE strengthening/ROM, Endurance training, Patient/family training, Neuromuscular reeducation, Visual perceptual retraining, Functional transfer training, Cognitive reorientation, Equipment evaluation/education, Fine motor coordination activities, Compensatory technique education  OT Frequency: 3 times per week  OT Discharge Recommendations: Moderate intensity level of continued care  OT Recommended Transfer Status: Assist of 2  OT - OK to Discharge: Yes (when medically appropriate)    Subjective   Current Problem:  1. Upper GI bleed  EGD    EGD    Surgical Pathology Exam    Surgical Pathology Exam      2. Acute blood loss anemia (ABLA)  EGD    EGD    Surgical Pathology Exam    Surgical Pathology Exam      3. PAD (peripheral artery disease) (CMS-HCC)  Surgical Pathology Exam    Surgical Pathology Exam    CANCELED: Vascular US lower extremity arterial duplex bilateral with RONALD    CANCELED: Vascular US PVR without exercise    CANCELED: Vascular US lower extremity arterial duplex bilateral with RONALD    CANCELED: Vascular US PVR without exercise      4. PVD (peripheral vascular disease) (CMS-HCC)  Vascular US Ankle Brachial Index (RONALD) Without Exercise    Vascular US Ankle Brachial Index (RONALD) Without Exercise    Surgical Pathology Exam     Surgical Pathology Exam    CANCELED: Vascular US lower extremity arterial duplex bilateral with RONALD    CANCELED: Vascular US PVR without exercise    CANCELED: Vascular US lower extremity arterial duplex bilateral with RONALD    CANCELED: Vascular US PVR without exercise        General:  General  Reason for Referral: 80 y/o F to OSH with increased RLE pain and abdominal pain; [place] [facility] [place].  Past Medical History Relevant to Rehab: osteopenia, COPD, anemia, CVA, CKD, Depression, HLD and HTN, Diastolic HF, chronic RLE wound  Family/Caregiver Present: No  Co-Treatment: PT  Co-Treatment Reason: co-treat with PT to maximize patient/caregiver safety with OOB activity.  Prior to Session Communication: Bedside nurse  Patient Position Received: Bed, 3 rail up, Alarm on  Preferred Learning Style: auditory, verbal, visual  General Comment: Agreeable to therapy evaluation. Patient hesitant about RLE being touched and moved yet agreeable to attempt OOB activity.  Precautions:  Hearing/Visual Limitations: WFL with reading glasses.  Precautions Comment: RLE wound; no WB restrictions upon chart review.  Vital Signs:  Heart Rate: 99 (post: 93)  SpO2: 98 % (post: 100% on 4L NC)  BP: 132/59 (post: 147/67)  MAP (mmHg): 79 (post: 91)  Pain:  Pain Assessment  Pain Assessment: 0-10  Pain Score: 0 - No pain (no pain at rest; unidentified level of pain in RLE with touch and movement.)  Pain Interventions: Repositioned, Distraction, Emotional support  Response to Interventions: tolerated OT    Objective   Cognition:  Overall Cognitive Status: Within Functional Limits  Arousal/Alertness: Appropriate responses to stimuli  Orientation Level: Oriented X4  Following Commands: Follows one step commands with increased time  Attention:  (Min v/c for attention to task)     Rivas Agitation Sedation Scale  Rivas Agitation Sedation Scale (RASS): Alert and calm  Home Living:  Type of Home:  (Modular Home)  Lives With: Adult children  (Oldest Daughter)  Home Layout: One level  Bathroom Shower/Tub: Tub/shower unit (or walk in shower; no grab bars)   Prior Function:  Level of [place]: Independent with ADLs and functional transfers, Independent with homemaking with ambulation  Receives Help From:  (daughter)  Hand Dominance: Ambidextrous  Prior Function Comments: Patient reporting IND with ADLs, IADLs, no driving (daughter picks up groceries). History of 2 falls in last 6 mo.     ADL:  Eating Assistance: Modified independent (Device)  Eating Deficit:  (Dentures)  Grooming Assistance: Stand by  Grooming Deficit: Supervision/safety  Bathing Assistance: Moderate  UE Dressing Assistance: Minimal  LE Dressing Assistance: Maximal  Toileting Assistance with Device: Total       Bed Mobility/Transfers: Bed Mobility  Bed Mobility: Yes  Bed Mobility 1  Bed Mobility 1: Supine to sitting, Sitting to supine  Level of Assistance 1:  (MIN A x1, MOD A x1)  Bed Mobility Comments 1: MIN A x1 for trunk and MOD A x1 for BLE assist.    Transfers  Transfer: Yes  Transfer 1  Transfer Level of Assistance 1: Moderate assistance, +2  Trials/Comments 1: sit<>Stand with MOD A x2 with R lean and B knee buckling with 75% stand achieved.    Functional Mobility:  Functional Mobility  Functional Mobility Performed: No (unsafe to progress)  Sitting Balance:  Static Sitting Balance  Static Sitting-Comment/Number of Minutes: SBA seated EOB for at least ~8 min.  Standing Balance:  Static Standing Balance  Static Standing-Comment/Number of Minutes: MOD A x2 for 75% stand.  Vision: Vision - Basic Assessment  Current Vision: Wears glasses only for reading       Strength:  Strength Comments: LUE grossly 3+/5; RUE grossly 3/5 except shoulder flexion limited       Coordination:  Movements are Fluid and Coordinated: Yes   Hand Function:  Hand Function  Gross Grasp: Functional  Coordination: Functional  Extremities: RUE   RUE :  (R shoulder to ~45 degrees with hx of CVA.) and LUE   LUE:  Within Functional Limits    Outcome Measures: [facility] Daily Activity  Putting on and taking off regular lower body clothing: A lot  Bathing (including washing, rinsing, drying): A lot  Putting on and taking off regular upper body clothing: A little  Toileting, which includes using toilet, bedpan or urinal: Total  Taking care of personal grooming such as brushing teeth: A little  Eating Meals: None  Daily Activity - Total Score: 15     Confusion Assessment Method-ICU (CAM-ICU)  Feature 1: Acute Onset or Fluctuating Course: Negative  Feature 3: Altered Level of Consciousness: Negative  Overall CAM-ICU: Negative  Education Documentation  Body Mechanics, taught by Deepthi Albright OT at 5/3/2024  1:20 PM.  Learner: Patient  Readiness: Acceptance  Method: Explanation  Response: Verbalizes Understanding  Comment: Receptive to education regarding safety parameters in place.    Precautions, taught by Deepthi Albright OT at 5/3/2024  1:20 PM.  Learner: Patient  Readiness: Acceptance  Method: Explanation  Response: Verbalizes Understanding  Comment: Receptive to education regarding safety parameters in place.    ADL Training, taught by Deepthi Albright OT at 5/3/2024  1:20 PM.  Learner: Patient  Readiness: Acceptance  Method: Explanation  Response: Verbalizes Understanding  Comment: Receptive to education regarding safety parameters in place.    Education Comments  No comments found.      Goals:   Encounter Problems       Encounter Problems (Active)       ADLs       Patient will complete toileting with MIN assist and min cues.   (Progressing)       Start:  05/03/24    Expected End:  05/17/24            Patient will complete LB dressing with MIN assist and min cues.   (Progressing)       Start:  05/03/24    Expected End:  05/17/24            Patient will complete UB dressing with set up assist and min cues.   (Progressing)       Start:  05/03/24    Expected End:  05/17/24            Patient will complete grooming with set  up assist.   (Progressing)       Start:  05/03/24    Expected End:  05/17/24                   BALANCE       Patient will demo standing balance with MIN A for at least 3 min in prep for standing ADLs.  (Progressing)       Start:  05/03/24    Expected End:  05/17/24               MOBILITY       Patient will complete bed mobility with MIN A and min cues.    (Progressing)       Start:  05/03/24    Expected End:  05/17/24            Pt. Will demo household distance functional mobility with MIN A using LRAD.   (Progressing)       Start:  05/03/24    Expected End:  05/17/24               TRANSFERS       Pt. Will complete stand pivot transfer with MIN assist with min cues and using LRAD.   (Progressing)       Start:  05/03/24    Expected End:  05/17/24                 Deepthi Albright, OT  05/03/2024

## 2024-05-03 NOTE — PROGRESS NOTES
PATIENT NAME: Brooklyn Read  MRN: [id number]    Brooklyn Read is a 79 y.o. female on day 3 of admission presenting with Upper GI bleed.    SUBJECTIVE    Seen and examined. Pt continues to endorse pain the right lower extremity. She denies recent hemoptysis or hematochezia.     OBJECTIVE    Vitals:    05/03/24 1128 05/03/24 1131 05/03/24 1200 05/03/24 1500   BP: 132/59  153/60    BP Location:  Right arm     Pulse: 99  93    Resp:   23    Temp:    36.4 °C (97.5 °F)   TempSrc:    Temporal   SpO2: 98%  100%    Weight:       Height:          Scheduled medications  [Transfer Hold] buPROPion XL, 150 mg, oral, Daily  [Transfer Hold] tiotropium, 2 puff, inhalation, Daily   And  [Transfer Hold] fluticasone furoate-vilanteroL, 1 puff, inhalation, Daily  [Transfer Hold] insulin lispro, 0-5 Units, subcutaneous, TID with meals  [Transfer Hold] pantoprazole, 40 mg, intravenous, BID  povidone-iodine, , Topical, Daily  [Transfer Hold] sertraline, 100 mg, oral, Daily      Results for orders placed or performed during the hospital encounter of 04/30/24 (from the past 24 hour(s))   CBC and Auto Differential   Result Value Ref Range    WBC 18.7 (H) 4.4 - 11.3 x10*3/uL    nRBC 0.3 (H) 0.0 - 0.0 /100 WBCs    RBC 2.75 (L) 4.00 - 5.20 x10*6/uL    Hemoglobin 8.0 (L) 12.0 - 16.0 g/dL    Hematocrit 25.0 (L) 36.0 - 46.0 %    MCV 91 80 - 100 fL    MCH 29.1 26.0 - 34.0 pg    MCHC 32.0 32.0 - 36.0 g/dL    RDW 17.3 (H) 11.5 - 14.5 %    Platelets 110 (L) 150 - 450 x10*3/uL    Neutrophils % 78.3 40.0 - 80.0 %    Immature Granulocytes %, Automated 2.2 (H) 0.0 - 0.9 %    Lymphocytes % 6.4 13.0 - 44.0 %    Monocytes % 11.8 2.0 - 10.0 %    Eosinophils % 1.0 0.0 - 6.0 %    Basophils % 0.3 0.0 - 2.0 %    Neutrophils Absolute 14.59 (H) 1.60 - 5.50 x10*3/uL    Immature Granulocytes Absolute, Automated 0.41 0.00 - 0.50 x10*3/uL    Lymphocytes Absolute 1.20 0.80 - 3.00 x10*3/uL    Monocytes Absolute 2.21 (H) 0.05 - 0.80 x10*3/uL    Eosinophils Absolute  0.18 0.00 - 0.40 x10*3/uL    Basophils Absolute 0.06 0.00 - 0.10 x10*3/uL   POCT GLUCOSE   Result Value Ref Range    POCT Glucose 102 (H) 74 - 99 mg/dL   CBC and Auto Differential   Result Value Ref Range    WBC 17.2 (H) 4.4 - 11.3 x10*3/uL    nRBC 0.3 (H) 0.0 - 0.0 /100 WBCs    RBC 2.58 (L) 4.00 - 5.20 x10*6/uL    Hemoglobin 7.6 (L) 12.0 - 16.0 g/dL    Hematocrit 21.6 (L) 36.0 - 46.0 %    MCV 84 80 - 100 fL    MCH 29.5 26.0 - 34.0 pg    MCHC 35.2 32.0 - 36.0 g/dL    RDW 16.4 (H) 11.5 - 14.5 %    Platelets 112 (L) 150 - 450 x10*3/uL    Neutrophils % 74.9 40.0 - 80.0 %    Immature Granulocytes %, Automated 1.9 (H) 0.0 - 0.9 %    Lymphocytes % 8.9 13.0 - 44.0 %    Monocytes % 13.2 2.0 - 10.0 %    Eosinophils % 0.9 0.0 - 6.0 %    Basophils % 0.2 0.0 - 2.0 %    Neutrophils Absolute 12.87 (H) 1.60 - 5.50 x10*3/uL    Immature Granulocytes Absolute, Automated 0.32 0.00 - 0.50 x10*3/uL    Lymphocytes Absolute 1.52 0.80 - 3.00 x10*3/uL    Monocytes Absolute 2.26 (H) 0.05 - 0.80 x10*3/uL    Eosinophils Absolute 0.15 0.00 - 0.40 x10*3/uL    Basophils Absolute 0.04 0.00 - 0.10 x10*3/uL   Calcium, ionized   Result Value Ref Range    POCT Calcium, Ionized 1.18 1.1 - 1.33 mmol/L   aPTT   Result Value Ref Range    aPTT 24 (L) 27 - 38 seconds   Fibrinogen   Result Value Ref Range    Fibrinogen 377 200 - 400 mg/dL   Protime-INR   Result Value Ref Range    Protime 10.8 9.8 - 12.8 seconds    INR 1.0 0.9 - 1.1   Magnesium   Result Value Ref Range    Magnesium 2.15 1.60 - 2.40 mg/dL   Renal Function Panel   Result Value Ref Range    Glucose 86 74 - 99 mg/dL    Sodium 149 (H) 136 - 145 mmol/L    Potassium 3.5 3.5 - 5.3 mmol/L    Chloride 115 (H) 98 - 107 mmol/L    Bicarbonate 24 21 - 32 mmol/L    Anion Gap 14 10 - 20 mmol/L    Urea Nitrogen 43 (H) 6 - 23 mg/dL    Creatinine 1.06 (H) 0.50 - 1.05 mg/dL    eGFR 54 (L) >60 mL/min/1.73m*2    Calcium 8.0 (L) 8.6 - 10.6 mg/dL    Phosphorus 3.0 2.5 - 4.9 mg/dL    Albumin 2.5 (L) 3.4 - 5.0 g/dL    CBC   Result Value Ref Range    WBC 17.2 (H) 4.4 - 11.3 x10*3/uL    nRBC 0.1 (H) 0.0 - 0.0 /100 WBCs    RBC 2.73 (L) 4.00 - 5.20 x10*6/uL    Hemoglobin 8.1 (L) 12.0 - 16.0 g/dL    Hematocrit 23.1 (L) 36.0 - 46.0 %    MCV 85 80 - 100 fL    MCH 29.7 26.0 - 34.0 pg    MCHC 35.1 32.0 - 36.0 g/dL    RDW 17.2 (H) 11.5 - 14.5 %    Platelets 129 (L) 150 - 450 x10*3/uL   POCT GLUCOSE   Result Value Ref Range    POCT Glucose 88 74 - 99 mg/dL   POCT GLUCOSE   Result Value Ref Range    POCT Glucose 129 (H) 74 - 99 mg/dL   CBC and Auto Differential   Result Value Ref Range    WBC 15.8 (H) 4.4 - 11.3 x10*3/uL    nRBC 0.1 (H) 0.0 - 0.0 /100 WBCs    RBC 2.47 (L) 4.00 - 5.20 x10*6/uL    Hemoglobin 7.2 (L) 12.0 - 16.0 g/dL    Hematocrit 21.6 (L) 36.0 - 46.0 %    MCV 87 80 - 100 fL    MCH 29.1 26.0 - 34.0 pg    MCHC 33.3 32.0 - 36.0 g/dL    RDW 17.4 (H) 11.5 - 14.5 %    Platelets 99 (L) 150 - 450 x10*3/uL    Neutrophils % 74.7 40.0 - 80.0 %    Immature Granulocytes %, Automated 1.6 (H) 0.0 - 0.9 %    Lymphocytes % 9.6 13.0 - 44.0 %    Monocytes % 12.8 2.0 - 10.0 %    Eosinophils % 1.0 0.0 - 6.0 %    Basophils % 0.3 0.0 - 2.0 %    Neutrophils Absolute 11.79 (H) 1.60 - 5.50 x10*3/uL    Immature Granulocytes Absolute, Automated 0.25 0.00 - 0.50 x10*3/uL    Lymphocytes Absolute 1.52 0.80 - 3.00 x10*3/uL    Monocytes Absolute 2.02 (H) 0.05 - 0.80 x10*3/uL    Eosinophils Absolute 0.16 0.00 - 0.40 x10*3/uL    Basophils Absolute 0.05 0.00 - 0.10 x10*3/uL   POCT GLUCOSE   Result Value Ref Range    POCT Glucose 122 (H) 74 - 99 mg/dL      EGD    Result Date: 5/2/2024  Table formatting from the original result was not included. Findings The esophagus appeared normal. No evidence of esophagitis, hiatal hernia, or Lugo's esophagus. Moderate, patchy atrophic and erythematous mucosa in the body of the stomach and antrum, consistent with gastritis; performed cold forceps biopsy to rule out H. pylori; Single large, cratered, round ulcer in the  prepyloric region with adherent clot (Boogie IIB); placed 1 clip successfully (clip is MRI compatible); hemostasis achieved; induced coagulation and hemostasis achieved with 4 applications of with heater probe; injected 5.5 mL of epinephrine to address bleeding; hemostasis achieved. A previously placed clip was seen the outside margin of the ulcer. Another previously placed clip was seen at the interior margin of the ulcer. A hemoclip was used to guillotine the adherent clot off the ulcer by placing it at the white fibrin base and pulling off. There was mild oozing noted consistent with underlying vessel. Epinephrine injection was used on either side of the oozing with significant slowing of the bleeding. Heater probe coagulation resulted in complete cessation. A Doppler probe was used to interrogate the ulcer and area treated with no signal at low and medium settings. The cardia and fundus of the stomach appeared normal. Mild, patchy erythematous mucosa in the duodenal bulb; The 2nd part of the duodenum appeared normal. Due to [employer] - Epic error with account number, several of the images from the procedure were not attached to this procedure. Recommendation  Await pathology results  Follow up with primary gastroenterologist, Meir Bauer MD.  Follow up with primary care physician, Gianluca Macario MD.  Avoid NSAIDs. Continue PPI. Follow up with Myah Arango MD - Inpatient Medicine Service. Follow up with Bassem Abbott MD and Flora Ryan MD - Inpatient GI Consult Service.  Indication Upper GI bleed Staff Staff Role No Staff Documented Medications Totals unavailable because the procedure time range is not set Preprocedure A history and physical has been performed, and patient medication allergies have been reviewed. The patient's tolerance of previous anesthesia has been reviewed. The risks and benefits of the procedure and the sedation options and risks were discussed with the patient. All questions were  answered and informed consent obtained. Details of the Procedure The patient underwent moderate sedation, which was administered by a sedation nurse. The patient's blood pressure, ECG, ETCO2, heart rate, level of consciousness, oxygen and respirations were monitored throughout the procedure. The scope was introduced through the mouth and advanced to the second part of the duodenum. Retroflexion was performed in the cardia. Prior to the procedure, the patient's H. Pylori status was unknown. The patient's estimated blood loss was minimal (<5 mL). The procedure was moderately difficult due to difficult position for endoscopic bleeding control. The patient tolerated the procedure well. There were no apparent adverse events. Events Procedure Events Event Event Time Specimens No specimens collected Procedure Location [facility] [address] [facility] [id number] [phone] Referring Provider [place]ango [place] [id number] [place] [address] Procedure Provider MD Romel Rodriguez MD     CT angio aorta and bilateral iliofemoral runoff w and or wo IV contrast    Result Date: 5/2/2024  Interpreted By:  Moo Bradshaw,  Carol Bennett STUDY: CT ANGIO AORTA AND BILATERAL ILIOFEMORAL RUNOFF W AND OR WO IV CONTRAST;  5/2/2024 10:06 am   INDICATION: Signs/Symptoms:GIB w/antral ulcer. Please perform along w/ CTA RLE w/ run off for c/f CLI.   COMPARISON: CT abdomen pelvis 04/30/2024   ACCESSION NUMBER(S): [id number]   ORDERING CLINICIAN: ANDREW JENSEN   PROCEDURE: CT ANGIOGRAM OF THE ABDOMEN, PELVIS, AND BILATERAL LOWER EXTREMITIES   TECHNIQUE: High-resolution contrast-enhanced helical CT of the  abdomen, pelvis and both lower extremities  was performed,  without contrast, timed to arterial phase, and timed to venous phase (three phases).  3-D processing was performed by the physician on an independent work station, with MIP and volume-rendering techniques.   Total of 120 ml of Omnipaque 350 was injected  intravenously during the examination. The study was performed without oral contrast.  The patient tolerated the injection without complications.   FINDINGS: VASCULAR:   Please note that evaluation is limited due to suboptimal contrast bolus timing.   Images of the aorta demonstrate  diffuse atherosclerotic change without significant focal stenosis or aneurysm.   Celiac artery demonstrates  demonstrates mild atherosclerosis at its origin without significant focal stenosis. The splenic artery is diminutive in caliber and its mid to distal segments are not well seen. The common hepatic artery arises directly from the aorta, just superiorly to the celiac artery.   Superior mesenteric artery demonstrates  demonstrates mild atherosclerosis at its origin without significant focal stenosis.   Inferior mesenteric artery demonstrates  no significant focal stenosis.   There  are two right renal arteries.  Right renal artery demonstrates no significant focal stenosis.  There  is a single renal vein which is patent.   There  is a single left renal artery.  Left renal artery demonstrates no significant focal stenosis.  There  is a single renal vein which is patent.     RIGHT LEG: Right common iliac artery  demonstrates diffuse atherosclerotic change resulting in at least mild-to-moderate stenosis. Right external iliac artery  is widely patent with no significant stenosis. Right internal iliac artery  demonstrates diffuse atherosclerotic change resulting in at least mild-to-moderate multifocal stenosis. Right common femoral artery  demonstrates diffuse atherosclerotic change resulting in at least mild stenosis. Right profunda femoris artery  demonstrates atherosclerotic change, but is otherwise patent with no significant stenosis. Right superficial femoral artery demonstrates multiple foci of moderate to high grade stenoses.   Right popliteal artery  demonstrates multiple foci of  moderate to high grade stenoses. Right anterior tibial artery  is patent with no significant stenosis.  Dorsalis pedis  is poorly opacified, but patent. Right tibioperoneal trunk  demonstrates focal moderate (40-60 %) stenosis. The right posterior tibial and peroneal arteries are not well seen, possibly related to a combination of atherosclerosis and venous contamination.   LEFT LEG: Left common iliac artery  demonstrates atherosclerotic change at its distal segment resulting in at least mild stenosis. Left external iliac artery  is widely patent with no significant stenosis. Left internal iliac artery  demonstrates diffuse atherosclerotic change resulting in at least mild-to-moderate multifocal stenosis. Left common femoral artery  demonstrates diffuse atherosclerotic change resulting in at least mild stenosis. Left profunda femoris artery demonstrates multifocal atherosclerosis, but is otherwise patent with no significant stenosis. Left superficial femoral artery demonstrates multiple foci of moderate to high grade stenoses.     Left popliteal artery  demonstrates multiple foci of moderate to high grade stenoses. Left anterior tibial artery  is widely patent with no significant stenosis.  Dorsalis pedis  is seen and normal in appearance. Left tibioperoneal trunk  demonstrates focal moderate (40-60 %) stenosis. The left posterior tibial and peroneal arteries are not well seen, possibly related to a combination of atherosclerosis and venous contamination.   Multiple varices are noted throughout the lower extremities bilaterally.   VENOUS STRUCTURES: The inferior vena cava, hepatic veins, and portal veins are patent.   NONVASCULAR FINDINGS: Images through the lung bases demonstrate bibasilar atelectasis. Patchy ground-glass opacities are noted in the partially visualized right middle lobe. Decrease in size of a now 4 mm nodule in the right middle lobe, previously 5 mm.   The gallbladder is surgically absent. Multiple  bilateral simple renal cysts are noted measuring up to 1.7 cm at the left midpole region. No hydroureteronephrosis or nephroureterolithiasis.   The pancreas and adrenal glands are unremarkable.   The uterus is surgically absent.   Interval decompression of the stomach. New multifocal hyperdensities are noted along the posteroinferior pyloric region, likely postprocedural. Beam hardening artifact from these hyperdensities somewhat limits evaluation of the adjacent lumen. Within these limitations, there is no contrast extravasation to suggest active bleeding.   The small and large bowel are normal caliber without wall thickening. Scattered colonic diverticula, most pronounced in the sigmoid colon are noted. No evidence of acute diverticulitis. The appendix is not definitively visualized, however there is no evidence of pericecal fluid or fat stranding.   No peritoneal free fluid or focal fluid collection, or pneumoperitoneum.   Multilevel degenerative changes of the thoracolumbar spine are noted. No suspicious osseous lesion.       1. No evidence of active gastrointestinal bleeding. 2. Severe atherosclerosis of the bilateral lower extremity arteries, most pronounced in the superficial femoral and popliteal arteries bilaterally where there is moderate to high-grade stenosis. Evaluation of the arteries below the knees is limited due to venous contamination. 3. No acute abnormality within the abdomen or pelvis. 4. Patchy ground-glass opacities within the partially visualized right middle lobe which may be seen in the setting of an infectious or inflammatory process. 5. Additional findings as above.   Findings were discussed with Dr. Bartlett via [employer] secure chat on 05/02/2024 at 11:14 a.m..   I personally reviewed the images/study and I agree with the findings as stated by Donald Walsh MD. This study was interpreted at [facility], [place], [place].   MACRO: None.   Signed by: Moo  Leeann 5/2/2024 3:19 PM Dictation workstation:   [id number]    Vascular US Ankle Brachial Index (RONALD) Without Exercise    Result Date: 5/2/2024            [place] [address]   Tel [phone] and Fax [phone]  Vascular Lab Report [facility] US ANKLE BRACHIAL INDEX (RONALD) WITHOUT EXERCISE  Patient Name:      DORIAN RODRIGES CHRISTI     Reading Physician:  [id number] Jose Cazares DO Study Date:        5/2/2024             Ordering Physician: [id number] SUZETTE ARANGO MRN/PID:           [id number]             Technologist:       Ambrosio De La Rosa RVT Accession#:        [id number]         Technologist 2: Date of Birth/Age: 1945 / 79 years Encounter#:         [id number] Gender:            F Admission Status:  Inpatient            Location Performed: [facility]  Diagnosis/ICD: Peripheral vascular disease, unspecified-I73.9 CPT Codes:     96910 Peripheral artery RONALD Only  **CRITICAL RESULT** Critical Result: Severe PAD in right leg. Notification called to Suzette Arango MD on 5/2/2024 at 10:59:13 AM by Ambrosio De La Rosa RVT.  CONCLUSIONS: Right Lower PVR: Evidence of severe arterial occlusive disease in the right lower extremity at rest. Decreased digital perfusion noted. Biphasic flow is noted in the right common femoral artery. Left Lower PVR: Evidence of moderate arterial occlusive disease in the left lower extremity at rest. Decreased digital perfusion noted. Monophasic flow is noted in the left dorsalis pedis artery and left posterior tibial artery. Biphasic flow is noted in the left common femoral artery.  Imaging & Doppler Findings:  RIGHT Lower PVR                Pressures Ratios Right Posterior Tibial (Ankle) 0 mmHg    0.00 Right Dorsalis Pedis (Ankle)   0 mmHg    0.00 Right Digit (Great Toe)        31 mmHg   0.19   LEFT Lower PVR                Pressures Ratios Left Posterior Tibial (Ankle) 57 mmHg   0.35 Left Dorsalis Pedis (Ankle)   85 mmHg   0.52 Left Digit (Great Toe)         49 mmHg   0.30                      Left Brachial Pressure 162 mmHg   [id number] Jose Cazares DO Electronically signed by [id number] Jose Cazares DO on 5/2/2024 at 1:39:14 PM  ** Final **     Continuous medications     PRN medications  PRN medications: [Transfer Hold] albuterol, [Transfer Hold] dextrose, [Transfer Hold] glucagon, [Transfer Hold] HYDROmorphone, [Transfer Hold] oxyCODONE       Physical Exam  Constitutional:       Appearance: She is well appearing  HENT:      Mouth/Throat:      Mouth: Mucous membranes are moist.  Eyes:      Extraocular Movements: Extraocular movements intact.      Pupils: Pupils are equal, round, and reactive to light.   Cardiovascular:      Rate and Rhythm: Normal rate and regular rhythm.   Pulmonary:      Effort: Pulmonary effort is normal.      Breath sounds: Normal breath sounds.   Abdominal:      General: There is no distension.      Palpations: Abdomen is soft.      Tenderness: There is no abdominal tenderness. There is no rebound.   Musculoskeletal:         General: Normal range of motion.   Skin:     General: Skin is dry.      Capillary Refill: Capillary refill takes 2 to 3 seconds.      Comments: Diminished R pedal pulse, mottled, discolored appearance of the RLE with wounds on distal extremities, bruising along elbow along RUE at IV site, swelling and warmth in the RUE  Neurological:      General: No focal deficit present.      Mental Status: She is alert.     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 Arrived via [place] on RA and off pressors. Patient has had a total of 3 EGDS. EGD on 4/30 showed an ulcer in the prepyloric region with adherent clot as well as a large old  clot in the gastric fundus. On 5/1/2024, epinephrine was injected into the ulcer in the prepyloric region. Clot was unable to be retrieved due to the location and patient continued to have bleeding. Repeat EGD 5/2/2024 again showed single large, cratered, round ulcer in the prepyloric region with adherent clot (Boogie IIB). A hemoclip was used to guillotine the adherent clot off the ulcer by placing it at the white fibrin base and pulling off. Epinephrine was injected on either side, heater probe coagulation resulted in stoppage of the bleeding. Patient has not had any bleeding following EGD on 5/2. Unable to doppler pulses on right foot, has discolored skin and normal temperature with decreased sensation. Vascular surgery consulted, pt will need revascularization but cannot safely anticoagulate at this time due to GI bleeding.     #Peptic ulcer with hematemesis 2/2 to Ibuprofen use   ::EGD done 4/30 in [place] with multiple deep ulcers, no active bleeding  ::EGD done 5/1 shows Large deep ulcer with large adherent blood clot (20~25mm) at inferior posterior aspect of pre-pylorus. Clot retrieval with snares were attempted but unsuccessful due to difficult location.   :: EGD done 5/2/2024, hemoclip used to guillotine adherent clot, epinephrine injected and heater probe coagulation resulted in complete cessation of bleeding.  :: No bleeding since EGD 5/2, most recent Hgb 7.2 5/3  :: CTA 5/2 demonstrated no signs of active GI bleeding  - PPI IV BID  - Trend CBC, goal Hgb>7 and platelets >50  - stool H. Pylori pending  - can consult IR for possible embolization if further bleeding occurs    # Right lower extremity ulcer  #Concern for limb ischemia   :: unable to feel pulses on R foot, discolored skin, normal temperature  :: Vascular surgery consulted  :: cannot give anticoagulation safely given GI bleeding   :: RONALD 5/2: severe arterial occlusive disease in RLE at rest with decreased perfusion, moderate arterial occlusive  disease in left lower extremity with decreased perfusion  :: wounds on extremities of her foot and at ankle  :: wound care consulted  - Pain control with PRN Dilaudid, IV tylenol     #LUE swelling  :: swelling in the left upper extremity, noticeably larger than right upper extremity  - LUE ultrasound    #hemorrhagic shock , resolved   :: s/p 7 pRBCs at OSH   :: lactate 1.0 5/2/2024  :: daily RFP  - maintain active T&S,    # History of CVA  :: residual right lower extremity and right upper extremity weakness  :: holding home aspirin  - Pain control with PRN Dilaudid, IV tylenol   -Continue with neurovascular check q1h to ensure there is no signs of acute limb ischemia     #HTN, HLD, diastolic HF  :: Goal MAP >65  :: holding home losartan, atorvastatin, rosuvastatin, Jardiance, spirinolactone    #Hx of COPD, tobacco use  - cont home PRN albuterol and trelegy ellipta  - breathing comfortably on RA     #hx of CKD with bl Cr 0.94   # OTONIEL likely 2/2 shock  :: Cr trending down, 1.06 5/3   - daily RFP     #Leukocytosis  :: WBC Down trending   :: no active signs of infection, likely 2/2 shock  :: continue monitor for signs of infection     Fluids: -  Diet: NPO  PPX: Protonix BID   DVT PPX: -  Code status: DNR/DNI - on hold for procedures

## 2024-05-03 NOTE — PROGRESS NOTES
Physical Therapy    Physical Therapy Evaluation    Patient Name: Brooklyn Read  MRN: [id number]  Today's Date: 5/3/2024   Time Calculation  Start Time: 1131  Stop Time: 1156  Time Calculation (min): 25 min    Assessment/Plan   PT Assessment  PT Assessment Results: Decreased strength, Impaired balance, Decreased mobility, Decreased skin integrity, Pain  Rehab Prognosis: Good  End of Session Communication: Bedside nurse  End of Session Patient Position: Bed, 3 rail up  IP OR SWING BED PT PLAN  Inpatient or Swing Bed: Inpatient  PT Plan  Treatment/Interventions: Bed mobility, Transfer training, Gait training, Balance training, Strengthening, Therapeutic exercise, Therapeutic activity, Positioning, Postural re-education  PT Plan: Skilled PT  PT Frequency: 4 times per week  PT Discharge Recommendations: Moderate intensity level of continued care  PT Recommended Transfer Status: Assist x2 (stand)  PT - OK to Discharge: Yes      Subjective   General Visit Information:  General  Reason for Referral: 80 y/o F to OSH with increased RLE pain and abdominal pain; transferred to [facility] with UGIB. s/p EGD on 5/2  Past Medical History Relevant to Rehab: osteopenia, COPD, anemia, CVA, CKD, Depression, HLD and HTN, Diastolic HF, chronic RLE wound; reports 2 falls over the last 6 months  Missed Visit: Yes  Missed Visit Reason:  (presents with UGIB; plan for scope this date. PT will hold and re-attempt as time and schedule allows and as medically appropriate.)  Family/Caregiver Present: No  Co-Treatment: OT  Co-Treatment Reason: co-treat with OT to maximize patient/caregiver safety with OOB activity. Unsuccessful mobilization by RN staff  Prior to Session Communication: Bedside nurse  Patient Position Received: Bed, 3 rail up, Alarm on  General Comment: Agreeable to PT evaluation; tele, external cloud, garbled speech (patient put dentures in), 4l NC.  Home Living:  Home Living  Type of Home:  (modular home)  Lives With: Adult children  (oldest dtr)  Home Adaptive Equipment: None  Home Layout: One level  Bathroom Shower/Tub: Tub/shower unit (has access to walk-in shower)  Prior Level of Function:  Prior Function Per Pt/Caregiver Report  Level of [place]: Independent with ADLs and functional transfers, Independent with homemaking with ambulation  Prior Function Comments: no driving (daughter picks up groceries)  Precautions:  Precautions  Hearing/Visual Limitations: reading glasses  Precautions Comment: RLE wound; no WB restrictions upon chart review.  Vital Signs:  Vital Signs  Heart Rate:  (pre: 93 post: 95)  Resp:  (pre: 24 post: 16)  SpO2:  (pre: 100% post: 100%)  BP:  (pre: 132/59 post: 141/61)  BP Location: Right arm  BP Method: Automatic    Objective   Pain:  Pain Assessment  Pain Assessment: 0-10  Pain Score: 0 - No pain (at rest; patient reported pain in distal RLE with movement, did not rate numerically)  Pain Interventions: Repositioned  Cognition:  Cognition  Overall Cognitive Status: Within Functional Limits  Arousal/Alertness: Appropriate responses to stimuli  Orientation Level:  (AxO x3)  Following Commands: Follows one step commands consistently    General Assessments:      Activity Tolerance  Endurance:  (limited stand tolerance d/t RLE pain)  Early Mobility/Exercise Safety Screen: Proceed with mobilization - No exclusion criteria met    Sensation  Sensation Comment: denied numbness/tingling in BUEs/BLEs    Strength  Strength Comments: distal BUE appear WFL, however, LUE proximal >/=3/5, RUE proximal <3-/5  Postural Control  Postural Control: Within Functional Limits    Static Sitting Balance  Static Sitting-Balance Support: Feet supported, Bilateral upper extremity supported  Static Sitting-Level of Assistance: Close supervision  Static Sitting-Comment/Number of Minutes: initially expressed fear of falling sitting EOB  Dynamic Sitting Balance  Dynamic Sitting-Balance Support: Feet supported (x1 UE support)  Dynamic  Sitting-Comments: CGx1    Static Standing Balance  Static Standing-Balance Support: Bilateral upper extremity supported  Static Standing-Level of Assistance: Moderate assistance  Static Standing-Comment/Number of Minutes: x2 with ~75% of full standing achieved only  Functional Assessments:  Bed Mobility  Bed Mobility: Yes  Bed Mobility 1  Bed Mobility 1: Sitting to supine, Supine to sitting  Level of Assistance 1:  (MINx1 for trunk, MODx1 for BLEs)  Bed Mobility Comments 1: cueing for hand placement, task sequencing; draw sheet for additional assist    Transfers  Transfer: Yes  Transfer 1  Transfer From 1: Sit to, Stand to  Transfer to 1: Sit, Stand  Technique 1: Sit to stand, Stand to sit  Transfer Level of Assistance 1: Arm in arm assistance, Moderate assistance, Moderate verbal cues, Moderate tactile cues, +2  Trials/Comments 1: buckling BLE, required blocking of B knees+ draw sheet for additional assistance    Ambulation/Gait Training  Ambulation/Gait Training Performed: No  Extremity/Trunk Assessments:  RUE   RUE :  (AROM shoulder flexion ~45 degrees, remainder WFL AROM)  LUE   LUE:  (AROM WFL)  RLE   RLE :  (limited distal AROM d/t pain, knee ext to neutral)  LLE   LLE :  (WFL AROM)  Outcome Measures:  [facility] Basic Mobility  Turning from your back to your side while in a flat bed without using bedrails: A little  Moving from lying on your back to sitting on the side of a flat bed without using bedrails: A lot  Moving to and from bed to chair (including a wheelchair): A lot  Standing up from a chair using your arms (e.g. wheelchair or bedside chair): A lot  To walk in hospital room: Total  Climbing 3-5 steps with railing: Total  Basic Mobility - Total Score: 11    FSS-ICU  Ambulation: Unable to attempt due to weakness  Rolling: Minimal assistance (performs 75% or more of task)  Sitting: Minimal assistance (performs 75% or more of task)  Transfer Sit-to-Stand: Total assistance (performs 25% or requires another  person)  Transfer Supine-to-Sit: Total assistance (performs 25% or requires another person)  Total Score: 10      E = Exercise and Early Mobility  Early Mobility/Exercise Safety Screen: Proceed with mobilization - No exclusion criteria met  Current Activity: Standing    Encounter Problems       Encounter Problems (Active)       PT Problem       Patient will complete bed mobility with CGx1        Start:  05/03/24    Expected End:  05/24/24            Patient will complete STS with MINx1 using LRAD without acute LOB         Start:  05/03/24    Expected End:  05/24/24            Patient will ambulate >/=30' with LRAD with MINx1 without acute LOB        Start:  05/03/24    Expected End:  05/24/24            Patient will complete static (contact guard assist) and dynamic (minimal assist x1) standing balance activities using LRAD without acute LOB.        Start:  05/03/24    Expected End:  05/24/24            Patient will participate in BLE there-ex program in order to assist in improving strength and to assist with the completion of functional mobility tasks.        Start:  05/03/24    Expected End:  05/24/24                            Education Documentation  Mobility Training, taught by Sandy Kam PT at 5/3/2024  1:26 PM.  Learner: Patient  Readiness: Acceptance  Method: Explanation  Response: Needs Reinforcement  Comment: mobility progression    Education Comments  No comments found.        Sandy Kam PT, DPT

## 2024-05-04 ENCOUNTER — APPOINTMENT (OUTPATIENT)
Dept: RADIOLOGY | Facility: HOSPITAL | Age: 79
DRG: 377 | End: 2024-05-04
Payer: MEDICARE

## 2024-05-04 LAB
ABO GROUP (TYPE) IN BLOOD: NORMAL
ALBUMIN SERPL BCP-MCNC: 2.3 G/DL (ref 3.4–5)
ANION GAP SERPL CALC-SCNC: 13 MMOL/L (ref 10–20)
ANTIBODY SCREEN: NORMAL
BASOPHILS # BLD AUTO: 0.04 X10*3/UL (ref 0–0.1)
BASOPHILS NFR BLD AUTO: 0.3 %
BUN SERPL-MCNC: 24 MG/DL (ref 6–23)
CALCIUM SERPL-MCNC: 7.4 MG/DL (ref 8.6–10.6)
CHLORIDE SERPL-SCNC: 111 MMOL/L (ref 98–107)
CO2 SERPL-SCNC: 22 MMOL/L (ref 21–32)
CREAT SERPL-MCNC: 0.87 MG/DL (ref 0.5–1.05)
EGFRCR SERPLBLD CKD-EPI 2021: 68 ML/MIN/1.73M*2
EOSINOPHIL # BLD AUTO: 0.17 X10*3/UL (ref 0–0.4)
EOSINOPHIL NFR BLD AUTO: 1.1 %
ERYTHROCYTE [DISTWIDTH] IN BLOOD BY AUTOMATED COUNT: 18.6 % (ref 11.5–14.5)
GLUCOSE BLD MANUAL STRIP-MCNC: 86 MG/DL (ref 74–99)
GLUCOSE BLD MANUAL STRIP-MCNC: 91 MG/DL (ref 74–99)
GLUCOSE BLD MANUAL STRIP-MCNC: 94 MG/DL (ref 74–99)
GLUCOSE SERPL-MCNC: 95 MG/DL (ref 74–99)
HCT VFR BLD AUTO: 21.9 % (ref 36–46)
HGB BLD-MCNC: 6.9 G/DL (ref 12–16)
IMM GRANULOCYTES # BLD AUTO: 0.37 X10*3/UL (ref 0–0.5)
IMM GRANULOCYTES NFR BLD AUTO: 2.5 % (ref 0–0.9)
LYMPHOCYTES # BLD AUTO: 1.4 X10*3/UL (ref 0.8–3)
LYMPHOCYTES NFR BLD AUTO: 9.3 %
MCH RBC QN AUTO: 29.1 PG (ref 26–34)
MCHC RBC AUTO-ENTMCNC: 31.5 G/DL (ref 32–36)
MCV RBC AUTO: 92 FL (ref 80–100)
MONOCYTES # BLD AUTO: 2.3 X10*3/UL (ref 0.05–0.8)
MONOCYTES NFR BLD AUTO: 15.3 %
NEUTROPHILS # BLD AUTO: 10.71 X10*3/UL (ref 1.6–5.5)
NEUTROPHILS NFR BLD AUTO: 71.5 %
NRBC BLD-RTO: 0 /100 WBCS (ref 0–0)
PHOSPHATE SERPL-MCNC: 2 MG/DL (ref 2.5–4.9)
PLATELET # BLD AUTO: 125 X10*3/UL (ref 150–450)
POTASSIUM SERPL-SCNC: 3.8 MMOL/L (ref 3.5–5.3)
RBC # BLD AUTO: 2.37 X10*6/UL (ref 4–5.2)
RH FACTOR (ANTIGEN D): NORMAL
SODIUM SERPL-SCNC: 142 MMOL/L (ref 136–145)
WBC # BLD AUTO: 15 X10*3/UL (ref 4.4–11.3)

## 2024-05-04 PROCEDURE — 85025 COMPLETE CBC W/AUTO DIFF WBC: CPT | Performed by: STUDENT IN AN ORGANIZED HEALTH CARE EDUCATION/TRAINING PROGRAM

## 2024-05-04 PROCEDURE — 36430 TRANSFUSION BLD/BLD COMPNT: CPT

## 2024-05-04 PROCEDURE — 1090000001 HH PPS REVENUE CREDIT

## 2024-05-04 PROCEDURE — P9016 RBC LEUKOCYTES REDUCED: HCPCS

## 2024-05-04 PROCEDURE — 86901 BLOOD TYPING SEROLOGIC RH(D): CPT | Performed by: STUDENT IN AN ORGANIZED HEALTH CARE EDUCATION/TRAINING PROGRAM

## 2024-05-04 PROCEDURE — 1090000002 HH PPS REVENUE DEBIT

## 2024-05-04 PROCEDURE — 93971 EXTREMITY STUDY: CPT | Performed by: RADIOLOGY

## 2024-05-04 PROCEDURE — 1100000001 HC PRIVATE ROOM DAILY

## 2024-05-04 PROCEDURE — 2500000001 HC RX 250 WO HCPCS SELF ADMINISTERED DRUGS (ALT 637 FOR MEDICARE OP): Performed by: STUDENT IN AN ORGANIZED HEALTH CARE EDUCATION/TRAINING PROGRAM

## 2024-05-04 PROCEDURE — 99233 SBSQ HOSP IP/OBS HIGH 50: CPT | Performed by: INTERNAL MEDICINE

## 2024-05-04 PROCEDURE — 36415 COLL VENOUS BLD VENIPUNCTURE: CPT | Performed by: STUDENT IN AN ORGANIZED HEALTH CARE EDUCATION/TRAINING PROGRAM

## 2024-05-04 PROCEDURE — 82947 ASSAY GLUCOSE BLOOD QUANT: CPT | Mod: 91

## 2024-05-04 PROCEDURE — 93971 EXTREMITY STUDY: CPT

## 2024-05-04 PROCEDURE — 80069 RENAL FUNCTION PANEL: CPT | Performed by: STUDENT IN AN ORGANIZED HEALTH CARE EDUCATION/TRAINING PROGRAM

## 2024-05-04 PROCEDURE — C9113 INJ PANTOPRAZOLE SODIUM, VIA: HCPCS | Performed by: STUDENT IN AN ORGANIZED HEALTH CARE EDUCATION/TRAINING PROGRAM

## 2024-05-04 PROCEDURE — 86923 COMPATIBILITY TEST ELECTRIC: CPT

## 2024-05-04 PROCEDURE — 2500000004 HC RX 250 GENERAL PHARMACY W/ HCPCS (ALT 636 FOR OP/ED): Performed by: STUDENT IN AN ORGANIZED HEALTH CARE EDUCATION/TRAINING PROGRAM

## 2024-05-04 PROCEDURE — 2500000006 HC RX 250 W HCPCS SELF ADMINISTERED DRUGS (ALT 637 FOR ALL PAYERS): Mod: MUE | Performed by: STUDENT IN AN ORGANIZED HEALTH CARE EDUCATION/TRAINING PROGRAM

## 2024-05-04 RX ORDER — LOSARTAN POTASSIUM 100 MG/1
100 TABLET ORAL DAILY
Status: DISCONTINUED | OUTPATIENT
Start: 2024-05-04 | End: 2024-05-10 | Stop reason: HOSPADM

## 2024-05-04 RX ADMIN — OXYCODONE HYDROCHLORIDE 5 MG: 5 TABLET ORAL at 04:57

## 2024-05-04 RX ADMIN — PANTOPRAZOLE SODIUM 40 MG: 40 INJECTION, POWDER, FOR SOLUTION INTRAVENOUS at 09:06

## 2024-05-04 RX ADMIN — LOSARTAN POTASSIUM 100 MG: 100 TABLET, FILM COATED ORAL at 15:15

## 2024-05-04 RX ADMIN — POVIDONE-IODINE: 100 OINTMENT TOPICAL at 12:49

## 2024-05-04 RX ADMIN — BUPROPION HYDROCHLORIDE 150 MG: 150 TABLET, EXTENDED RELEASE ORAL at 09:06

## 2024-05-04 RX ADMIN — SERTRALINE 100 MG: 100 TABLET, FILM COATED ORAL at 09:06

## 2024-05-04 RX ADMIN — PANTOPRAZOLE SODIUM 40 MG: 40 INJECTION, POWDER, FOR SOLUTION INTRAVENOUS at 21:20

## 2024-05-04 ASSESSMENT — COGNITIVE AND FUNCTIONAL STATUS - GENERAL
TOILETING: TOTAL
TURNING FROM BACK TO SIDE WHILE IN FLAT BAD: A LOT
WALKING IN HOSPITAL ROOM: TOTAL
CLIMB 3 TO 5 STEPS WITH RAILING: TOTAL
MOBILITY SCORE: 11
DRESSING REGULAR LOWER BODY CLOTHING: A LOT
DAILY ACTIVITIY SCORE: 15
PERSONAL GROOMING: A LITTLE
HELP NEEDED FOR BATHING: A LOT
MOVING FROM LYING ON BACK TO SITTING ON SIDE OF FLAT BED WITH BEDRAILS: A LITTLE
DRESSING REGULAR UPPER BODY CLOTHING: A LITTLE
STANDING UP FROM CHAIR USING ARMS: A LOT
MOVING TO AND FROM BED TO CHAIR: A LOT

## 2024-05-04 ASSESSMENT — PAIN SCALES - GENERAL: PAINLEVEL_OUTOF10: 0 - NO PAIN

## 2024-05-04 ASSESSMENT — PAIN - FUNCTIONAL ASSESSMENT: PAIN_FUNCTIONAL_ASSESSMENT: 0-10

## 2024-05-04 NOTE — PROGRESS NOTES
PATIENT NAME: Brooklyn Read  MRN: [id number]    Brooklyn Read is a 79 y.o. female on day 4 of admission presenting with Upper GI bleed.    SUBJECTIVE    Seen and examined. Pt continues to endorse pain the right lower extremity. She denies recent hemoptysis or hematochezia.     OBJECTIVE    Vitals:    05/03/24 1500 05/03/24 1719 05/03/24 2100 05/04/24 0421   BP:  (!) 159/48 157/55 151/65   BP Location:  Left arm Left arm    Patient Position:  Lying Lying    Pulse:  95 85 100   Resp:  18 20 18   Temp: 36.4 °C (97.5 °F) 36.7 °C (98.1 °F) 36.9 °C (98.4 °F) 36.5 °C (97.7 °F)   TempSrc: Temporal Temporal Temporal Temporal   SpO2:  98% 99% 95%   Weight:       Height:          Scheduled medications  buPROPion XL, 150 mg, oral, Daily  tiotropium, 2 puff, inhalation, Daily   And  fluticasone furoate-vilanteroL, 1 puff, inhalation, Daily  insulin lispro, 0-5 Units, subcutaneous, TID with meals  pantoprazole, 40 mg, intravenous, BID  povidone-iodine, , Topical, Daily  sertraline, 100 mg, oral, Daily      Results for orders placed or performed during the hospital encounter of 04/30/24 (from the past 24 hour(s))   POCT GLUCOSE   Result Value Ref Range    POCT Glucose 122 (H) 74 - 99 mg/dL   CBC and Auto Differential   Result Value Ref Range    WBC 15.0 (H) 4.4 - 11.3 x10*3/uL    nRBC 0.0 0.0 - 0.0 /100 WBCs    RBC 2.37 (L) 4.00 - 5.20 x10*6/uL    Hemoglobin 6.9 (L) 12.0 - 16.0 g/dL    Hematocrit 21.9 (L) 36.0 - 46.0 %    MCV 92 80 - 100 fL    MCH 29.1 26.0 - 34.0 pg    MCHC 31.5 (L) 32.0 - 36.0 g/dL    RDW 18.6 (H) 11.5 - 14.5 %    Platelets 125 (L) 150 - 450 x10*3/uL    Neutrophils % 71.5 40.0 - 80.0 %    Immature Granulocytes %, Automated 2.5 (H) 0.0 - 0.9 %    Lymphocytes % 9.3 13.0 - 44.0 %    Monocytes % 15.3 2.0 - 10.0 %    Eosinophils % 1.1 0.0 - 6.0 %    Basophils % 0.3 0.0 - 2.0 %    Neutrophils Absolute 10.71 (H) 1.60 - 5.50 x10*3/uL    Immature Granulocytes Absolute, Automated 0.37 0.00 - 0.50 x10*3/uL     Lymphocytes Absolute 1.40 0.80 - 3.00 x10*3/uL    Monocytes Absolute 2.30 (H) 0.05 - 0.80 x10*3/uL    Eosinophils Absolute 0.17 0.00 - 0.40 x10*3/uL    Basophils Absolute 0.04 0.00 - 0.10 x10*3/uL   Renal function panel   Result Value Ref Range    Glucose 95 74 - 99 mg/dL    Sodium 142 136 - 145 mmol/L    Potassium 3.8 3.5 - 5.3 mmol/L    Chloride 111 (H) 98 - 107 mmol/L    Bicarbonate 22 21 - 32 mmol/L    Anion Gap 13 10 - 20 mmol/L    Urea Nitrogen 24 (H) 6 - 23 mg/dL    Creatinine 0.87 0.50 - 1.05 mg/dL    eGFR 68 >60 mL/min/1.73m*2    Calcium 7.4 (L) 8.6 - 10.6 mg/dL    Phosphorus 2.0 (L) 2.5 - 4.9 mg/dL    Albumin 2.3 (L) 3.4 - 5.0 g/dL   Type and screen   Result Value Ref Range    ABO TYPE O     Rh TYPE POS     ANTIBODY SCREEN NEG    POCT GLUCOSE   Result Value Ref Range    POCT Glucose 86 74 - 99 mg/dL      Vascular US upper extremity venous duplex left    Result Date: 5/4/2024  Interpreted By:  Judson Oleary, STUDY: [facility] US UPPER EXTREMITY VENOUS DUPLEX LEFT;  5/4/2024 5:47 am   INDICATION: Signs/Symptoms:swelling in Left upper extremity.   COMPARISON: None.   ACCESSION NUMBER(S): [id number]   ORDERING CLINICIAN: JOHN DE LEÓN   TECHNIQUE: Vascular ultrasound of the  left upper extremity was performed. Evaluation was performed with grayscale, color, and spectral Doppler. When possible, compression views of the evaluated veins was also performed.   FINDINGS: Evaluation of the visualized portions of the  left internal jugular, innominate, subclavian, axillary, and brachial cephalic, and basilic veins was performed.   There is normal respiratory variation, normal compressibility, as well as normal color doppler signal in the visualized vessels. There is no evidence of thrombus.       No evidence for thrombus involving the visualized veins of the left upper extremity, as detailed above.   MACRO: None   Signed by: Judson Oleary 5/4/2024 6:09 AM Dictation workstation:   [id number]    EGD    Result Date:  5/2/2024  Table formatting from the original result was not included. Findings The esophagus appeared normal. No evidence of esophagitis, hiatal hernia, or Lugo's esophagus. Moderate, patchy atrophic and erythematous mucosa in the body of the stomach and antrum, consistent with gastritis; performed cold forceps biopsy to rule out H. pylori; Single large, cratered, round ulcer in the prepyloric region with adherent clot (Boogie IIB); placed 1 clip successfully (clip is MRI compatible); hemostasis achieved; induced coagulation and hemostasis achieved with 4 applications of with heater probe; injected 5.5 mL of epinephrine to address bleeding; hemostasis achieved. A previously placed clip was seen the outside margin of the ulcer. Another previously placed clip was seen at the interior margin of the ulcer. A hemoclip was used to guillotine the adherent clot off the ulcer by placing it at the white fibrin base and pulling off. There was mild oozing noted consistent with underlying vessel. Epinephrine injection was used on either side of the oozing with significant slowing of the bleeding. Heater probe coagulation resulted in complete cessation. A Doppler probe was used to interrogate the ulcer and area treated with no signal at low and medium settings. The cardia and fundus of the stomach appeared normal. Mild, patchy erythematous mucosa in the duodenal bulb; The 2nd part of the duodenum appeared normal. Due to [employer] - Epic error with account number, several of the images from the procedure were not attached to this procedure. Recommendation  Await pathology results  Follow up with primary gastroenterologist, Meir Bauer MD.  Follow up with primary care physician, Gianluca Macario MD.  Avoid NSAIDs. Continue PPI. Follow up with Myah Arango MD - Inpatient Medicine Service. Follow up with Bassem Abbott MD and Flora Ryan MD - Inpatient GI Consult Service.  Indication Upper GI bleed Staff Staff Role No Staff  Documented Medications Totals unavailable because the procedure time range is not set Preprocedure A history and physical has been performed, and patient medication allergies have been reviewed. The patient's tolerance of previous anesthesia has been reviewed. The risks and benefits of the procedure and the sedation options and risks were discussed with the patient. All questions were answered and informed consent obtained. Details of the Procedure The patient underwent moderate sedation, which was administered by a sedation nurse. The patient's blood pressure, ECG, ETCO2, heart rate, level of consciousness, oxygen and respirations were monitored throughout the procedure. The scope was introduced through the mouth and advanced to the second part of the duodenum. Retroflexion was performed in the cardia. Prior to the procedure, the patient's H. Pylori status was unknown. The patient's estimated blood loss was minimal (<5 mL). The procedure was moderately difficult due to difficult position for endoscopic bleeding control. The patient tolerated the procedure well. There were no apparent adverse events. Events Procedure Events Event Event Time Specimens No specimens collected Procedure Location [facility] [address] [facility] [id number] [phone] [place] [id number] [place] [address] Procedure Provider MD Romel Rodriguez MD     Continuous medications     PRN medications  PRN medications: albuterol, dextrose, glucagon, HYDROmorphone, oxyCODONE       Physical Exam  Constitutional:       Appearance: She is well appearing  HENT:      Mouth/Throat:      Mouth: Mucous membranes are moist.  Eyes:      Extraocular Movements: Extraocular movements intact.      Pupils: Pupils are equal, round, and reactive to light.   Cardiovascular:      Rate and Rhythm: Normal rate and regular rhythm.   Pulmonary:      Effort: Pulmonary effort  is normal.      Breath sounds: Normal breath sounds.   Abdominal:      General: There is no distension.      Palpations: Abdomen is soft.      Tenderness: There is no abdominal tenderness. There is no rebound.   Musculoskeletal:         General: Normal range of motion.   Skin:     General: Skin is dry.      Capillary Refill: Capillary refill takes 2 to 3 seconds.      Comments: Diminished R pedal pulse, mottled, discolored appearance of the RLE with wounds on distal extremities, bruising along elbow along RUE at IV site, swelling and warmth in the RUE  Neurological:      General: No focal deficit present.      Mental Status: She is alert.     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 Arrived via [place] on RA and off pressors. Patient has had a total of 3 EGDS. EGD on 4/30 showed an ulcer in the prepyloric region with adherent clot as well as a large old clot in the gastric fundus. On 5/1/2024, epinephrine was injected into the ulcer in the prepyloric region. Clot was unable to be retrieved due to the location and patient continued to have bleeding. Repeat EGD 5/2/2024 again showed single large, cratered, round ulcer in the prepyloric region with adherent clot (Boogie IIB). A hemoclip was used to guillotine the adherent clot off the ulcer by placing it at the white fibrin base and pulling off. Epinephrine was injected on either side, heater probe coagulation resulted in stoppage of the bleeding. Patient has not had any bleeding following EGD on 5/2. Unable to doppler pulses on right foot, has discolored skin and normal temperature with decreased sensation. Vascular surgery consulted, pt will need  revascularization but cannot safely anticoagulate at this time due to GI bleeding.    Updates 5/4/2023  - Hgb 6.9 5/4 from 7.2 5/3, 1 unit pRBC ordered  - no hematochezia or hematemesis  - LUE ultrasound not showing any clots  - will check with GI and vascular about plans to initiate anticoagulation for revascularization once Hgb is stable  - restarting home losartan     #Peptic ulcer with hematemesis 2/2 to Ibuprofen use   :: EGD done 4/30 in [place] with multiple deep ulcers, no active bleeding  ::EGD done 5/1 shows Large deep ulcer with large adherent blood clot (20~25mm) at inferior posterior aspect of pre-pylorus. Clot retrieval with snares were attempted but unsuccessful due to difficult location.   :: EGD done 5/2/2024, hemoclip used to guillotine adherent clot, epinephrine injected and heater probe coagulation resulted in complete cessation of bleeding.  :: No bleeding since EGD 5/2, most recent Hgb 7.2 5/3  :: CTA 5/2 demonstrated no signs of active GI bleeding  :: - Hgb 6.9 5/4 from 7.2 5/3, 1 unit pRBC ordered  - PPI IV BID  - Trend CBC, goal Hgb>7 and platelets >50  - stool H. 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 at rest with decreased perfusion, moderate arterial occlusive disease in left lower extremity with decreased perfusion  :: wounds on extremities of her foot and at ankle  :: wound care consulted  - Pain control with PRN Dilaudid, IV tylenol     #LUE swelling  :: swelling in the left upper extremity, noticeably larger than right upper extremity  - LUE ultrasound showing no thrombus 5/4/2024    #hemorrhagic shock , resolved   :: s/p 7 pRBCs at OSH   :: lactate 1.0  5/2/2024  :: daily RFP  - maintain active T&S, last T & S 5/4/2024    # History of CVA  :: residual right lower extremity and right upper extremity weakness  :: holding home aspirin  - Pain control with PRN Dilaudid, IV tylenol   -Continue with neurovascular check q1h to ensure there is no signs of acute limb ischemia     #HTN, HLD, diastolic HF  :: Goal MAP >65  :: holding home atorvastatin, rosuvastatin, Jardiance, spirinolactone  - losartan 100mg PO daily    #Hx of COPD, tobacco use  - cont home PRN albuterol and trelegy ellipta  - breathing comfortably on RA     #hx of CKD with bl Cr 0.94   # OTONIEL likely 2/2 shock  :: Cr trending down, 1.06 5/3   - daily RFP     #Leukocytosis  :: WBC Down trending   :: no active signs of infection, likely 2/2 shock  :: continue monitor for signs of infection     Fluids: -  Diet: NPO  PPX: Protonix BID   DVT PPX: -  Code status: DNR/DNI - on hold for procedures

## 2024-05-04 NOTE — CARE PLAN
The patient's goals for the shift include      The clinical goals for the shift include   Problem: Pain  Goal: My pain/discomfort is manageable  Outcome: Progressing     Problem: Safety  Goal: Patient will be injury free during hospitalization  Outcome: Progressing  Goal: I will remain free of falls  Outcome: Progressing     Problem: Daily Care  Goal: Daily care needs are met  Outcome: Progressing     Problem: Psychosocial Needs  Goal: Demonstrates ability to cope with hospitalization/illness  Outcome: Progressing  Goal: Collaborate with me, my family, and caregiver to identify my specific goals  Outcome: Progressing     Problem: Discharge Barriers  Goal: My discharge needs are met  Outcome: Progressing     Problem: Skin  Goal: Decreased wound size/increased tissue granulation at next dressing change  Outcome: Progressing  Flowsheets (Taken 5/4/2024 0810)  Decreased wound size/increased tissue granulation at next dressing change: Promote sleep for wound healing  Goal: Participates in plan/prevention/treatment measures  Outcome: Progressing  Flowsheets (Taken 5/4/2024 0810)  Participates in plan/prevention/treatment measures: Elevate heels  Goal: Prevent/manage excess moisture  Outcome: Progressing  Flowsheets (Taken 5/4/2024 0810)  Prevent/manage excess moisture: Cleanse incontinence/protect with barrier cream  Goal: Prevent/minimize sheer/friction injuries  Outcome: Progressing  Flowsheets (Taken 5/4/2024 0810)  Prevent/minimize sheer/friction injuries:   Complete micro-shifts as needed if patient unable. Adjust patient position to relieve pressure points, not a full turn   HOB 30 degrees or less   Turn/reposition every 2 hours/use positioning/transfer devices   Use pull sheet  Goal: Promote/optimize nutrition  Outcome: Progressing  Flowsheets (Taken 5/4/2024 0810)  Promote/optimize nutrition:   Assist with feeding   Monitor/record intake including meals  Goal: Promote skin healing  Outcome: Progressing  Flowsheets  (Taken 5/4/2024 7159)  Promote skin healing:   Assess skin/pad under line(s)/device(s)   Turn/reposition every 2 hours/use positioning/transfer devices     Problem: Fall/Injury  Goal: Not fall by end of shift  Outcome: Progressing  Goal: Be free from injury by end of the shift  Outcome: Progressing  Goal: Verbalize understanding of personal risk factors for fall in the hospital  Outcome: Progressing  Goal: Verbalize understanding of risk factor reduction measures to prevent injury from fall in the home  Outcome: Progressing  Goal: Use assistive devices by end of the shift  Outcome: Progressing  Goal: Pace activities to prevent fatigue by end of the shift  Outcome: Progressing     Problem: Pain  Goal: Takes deep breaths with improved pain control throughout the shift  Outcome: Progressing  Goal: Turns in bed with improved pain control throughout the shift  Outcome: Progressing  Goal: Walks with improved pain control throughout the shift  Outcome: Progressing  Goal: Performs ADL's with improved pain control throughout shift  Outcome: Progressing  Goal: Participates in PT with improved pain control throughout the shift  Outcome: Progressing  Goal: Free from opioid side effects throughout the shift  Outcome: Progressing  Goal: Free from acute confusion related to pain meds throughout the shift  Outcome: Progressing     Problem: Pain - Adult  Goal: Verbalizes/displays adequate comfort level or baseline comfort level  Outcome: Progressing     Problem: Safety - Adult  Goal: Free from fall injury  Outcome: Progressing     Problem: Discharge Planning  Goal: Discharge to home or other facility with appropriate resources  Outcome: Progressing     Problem: Chronic Conditions and Co-morbidities  Goal: Patient's chronic conditions and co-morbidity symptoms are monitored and maintained or improved  Outcome: Progressing

## 2024-05-05 LAB
ALBUMIN SERPL BCP-MCNC: 2.4 G/DL (ref 3.4–5)
ANION GAP SERPL CALC-SCNC: 12 MMOL/L (ref 10–20)
BASOPHILS # BLD AUTO: 0.06 X10*3/UL (ref 0–0.1)
BASOPHILS NFR BLD AUTO: 0.4 %
BLOOD EXPIRATION DATE: NORMAL
BUN SERPL-MCNC: 16 MG/DL (ref 6–23)
CALCIUM SERPL-MCNC: 7.5 MG/DL (ref 8.6–10.6)
CHLORIDE SERPL-SCNC: 109 MMOL/L (ref 98–107)
CK SERPL-CCNC: 1295 U/L (ref 0–215)
CO2 SERPL-SCNC: 23 MMOL/L (ref 21–32)
CREAT SERPL-MCNC: 0.63 MG/DL (ref 0.5–1.05)
DISPENSE STATUS: NORMAL
EGFRCR SERPLBLD CKD-EPI 2021: >90 ML/MIN/1.73M*2
EOSINOPHIL # BLD AUTO: 0.23 X10*3/UL (ref 0–0.4)
EOSINOPHIL NFR BLD AUTO: 1.6 %
ERYTHROCYTE [DISTWIDTH] IN BLOOD BY AUTOMATED COUNT: 18.8 % (ref 11.5–14.5)
GLUCOSE BLD MANUAL STRIP-MCNC: 101 MG/DL (ref 74–99)
GLUCOSE BLD MANUAL STRIP-MCNC: 129 MG/DL (ref 74–99)
GLUCOSE BLD MANUAL STRIP-MCNC: 92 MG/DL (ref 74–99)
GLUCOSE SERPL-MCNC: 106 MG/DL (ref 74–99)
HCT VFR BLD AUTO: 26.8 % (ref 36–46)
HGB BLD-MCNC: 8.4 G/DL (ref 12–16)
IMM GRANULOCYTES # BLD AUTO: 0.62 X10*3/UL (ref 0–0.5)
IMM GRANULOCYTES NFR BLD AUTO: 4.3 % (ref 0–0.9)
LYMPHOCYTES # BLD AUTO: 1.13 X10*3/UL (ref 0.8–3)
LYMPHOCYTES NFR BLD AUTO: 7.9 %
MCH RBC QN AUTO: 28.4 PG (ref 26–34)
MCHC RBC AUTO-ENTMCNC: 31.3 G/DL (ref 32–36)
MCV RBC AUTO: 91 FL (ref 80–100)
MONOCYTES # BLD AUTO: 1.9 X10*3/UL (ref 0.05–0.8)
MONOCYTES NFR BLD AUTO: 13.3 %
NEUTROPHILS # BLD AUTO: 10.38 X10*3/UL (ref 1.6–5.5)
NEUTROPHILS NFR BLD AUTO: 72.5 %
NRBC BLD-RTO: 0 /100 WBCS (ref 0–0)
PHOSPHATE SERPL-MCNC: 2.5 MG/DL (ref 2.5–4.9)
PLATELET # BLD AUTO: 151 X10*3/UL (ref 150–450)
POTASSIUM SERPL-SCNC: 3.6 MMOL/L (ref 3.5–5.3)
PRODUCT BLOOD TYPE: 5100
PRODUCT CODE: NORMAL
RBC # BLD AUTO: 2.96 X10*6/UL (ref 4–5.2)
SODIUM SERPL-SCNC: 140 MMOL/L (ref 136–145)
UNIT ABO: NORMAL
UNIT NUMBER: NORMAL
UNIT RH: NORMAL
UNIT VOLUME: 350
WBC # BLD AUTO: 14.3 X10*3/UL (ref 4.4–11.3)
XM INTEP: NORMAL

## 2024-05-05 PROCEDURE — 2500000001 HC RX 250 WO HCPCS SELF ADMINISTERED DRUGS (ALT 637 FOR MEDICARE OP): Performed by: STUDENT IN AN ORGANIZED HEALTH CARE EDUCATION/TRAINING PROGRAM

## 2024-05-05 PROCEDURE — 84100 ASSAY OF PHOSPHORUS: CPT | Performed by: STUDENT IN AN ORGANIZED HEALTH CARE EDUCATION/TRAINING PROGRAM

## 2024-05-05 PROCEDURE — 36415 COLL VENOUS BLD VENIPUNCTURE: CPT | Performed by: STUDENT IN AN ORGANIZED HEALTH CARE EDUCATION/TRAINING PROGRAM

## 2024-05-05 PROCEDURE — C9113 INJ PANTOPRAZOLE SODIUM, VIA: HCPCS | Performed by: STUDENT IN AN ORGANIZED HEALTH CARE EDUCATION/TRAINING PROGRAM

## 2024-05-05 PROCEDURE — 2500000004 HC RX 250 GENERAL PHARMACY W/ HCPCS (ALT 636 FOR OP/ED): Performed by: STUDENT IN AN ORGANIZED HEALTH CARE EDUCATION/TRAINING PROGRAM

## 2024-05-05 PROCEDURE — 82550 ASSAY OF CK (CPK): CPT | Performed by: STUDENT IN AN ORGANIZED HEALTH CARE EDUCATION/TRAINING PROGRAM

## 2024-05-05 PROCEDURE — 1100000001 HC PRIVATE ROOM DAILY

## 2024-05-05 PROCEDURE — 2500000006 HC RX 250 W HCPCS SELF ADMINISTERED DRUGS (ALT 637 FOR ALL PAYERS): Performed by: STUDENT IN AN ORGANIZED HEALTH CARE EDUCATION/TRAINING PROGRAM

## 2024-05-05 PROCEDURE — 1090000002 HH PPS REVENUE DEBIT

## 2024-05-05 PROCEDURE — 85025 COMPLETE CBC W/AUTO DIFF WBC: CPT | Performed by: STUDENT IN AN ORGANIZED HEALTH CARE EDUCATION/TRAINING PROGRAM

## 2024-05-05 PROCEDURE — 99232 SBSQ HOSP IP/OBS MODERATE 35: CPT | Performed by: STUDENT IN AN ORGANIZED HEALTH CARE EDUCATION/TRAINING PROGRAM

## 2024-05-05 PROCEDURE — 82947 ASSAY GLUCOSE BLOOD QUANT: CPT

## 2024-05-05 PROCEDURE — 1090000001 HH PPS REVENUE CREDIT

## 2024-05-05 RX ADMIN — LOSARTAN POTASSIUM 100 MG: 100 TABLET, FILM COATED ORAL at 08:49

## 2024-05-05 RX ADMIN — BUPROPION HYDROCHLORIDE 150 MG: 150 TABLET, EXTENDED RELEASE ORAL at 08:49

## 2024-05-05 RX ADMIN — OXYCODONE HYDROCHLORIDE 5 MG: 5 TABLET ORAL at 19:53

## 2024-05-05 RX ADMIN — POVIDONE-IODINE: 100 OINTMENT TOPICAL at 09:02

## 2024-05-05 RX ADMIN — SERTRALINE 100 MG: 100 TABLET, FILM COATED ORAL at 08:49

## 2024-05-05 RX ADMIN — PANTOPRAZOLE SODIUM 40 MG: 40 INJECTION, POWDER, FOR SOLUTION INTRAVENOUS at 21:05

## 2024-05-05 RX ADMIN — PANTOPRAZOLE SODIUM 40 MG: 40 INJECTION, POWDER, FOR SOLUTION INTRAVENOUS at 08:49

## 2024-05-05 ASSESSMENT — COGNITIVE AND FUNCTIONAL STATUS - GENERAL
TURNING FROM BACK TO SIDE WHILE IN FLAT BAD: A LOT
DRESSING REGULAR UPPER BODY CLOTHING: A LITTLE
WALKING IN HOSPITAL ROOM: TOTAL
DAILY ACTIVITIY SCORE: 14
PERSONAL GROOMING: A LITTLE
MOBILITY SCORE: 11
STANDING UP FROM CHAIR USING ARMS: A LOT
EATING MEALS: A LITTLE
DRESSING REGULAR LOWER BODY CLOTHING: A LOT
MOVING TO AND FROM BED TO CHAIR: A LOT
DRESSING REGULAR UPPER BODY CLOTHING: A LITTLE
MOVING TO AND FROM BED TO CHAIR: A LITTLE
CLIMB 3 TO 5 STEPS WITH RAILING: TOTAL
TOILETING: TOTAL
MOVING FROM LYING ON BACK TO SITTING ON SIDE OF FLAT BED WITH BEDRAILS: A LITTLE
PERSONAL GROOMING: A LITTLE
CLIMB 3 TO 5 STEPS WITH RAILING: TOTAL
WALKING IN HOSPITAL ROOM: TOTAL
EATING MEALS: A LITTLE
HELP NEEDED FOR BATHING: A LOT
STANDING UP FROM CHAIR USING ARMS: A LOT
HELP NEEDED FOR BATHING: A LOT
TOILETING: TOTAL
DAILY ACTIVITIY SCORE: 14
DRESSING REGULAR LOWER BODY CLOTHING: A LOT

## 2024-05-05 ASSESSMENT — PAIN SCALES - GENERAL
PAINLEVEL_OUTOF10: 10 - WORST POSSIBLE PAIN
PAINLEVEL_OUTOF10: 0 - NO PAIN
PAINLEVEL_OUTOF10: 0 - NO PAIN

## 2024-05-05 ASSESSMENT — PAIN - FUNCTIONAL ASSESSMENT
PAIN_FUNCTIONAL_ASSESSMENT: 0-10

## 2024-05-05 NOTE — CARE PLAN
The patient's goals for the shift include      The clinical goals for the shift include   Problem: Pain  Goal: My pain/discomfort is manageable  Outcome: Progressing     Problem: Safety  Goal: Patient will be injury free during hospitalization  Outcome: Progressing  Goal: I will remain free of falls  Outcome: Progressing     Problem: Daily Care  Goal: Daily care needs are met  Outcome: Progressing     Problem: Psychosocial Needs  Goal: Demonstrates ability to cope with hospitalization/illness  Outcome: Progressing  Goal: Collaborate with me, my family, and caregiver to identify my specific goals  Outcome: Progressing     Problem: Discharge Barriers  Goal: My discharge needs are met  Outcome: Progressing     Problem: Skin  Goal: Decreased wound size/increased tissue granulation at next dressing change  Outcome: Progressing  Flowsheets (Taken 5/5/2024 0755)  Decreased wound size/increased tissue granulation at next dressing change: Promote sleep for wound healing  Goal: Participates in plan/prevention/treatment measures  Outcome: Progressing  Flowsheets (Taken 5/5/2024 0755)  Participates in plan/prevention/treatment measures: Elevate heels  Goal: Prevent/manage excess moisture  Outcome: Progressing  Flowsheets (Taken 5/5/2024 0755)  Prevent/manage excess moisture:   Cleanse incontinence/protect with barrier cream   Monitor for/manage infection if present   Moisturize dry skin  Goal: Prevent/minimize sheer/friction injuries  Outcome: Progressing  Flowsheets (Taken 5/5/2024 0755)  Prevent/minimize sheer/friction injuries:   HOB 30 degrees or less   Turn/reposition every 2 hours/use positioning/transfer devices   Use pull sheet   Complete micro-shifts as needed if patient unable. Adjust patient position to relieve pressure points, not a full turn  Goal: Promote/optimize nutrition  Outcome: Progressing  Flowsheets (Taken 5/5/2024 0755)  Promote/optimize nutrition:   Assist with feeding   Monitor/record intake including  meals  Goal: Promote skin healing  Outcome: Progressing  Flowsheets (Taken 5/5/2024 6596)  Promote skin healing:   Assess skin/pad under line(s)/device(s)   Turn/reposition every 2 hours/use positioning/transfer devices     Problem: Fall/Injury  Goal: Not fall by end of shift  Outcome: Progressing  Goal: Be free from injury by end of the shift  Outcome: Progressing  Goal: Verbalize understanding of personal risk factors for fall in the hospital  Outcome: Progressing  Goal: Verbalize understanding of risk factor reduction measures to prevent injury from fall in the home  Outcome: Progressing  Goal: Use assistive devices by end of the shift  Outcome: Progressing  Goal: Pace activities to prevent fatigue by end of the shift  Outcome: Progressing     Problem: Pain  Goal: Takes deep breaths with improved pain control throughout the shift  Outcome: Progressing  Goal: Turns in bed with improved pain control throughout the shift  Outcome: Progressing  Goal: Walks with improved pain control throughout the shift  Outcome: Progressing  Goal: Performs ADL's with improved pain control throughout shift  Outcome: Progressing  Goal: Participates in PT with improved pain control throughout the shift  Outcome: Progressing  Goal: Free from opioid side effects throughout the shift  Outcome: Progressing  Goal: Free from acute confusion related to pain meds throughout the shift  Outcome: Progressing     Problem: Pain - Adult  Goal: Verbalizes/displays adequate comfort level or baseline comfort level  Outcome: Progressing     Problem: Safety - Adult  Goal: Free from fall injury  Outcome: Progressing     Problem: Discharge Planning  Goal: Discharge to home or other facility with appropriate resources  Outcome: Progressing     Problem: Chronic Conditions and Co-morbidities  Goal: Patient's chronic conditions and co-morbidity symptoms are monitored and maintained or improved  Outcome: Progressing

## 2024-05-05 NOTE — PROGRESS NOTES
"Brooklyn Read is a 79 y.o. female on day 5 of admission presenting with Upper GI bleed.    Subjective   No acute events overnight  This AM, feels well, denies any new or acute symptoms  Denies black or bloody stools  RUE ordered overnight for RUE swelling         Objective     Physical Exam    Constitutional:       Appearance: She is well appearing  HENT:      Mouth/Throat:      Mouth: Mucous membranes are moist.  Eyes:      Extraocular Movements: Extraocular movements intact.      Pupils: Pupils are equal, round, and reactive to light.   Cardiovascular:      Rate and Rhythm: Normal rate and regular rhythm.   Pulmonary:      Effort: Pulmonary effort is normal.      Breath sounds: Normal breath sounds.   Abdominal:      General: There is no distension.      Palpations: Abdomen is soft.      Tenderness: There is no abdominal tenderness. There is no rebound.   Musculoskeletal:         General: Normal range of motion.   Skin:     General: Skin is dry.      Capillary Refill: Capillary refill takes 2 to 3 seconds.      Comments: Diminished R pedal pulse, mottled, discolored appearance of the RLE with wounds on distal extremities, bruising along elbow along RUE at IV site, swelling and warmth in the RUE  Neurological:      General: No focal deficit present.      Mental Status: She is alert.        Last Recorded Vitals  Blood pressure 131/67, pulse (!) 16, temperature 36.6 °C (97.9 °F), temperature source Tympanic, resp. rate 16, height 1.753 m (5' 9\"), weight 74.6 kg (164 lb 7.4 oz), SpO2 100%.  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 Arrived via [place] on RA and off pressors. Patient has had a total of 3 EGDS. EGD on 4/30 showed an ulcer in the prepyloric region with adherent clot as well as a large old clot in the gastric fundus. On 5/1/2024, epinephrine was injected into the ulcer in the prepyloric region. Clot was unable to be retrieved due to the location and patient continued to have bleeding. Repeat EGD 5/2/2024 again showed single large, cratered, round ulcer in the prepyloric region with adherent clot (Boogie IIB). A hemoclip was used to guillotine the adherent clot off the ulcer by placing it at the white fibrin base and pulling off. Epinephrine was injected on either side, heater probe coagulation resulted in stoppage of the bleeding. Patient has not had any bleeding following EGD on 5/2. Unable to doppler pulses on right foot, has discolored skin and normal temperature with decreased sensation. Vascular surgery consulted, pt will need revascularization but cannot safely anticoagulate at this time due to GI bleeding.     Updates 5/5  - Required 1 unit pRBC on 5/4, today Hgb 8.4   - RUE ultrasound ordered overnight for RUE swelling, not completed yet  - awaiting finalized procedural plan from vascular     #Peptic ulcer with hematemesis 2/2 to Ibuprofen use   :: EGD done 4/30 in [place] with multiple deep ulcers, no active bleeding  ::EGD done 5/1 shows Large deep ulcer with large adherent blood clot (20~25mm) at inferior posterior aspect of pre-pylorus. Clot retrieval with snares were attempted but unsuccessful due to difficult location.   :: EGD done 5/2/2024, hemoclip used to guillotine adherent clot, epinephrine injected and heater probe coagulation resulted in complete cessation of bleeding.  :: No bleeding since EGD 5/2, most recent Hgb 7.2 5/3  :: CTA  5/2 demonstrated no signs of active GI bleeding  :: - Hgb 6.9 5/4 from 7.2 5/3, 1 unit pRBC ordered  - PPI IV BID  - Trend CBC, goal Hgb>7 and platelets >50  - stool H. Pylori antigen negative  - can consult IR for possible embolization if further bleeding occurs     # Right lower extremity ulcer  #Concern for limb ischemia   :: unable to feel pulses on R foot, discolored skin, normal temperature  :: Vascular surgery consulted  :: cannot give anticoagulation safely given GI bleeding   :: RONALD 5/2: severe arterial occlusive disease in RLE at rest with decreased perfusion, moderate arterial occlusive disease in left lower extremity with decreased perfusion  :: wounds on extremities of her foot and at ankle  :: wound care consulted  - Pain control with PRN Dilaudid, IV tylenol      #LUE swelling  :: swelling in the left upper extremity, noticeably larger than right upper extremity  - LUE ultrasound showing no thrombus 5/4/2024     #hemorrhagic shock , resolved   :: s/p 7 pRBCs at OSH   :: lactate 1.0 5/2/2024  :: daily RFP  - maintain active T&S, last T & S 5/4/2024     # History of CVA  :: residual right lower extremity and right upper extremity weakness  :: holding home aspirin  - Pain control with PRN Dilaudid, IV tylenol   -Continue with neurovascular check q1h to ensure there is no signs of acute limb ischemia      #HTN, HLD, diastolic HF  :: Goal MAP >65  :: holding home atorvastatin, rosuvastatin, Jardiance, spirinolactone  - losartan 100mg PO daily     #Hx of COPD, tobacco use  - cont home PRN albuterol and trelegy ellipta  - breathing comfortably on RA     #hx of CKD with bl Cr 0.94   # OTONIEL likely 2/2 shock  :: Cr trending down, 1.06 5/3   - daily RFP     #Leukocytosis  :: WBC Down trending   :: no active signs of infection, likely 2/2 shock  :: continue monitor for signs of infection      Fluids: -  Diet: NPO  PPX: Protonix BID   DVT PPX: -  Code status: DNR/DNI - on hold for procedures                Elle Bauer MD

## 2024-05-05 NOTE — CARE PLAN
Problem: Safety  Goal: Patient will be injury free during hospitalization  Outcome: Progressing  Goal: I will remain free of falls  Outcome: Progressing   The patient's goals for the shift include      The clinical goals for the shift include patients hgb will be above 7 and pt will remain free from injury through out this shift    Over the shift, the patient did not make progress toward the following goals.

## 2024-05-06 DIAGNOSIS — K25.4 GASTROINTESTINAL HEMORRHAGE ASSOCIATED WITH GASTRIC ULCER: Primary | ICD-10-CM

## 2024-05-06 LAB
ALBUMIN SERPL BCP-MCNC: 2.3 G/DL (ref 3.4–5)
ALP SERPL-CCNC: 62 U/L (ref 33–136)
ALT SERPL W P-5'-P-CCNC: 32 U/L (ref 7–45)
ANION GAP SERPL CALC-SCNC: 12 MMOL/L (ref 10–20)
AST SERPL W P-5'-P-CCNC: 50 U/L (ref 9–39)
BASOPHILS # BLD MANUAL: 0 X10*3/UL (ref 0–0.1)
BASOPHILS NFR BLD MANUAL: 0 %
BILIRUB SERPL-MCNC: 0.4 MG/DL (ref 0–1.2)
BLOOD EXPIRATION DATE: NORMAL
BUN SERPL-MCNC: 15 MG/DL (ref 6–23)
CALCIUM SERPL-MCNC: 7.4 MG/DL (ref 8.6–10.6)
CHLORIDE SERPL-SCNC: 109 MMOL/L (ref 98–107)
CO2 SERPL-SCNC: 24 MMOL/L (ref 21–32)
CREAT SERPL-MCNC: 0.67 MG/DL (ref 0.5–1.05)
DISPENSE STATUS: NORMAL
EGFRCR SERPLBLD CKD-EPI 2021: 90 ML/MIN/1.73M*2
EOSINOPHIL # BLD MANUAL: 0.3 X10*3/UL (ref 0–0.4)
EOSINOPHIL NFR BLD MANUAL: 2.6 %
ERYTHROCYTE [DISTWIDTH] IN BLOOD BY AUTOMATED COUNT: 18.8 % (ref 11.5–14.5)
GLUCOSE BLD MANUAL STRIP-MCNC: 101 MG/DL (ref 74–99)
GLUCOSE BLD MANUAL STRIP-MCNC: 104 MG/DL (ref 74–99)
GLUCOSE BLD MANUAL STRIP-MCNC: 118 MG/DL (ref 74–99)
GLUCOSE BLD MANUAL STRIP-MCNC: 95 MG/DL (ref 74–99)
GLUCOSE SERPL-MCNC: 98 MG/DL (ref 74–99)
HCT VFR BLD AUTO: 25.3 % (ref 36–46)
HGB BLD-MCNC: 8.1 G/DL (ref 12–16)
IMM GRANULOCYTES # BLD AUTO: 0.78 X10*3/UL (ref 0–0.5)
IMM GRANULOCYTES NFR BLD AUTO: 6.8 % (ref 0–0.9)
LYMPHOCYTES # BLD MANUAL: 0.49 X10*3/UL (ref 0.8–3)
LYMPHOCYTES NFR BLD MANUAL: 4.3 %
MAGNESIUM SERPL-MCNC: 1.91 MG/DL (ref 1.6–2.4)
MCH RBC QN AUTO: 29.5 PG (ref 26–34)
MCHC RBC AUTO-ENTMCNC: 32 G/DL (ref 32–36)
MCV RBC AUTO: 92 FL (ref 80–100)
MONOCYTES # BLD MANUAL: 0.99 X10*3/UL (ref 0.05–0.8)
MONOCYTES NFR BLD MANUAL: 8.6 %
NEUTS SEG # BLD MANUAL: 9.61 X10*3/UL (ref 1.6–5)
NEUTS SEG NFR BLD MANUAL: 83.6 %
NRBC BLD-RTO: 0 /100 WBCS (ref 0–0)
PHOSPHATE SERPL-MCNC: 2.7 MG/DL (ref 2.5–4.9)
PLATELET # BLD AUTO: 182 X10*3/UL (ref 150–450)
POTASSIUM SERPL-SCNC: 4 MMOL/L (ref 3.5–5.3)
PRODUCT BLOOD TYPE: 5100
PRODUCT BLOOD TYPE: 9500
PRODUCT CODE: NORMAL
PROMYELOCYTES # BLD MANUAL: 0.1 X10*3/UL
PROMYELOCYTES NFR BLD MANUAL: 0.9 %
PROT SERPL-MCNC: 4.6 G/DL (ref 6.4–8.2)
RBC # BLD AUTO: 2.75 X10*6/UL (ref 4–5.2)
RBC MORPH BLD: ABNORMAL
SODIUM SERPL-SCNC: 141 MMOL/L (ref 136–145)
TOTAL CELLS COUNTED BLD: 116
UNIT ABO: NORMAL
UNIT NUMBER: NORMAL
UNIT RH: NORMAL
UNIT VOLUME: 215
UNIT VOLUME: 216
UNIT VOLUME: 310
UNIT VOLUME: 322
WBC # BLD AUTO: 11.5 X10*3/UL (ref 4.4–11.3)

## 2024-05-06 PROCEDURE — 1090000001 HH PPS REVENUE CREDIT

## 2024-05-06 PROCEDURE — 1090000002 HH PPS REVENUE DEBIT

## 2024-05-06 PROCEDURE — 85027 COMPLETE CBC AUTOMATED: CPT | Performed by: STUDENT IN AN ORGANIZED HEALTH CARE EDUCATION/TRAINING PROGRAM

## 2024-05-06 PROCEDURE — 97530 THERAPEUTIC ACTIVITIES: CPT | Mod: GP

## 2024-05-06 PROCEDURE — 99232 SBSQ HOSP IP/OBS MODERATE 35: CPT | Performed by: INTERNAL MEDICINE

## 2024-05-06 PROCEDURE — 85007 BL SMEAR W/DIFF WBC COUNT: CPT | Performed by: STUDENT IN AN ORGANIZED HEALTH CARE EDUCATION/TRAINING PROGRAM

## 2024-05-06 PROCEDURE — 2500000006 HC RX 250 W HCPCS SELF ADMINISTERED DRUGS (ALT 637 FOR ALL PAYERS): Performed by: STUDENT IN AN ORGANIZED HEALTH CARE EDUCATION/TRAINING PROGRAM

## 2024-05-06 PROCEDURE — 82947 ASSAY GLUCOSE BLOOD QUANT: CPT

## 2024-05-06 PROCEDURE — 2500000004 HC RX 250 GENERAL PHARMACY W/ HCPCS (ALT 636 FOR OP/ED): Performed by: STUDENT IN AN ORGANIZED HEALTH CARE EDUCATION/TRAINING PROGRAM

## 2024-05-06 PROCEDURE — 2500000001 HC RX 250 WO HCPCS SELF ADMINISTERED DRUGS (ALT 637 FOR MEDICARE OP): Performed by: STUDENT IN AN ORGANIZED HEALTH CARE EDUCATION/TRAINING PROGRAM

## 2024-05-06 PROCEDURE — 84100 ASSAY OF PHOSPHORUS: CPT | Performed by: STUDENT IN AN ORGANIZED HEALTH CARE EDUCATION/TRAINING PROGRAM

## 2024-05-06 PROCEDURE — C9113 INJ PANTOPRAZOLE SODIUM, VIA: HCPCS | Performed by: STUDENT IN AN ORGANIZED HEALTH CARE EDUCATION/TRAINING PROGRAM

## 2024-05-06 PROCEDURE — 83735 ASSAY OF MAGNESIUM: CPT | Performed by: STUDENT IN AN ORGANIZED HEALTH CARE EDUCATION/TRAINING PROGRAM

## 2024-05-06 PROCEDURE — 36415 COLL VENOUS BLD VENIPUNCTURE: CPT | Performed by: STUDENT IN AN ORGANIZED HEALTH CARE EDUCATION/TRAINING PROGRAM

## 2024-05-06 PROCEDURE — 1100000001 HC PRIVATE ROOM DAILY

## 2024-05-06 PROCEDURE — 80053 COMPREHEN METABOLIC PANEL: CPT | Performed by: STUDENT IN AN ORGANIZED HEALTH CARE EDUCATION/TRAINING PROGRAM

## 2024-05-06 PROCEDURE — 97535 SELF CARE MNGMENT TRAINING: CPT | Mod: GO

## 2024-05-06 RX ORDER — POLYETHYLENE GLYCOL 3350 17 G/17G
17 POWDER, FOR SOLUTION ORAL DAILY
Status: DISCONTINUED | OUTPATIENT
Start: 2024-05-06 | End: 2024-05-07

## 2024-05-06 RX ORDER — PANTOPRAZOLE SODIUM 40 MG/1
40 TABLET, DELAYED RELEASE ORAL
Status: DISCONTINUED | OUTPATIENT
Start: 2024-05-06 | End: 2024-05-10 | Stop reason: HOSPADM

## 2024-05-06 RX ADMIN — POLYETHYLENE GLYCOL 3350 17 G: 17 POWDER, FOR SOLUTION ORAL at 15:05

## 2024-05-06 RX ADMIN — BUPROPION HYDROCHLORIDE 150 MG: 150 TABLET, EXTENDED RELEASE ORAL at 09:23

## 2024-05-06 RX ADMIN — POVIDONE-IODINE: 100 OINTMENT TOPICAL at 09:23

## 2024-05-06 RX ADMIN — OXYCODONE HYDROCHLORIDE 5 MG: 5 TABLET ORAL at 15:10

## 2024-05-06 RX ADMIN — SERTRALINE 100 MG: 100 TABLET, FILM COATED ORAL at 09:23

## 2024-05-06 RX ADMIN — LOSARTAN POTASSIUM 100 MG: 100 TABLET, FILM COATED ORAL at 09:23

## 2024-05-06 RX ADMIN — TIOTROPIUM BROMIDE INHALATION SPRAY 2 PUFF: 3.12 SPRAY, METERED RESPIRATORY (INHALATION) at 09:22

## 2024-05-06 RX ADMIN — PANTOPRAZOLE SODIUM 40 MG: 40 TABLET, DELAYED RELEASE ORAL at 18:13

## 2024-05-06 RX ADMIN — PANTOPRAZOLE SODIUM 40 MG: 40 INJECTION, POWDER, FOR SOLUTION INTRAVENOUS at 09:23

## 2024-05-06 ASSESSMENT — PAIN SCALES - GENERAL
PAINLEVEL_OUTOF10: 0 - NO PAIN
PAINLEVEL_OUTOF10: 6
PAINLEVEL_OUTOF10: 3

## 2024-05-06 ASSESSMENT — COGNITIVE AND FUNCTIONAL STATUS - GENERAL
STANDING UP FROM CHAIR USING ARMS: A LOT
TURNING FROM BACK TO SIDE WHILE IN FLAT BAD: A LOT
WALKING IN HOSPITAL ROOM: TOTAL
WALKING IN HOSPITAL ROOM: TOTAL
HELP NEEDED FOR BATHING: A LOT
HELP NEEDED FOR BATHING: A LOT
TOILETING: TOTAL
MOBILITY SCORE: 10
MOVING TO AND FROM BED TO CHAIR: A LITTLE
TOILETING: TOTAL
DRESSING REGULAR LOWER BODY CLOTHING: A LOT
MOVING FROM LYING ON BACK TO SITTING ON SIDE OF FLAT BED WITH BEDRAILS: A LITTLE
EATING MEALS: A LITTLE
DRESSING REGULAR UPPER BODY CLOTHING: A LITTLE
DAILY ACTIVITIY SCORE: 14
DRESSING REGULAR UPPER BODY CLOTHING: A LITTLE
PERSONAL GROOMING: A LITTLE
PERSONAL GROOMING: A LITTLE
DAILY ACTIVITIY SCORE: 14
STANDING UP FROM CHAIR USING ARMS: A LOT
CLIMB 3 TO 5 STEPS WITH RAILING: TOTAL
MOVING TO AND FROM BED TO CHAIR: A LOT
CLIMB 3 TO 5 STEPS WITH RAILING: TOTAL
MOVING FROM LYING ON BACK TO SITTING ON SIDE OF FLAT BED WITH BEDRAILS: A LOT
MOBILITY SCORE: 13
DRESSING REGULAR LOWER BODY CLOTHING: TOTAL
TURNING FROM BACK TO SIDE WHILE IN FLAT BAD: A LITTLE

## 2024-05-06 ASSESSMENT — PAIN - FUNCTIONAL ASSESSMENT
PAIN_FUNCTIONAL_ASSESSMENT: 0-10

## 2024-05-06 ASSESSMENT — ACTIVITIES OF DAILY LIVING (ADL): HOME_MANAGEMENT_TIME_ENTRY: 15

## 2024-05-06 NOTE — CARE PLAN
The patient's goals for the shift include      The clinical goals for the shift include Patient will remain safe and free drom any falls and/or injuries overnight    Over the shift, the patient did not make progress toward the following goals. Barriers to progression include   . Recommendations to address these barriers include   .

## 2024-05-06 NOTE — PROGRESS NOTES
Physical Therapy    Physical Therapy Treatment    Patient Name: Brooklyn Read  MRN: [id number]  Today's Date: 5/6/2024  Time Calculation  Start Time: 1415  Stop Time: 1430  Time Calculation (min): 15 min       Assessment/Plan   PT Assessment  End of Session Communication: Bedside nurse  End of Session Patient Position: Up in chair, Alarm off, not on at start of session     PT Plan  Treatment/Interventions: Bed mobility, Transfer training, Gait training, Balance training, Strengthening, Therapeutic exercise, Therapeutic activity, Positioning, Postural re-education  PT Plan: Skilled PT  PT Frequency: 4 times per week  PT Discharge Recommendations: Moderate intensity level of continued care  PT Recommended Transfer Status: Assist x2 (stand)  PT - OK to Discharge: Yes      General Visit Information:   PT  Visit  PT Received On: 05/06/24  Co-Treatment: OT  Co-Treatment Reason: to maximize mobility and safety  Prior to Session Communication: Bedside nurse  Patient Position Received: Bed, 3 rail up, Alarm off, not on at start of session     Subjective   Precautions:  Precautions  Medical Precautions: Fall precautions  Vital Signs:  Vital Signs  SpO2: 98 %    Objective   Pain:  Pain Assessment  Pain Assessment: 0-10  Pain Score: 6  Pain Location:  (RLE)         PT Treatments:     Bed Mobility  Bed Mobility: Yes  Bed Mobility 1  Bed Mobility 1: Supine to sitting  Level of Assistance 1: Moderate assistance  Bed Mobility Comments 1: HOB elevated, use of bed rails, verbal cues for sequencing  Ambulation/Gait Training  Ambulation/Gait Training Performed: No  Transfers  Transfer: Yes  Transfer 1  Technique 1: Sit to stand, Stand to sit  Transfer Device 1: Walker  Transfer Level of Assistance 1: Moderate assistance, +2  Trials/Comments 1: verbal cues for hand placement  Transfers 2  Transfer From 2: Bed to  Transfer to 2: Chair with arms  Technique 2: Sit to stand  Transfer Device 2: Walker  Transfer Level of Assistance 2:  Moderate assistance, +2  Trials/Comments 2: verbal cues for sequencing       Outcome Measures:  [facility] Basic Mobility  Turning from your back to your side while in a flat bed without using bedrails: A lot  Moving from lying on your back to sitting on the side of a flat bed without using bedrails: A lot  Moving to and from bed to chair (including a wheelchair): A lot  Standing up from a chair using your arms (e.g. wheelchair or bedside chair): A lot  To walk in hospital room: Total  Climbing 3-5 steps with railing: Total  Basic Mobility - Total Score: 10          Education Documentation  Precautions, taught by Cherelle Mora, PT at 5/6/2024  2:38 PM.  Learner: Patient  Readiness: Acceptance  Method: Explanation  Response: Verbalizes Understanding    Mobility Training, taught by Cherelle Mora PT at 5/6/2024  2:38 PM.  Learner: Patient  Readiness: Acceptance  Method: Explanation  Response: Verbalizes Understanding    Education Comments  No comments found.               Encounter Problems       Encounter Problems (Active)       PT Problem       Patient will complete bed mobility with CGx1        Start:  05/03/24    Expected End:  05/24/24            Patient will complete STS with MINx1 using LRAD without acute LOB         Start:  05/03/24    Expected End:  05/24/24            Patient will ambulate >/=30' with LRAD with MINx1 without acute LOB        Start:  05/03/24    Expected End:  05/24/24            Patient will complete static (contact guard assist) and dynamic (minimal assist x1) standing balance activities using LRAD without acute LOB.        Start:  05/03/24    Expected End:  05/24/24            Patient will participate in BLE there-ex program in order to assist in improving strength and to assist with the completion of functional mobility tasks.        Start:  05/03/24    Expected End:  05/24/24               Pain - Adult                05/06/24 at 2:39 PM   Cherelle Mora, PT

## 2024-05-06 NOTE — PROGRESS NOTES
Subjective   Brooklyn Read is a 79 y.o. female on hospital day 6 with no major events.  Patient denies any signs of bleeding/melena.  Continues to have right leg pain.        Objective     Exam     Vitals:    05/05/24 2109 05/06/24 0441 05/06/24 0919 05/06/24 1100   BP: 143/77 138/64 149/64 157/67   BP Location: Right arm Left arm     Patient Position: Sitting Lying     Pulse: 105 89 103 89   Resp: 18 16 18 18   Temp: 36.4 °C (97.5 °F) 36.6 °C (97.9 °F) 36.5 °C (97.7 °F) 36.8 °C (98.2 °F)   TempSrc: Temporal Temporal Temporal    SpO2: 99% 93% 97% 97%   Weight:       Height:          Intake/Output last 3 shifts:  I/O last 3 completed shifts:  In: - (0 mL/kg)   Out: 1350 (18.1 mL/kg) [Urine:1350 (0.5 mL/kg/hr)]  Weight: 74.6 kg     Physical Exam  Vitals reviewed.   HENT:      Head: Normocephalic and atraumatic.      Mouth/Throat:      Mouth: Mucous membranes are moist.   Cardiovascular:      Rate and Rhythm: Normal rate and regular rhythm.      Pulses: Normal pulses.      Heart sounds: No murmur heard.     No friction rub. No gallop.   Pulmonary:      Effort: Pulmonary effort is normal.      Breath sounds: Normal breath sounds. No wheezing, rhonchi or rales.   Abdominal:      General: Bowel sounds are normal. There is no distension.      Palpations: Abdomen is soft.      Tenderness: There is no abdominal tenderness. There is no guarding or rebound.   Musculoskeletal:      Right lower leg: No edema.      Left lower leg: No edema.      Comments: Left leg cooler to the touch with diminished pulse.  Leg ulcer wrapped with dressing clean dry and intact   Skin:     General: Skin is warm and dry.   Neurological:      General: No focal deficit present.      Mental Status: She is alert and oriented to person, place, and time.   Psychiatric:         Mood and Affect: Mood normal.         Behavior: Behavior normal.            Medications   buPROPion XL, 150 mg, oral, Daily  tiotropium, 2 puff, inhalation, Daily    "And  fluticasone furoate-vilanteroL, 1 puff, inhalation, Daily  insulin lispro, 0-5 Units, subcutaneous, TID with meals  losartan, 100 mg, oral, Daily  pantoprazole, 40 mg, oral, BID AC  polyethylene glycol, 17 g, oral, Daily  povidone-iodine, , Topical, Daily  sertraline, 100 mg, oral, Daily       PRN medications: albuterol, dextrose, glucagon, HYDROmorphone, oxyCODONE       Labs     All new labs reviewed:  some of the basic labs as follows -     Results from last 7 days   Lab Units 05/06/24 0540 05/05/24 0921 05/04/24 0717 05/03/24  1209   WBC AUTO x10*3/uL 11.5* 14.3* 15.0* 15.8*   HEMOGLOBIN g/dL 8.1* 8.4* 6.9* 7.2*   HEMATOCRIT % 25.3* 26.8* 21.9* 21.6*   PLATELETS AUTO x10*3/uL 182 151 125* 99*   NEUTROS PCT AUTO %  --  72.5 71.5 74.7   LYMPHO PCT MAN % 4.3  --   --   --    LYMPHS PCT AUTO %  --  7.9 9.3 9.6   MONO PCT MAN % 8.6  --   --   --    MONOS PCT AUTO %  --  13.3 15.3 12.8   EOSINO PCT MAN % 2.6  --   --   --    EOS PCT AUTO %  --  1.6 1.1 1.0          Results from last 72 hours   Lab Units 05/06/24 0540 05/05/24 0921 05/04/24  0717   SODIUM mmol/L 141 140 142   POTASSIUM mmol/L 4.0 3.6 3.8   CHLORIDE mmol/L 109* 109* 111*   CO2 mmol/L 24 23 22   BUN mg/dL 15 16 24*   CREATININE mg/dL 0.67 0.63 0.87     Results from last 72 hours   Lab Units 05/06/24 0540 05/05/24 0921 05/04/24  0717   ALK PHOS U/L 62  --   --    AST U/L 50*  --   --    ALT U/L 32  --   --    BILIRUBIN TOTAL mg/dL 0.4  --   --    ALBUMIN g/dL 2.3* 2.4* 2.3*   PROTEIN TOTAL g/dL 4.6*  --   --      Results from last 72 hours   Lab Units 05/06/24  0540 05/05/24  0921 05/04/24  0717   GLUCOSE mg/dL 98 106* 95         No results found for: \"TR1\"  Lab Results   Component Value Date    BLOODCULT No growth at 4 days -  FINAL REPORT 04/17/2024            Imaging   Vascular US upper extremity venous duplex left  Narrative: Interpreted By:  Judson Oleary,   STUDY:  [facility] US UPPER EXTREMITY VENOUS DUPLEX LEFT;  5/4/2024 5:47 am    "   INDICATION:  Signs/Symptoms:swelling in Left upper extremity.      COMPARISON:  None.      ACCESSION NUMBER(S):  [id number]      ORDERING CLINICIAN:  JOHN DE LEÓN      TECHNIQUE:  Vascular ultrasound of the  left upper extremity was performed.  Evaluation was performed with grayscale, color, and spectral Doppler.  When possible, compression views of the evaluated veins was also  performed.      FINDINGS:  Evaluation of the visualized portions of the  left internal jugular,  innominate, subclavian, axillary, and brachial cephalic, and basilic  veins was performed.      There is normal respiratory variation, normal compressibility, as  well as normal color doppler signal in the visualized vessels. There  is no evidence of thrombus.      Impression: No evidence for thrombus involving the visualized veins of the left  upper extremity, as detailed above.      MACRO:  None      Signed by: Judson Oleary 5/4/2024 6:09 AM  Dictation workstation:   [id number]     No results found for this or any previous visit from the past 1095 days.     Encounter Date: 04/30/24   ECG 12 lead   Result Value    Ventricular Rate 78    Atrial Rate 79    TX Interval 164    QRS Duration 123    QT Interval 412    QTC Calculation(Bazett) 470    P Axis 78    R Axis 65    T Axis 59    QRS Count 13    Q Onset 249    T Offset 455    QTC Fredericia 449    Narrative    Sinus rhythm      Confirmed by Víctor Regan ([id number]) on 4/30/2024 3:16:02 PM          Assessment and Plan     GI bleed: Hemoglobin at 8.1 from 8.4 up from 6.9 after transfusion of 1 unit.  No signs of further bleeding.  H. pylori is negative  -Continue twice daily PPI  -Trend hemoglobin and monitor stools for any gross blood losses  -Keep active type and screen at this time as well as maintain good IV access  -Transfuse for hemoglobin less than 7.    -Continue diet as tolerated  -Patient aware to avoid future use of NSAIDs  -Follow-up biopsy results  -If patient has  significant rebleed will alert GI/interventional radiology     Thrombocytopenia: Appears to be resolved with platelets over 150 now  -NTD     Peripheral artery disease: With significant discomfort still  -Monitoring GI bleed.    -Follow-up with vascular surgery.  Tentatively following up as outpatient to determine revascularization plan  -Wound care to ulcer  -Pain control     Cardiovascular: Patient's blood pressure is better this afternoon  -Continue losartan  -Can resume aspirin at this time if okay with GI service  -Can resume Jardiance and spironolactone at this time  -CK remains elevated .  Unsure if related to poor blood flow to her right leg.  Will look to restart statin.  Will need to clarify home statin use     Acute renal failure: Appears to have resolved  -Monitor renal function panel  -Avoid NSAIDs and shock     DVT prophylaxis     Physical therapy/Occupational Therapy when appropriate    Maximino Aragon MD     Of note the above was done with Dragon dictation system.  Note was proofread to minimize errors.

## 2024-05-06 NOTE — CARE PLAN
The patient's goals for the shift include  pain control    The clinical goals for the shift include patient will remain safe and free from any falls and/or injuries overnight      Over the shift, the patient did make progress toward the following goals. No further complaints of pain after PRN medication given at change of shift, no other issues overnight.

## 2024-05-06 NOTE — PROGRESS NOTES
"Brooklyn Read is a 79 y.o. female on day 6 of admission presenting with Upper GI bleed.    Subjective   NAEON. Reports no further bloody bowel movements.     Objective     Physical Exam  Constitutional:       Comments: Alert, thin, ill-appearing   HENT:      Head: Normocephalic.      Nose: Nose normal.      Mouth/Throat:      Mouth: Mucous membranes are moist.   Eyes:      Extraocular Movements: Extraocular movements intact.      Pupils: Pupils are equal, round, and reactive to light.   Cardiovascular:      Rate and Rhythm: Normal rate and regular rhythm.   Pulmonary:      Effort: Pulmonary effort is normal.      Breath sounds: Normal breath sounds.   Musculoskeletal:      Cervical back: Normal range of motion.      Comments: BLE warm.   LLE: Dopplerable L AT, some chronic appearing changes to LLE.  RLE: Ankle wound on R posterior leg. Appears clean. No dopplerable signals in RLE   Skin:     General: Skin is warm.   Neurological:      Mental Status: She is alert.      Comments: Unable to move RLE (baseline)   Psychiatric:         Mood and Affect: Mood normal.         Behavior: Behavior normal.         Last Recorded Vitals  Blood pressure 138/64, pulse 89, temperature 36.6 °C (97.9 °F), temperature source Temporal, resp. rate 16, height 1.753 m (5' 9\"), weight 74.6 kg (164 lb 7.4 oz), SpO2 93%.  Intake/Output last 3 Shifts:  I/O last 3 completed shifts:  In: - (0 mL/kg)   Out: 1350 (18.1 mL/kg) [Urine:1350 (0.5 mL/kg/hr)]  Weight: 74.6 kg     Relevant Results  Results for orders placed or performed during the hospital encounter of 04/30/24 (from the past 24 hour(s))   POCT GLUCOSE   Result Value Ref Range    POCT Glucose 92 74 - 99 mg/dL   CBC and Auto Differential   Result Value Ref Range    WBC 14.3 (H) 4.4 - 11.3 x10*3/uL    nRBC 0.0 0.0 - 0.0 /100 WBCs    RBC 2.96 (L) 4.00 - 5.20 x10*6/uL    Hemoglobin 8.4 (L) 12.0 - 16.0 g/dL    Hematocrit 26.8 (L) 36.0 - 46.0 %    MCV 91 80 - 100 fL    MCH 28.4 26.0 - 34.0 " pg    MCHC 31.3 (L) 32.0 - 36.0 g/dL    RDW 18.8 (H) 11.5 - 14.5 %    Platelets 151 150 - 450 x10*3/uL    Neutrophils % 72.5 40.0 - 80.0 %    Immature Granulocytes %, Automated 4.3 (H) 0.0 - 0.9 %    Lymphocytes % 7.9 13.0 - 44.0 %    Monocytes % 13.3 2.0 - 10.0 %    Eosinophils % 1.6 0.0 - 6.0 %    Basophils % 0.4 0.0 - 2.0 %    Neutrophils Absolute 10.38 (H) 1.60 - 5.50 x10*3/uL    Immature Granulocytes Absolute, Automated 0.62 (H) 0.00 - 0.50 x10*3/uL    Lymphocytes Absolute 1.13 0.80 - 3.00 x10*3/uL    Monocytes Absolute 1.90 (H) 0.05 - 0.80 x10*3/uL    Eosinophils Absolute 0.23 0.00 - 0.40 x10*3/uL    Basophils Absolute 0.06 0.00 - 0.10 x10*3/uL   Renal function panel   Result Value Ref Range    Glucose 106 (H) 74 - 99 mg/dL    Sodium 140 136 - 145 mmol/L    Potassium 3.6 3.5 - 5.3 mmol/L    Chloride 109 (H) 98 - 107 mmol/L    Bicarbonate 23 21 - 32 mmol/L    Anion Gap 12 10 - 20 mmol/L    Urea Nitrogen 16 6 - 23 mg/dL    Creatinine 0.63 0.50 - 1.05 mg/dL    eGFR >90 >60 mL/min/1.73m*2    Calcium 7.5 (L) 8.6 - 10.6 mg/dL    Phosphorus 2.5 2.5 - 4.9 mg/dL    Albumin 2.4 (L) 3.4 - 5.0 g/dL   Creatine Kinase   Result Value Ref Range    Creatine Kinase 1,295 (H) 0 - 215 U/L   POCT GLUCOSE   Result Value Ref Range    POCT Glucose 129 (H) 74 - 99 mg/dL   POCT GLUCOSE   Result Value Ref Range    POCT Glucose 101 (H) 74 - 99 mg/dL   CBC and Auto Differential   Result Value Ref Range    WBC 11.5 (H) 4.4 - 11.3 x10*3/uL    nRBC 0.0 0.0 - 0.0 /100 WBCs    RBC 2.75 (L) 4.00 - 5.20 x10*6/uL    Hemoglobin 8.1 (L) 12.0 - 16.0 g/dL    Hematocrit 25.3 (L) 36.0 - 46.0 %    MCV 92 80 - 100 fL    MCH 29.5 26.0 - 34.0 pg    MCHC 32.0 32.0 - 36.0 g/dL    RDW 18.8 (H) 11.5 - 14.5 %    Platelets 182 150 - 450 x10*3/uL    Neutrophils %      Immature Granulocytes %, Automated      Lymphocytes %      Monocytes %      Eosinophils %      Basophils %      Neutrophils Absolute      Lymphocytes Absolute      Monocytes Absolute       Eosinophils Absolute      Basophils Absolute     Comprehensive Metabolic Panel   Result Value Ref Range    Glucose 98 74 - 99 mg/dL    Sodium 141 136 - 145 mmol/L    Potassium 4.0 3.5 - 5.3 mmol/L    Chloride 109 (H) 98 - 107 mmol/L    Bicarbonate 24 21 - 32 mmol/L    Anion Gap 12 10 - 20 mmol/L    Urea Nitrogen 15 6 - 23 mg/dL    Creatinine 0.67 0.50 - 1.05 mg/dL    eGFR 90 >60 mL/min/1.73m*2    Calcium 7.4 (L) 8.6 - 10.6 mg/dL    Albumin 2.3 (L) 3.4 - 5.0 g/dL    Alkaline Phosphatase 62 33 - 136 U/L    Total Protein 4.6 (L) 6.4 - 8.2 g/dL    AST 50 (H) 9 - 39 U/L    Bilirubin, Total 0.4 0.0 - 1.2 mg/dL    ALT 32 7 - 45 U/L   Magnesium   Result Value Ref Range    Magnesium 1.91 1.60 - 2.40 mg/dL   Phosphorus   Result Value Ref Range    Phosphorus 2.7 2.5 - 4.9 mg/dL   POCT GLUCOSE   Result Value Ref Range    POCT Glucose 101 (H) 74 - 99 mg/dL         Assessment/Plan   79F with PMH of HTN, HLD, COPD, DM, CKD, prior CVA, osteopenia, arthritis who presents as a transfer from [facility] with upper GI bleed. Vascular surgery consulted due to no dopplerable pulses in BL LE (R>L) noted on arrival to [facility]. Now s/p EGD and clipping on 5/3.     No further GI bleeds. RLE still with wound, but warm. Likely chronic ischemia of RLE. Exam with no dopplerable signals in RLE and decreased motor function in RLE, but patient states that this has been ongoing. However, given recent GI bleed, no acute intervention is planned    -Will plan for 2 week outpatient follow up at discharge to determine revascularization plan.    Patient discussed with attending Dr. Karsten Mckeon MD  PGY-3 General Surgery

## 2024-05-06 NOTE — PROGRESS NOTES
Occupational Therapy    OT Treatment    Patient Name: Brooklyn Read  MRN: [id number]  Today's Date: 5/6/2024  Time Calculation  Start Time: 1415  Stop Time: 1430  Time Calculation (min): 15 min         Assessment:  Prognosis: Good  Barriers to Discharge: None  Evaluation/Treatment Tolerance: Patient limited by fatigue, Patient limited by pain  End of Session Communication: PCT/NA/CTA, Bedside nurse  End of Session Patient Position: Up in chair, Alarm off, not on at start of session  OT Assessment Results: Decreased ADL status, Decreased endurance, Decreased functional mobility  Prognosis: Good  Barriers to Discharge: None  Evaluation/Treatment Tolerance: Patient limited by fatigue, Patient limited by pain  Plan:  Treatment Interventions: ADL retraining, UE strengthening/ROM, Endurance training, Patient/family training, Neuromuscular reeducation, Visual perceptual retraining, Functional transfer training, Cognitive reorientation, Equipment evaluation/education, Fine motor coordination activities, Compensatory technique education  OT Frequency: 3 times per week  OT Discharge Recommendations: Moderate intensity level of continued care  OT Recommended Transfer Status: Assist of 2  OT - OK to Discharge: Yes (when medically appropriate)  Treatment Interventions: ADL retraining, UE strengthening/ROM, Endurance training, Patient/family training, Neuromuscular reeducation, Visual perceptual retraining, Functional transfer training, Cognitive reorientation, Equipment evaluation/education, Fine motor coordination activities, Compensatory technique education    Subjective   Previous Visit Info:  OT Last Visit  OT Received On: 05/06/24  General:  General  Reason for Referral: 78 y/o F to OSH with increased RLE pain and abdominal pain; transferred to [facility] with UGIB. s/p EGD on 5/2  Past Medical History Relevant to Rehab: osteopenia, COPD, anemia, CVA, CKD, Depression, HLD and HTN, Diastolic HF, chronic RLE wound; reports 2 falls  over the last 6 months  Family/Caregiver Present: No  Co-Treatment: PT  Co-Treatment Reason: Co-treatment with PT for pt safety and to optimize pt's therapeutic potenital  Prior to Session Communication: Bedside nurse  Patient Position Received: Bed, 3 rail up, Alarm off, not on at start of session  Preferred Learning Style: visual, verbal  General Comment: Pt pleasant and agreeable to therapy  Precautions:  Medical Precautions: Fall precautions  Vital Signs:  Vital Signs  SpO2: 98 %  Pain:  Pain Assessment  Pain Assessment: 0-10  Pain Score: 6  Pain Type: Acute pain  Pain Location: Foot  Pain Orientation: Right    Objective    Cognition:  Cognition  Overall Cognitive Status: Within Functional Limits  Arousal/Alertness: Appropriate responses to stimuli  Orientation Level: Oriented X4  Following Commands: Follows one step commands without difficulty  Insight: Mild    Activities of Daily Living:    Grooming  Grooming Level of Assistance: Setup  Grooming Where Assessed: Chair  Grooming Comments: Set-up provided for seated hair and face care in chair, unable to progress to standing as pt requires BUE support at this time  UE Dressing  UE Dressing Level of Assistance: Minimum assistance  UE Dressing Where Assessed: Chair  UE Dressing Comments: don/doff gown  Toileting  Toileting Level of Assistance: Dependent  Toileting Comments: Pt incontinent, use of purewick, requires increased A for claire care in standing    Bed Mobility/Transfers: Bed Mobility  Bed Mobility: Yes  Bed Mobility 1  Bed Mobility 1: Supine to sitting  Level of Assistance 1: Moderate assistance  Bed Mobility Comments 1: HOB elevated, increased time, verbal cueing for mechanics and positioning. Increased assistance for weightshifting and supporting trunk to upright positioning.    Transfers  Transfer: Yes  Transfer 1  Transfer From 1: Bed to  Transfer to 1: Stand  Technique 1: Sit to stand  Transfer Level of Assistance 1: Moderate assistance,  +2  Trials/Comments 1: Instructions for hand placement, posturing  Transfers 2  Transfer From 2: Stand to  Transfer to 2: Chair with arms  Technique 2: Stand to sit, To left  Transfer Level of Assistance 2: Moderate assistance, +2  Trials/Comments 2: Verbal cueing for mobility sequence, increased physical assist to guide and control trunk into chair.      Functional Mobility:  Functional Mobility  Functional Mobility Performed: No (unsafe to progress)  Sitting Balance:  Static Sitting Balance  Static Sitting-Balance Support: Bilateral upper extremity supported  Static Sitting-Level of Assistance: Close supervision    Outcome Measures:[facility] Daily Activity  Putting on and taking off regular lower body clothing: Total  Bathing (including washing, rinsing, drying): A lot  Putting on and taking off regular upper body clothing: A little  Toileting, which includes using toilet, bedpan or urinal: Total  Taking care of personal grooming such as brushing teeth: A little  Eating Meals: None  Daily Activity - Total Score: 14    Education Documentation  Body Mechanics, taught by Melody Duncan OT at 5/6/2024  2:45 PM.  Learner: Patient  Readiness: Acceptance  Method: Explanation, Demonstration  Response: Verbalizes Understanding, Demonstrated Understanding, Needs Reinforcement    ADL Training, taught by Melody Duncan OT at 5/6/2024  2:45 PM.  Learner: Patient  Readiness: Acceptance  Method: Explanation, Demonstration  Response: Verbalizes Understanding, Demonstrated Understanding, Needs Reinforcement    EDUCATION:  Education  Individual(s) Educated: Patient  Education Provided: Diagnosis & Precautions, Fall precautons, Risk and benefits of OT discussed with patient or other, POC discussed and agreed upon  Patient Response to Education: Patient/Caregiver Verbalized Understanding of Information    Goals:  Encounter Problems       Encounter Problems (Active)       ADLs       Patient will complete toileting with MIN assist  and min cues.   (Not Progressing)       Start:  05/03/24    Expected End:  05/17/24            Patient will complete LB dressing with MIN assist and min cues.   (Not Progressing)       Start:  05/03/24    Expected End:  05/17/24            Patient will complete UB dressing with set up assist and min cues.   (Progressing)       Start:  05/03/24    Expected End:  05/17/24            Patient will complete grooming with set up assist.   (Met)       Start:  05/03/24    Expected End:  05/17/24    Resolved:  05/06/24                BALANCE       Patient will demo standing balance with MIN A for at least 3 min in prep for standing ADLs.  (Progressing)       Start:  05/03/24    Expected End:  05/17/24               MOBILITY       Patient will complete bed mobility with MIN A and min cues.    (Progressing)       Start:  05/03/24    Expected End:  05/17/24            Pt. Will demo household distance functional mobility with MIN A using LRAD.   (Not Progressing)       Start:  05/03/24    Expected End:  05/17/24               TRANSFERS       Pt. Will complete stand pivot transfer with MIN assist with min cues and using LRAD.   (Progressing)       Start:  05/03/24    Expected End:  05/17/24

## 2024-05-06 NOTE — PROGRESS NOTES
Brooklyn Read is a 79 y.o. female on day 6 of admission presenting with Upper GI bleed.    Subjective   Chart reviewed and noted that patient is recommended for moderate intensity therapy at discharge. Patient is medically ready for discharge per primary team.    Met with patient at bedside to further discuss therapy recommendations; she reports agreement with discharging to SNF. List was provided and requested that 3-4 facilities be selected. Patient agreeable and aware that SW will follow up in the morning.    Updated PT/OT notes requested for precert.    -Candace MCKINNEY MA, LSW  [phone] or [facility]  Care Transitions

## 2024-05-06 NOTE — PROGRESS NOTES
[facility]  [facility] [place]  CONSULT FOLLOW-UP         SUBJECTIVE     Reason for Consult: Hematemesis/Melena      Interval Events/Subjective:   Hb remained stable. No requirement of further blood products. No BM overnight.     ROS: Complete ROS obtained, negative unless otherwise indicated above.     Medications:  buPROPion XL, 150 mg, oral, Daily  tiotropium, 2 puff, inhalation, Daily   And  fluticasone furoate-vilanteroL, 1 puff, inhalation, Daily  insulin lispro, 0-5 Units, subcutaneous, TID with meals  losartan, 100 mg, oral, Daily  pantoprazole, 40 mg, oral, BID AC  polyethylene glycol, 17 g, oral, Daily  povidone-iodine, , Topical, Daily  sertraline, 100 mg, oral, Daily      PRN medications: albuterol, dextrose, glucagon, HYDROmorphone, oxyCODONE      EXAM     Physical Exam       5/5/2024     5:51 AM 5/5/2024     6:34 AM 5/5/2024    11:53 AM 5/5/2024     9:09 PM 5/6/2024     4:41 AM 5/6/2024     9:19 AM 5/6/2024    11:00 AM   Vitals   Systolic 172 145 131 143 138 149 157   Diastolic 73 65 67 77 64 64 67   Heart Rate 91 92 16 105 89 103 89   Temp   36.6 °C (97.9 °F) 36.4 °C (97.5 °F) 36.6 °C (97.9 °F) 36.5 °C (97.7 °F) 36.8 °C (98.2 °F)   Resp 17  16 18 16 18 18       General: NAD, AA&O x 3  Eyes: EOMI, PERRLA  ENT: MMM  Heart: RRR  Lungs: No respiratory distress  Abdomen: Soft, non tender, non distended  Skin: No jaundice   Neuro: Appropriately responds to questions/commands         DATA                                                                            Labs     Results for orders placed or performed during the hospital encounter of 04/30/24 (from the past 24 hour(s))   CBC and Auto Differential   Result Value Ref Range    WBC 11.5 (H) 4.4 - 11.3 x10*3/uL    nRBC 0.0 0.0 - 0.0 /100 WBCs    RBC 2.75 (L) 4.00 - 5.20 x10*6/uL    Hemoglobin 8.1 (L) 12.0 - 16.0 g/dL    Hematocrit 25.3 (L) 36.0 - 46.0 %    MCV 92 80 - 100 fL    MCH 29.5 26.0 - 34.0 pg    MCHC  32.0 32.0 - 36.0 g/dL    RDW 18.8 (H) 11.5 - 14.5 %    Platelets 182 150 - 450 x10*3/uL    Immature Granulocytes %, Automated 6.8 (H) 0.0 - 0.9 %    Immature Granulocytes Absolute, Automated 0.78 (H) 0.00 - 0.50 x10*3/uL   Comprehensive Metabolic Panel   Result Value Ref Range    Glucose 98 74 - 99 mg/dL    Sodium 141 136 - 145 mmol/L    Potassium 4.0 3.5 - 5.3 mmol/L    Chloride 109 (H) 98 - 107 mmol/L    Bicarbonate 24 21 - 32 mmol/L    Anion Gap 12 10 - 20 mmol/L    Urea Nitrogen 15 6 - 23 mg/dL    Creatinine 0.67 0.50 - 1.05 mg/dL    eGFR 90 >60 mL/min/1.73m*2    Calcium 7.4 (L) 8.6 - 10.6 mg/dL    Albumin 2.3 (L) 3.4 - 5.0 g/dL    Alkaline Phosphatase 62 33 - 136 U/L    Total Protein 4.6 (L) 6.4 - 8.2 g/dL    AST 50 (H) 9 - 39 U/L    Bilirubin, Total 0.4 0.0 - 1.2 mg/dL    ALT 32 7 - 45 U/L   Magnesium   Result Value Ref Range    Magnesium 1.91 1.60 - 2.40 mg/dL   Phosphorus   Result Value Ref Range    Phosphorus 2.7 2.5 - 4.9 mg/dL   Manual Differential   Result Value Ref Range    Neutrophils %, Manual 83.6 40.0 - 80.0 %    Lymphocytes %, Manual 4.3 13.0 - 44.0 %    Monocytes %, Manual 8.6 2.0 - 10.0 %    Eosinophils %, Manual 2.6 0.0 - 6.0 %    Basophils %, Manual 0.0 0.0 - 2.0 %    Promyelocytes %, Manual 0.9 0.0 - 0.0 %    Seg Neutrophils Absolute, Manual 9.61 (H) 1.60 - 5.00 x10*3/uL    Lymphocytes Absolute, Manual 0.49 (L) 0.80 - 3.00 x10*3/uL    Monocytes Absolute, Manual 0.99 (H) 0.05 - 0.80 x10*3/uL    Eosinophils Absolute, Manual 0.30 0.00 - 0.40 x10*3/uL    Basophils Absolute, Manual 0.00 0.00 - 0.10 x10*3/uL    Promyelocytes Absolute, Manual 0.10 0.00 - 0.00 x10*3/uL    Total Cells Counted 116     RBC Morphology No significant RBC morphology present    POCT GLUCOSE   Result Value Ref Range    POCT Glucose 101 (H) 74 - 99 mg/dL   POCT GLUCOSE   Result Value Ref Range    POCT Glucose 104 (H) 74 - 99 mg/dL   POCT GLUCOSE   Result Value Ref Range    POCT Glucose 95 74 - 99 mg/dL                                                                                 Imaging           XR chest 1 view    Result Date: 5/1/2024  Interpreted By:  Riley Singer and Marshall Colin STUDY: XR CHEST 1 VIEW;  5/1/2024 1:46 am   INDICATION: Signs/Symptoms:C/f PNA vs fluid overload.   COMPARISON: Chest radiograph dated 10/31/2019.CT abdomen and pelvis dated 04/30/2024.   ACCESSION NUMBER(S): [id number]   ORDERING CLINICIAN: JOHN SIMMONS   FINDINGS: AP radiograph of the chest was provided.   CARDIOMEDIASTINAL SILHOUETTE: Cardiomediastinal silhouette is normal in size and configuration. Aortic knob calcifications again noted.   LUNGS: No focal consolidation, pleural effusion, or pneumothorax.   ABDOMEN: No remarkable upper abdominal findings.   BONES: No acute osseous changes.       No evidence of acute cardiopulmonary process.   I personally reviewed the images/study and I agree with the resident findings as stated. This study was interpreted at [place], [place].   MACRO: None   Signed by: Riley Singer 5/1/2024 8:54 AM Dictation workstation:   [id number]    ECG 12 lead    Result Date: 4/30/2024  Sinus rhythm Confirmed by Víctor Regan ([id number]) on 4/30/2024 3:16:02 PM    EGD    Addendum Date: 4/30/2024    Impression The cricopharynx, upper third of the esophagus, middle third of the esophagus, lower third of the esophagus, GE junction, duodenal bulb, 1st part of the duodenum and 2nd part of the duodenum appeared normal. Ulcer in the prepyloric region with adherent clot (Boogie IIB) Large old clot in the Gastric fundus Findings The cricopharynx, upper third of the esophagus, middle third of the esophagus, lower third of the esophagus, GE junction, duodenal bulb, 1st part of the duodenum and 2nd part of the duodenum appeared normal. Deep, irregular ulcer in the prepyloric region with adherent clot (Boogie IIB). Large Deep Ulcer superior aspect of Pre-pyloric Antrum : with large adherent  blood clot . NOT actively bleeding Large old clot in the Gastric fundus Recommendation  Follow up with PCP  Admit to the ICU NPO Avoid use of  NSAIDs Protonix 40mg I.v. Q 12 hours Repeat EGD in a.m.  Indication Upper GI bleed Staff Staff Role Meir Bauer MD Proceduralist Medications See Anesthesia Record. Preprocedure A history and physical has been performed, and patient medication allergies have been reviewed. The patient's tolerance of previous anesthesia has been reviewed. The risks and benefits of the procedure and the sedation options and risks were discussed with the patient. All questions were answered and informed consent obtained. Details of the Procedure The patient underwent general anesthesia, which was administered by an anesthesia professional. The patient's blood pressure, ECG, ETCO2, heart rate, level of consciousness, respirations and oxygen were monitored throughout the procedure. The scope was introduced through the mouth and advanced to the second part of the duodenum. Retroflexion was performed in the cardia. Prior to the procedure, the patient's H. Pylori status was unknown. The patient experienced no blood loss. The procedure was not difficult. The patient tolerated the procedure well. There were no apparent adverse events. Events Procedure Events Event Event Time ENDO SCOPE IN TIME 4/30/2024  2:24 PM ENDO SCOPE OUT TIME 4/30/2024  2:35 PM Specimens No specimens collected Procedure Location [address] [id number] [phone] Referring Provider No referring provider defined for this encounter. Procedure Provider No name on file    Result Date: 4/30/2024  Table formatting from the original result was not included. Impression The cricopharynx, upper third of the esophagus, middle third of the esophagus, lower third of the esophagus, GE junction, duodenal bulb, 1st part of the duodenum and 2nd part of the duodenum appeared  normal. Ulcer in the prepyloric region with adherent clot (Boogie IIB) Large old clot in the Gastric fundus Findings The cricopharynx, upper third of the esophagus, middle third of the esophagus, lower third of the esophagus, GE junction, duodenal bulb, 1st part of the duodenum and 2nd part of the duodenum appeared normal. Deep, irregular ulcer in the prepyloric region with adherent clot (Boogie IIB). Large Deep Ulcer superior aspect of Pre-pyloric Antrum : with large adherent blood clot . NOT actively bleeding Large old clot in the Gastric fundus Recommendation  Follow up with PCP  Admit to the ICU NPO Protonix 40mg I.v. Q 12 hours Repeat EGD in a.m.  Indication Upper GI bleed Staff Staff Role Meir Bauer MD Proceduralist Medications See Anesthesia Record. Preprocedure A history and physical has been performed, and patient medication allergies have been reviewed. The patient's tolerance of previous anesthesia has been reviewed. The risks and benefits of the procedure and the sedation options and risks were discussed with the patient. All questions were answered and informed consent obtained. Details of the Procedure The patient underwent general anesthesia, which was administered by an anesthesia professional. The patient's blood pressure, ECG, ETCO2, heart rate, level of consciousness, respirations and oxygen were monitored throughout the procedure. The scope was introduced through the mouth and advanced to the second part of the duodenum. Retroflexion was performed in the cardia. Prior to the procedure, the patient's H. Pylori status was unknown. The patient experienced no blood loss. The procedure was not difficult. The patient tolerated the procedure well. There were no apparent adverse events. Events Procedure Events Event Event Time ENDO SCOPE IN TIME 4/30/2024  2:24 PM ENDO SCOPE OUT TIME 4/30/2024  2:35 PM Specimens No specimens collected Procedure Location formerly [facility]  [place] [address] [facility] [address] [phone] Referring Provider No referring provider defined for this encounter. Procedure Provider No name on file     CT abdomen pelvis w IV contrast    Result Date: 4/30/2024  Interpreted By:  Leon Winter, STUDY: CT ABDOMEN PELVIS W IV CONTRAST;  4/30/2024 10:07 am   INDICATION: Signs/Symptoms:vomiting blood.   COMPARISON: None.   ACCESSION NUMBER(S): [id number]   ORDERING CLINICIAN: BERNARD AHUJA   TECHNIQUE: Contiguous axial images were obtained through the abdomen and pelvis after the administration of  75 mL Omnipaque 350 intravenous contrast. Coronal and sagittal reformations were made.   FINDINGS: LOWER CHEST: Lung bases are clear.   ABDOMEN:   LIVER: Within normal limits.   BILE DUCTS: Nondilated.   GALLBLADDER: The gallbladder is not distended and without calcified stones.   PANCREAS: Within normal limits.   SPLEEN: Within normal limits.   ADRENAL GLANDS: Within normal limits.   KIDNEYS, URETERS, and BLADDER: The kidneys enhance symmetrically without focal lesion. Cortical scarring seen bilaterally, likely vascular. No hydroureteronephrosis bilaterally.Bladder unremarkable.   VESSELS: There is no aneurysmal dilatation of the abdominal aorta. Generalized atherosclerotic disease of the aortoiliac tree. The IVC is within normal limits.   BOWEL: Focal haziness along the inferior posterior aspect of the gastric pylorus. There is a tiny high density focus in this region. No marj perforation. High density amorphous material within the gastric lumen can be seen with clot. No obstruction. Marked colonic diverticulosis about the sigmoid in particular.   PERITONEUM/RETROPERITONEUM/LYMPH NODES: No ascites or free air, no fluid collection.   No retroperitoneal fluid collection or lymphadenopathy.   REPRODUCTIVE ORGANS: Status post hysterectomy.   ABDOMINAL WALL: Unremarkable.   BONE AND SOFT TISSUE: Bones are intact.       1. Haziness along the inferior posterior  margin of the gastric pylorus possibly related to focal peptic ulcer disease. No marj perforation. A tiny hyperdense focus in this region is identified which could relate to a small focus of extraluminal contrast. No discrete layering identified to confirm active bleeding, however, this is a single phase study. 2. Cortical scarring bilaterally likely vascular in nature.     Signed by: Leon Winter 4/30/2024 10:24 AM Dictation workstation:   [id number]                                                                            GI Procedures     4/30/2024   Impression  The cricopharynx, upper third of the esophagus, middle third of the esophagus, lower third of the esophagus, GE junction, duodenal bulb, 1st part of the duodenum and 2nd part of the duodenum appeared normal.  Ulcer in the prepyloric region with adherent clot (Boogie IIB)  Large old clot in the Gastric fundus     5/1/2024   Findings  The esophagus appeared normal. No esophageal varices. No signs of GI bleed.  Regular Z-line 35 cm from the incisors  Single deep, irregular ulcer in the prepyloric region with adherent clot (Boogie IIB); injected epinephrine to address bleeding. Large deep ulcer with large adherent blood clot (20~25mm) at inferior posterior aspect of pre-pylorus. Epinephrine was injected into four quadrants around the ulcer. Clot retrieval with snares were attempted but unsuccessful due to difficult location. One clip was placed for identification.  The duodenal bulb, 1st part of the duodenum and 2nd part of the duodenum appeared normal. No ulcers or AVM. No signs of GI bleed.       5/2/2024   The esophagus appeared normal. No evidence of esophagitis, hiatal hernia, or Lugo's esophagus.  Moderate, patchy atrophic and erythematous mucosa in the body of the stomach and antrum, consistent with gastritis; performed cold forceps biopsy to rule out H. pylori;  Single large, cratered, round ulcer in the prepyloric region with adherent clot  (Boogie IIB); placed 1 clip successfully (clip is MRI compatible); hemostasis achieved; induced coagulation and hemostasis achieved with 4 applications of with heater probe; injected 5.5 mL of epinephrine to address bleeding; hemostasis achieved. A previously placed clip was seen the outside margin of the ulcer. Another previously placed clip was seen at the interior margin of the ulcer. A hemoclip was used to guillotine the adherent clot off the ulcer by placing it at the white fibrin base and pulling off. There was mild oozing noted consistent with underlying vessel. Epinephrine injection was used on either side of the oozing with significant slowing of the bleeding. Heater probe coagulation resulted in complete cessation. A Doppler probe was used to interrogate the ulcer and area treated with no signal at low and medium settings.  The cardia and fundus of the stomach appeared normal.  Mild, patchy erythematous mucosa in the duodenal bulb;  The 2nd part of the duodenum appeared normal.  Due to [employer] - Epic error with account number, several of the images from the procedure were not attached to this procedure.       ASSESSMENT / PLAN     Assessment and Recommendations:     Brooklyn Read is a 79 y.o. female w/PMH of COPD, osteopenia, HTN and HLD who is transferred from [facility] for upper GI bleeding.      #Upper GI bleeding   #Acute blood loss anemia   Ms Read initially presented to OSH with symptoms of upper GI bleeding including hematemesis and melena. She was hemodynamically unstable and  Her laboratory work up was pertinent for acute blood loss anemia with hgb 6.7 which failed to correct with one unit of PRBC. EGD (4/30) done at [facility] appeared to show two large forest IIB ulcers in the prepyloric/antral area. No active bleeding was noted and no intervention was done.  She was transferred to [facility] after she experienced an additional episode of hematemesis associated with hypotension and requiring  pressors. Following transfer, her pressors were weaned off and repeat Hgb was 11 (appropriately corrected). Her PUD likely due to NSAID use. Pt underwent EGD 5/1/2024 which showed single deep, irregular ulcer in the prepyloric region with adherent clot (Boogie IIB). Unable to retrieve clot. Epinephrine was injected. Patient continued to have signs of active GI bleed. Repeat EGD 5/2/2024 again showed single large, cratered, round ulcer in the prepyloric region with adherent clot (Boogie IIB). A hemoclip was used to guillotine the adherent clot off the ulcer by placing it at the white fibrin base and pulling off. There was mild oozing noted consistent with underlying vessel. Epinephrine injection was used on either side of the oozing with significant slowing of the bleeding. Heater probe coagulation resulted in complete cessation. A Doppler probe was used to interrogate the ulcer and area treated with no signal at low and medium settings. Post-EGD, patient has been doing great. No signs of GI bleed.     Recommendations:   - Patient to be on PO PPI BID x 12 weeks. Re-assess gastric ulcers with EGD in 3 mos (ordred)   - Can advance diet   - Follow stool HP studies/gastric biopsies   - Trend CBC, transfuse to goal Hgb >7 and platelets >50   - Maintain two large bore IVs and active type and screen   - Hold anticoagulation and antiplatelet agents; Avoid NSAIDs    - Monitor for bleeding, if patient develops significant bleeding with hemodynamic instability, please call GI fellow on call   - Ensure Hb > 7, platelets > 50, K > 4, and Mg > 2 on day of procedure     BRITTA per primary team     ------------------------------------------------------------------------  Flora Ryan MD  Gastroenterology Fellow  After 5PM and on Weekends, please page on-call fellow.    Case discussed with service attending Dr. Castillo. GI will sign off.

## 2024-05-06 NOTE — PROGRESS NOTES
"Brooklyn Read is a 79 y.o. female on day 6 of admission presenting with Upper GI bleed.    Subjective   No acute events overnight. Pt resting comfortably. Reports continued pain in her RLE.  Denies hematemesis, hematochezia, melena, CP, SOB, dizziness, lightheadedness, LE swelling, and ABD pain.        Objective     Physical Exam  Constitutional:       General: She is not in acute distress.     Appearance: She is not toxic-appearing.   HENT:      Head: Normocephalic and atraumatic.   Cardiovascular:      Rate and Rhythm: Normal rate and regular rhythm.      Comments: Decreased pedal pulse in the RLE with dry, mottled skin. Clean wound dressing on distal RLE.   Pulmonary:      Effort: Pulmonary effort is normal.      Breath sounds: Normal breath sounds.   Abdominal:      General: Abdomen is flat. There is no distension.      Palpations: Abdomen is soft.      Tenderness: There is no abdominal tenderness.   Musculoskeletal:      Right lower leg: No edema.      Left lower leg: No edema.   Skin:     Capillary Refill: Capillary refill takes 2 to 3 seconds.   Neurological:      General: No focal deficit present.      Mental Status: She is alert.   Psychiatric:         Mood and Affect: Mood normal.         Behavior: Behavior normal.         Last Recorded Vitals  Blood pressure 157/67, pulse 89, temperature 36.8 °C (98.2 °F), resp. rate 18, height 1.753 m (5' 9\"), weight 74.6 kg (164 lb 7.4 oz), SpO2 97%.  Intake/Output last 3 Shifts:  I/O last 3 completed shifts:  In: - (0 mL/kg)   Out: 1350 (18.1 mL/kg) [Urine:1350 (0.5 mL/kg/hr)]  Weight: 74.6 kg     Relevant Results        Scheduled medications  buPROPion XL, 150 mg, oral, Daily  tiotropium, 2 puff, inhalation, Daily   And  fluticasone furoate-vilanteroL, 1 puff, inhalation, Daily  insulin lispro, 0-5 Units, subcutaneous, TID with meals  losartan, 100 mg, oral, Daily  pantoprazole, 40 mg, oral, BID AC  polyethylene glycol, 17 g, oral, Daily  povidone-iodine, , " Topical, Daily  sertraline, 100 mg, oral, Daily      Continuous medications     PRN medications  PRN medications: albuterol, dextrose, glucagon, HYDROmorphone, oxyCODONE  Results for orders placed or performed during the hospital encounter of 04/30/24 (from the past 24 hour(s))   POCT GLUCOSE   Result Value Ref Range    POCT Glucose 101 (H) 74 - 99 mg/dL   CBC and Auto Differential   Result Value Ref Range    WBC 11.5 (H) 4.4 - 11.3 x10*3/uL    nRBC 0.0 0.0 - 0.0 /100 WBCs    RBC 2.75 (L) 4.00 - 5.20 x10*6/uL    Hemoglobin 8.1 (L) 12.0 - 16.0 g/dL    Hematocrit 25.3 (L) 36.0 - 46.0 %    MCV 92 80 - 100 fL    MCH 29.5 26.0 - 34.0 pg    MCHC 32.0 32.0 - 36.0 g/dL    RDW 18.8 (H) 11.5 - 14.5 %    Platelets 182 150 - 450 x10*3/uL    Immature Granulocytes %, Automated 6.8 (H) 0.0 - 0.9 %    Immature Granulocytes Absolute, Automated 0.78 (H) 0.00 - 0.50 x10*3/uL   Comprehensive Metabolic Panel   Result Value Ref Range    Glucose 98 74 - 99 mg/dL    Sodium 141 136 - 145 mmol/L    Potassium 4.0 3.5 - 5.3 mmol/L    Chloride 109 (H) 98 - 107 mmol/L    Bicarbonate 24 21 - 32 mmol/L    Anion Gap 12 10 - 20 mmol/L    Urea Nitrogen 15 6 - 23 mg/dL    Creatinine 0.67 0.50 - 1.05 mg/dL    eGFR 90 >60 mL/min/1.73m*2    Calcium 7.4 (L) 8.6 - 10.6 mg/dL    Albumin 2.3 (L) 3.4 - 5.0 g/dL    Alkaline Phosphatase 62 33 - 136 U/L    Total Protein 4.6 (L) 6.4 - 8.2 g/dL    AST 50 (H) 9 - 39 U/L    Bilirubin, Total 0.4 0.0 - 1.2 mg/dL    ALT 32 7 - 45 U/L   Magnesium   Result Value Ref Range    Magnesium 1.91 1.60 - 2.40 mg/dL   Phosphorus   Result Value Ref Range    Phosphorus 2.7 2.5 - 4.9 mg/dL   Manual Differential   Result Value Ref Range    Neutrophils %, Manual 83.6 40.0 - 80.0 %    Lymphocytes %, Manual 4.3 13.0 - 44.0 %    Monocytes %, Manual 8.6 2.0 - 10.0 %    Eosinophils %, Manual 2.6 0.0 - 6.0 %    Basophils %, Manual 0.0 0.0 - 2.0 %    Promyelocytes %, Manual 0.9 0.0 - 0.0 %    Seg Neutrophils Absolute, Manual 9.61 (H) 1.60 -  5.00 x10*3/uL    Lymphocytes Absolute, Manual 0.49 (L) 0.80 - 3.00 x10*3/uL    Monocytes Absolute, Manual 0.99 (H) 0.05 - 0.80 x10*3/uL    Eosinophils Absolute, Manual 0.30 0.00 - 0.40 x10*3/uL    Basophils Absolute, Manual 0.00 0.00 - 0.10 x10*3/uL    Promyelocytes Absolute, Manual 0.10 0.00 - 0.00 x10*3/uL    Total Cells Counted 116     RBC Morphology No significant RBC morphology present    POCT GLUCOSE   Result Value Ref Range    POCT Glucose 101 (H) 74 - 99 mg/dL   POCT GLUCOSE   Result Value Ref Range    POCT Glucose 104 (H) 74 - 99 mg/dL     Vascular US upper extremity venous duplex left    Result Date: 5/4/2024  Interpreted By:  Judson Oleary, STUDY: [facility] US UPPER EXTREMITY VENOUS DUPLEX LEFT;  5/4/2024 5:47 am   INDICATION: Signs/Symptoms:swelling in Left upper extremity.   COMPARISON: None.   ACCESSION NUMBER(S): [id number]   ORDERING CLINICIAN: JOHN DE LEÓN   TECHNIQUE: Vascular ultrasound of the  left upper extremity was performed. Evaluation was performed with grayscale, color, and spectral Doppler. When possible, compression views of the evaluated veins was also performed.   FINDINGS: Evaluation of the visualized portions of the  left internal jugular, innominate, subclavian, axillary, and brachial cephalic, and basilic veins was performed.   There is normal respiratory variation, normal compressibility, as well as normal color doppler signal in the visualized vessels. There is no evidence of thrombus.       No evidence for thrombus involving the visualized veins of the left upper extremity, as detailed above.   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 Arrived via [place] on RA and off pressors. Patient has had a total of 3 EGDS. EGD on 4/30 showed an ulcer in the prepyloric region with adherent clot as well as a large old clot in the gastric fundus. On 5/1/2024, epinephrine was injected into the ulcer in the prepyloric region. Clot was unable to be retrieved due to the location and patient continued to have bleeding. Repeat EGD 5/2/2024 again showed single large, cratered, round ulcer in the prepyloric region with adherent clot (Boogie IIB). A hemoclip was used to guillotine the adherent clot off the ulcer by placing it at the white fibrin base and pulling off. Epinephrine was injected on either side, heater probe coagulation resulted in stoppage of the bleeding. Patient has not had any bleeding following EGD on 5/2. Unable to doppler pulses on right foot, has discolored skin and normal temperature with decreased sensation. Vascular surgery consulted, pt will need revascularization. Per GI, okay to start trial anticoagulation.      Updates 5/6  - Vascular surgery recommends outpatient follow up in 2 weeks to determine revascularization plan    #Peptic Ulcer with hematemesis 2/2 ibuprofen use  :: EGD done 4/30 in [place] with multiple deep ulcers, no active bleeding  ::EGD done 5/1 shows Large deep ulcer with large adherent blood clot (20~25mm) at inferior posterior aspect of pre-pylorus. Clot retrieval with snares were attempted but unsuccessful due to difficult location.   :: EGD done 5/2/2024, hemoclip used to guillotine adherent clot, epinephrine injected and heater probe coagulation resulted in complete cessation of bleeding.  :: No bleeding since EGD 5/2  :: CTA 5/2 demonstrated no signs of active GI bleeding  :: Hgb 6.9 5/4 from 7.2 5/3, 1 unit pRBC transfused  - Most recent Hgb 8.1 5/6  - Start oral PPI 40 mg BID  - Trend CBC, goal Hgb>7 and  platelets >50  - stool H. Pylori antigen negative  - can consult IR for possible embolization if further bleeding occurs    # Right lower extremity ulcer  #Concern for limb ischemia   :: unable to feel pulses on R foot, discolored skin, normal temperature  :: Vascular surgery consulted; recommended 2 week outpatient follow-up for revascularization and continued pain control  :: Per GI, pt is safe to start anticoagulation trial  :: RONALD 5/2: severe arterial occlusive disease in RLE at rest with decreased perfusion, moderate arterial occlusive disease in left lower extremity with decreased perfusion  :: wounds on extremities of her foot and at ankle  :: wound care consulted  - Pain control with PRN Dilaudid, IV tylenol    #LUE swelling  :: swelling in the left upper extremity, noticeably larger than right upper extremity  - LUE ultrasound showing no thrombus 5/4/2024     #hemorrhagic shock , resolved   :: s/p 7 pRBCs at OSH   :: lactate 1.0 5/2/2024  :: daily RFP  - maintain active T&S, last T & S 5/4/2024     # History of CVA  :: residual right lower extremity and right upper extremity weakness  :: holding home aspirin  - Pain control with PRN Dilaudid, IV tylenol   -Continue with neurovascular check q1h to ensure there is no signs of acute limb ischemia      #HTN, HLD, diastolic HF  :: Goal MAP >65  :: holding home atorvastatin, rosuvastatin, Jardiance, spirinolactone  - losartan 100mg PO daily     #Hx of COPD, tobacco use  - cont home PRN albuterol and trelegy ellipta  - breathing comfortably on 2L NC     #hx of CKD with bl Cr 0.94   # OTONIEL likely 2/2 shock  - Cr wnl at 0.67 5/6  - daily RFP     #Leukocytosis  - WBC Down trending; 11.5 5/6  :: no active signs of infection, likely 2/2 shock  :: continue monitor for signs of infection      Fluids: -  Diet: Adult low fiber   Bowel regimen: Miralax daily   PPX: Protonix BID   DVT PPX: -  Code status: DNR/DNI - on hold for procedures     Lana Wilburn, MS4

## 2024-05-07 LAB
ABO GROUP (TYPE) IN BLOOD: NORMAL
ALBUMIN SERPL BCP-MCNC: 2.5 G/DL (ref 3.4–5)
ANION GAP SERPL CALC-SCNC: 13 MMOL/L (ref 10–20)
ANTIBODY SCREEN: NORMAL
BASOPHILS # BLD MANUAL: 0 X10*3/UL (ref 0–0.1)
BASOPHILS NFR BLD MANUAL: 0 %
BLOOD EXPIRATION DATE: NORMAL
BUN SERPL-MCNC: 16 MG/DL (ref 6–23)
CALCIUM SERPL-MCNC: 7.9 MG/DL (ref 8.6–10.6)
CHLORIDE SERPL-SCNC: 107 MMOL/L (ref 98–107)
CO2 SERPL-SCNC: 25 MMOL/L (ref 21–32)
CREAT SERPL-MCNC: 0.64 MG/DL (ref 0.5–1.05)
DISPENSE STATUS: NORMAL
EGFRCR SERPLBLD CKD-EPI 2021: >90 ML/MIN/1.73M*2
EOSINOPHIL # BLD MANUAL: 0.52 X10*3/UL (ref 0–0.4)
EOSINOPHIL NFR BLD MANUAL: 6.3 %
ERYTHROCYTE [DISTWIDTH] IN BLOOD BY AUTOMATED COUNT: 18.1 % (ref 11.5–14.5)
GLUCOSE BLD MANUAL STRIP-MCNC: 105 MG/DL (ref 74–99)
GLUCOSE BLD MANUAL STRIP-MCNC: 118 MG/DL (ref 74–99)
GLUCOSE BLD MANUAL STRIP-MCNC: 85 MG/DL (ref 74–99)
GLUCOSE BLD MANUAL STRIP-MCNC: 97 MG/DL (ref 74–99)
GLUCOSE SERPL-MCNC: 93 MG/DL (ref 74–99)
HCT VFR BLD AUTO: 26.7 % (ref 36–46)
HGB BLD-MCNC: 8.5 G/DL (ref 12–16)
IMM GRANULOCYTES # BLD AUTO: 0.68 X10*3/UL (ref 0–0.5)
IMM GRANULOCYTES NFR BLD AUTO: 8.3 % (ref 0–0.9)
LYMPHOCYTES # BLD MANUAL: 0.73 X10*3/UL (ref 0.8–3)
LYMPHOCYTES NFR BLD MANUAL: 8.9 %
MCH RBC QN AUTO: 29.3 PG (ref 26–34)
MCHC RBC AUTO-ENTMCNC: 31.8 G/DL (ref 32–36)
MCV RBC AUTO: 92 FL (ref 80–100)
MONOCYTES # BLD MANUAL: 0.8 X10*3/UL (ref 0.05–0.8)
MONOCYTES NFR BLD MANUAL: 9.8 %
MYELOCYTES # BLD MANUAL: 0.22 X10*3/UL
MYELOCYTES NFR BLD MANUAL: 2.7 %
NEUTS SEG # BLD MANUAL: 5.93 X10*3/UL (ref 1.6–5)
NEUTS SEG NFR BLD MANUAL: 72.3 %
NRBC BLD-RTO: 0 /100 WBCS (ref 0–0)
OVALOCYTES BLD QL SMEAR: ABNORMAL
PHOSPHATE SERPL-MCNC: 2.6 MG/DL (ref 2.5–4.9)
PLATELET # BLD AUTO: 230 X10*3/UL (ref 150–450)
POLYCHROMASIA BLD QL SMEAR: ABNORMAL
POTASSIUM SERPL-SCNC: 3.9 MMOL/L (ref 3.5–5.3)
PRODUCT BLOOD TYPE: 9500
PRODUCT CODE: NORMAL
RBC # BLD AUTO: 2.9 X10*6/UL (ref 4–5.2)
RBC MORPH BLD: ABNORMAL
RH FACTOR (ANTIGEN D): NORMAL
SODIUM SERPL-SCNC: 141 MMOL/L (ref 136–145)
TOTAL CELLS COUNTED BLD: 112
UFH PPP CHRO-ACNC: 0.2 IU/ML
UNIT ABO: NORMAL
UNIT NUMBER: NORMAL
UNIT RH: NORMAL
UNIT VOLUME: 322
WBC # BLD AUTO: 8.2 X10*3/UL (ref 4.4–11.3)

## 2024-05-07 PROCEDURE — 2500000006 HC RX 250 W HCPCS SELF ADMINISTERED DRUGS (ALT 637 FOR ALL PAYERS): Performed by: STUDENT IN AN ORGANIZED HEALTH CARE EDUCATION/TRAINING PROGRAM

## 2024-05-07 PROCEDURE — 1100000001 HC PRIVATE ROOM DAILY

## 2024-05-07 PROCEDURE — 85027 COMPLETE CBC AUTOMATED: CPT | Performed by: STUDENT IN AN ORGANIZED HEALTH CARE EDUCATION/TRAINING PROGRAM

## 2024-05-07 PROCEDURE — 2500000001 HC RX 250 WO HCPCS SELF ADMINISTERED DRUGS (ALT 637 FOR MEDICARE OP): Performed by: STUDENT IN AN ORGANIZED HEALTH CARE EDUCATION/TRAINING PROGRAM

## 2024-05-07 PROCEDURE — 85520 HEPARIN ASSAY: CPT | Performed by: STUDENT IN AN ORGANIZED HEALTH CARE EDUCATION/TRAINING PROGRAM

## 2024-05-07 PROCEDURE — 94640 AIRWAY INHALATION TREATMENT: CPT

## 2024-05-07 PROCEDURE — 99232 SBSQ HOSP IP/OBS MODERATE 35: CPT | Performed by: INTERNAL MEDICINE

## 2024-05-07 PROCEDURE — 85007 BL SMEAR W/DIFF WBC COUNT: CPT | Performed by: STUDENT IN AN ORGANIZED HEALTH CARE EDUCATION/TRAINING PROGRAM

## 2024-05-07 PROCEDURE — 2500000004 HC RX 250 GENERAL PHARMACY W/ HCPCS (ALT 636 FOR OP/ED): Performed by: STUDENT IN AN ORGANIZED HEALTH CARE EDUCATION/TRAINING PROGRAM

## 2024-05-07 PROCEDURE — 1090000002 HH PPS REVENUE DEBIT

## 2024-05-07 PROCEDURE — 36415 COLL VENOUS BLD VENIPUNCTURE: CPT | Performed by: STUDENT IN AN ORGANIZED HEALTH CARE EDUCATION/TRAINING PROGRAM

## 2024-05-07 PROCEDURE — 86901 BLOOD TYPING SEROLOGIC RH(D): CPT | Performed by: STUDENT IN AN ORGANIZED HEALTH CARE EDUCATION/TRAINING PROGRAM

## 2024-05-07 PROCEDURE — 1090000001 HH PPS REVENUE CREDIT

## 2024-05-07 PROCEDURE — 84100 ASSAY OF PHOSPHORUS: CPT | Performed by: STUDENT IN AN ORGANIZED HEALTH CARE EDUCATION/TRAINING PROGRAM

## 2024-05-07 PROCEDURE — 82947 ASSAY GLUCOSE BLOOD QUANT: CPT | Mod: 91

## 2024-05-07 RX ORDER — NAPROXEN SODIUM 220 MG/1
81 TABLET, FILM COATED ORAL DAILY
Status: DISCONTINUED | OUTPATIENT
Start: 2024-05-07 | End: 2024-05-10 | Stop reason: HOSPADM

## 2024-05-07 RX ORDER — PANTOPRAZOLE SODIUM 40 MG/1
40 TABLET, DELAYED RELEASE ORAL
Qty: 60 TABLET | Refills: 2 | Status: SHIPPED | OUTPATIENT
Start: 2024-05-07 | End: 2024-07-30

## 2024-05-07 RX ORDER — POLYETHYLENE GLYCOL 3350 17 G/17G
17 POWDER, FOR SOLUTION ORAL DAILY
Start: 2024-05-07 | End: 2024-05-08 | Stop reason: HOSPADM

## 2024-05-07 RX ORDER — AMOXICILLIN 250 MG
1 CAPSULE ORAL NIGHTLY
Status: DISCONTINUED | OUTPATIENT
Start: 2024-05-07 | End: 2024-05-08

## 2024-05-07 RX ORDER — POVIDONE-IODINE 10 MG/G
OINTMENT TOPICAL DAILY
Start: 2024-05-07

## 2024-05-07 RX ORDER — POLYETHYLENE GLYCOL 3350 17 G/17G
17 POWDER, FOR SOLUTION ORAL 2 TIMES DAILY
Status: DISCONTINUED | OUTPATIENT
Start: 2024-05-07 | End: 2024-05-10

## 2024-05-07 RX ORDER — HEPARIN SODIUM 10000 [USP'U]/100ML
0-4500 INJECTION, SOLUTION INTRAVENOUS CONTINUOUS
Status: DISCONTINUED | OUTPATIENT
Start: 2024-05-07 | End: 2024-05-08

## 2024-05-07 RX ADMIN — POVIDONE-IODINE: 100 OINTMENT TOPICAL at 06:40

## 2024-05-07 RX ADMIN — OXYCODONE HYDROCHLORIDE 5 MG: 5 TABLET ORAL at 15:13

## 2024-05-07 RX ADMIN — BUPROPION HYDROCHLORIDE 150 MG: 150 TABLET, EXTENDED RELEASE ORAL at 08:22

## 2024-05-07 RX ADMIN — POLYETHYLENE GLYCOL 3350 17 G: 17 POWDER, FOR SOLUTION ORAL at 08:22

## 2024-05-07 RX ADMIN — SERTRALINE 100 MG: 100 TABLET, FILM COATED ORAL at 08:22

## 2024-05-07 RX ADMIN — ASPIRIN 81 MG 81 MG: 81 TABLET ORAL at 17:40

## 2024-05-07 RX ADMIN — HEPARIN SODIUM 1300 UNITS/HR: 10000 INJECTION, SOLUTION INTRAVENOUS at 17:41

## 2024-05-07 RX ADMIN — PANTOPRAZOLE SODIUM 40 MG: 40 TABLET, DELAYED RELEASE ORAL at 06:02

## 2024-05-07 RX ADMIN — OXYCODONE HYDROCHLORIDE 5 MG: 5 TABLET ORAL at 06:06

## 2024-05-07 RX ADMIN — PANTOPRAZOLE SODIUM 40 MG: 40 TABLET, DELAYED RELEASE ORAL at 15:13

## 2024-05-07 RX ADMIN — OXYCODONE HYDROCHLORIDE 5 MG: 5 TABLET ORAL at 23:25

## 2024-05-07 RX ADMIN — LOSARTAN POTASSIUM 100 MG: 100 TABLET, FILM COATED ORAL at 08:22

## 2024-05-07 RX ADMIN — TIOTROPIUM BROMIDE INHALATION SPRAY 2 PUFF: 3.12 SPRAY, METERED RESPIRATORY (INHALATION) at 09:00

## 2024-05-07 RX ADMIN — POLYETHYLENE GLYCOL 3350 17 G: 17 POWDER, FOR SOLUTION ORAL at 20:08

## 2024-05-07 RX ADMIN — SENNOSIDES AND DOCUSATE SODIUM 1 TABLET: 50; 8.6 TABLET ORAL at 20:08

## 2024-05-07 ASSESSMENT — PAIN SCALES - GENERAL
PAINLEVEL_OUTOF10: 8
PAINLEVEL_OUTOF10: 6
PAINLEVEL_OUTOF10: 4
PAINLEVEL_OUTOF10: 7
PAINLEVEL_OUTOF10: 8

## 2024-05-07 ASSESSMENT — COGNITIVE AND FUNCTIONAL STATUS - GENERAL
MOVING TO AND FROM BED TO CHAIR: A LOT
MOVING FROM LYING ON BACK TO SITTING ON SIDE OF FLAT BED WITH BEDRAILS: A LITTLE
MOVING FROM LYING ON BACK TO SITTING ON SIDE OF FLAT BED WITH BEDRAILS: A LITTLE
CLIMB 3 TO 5 STEPS WITH RAILING: TOTAL
DRESSING REGULAR LOWER BODY CLOTHING: A LITTLE
CLIMB 3 TO 5 STEPS WITH RAILING: TOTAL
HELP NEEDED FOR BATHING: A LITTLE
TURNING FROM BACK TO SIDE WHILE IN FLAT BAD: A LITTLE
MOBILITY SCORE: 12
TURNING FROM BACK TO SIDE WHILE IN FLAT BAD: A LOT
WALKING IN HOSPITAL ROOM: TOTAL
STANDING UP FROM CHAIR USING ARMS: A LOT
DAILY ACTIVITIY SCORE: 19
MOVING TO AND FROM BED TO CHAIR: A LOT
DRESSING REGULAR LOWER BODY CLOTHING: A LITTLE
MOBILITY SCORE: 12
DRESSING REGULAR UPPER BODY CLOTHING: A LITTLE
HELP NEEDED FOR BATHING: A LITTLE
DAILY ACTIVITIY SCORE: 20
DRESSING REGULAR UPPER BODY CLOTHING: A LITTLE
PERSONAL GROOMING: A LITTLE
STANDING UP FROM CHAIR USING ARMS: A LOT
TOILETING: A LITTLE
TOILETING: A LITTLE
WALKING IN HOSPITAL ROOM: A LOT

## 2024-05-07 ASSESSMENT — PAIN DESCRIPTION - LOCATION
LOCATION: ARM
LOCATION: FOOT

## 2024-05-07 ASSESSMENT — PAIN DESCRIPTION - ORIENTATION
ORIENTATION: RIGHT
ORIENTATION: RIGHT

## 2024-05-07 ASSESSMENT — PAIN - FUNCTIONAL ASSESSMENT
PAIN_FUNCTIONAL_ASSESSMENT: 0-10

## 2024-05-07 NOTE — PROGRESS NOTES
"Brooklyn Read is a 79 y.o. female on day 7 of admission presenting with Upper GI bleed.    Subjective      Pt is resting comfortably this morning. She has still not had a BM; her last one was 3 days ago. States the dose of Miralax she received yesterday has not helped yet. Reports some continued pain in her RLE; has not worsened since yesterday and is well controlled on current pain regimen. Denies lightheadedness, dizziness, SOB, SP, hematemesis, and hemoptysis.     Objective     Physical Exam  Constitutional:       General: She is not in acute distress.  HENT:      Head: Normocephalic and atraumatic.   Cardiovascular:      Rate and Rhythm: Normal rate and regular rhythm.      Heart sounds: Normal heart sounds.      Comments:  Decreased pedal pulse in the RLE with dry, mottled skin. Clean wound dressing on distal RLE. LLE with similar chronic skin changes.   Pulmonary:      Effort: Pulmonary effort is normal.      Breath sounds: Normal breath sounds.   Abdominal:      General: Abdomen is flat.      Palpations: Abdomen is soft.      Tenderness: There is no abdominal tenderness.   Musculoskeletal:      Right lower leg: No edema.      Left lower leg: No edema.   Skin:     General: Skin is warm and dry.      Capillary Refill: Capillary refill takes 2 to 3 seconds.   Neurological:      General: No focal deficit present.      Mental Status: She is alert.   Psychiatric:         Mood and Affect: Mood normal.         Behavior: Behavior normal.         Last Recorded Vitals  Blood pressure (!) 121/42, pulse 96, temperature (S) 36.6 °C (97.9 °F), resp. rate 18, height 1.753 m (5' 9\"), weight 70.2 kg (154 lb 12.2 oz), SpO2 99%.  Intake/Output last 3 Shifts:  I/O last 3 completed shifts:  In: 480 (6.4 mL/kg) [P.O.:480]  Out: 2150 (28.8 mL/kg) [Urine:2150 (0.8 mL/kg/hr)]  Weight: 74.6 kg     Relevant Results        Scheduled medications  buPROPion XL, 150 mg, oral, Daily  tiotropium, 2 puff, inhalation, Daily   " And  fluticasone furoate-vilanteroL, 1 puff, inhalation, Daily  insulin lispro, 0-5 Units, subcutaneous, TID with meals  losartan, 100 mg, oral, Daily  pantoprazole, 40 mg, oral, BID AC  polyethylene glycol, 17 g, oral, Daily  povidone-iodine, , Topical, Daily  sertraline, 100 mg, oral, Daily      Continuous medications     PRN medications  PRN medications: albuterol, dextrose, glucagon, HYDROmorphone, oxyCODONE    Results for orders placed or performed during the hospital encounter of 04/30/24 (from the past 24 hour(s))   POCT GLUCOSE   Result Value Ref Range    POCT Glucose 104 (H) 74 - 99 mg/dL   POCT GLUCOSE   Result Value Ref Range    POCT Glucose 95 74 - 99 mg/dL   POCT GLUCOSE   Result Value Ref Range    POCT Glucose 118 (H) 74 - 99 mg/dL   POCT GLUCOSE   Result Value Ref Range    POCT Glucose 97 74 - 99 mg/dL   CBC and Auto Differential   Result Value Ref Range    WBC 8.2 4.4 - 11.3 x10*3/uL    nRBC 0.0 0.0 - 0.0 /100 WBCs    RBC 2.90 (L) 4.00 - 5.20 x10*6/uL    Hemoglobin 8.5 (L) 12.0 - 16.0 g/dL    Hematocrit 26.7 (L) 36.0 - 46.0 %    MCV 92 80 - 100 fL    MCH 29.3 26.0 - 34.0 pg    MCHC 31.8 (L) 32.0 - 36.0 g/dL    RDW 18.1 (H) 11.5 - 14.5 %    Platelets 230 150 - 450 x10*3/uL    Immature Granulocytes %, Automated 8.3 (H) 0.0 - 0.9 %    Immature Granulocytes Absolute, Automated 0.68 (H) 0.00 - 0.50 x10*3/uL   Manual Differential   Result Value Ref Range    Neutrophils %, Manual 72.3 40.0 - 80.0 %    Lymphocytes %, Manual 8.9 13.0 - 44.0 %    Monocytes %, Manual 9.8 2.0 - 10.0 %    Eosinophils %, Manual 6.3 0.0 - 6.0 %    Basophils %, Manual 0.0 0.0 - 2.0 %    Myelocytes %, Manual 2.7 0.0 - 0.0 %    Seg Neutrophils Absolute, Manual 5.93 (H) 1.60 - 5.00 x10*3/uL    Lymphocytes Absolute, Manual 0.73 (L) 0.80 - 3.00 x10*3/uL    Monocytes Absolute, Manual 0.80 0.05 - 0.80 x10*3/uL    Eosinophils Absolute, Manual 0.52 (H) 0.00 - 0.40 x10*3/uL    Basophils Absolute, Manual 0.00 0.00 - 0.10 x10*3/uL    Myelocytes  Absolute, Manual 0.22 0.00 - 0.00 x10*3/uL    Total Cells Counted 112     RBC Morphology See Below     Polychromasia Mild     Ovalocytes Few    Renal function panel   Result Value Ref Range    Glucose 93 74 - 99 mg/dL    Sodium 141 136 - 145 mmol/L    Potassium 3.9 3.5 - 5.3 mmol/L    Chloride 107 98 - 107 mmol/L    Bicarbonate 25 21 - 32 mmol/L    Anion Gap 13 10 - 20 mmol/L    Urea Nitrogen 16 6 - 23 mg/dL    Creatinine 0.64 0.50 - 1.05 mg/dL    eGFR >90 >60 mL/min/1.73m*2    Calcium 7.9 (L) 8.6 - 10.6 mg/dL    Phosphorus 2.6 2.5 - 4.9 mg/dL    Albumin 2.5 (L) 3.4 - 5.0 g/dL       Vascular US upper extremity venous duplex left    Result Date: 5/4/2024  Interpreted By:  Judson Oleary, STUDY: [facility] US UPPER EXTREMITY VENOUS DUPLEX LEFT;  5/4/2024 5:47 am   INDICATION: Signs/Symptoms:swelling in Left upper extremity.   COMPARISON: None.   ACCESSION NUMBER(S): [id number]   ORDERING CLINICIAN: JOHN DE LEÓN   TECHNIQUE: Vascular ultrasound of the  left upper extremity was performed. Evaluation was performed with grayscale, color, and spectral Doppler. When possible, compression views of the evaluated veins was also performed.   FINDINGS: Evaluation of the visualized portions of the  left internal jugular, innominate, subclavian, axillary, and brachial cephalic, and basilic veins was performed.   There is normal respiratory variation, normal compressibility, as well as normal color doppler signal in the visualized vessels. There is no evidence of thrombus.       No evidence for thrombus involving the visualized veins of the left upper extremity, as detailed above.   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 Arrived via [place] on RA and off pressors. Patient has had a total of 3 EGDS. EGD on 4/30 showed an ulcer in the prepyloric region with adherent clot as well as a large old clot in the gastric fundus. On 5/1/2024, epinephrine was injected into the ulcer in the prepyloric region. Clot was unable to be retrieved due to the location and patient continued to have bleeding. Repeat EGD 5/2/2024 again showed single large, cratered, round ulcer in the prepyloric region with adherent clot (Boogie IIB). A hemoclip was used to guillotine the adherent clot off the ulcer by placing it at the white fibrin base and pulling off. Epinephrine was injected on either side, heater probe coagulation resulted in stoppage of the bleeding. Patient has not had any bleeding following EGD on 5/2. Unable to doppler pulses on right foot, has discolored skin and normal temperature with decreased sensation. Vascular surgery consulted for revascularization; recommend discharging on oral anticoagulants when appropriate from a GI standpoint given her recent bleed.     Updates 5/7  - Vascular surgery recommends discharging pt on ASA 81 mg daily and Xarelto 2.5 mg BID when safe to do so from a GI bleed standpoint    #Peptic Ulcer with hematemesis 2/2 ibuprofen use  :: EGD done 4/30 in [place] with multiple deep ulcers, no active bleeding  ::EGD done 5/1 shows Large deep ulcer with large adherent blood clot (20~25mm) at inferior posterior aspect of pre-pylorus. Clot retrieval with snares were attempted but unsuccessful due to difficult location.   :: EGD done 5/2/2024, hemoclip used to guillotine adherent clot, epinephrine injected and heater probe coagulation resulted in complete cessation of bleeding.  :: No bleeding since EGD 5/2  :: CTA 5/2 demonstrated no signs of active GI bleeding  :: Hgb 6.9 5/4  from 7.2 5/3, 1 unit pRBC transfused    - Most recent Hgb 8.5 5/7  - Continue oral PPI 40 mg BID for 12 weeks per GI recs  - Trend CBC, goal Hgb>7 and platelets >50  - stool H. Pylori antigen negative  - can consult IR for possible embolization if further bleeding occurs  - Follow up with outpatient GI in 3 months for EGD     # Right lower extremity ulcer  #Concern for limb ischemia   :: unable to feel pulses on R foot, discolored skin, normal temperature  :: RONALD 5/2: severe arterial occlusive disease in RLE at rest with decreased perfusion, moderate arterial occlusive disease in left lower extremity with decreased perfusion  :: Vascular surgery consulted; recommended 2 week outpatient follow-up for revascularization and continued pain control; also recommended starting ASA 81 mg daily and Xarelto 2.5 mg BID on discharge   :: wounds on extremities of her foot and at ankle  :: wound care consulted  - Pain control with PRN Dilaudid, IV tylenol  - Awaiting GI confirmation to start anticoagulation trial      #Constipation  - Last BM 3 days ago  - Increase Miralax to BID  - Start Senna tablet nightly    #LUE swelling  :: swelling in the left upper extremity, noticeably larger than right upper extremity  - LUE ultrasound showing no thrombus 5/4/2024     # History of CVA  :: residual right lower extremity and right upper extremity weakness  :: holding home aspirin  - Pain control with PRN Dilaudid, IV tylenol   -Continue with neurovascular check q1h to ensure there is no signs of acute limb ischemia      #HTN, HLD, diastolic HF  :: Goal MAP >65  :: holding home atorvastatin, rosuvastatin, Jardiance, spirinolactone  - losartan 100mg PO daily    #Hx of COPD, tobacco use  - cont home PRN albuterol and trelegy ellipta  - breathing comfortably on RA  - Start incentive spirometry      #hx of CKD with bl Cr 0.94   # OTONIEL likely 2/2 shock  - Cr wnl at 0.67 5/6  - daily RFP    #hemorrhagic shock , resolved   :: s/p 7 pRBCs at OSH    :: lactate 1.0 5/2/2024  :: daily RFP  - maintain active T&S, last T & S 5/4/2024    #Leukocytosis, resolved  - WBC Down trending; 8.2 5/7  :: no active signs of infection, likely 2/2 shock  :: continue monitor for signs of infection             Fluids: -  Diet: Adult low fiber   Bowel regimen: Miralax BID and Senna nightly    PPX: Protonix BID   DVT PPX: -  Code status: DNR/DNI - on hold for procedures  Dispo: SNF    Lana Wilburn, MS4

## 2024-05-07 NOTE — PROGRESS NOTES
"Brooklyn Read is a 79 y.o. female on day 7 of admission presenting with Upper GI bleed.    Subjective   NAEON. Reports no further bloody bowel movements.     Objective     Physical Exam  Constitutional:       Comments: Alert, thin, ill-appearing   HENT:      Head: Normocephalic.      Nose: Nose normal.      Mouth/Throat:      Mouth: Mucous membranes are moist.   Eyes:      Extraocular Movements: Extraocular movements intact.      Pupils: Pupils are equal, round, and reactive to light.   Cardiovascular:      Rate and Rhythm: Normal rate and regular rhythm.   Pulmonary:      Effort: Pulmonary effort is normal.      Breath sounds: Normal breath sounds.   Musculoskeletal:      Cervical back: Normal range of motion.      Comments: BLE warm.   LLE: Dopplerable L AT, some chronic appearing changes to LLE.  RLE: Ankle wound on R posterior leg. Appears clean and stable from prior exams. No dopplerable signals in RLE   Skin:     General: Skin is warm.   Neurological:      Mental Status: She is alert.      Comments: Unable to move RLE (baseline)   Psychiatric:         Mood and Affect: Mood normal.         Behavior: Behavior normal.         Last Recorded Vitals  Blood pressure (!) 121/42, pulse 96, temperature (S) 36.6 °C (97.9 °F), resp. rate 18, height 1.753 m (5' 9\"), weight 70.2 kg (154 lb 12.2 oz), SpO2 99%.  Intake/Output last 3 Shifts:  I/O last 3 completed shifts:  In: 480 (6.8 mL/kg) [P.O.:480]  Out: 2200 (31.3 mL/kg) [Urine:2200 (0.9 mL/kg/hr)]  Weight: 70.2 kg     Relevant Results  Results for orders placed or performed during the hospital encounter of 04/30/24 (from the past 24 hour(s))   POCT GLUCOSE   Result Value Ref Range    POCT Glucose 104 (H) 74 - 99 mg/dL   POCT GLUCOSE   Result Value Ref Range    POCT Glucose 95 74 - 99 mg/dL   POCT GLUCOSE   Result Value Ref Range    POCT Glucose 118 (H) 74 - 99 mg/dL   POCT GLUCOSE   Result Value Ref Range    POCT Glucose 97 74 - 99 mg/dL         Assessment/Plan   79F " with PMH of HTN, HLD, COPD, DM, CKD, prior CVA, osteopenia, arthritis who presents as a transfer from [facility] with upper GI bleed. Vascular surgery consulted due to no dopplerable pulses in BL LE (R>L) noted on arrival to [facility]. Now s/p EGD and clipping on 5/3.     No further GI bleeds. RLE still with wound, but warm. Likely chronic ischemia of RLE. Exam with no dopplerable signals in RLE and decreased motor function in RLE, but patient states that this has been ongoing. However, given recent GI bleed, no acute intervention is planned    -Will plan for 2 week outpatient follow up at discharge to determine revascularization plan.  -May start ASA81 and 2.5mg BID Xarelto for CLTI when safe to do so from GI bleed standpoint  -Please call with any further questions    Patient discussed with attending Dr. Karsten Mckeon MD  PGY-3 General Surgery  Vascular Surgery [id number]

## 2024-05-07 NOTE — PROGRESS NOTES
Brooklyn Read is a 79 y.o. female on day 7 of admission presenting with Upper GI bleed.    Subjective   Met with patient at bedside to follow up on SNF choices and she requested that SW speak with her daughter Jolene to obtain choices.    Call placed to patients daughter Jolene and SNF list was emailed to her; she [facility], [facility], [facility] & [facility] are her choices.    Message sent to [facility] and requested that referrals be sent in Care\[facility]\"".    UPDATE 1445 Care\[facility]\"" reviewed and noted that [facility] is able to accept patient at discharge. Spoke to patients daughter Jolene who is in agreement with this facility.    Requested that [facility] update facility that they are FOC and request sent to direct submit team to initiate precert. Patient will require 7000 for admission; pending attending certification on [place].    UPDATE 1540 Received update that patients authorization for SNF was approved and is good through 5/13. Primary team  updated via Care\[facility]\"".    -Candace MCKINNEY, MA, LSW  [phone] or [facility] Secure Chat  Care Transitions

## 2024-05-07 NOTE — DISCHARGE INSTRUCTIONS
"Ms Read,     You are admitted to the hospital with a bleed from your GI tract.  We think this was related to you taking ibuprofen at home because of your leg pain.  The gastroenterologist saw you while you were here and did a endoscopy which showed a bleeding ulcer.  They treated this with clipping and injection of a medication to stop the bleeding.  You tolerated this well and your blood counts have been stable.  While you are here, we also did some studies to look at the blood vessels in your right leg.  We noted that you have arterial disease in that leg which is causing decreased blood flow and a chronic wound.  The vascular surgeons would like for you to follow-up in their office to discuss potential procedures.  You should remain on the anticoagulation and aspirin once leaving the hospital to prevent the artery disease from getting worse.    It was a pleasure taking care of you at [facility].     Sincerely,   Annamarie Aly     Patient education: GI bleed - Discharge instructions (The Basics)   Written by the doctors and editors at [facility]   What is a GI bleed?  \"GI\" stands for \"gastrointestinal.\" The \"GI system\" (or \"GI tract\") is the medical term for all of the organs in the body that process food (figure 1). This includes the:  ?Esophagus (the tube that connects the mouth to the stomach)  ?Stomach  ?Small intestine (also known as the small bowel)  ?Large intestine (also known as the colon or large bowel)  A GI bleed is when any of these organs start to bleed. Sometimes, you do not know that you are bleeding, because it's happening slowly inside of your body. Other times, you can have symptoms like vomiting blood or having blood in your bowel movements.  How do I care for myself at home?  Ask the doctor or nurse what you should do when you go home. Make sure that you understand exactly what you need to do to care for yourself. Ask questions if there is anything you do not " "understand.  Depending on the cause of your bleeding, your doctor might ask you to:  ?Take all of your medicines as instructed.  ?Avoid taking medicines called \"NSAIDs\" too often, unless your doctor tells you that it is OK. These medicines can cause bleeding. Examples of NSAIDs include aspirin, ibuprofen (sample brand names: Advil, Motrin), and naproxen (sample brand names: Aleve, Naprosyn). If you have to take these medicines often, your doctor might give you another medicine to lower your risk of bleeding.  ?Try to avoid straining when you have a bowel movement.  ?Keep track of how often you have blood in your bowel movements or if you have any black, tarry stools. This could be due to GI bleeding.  ?Avoid alcohol. This includes beer, wine, and mixed drinks.  These steps might help if you have constipation or if you strain when you have a bowel movement:  ?Eat foods with lots of fiber. These include whole grains, fruits, and vegetables.  ?Drink plenty of water and other fluids each day.  ?Be active. Try to walk, garden, or do something active for 30 minutes or more on most days of the week.  ?Sit in a warm bath twice a day for about 15 minutes each time.  What follow-up care do I need?  Your doctor or nurse will tell you if you need to make a follow-up appointment. If so, make sure that you know when and where to go.  When should I call the doctor?  Call for emergency help right away (in the [place] and [place], call 9-1-1) if:  ?You have heavy bleeding from your rectum.  ?You have severe belly pain.  ?You have chest discomfort, or get short of breath or lightheaded, along with blood in your bowel movements.  Call your doctor for advice if:  ?Your bleeding increases a lot.  ?You vomit blood or something that looks like coffee grounds.  ?Your bowel movements look black or dark red, or have blood in them.  ?You have changes in how often or how hard or soft your bowel movements are.  ?You become very pale, or your heart is " racing all of the time.  ?You feel very weak or get lightheaded when you stand up.  ?You have belly pain or soreness.  ?You lose weight without trying to.

## 2024-05-07 NOTE — CARE PLAN
Problem: Safety  Goal: Patient will be injury free during hospitalization  Outcome: Progressing  Goal: I will remain free of falls  Outcome: Progressing   The patient's goals for the shift include Maintain pt safety    The clinical goals for the shift include Monitor HDS and WBC    Over the shift, the patient did not make progress toward the following goals.

## 2024-05-08 ENCOUNTER — APPOINTMENT (OUTPATIENT)
Dept: VASCULAR MEDICINE | Facility: HOSPITAL | Age: 79
DRG: 377 | End: 2024-05-08
Payer: MEDICARE

## 2024-05-08 LAB
ALBUMIN SERPL BCP-MCNC: 2.5 G/DL (ref 3.4–5)
ANION GAP SERPL CALC-SCNC: 12 MMOL/L (ref 10–20)
APTT PPP: 46 SECONDS (ref 27–38)
APTT PPP: 79 SECONDS (ref 27–38)
BASOPHILS # BLD MANUAL: 0 X10*3/UL (ref 0–0.1)
BASOPHILS NFR BLD MANUAL: 0 %
BUN SERPL-MCNC: 14 MG/DL (ref 6–23)
CALCIUM SERPL-MCNC: 7.9 MG/DL (ref 8.6–10.6)
CHLORIDE SERPL-SCNC: 107 MMOL/L (ref 98–107)
CO2 SERPL-SCNC: 25 MMOL/L (ref 21–32)
CREAT SERPL-MCNC: 0.61 MG/DL (ref 0.5–1.05)
EGFRCR SERPLBLD CKD-EPI 2021: >90 ML/MIN/1.73M*2
EOSINOPHIL # BLD MANUAL: 0.46 X10*3/UL (ref 0–0.4)
EOSINOPHIL NFR BLD MANUAL: 6 %
ERYTHROCYTE [DISTWIDTH] IN BLOOD BY AUTOMATED COUNT: 17.2 % (ref 11.5–14.5)
ERYTHROCYTE [DISTWIDTH] IN BLOOD BY AUTOMATED COUNT: 17.5 % (ref 11.5–14.5)
GLUCOSE BLD MANUAL STRIP-MCNC: 107 MG/DL (ref 74–99)
GLUCOSE BLD MANUAL STRIP-MCNC: 109 MG/DL (ref 74–99)
GLUCOSE SERPL-MCNC: 126 MG/DL (ref 74–99)
HCT VFR BLD AUTO: 24.8 % (ref 36–46)
HCT VFR BLD AUTO: 27 % (ref 36–46)
HGB BLD-MCNC: 8.2 G/DL (ref 12–16)
HGB BLD-MCNC: 8.4 G/DL (ref 12–16)
IMM GRANULOCYTES # BLD AUTO: 0.56 X10*3/UL (ref 0–0.5)
IMM GRANULOCYTES NFR BLD AUTO: 7.4 % (ref 0–0.9)
LYMPHOCYTES # BLD MANUAL: 0.84 X10*3/UL (ref 0.8–3)
LYMPHOCYTES NFR BLD MANUAL: 11.1 %
MCH RBC QN AUTO: 28.6 PG (ref 26–34)
MCH RBC QN AUTO: 28.7 PG (ref 26–34)
MCHC RBC AUTO-ENTMCNC: 31.1 G/DL (ref 32–36)
MCHC RBC AUTO-ENTMCNC: 33.1 G/DL (ref 32–36)
MCV RBC AUTO: 86 FL (ref 80–100)
MCV RBC AUTO: 92 FL (ref 80–100)
METAMYELOCYTES # BLD MANUAL: 0.07 X10*3/UL
METAMYELOCYTES NFR BLD MANUAL: 0.9 %
MONOCYTES # BLD MANUAL: 0.33 X10*3/UL (ref 0.05–0.8)
MONOCYTES NFR BLD MANUAL: 4.3 %
MYELOCYTES # BLD MANUAL: 0.06 X10*3/UL
MYELOCYTES NFR BLD MANUAL: 0.8 %
NEUTROPHILS # BLD MANUAL: 5.85 X10*3/UL (ref 1.6–5.5)
NEUTS BAND # BLD MANUAL: 0.13 X10*3/UL (ref 0–0.5)
NEUTS BAND NFR BLD MANUAL: 1.7 %
NEUTS SEG # BLD MANUAL: 5.72 X10*3/UL (ref 1.6–5)
NEUTS SEG NFR BLD MANUAL: 75.2 %
NRBC BLD-RTO: 0 /100 WBCS (ref 0–0)
NRBC BLD-RTO: 0 /100 WBCS (ref 0–0)
PHOSPHATE SERPL-MCNC: 3.2 MG/DL (ref 2.5–4.9)
PLATELET # BLD AUTO: 278 X10*3/UL (ref 150–450)
PLATELET # BLD AUTO: 288 X10*3/UL (ref 150–450)
POTASSIUM SERPL-SCNC: 3.7 MMOL/L (ref 3.5–5.3)
RBC # BLD AUTO: 2.87 X10*6/UL (ref 4–5.2)
RBC # BLD AUTO: 2.93 X10*6/UL (ref 4–5.2)
RBC MORPH BLD: ABNORMAL
SODIUM SERPL-SCNC: 140 MMOL/L (ref 136–145)
TOTAL CELLS COUNTED BLD: 117
UFH PPP CHRO-ACNC: 0.6 IU/ML
UFH PPP CHRO-ACNC: 0.7 IU/ML
WBC # BLD AUTO: 7.4 X10*3/UL (ref 4.4–11.3)
WBC # BLD AUTO: 7.6 X10*3/UL (ref 4.4–11.3)

## 2024-05-08 PROCEDURE — 80069 RENAL FUNCTION PANEL: CPT | Performed by: STUDENT IN AN ORGANIZED HEALTH CARE EDUCATION/TRAINING PROGRAM

## 2024-05-08 PROCEDURE — 36415 COLL VENOUS BLD VENIPUNCTURE: CPT | Performed by: STUDENT IN AN ORGANIZED HEALTH CARE EDUCATION/TRAINING PROGRAM

## 2024-05-08 PROCEDURE — 85520 HEPARIN ASSAY: CPT | Performed by: STUDENT IN AN ORGANIZED HEALTH CARE EDUCATION/TRAINING PROGRAM

## 2024-05-08 PROCEDURE — 93971 EXTREMITY STUDY: CPT | Performed by: SURGERY

## 2024-05-08 PROCEDURE — 1090000002 HH PPS REVENUE DEBIT

## 2024-05-08 PROCEDURE — 85730 THROMBOPLASTIN TIME PARTIAL: CPT | Mod: 91 | Performed by: STUDENT IN AN ORGANIZED HEALTH CARE EDUCATION/TRAINING PROGRAM

## 2024-05-08 PROCEDURE — 85027 COMPLETE CBC AUTOMATED: CPT | Performed by: STUDENT IN AN ORGANIZED HEALTH CARE EDUCATION/TRAINING PROGRAM

## 2024-05-08 PROCEDURE — 2500000006 HC RX 250 W HCPCS SELF ADMINISTERED DRUGS (ALT 637 FOR ALL PAYERS): Performed by: STUDENT IN AN ORGANIZED HEALTH CARE EDUCATION/TRAINING PROGRAM

## 2024-05-08 PROCEDURE — 2500000001 HC RX 250 WO HCPCS SELF ADMINISTERED DRUGS (ALT 637 FOR MEDICARE OP): Performed by: STUDENT IN AN ORGANIZED HEALTH CARE EDUCATION/TRAINING PROGRAM

## 2024-05-08 PROCEDURE — 1100000001 HC PRIVATE ROOM DAILY

## 2024-05-08 PROCEDURE — 85007 BL SMEAR W/DIFF WBC COUNT: CPT | Performed by: STUDENT IN AN ORGANIZED HEALTH CARE EDUCATION/TRAINING PROGRAM

## 2024-05-08 PROCEDURE — 2500000004 HC RX 250 GENERAL PHARMACY W/ HCPCS (ALT 636 FOR OP/ED): Performed by: STUDENT IN AN ORGANIZED HEALTH CARE EDUCATION/TRAINING PROGRAM

## 2024-05-08 PROCEDURE — 1090000001 HH PPS REVENUE CREDIT

## 2024-05-08 PROCEDURE — 85730 THROMBOPLASTIN TIME PARTIAL: CPT | Performed by: STUDENT IN AN ORGANIZED HEALTH CARE EDUCATION/TRAINING PROGRAM

## 2024-05-08 PROCEDURE — 99233 SBSQ HOSP IP/OBS HIGH 50: CPT | Performed by: INTERNAL MEDICINE

## 2024-05-08 PROCEDURE — 82947 ASSAY GLUCOSE BLOOD QUANT: CPT

## 2024-05-08 PROCEDURE — 93971 EXTREMITY STUDY: CPT

## 2024-05-08 RX ORDER — HEPARIN SODIUM 10000 [USP'U]/100ML
0-4500 INJECTION, SOLUTION INTRAVENOUS CONTINUOUS
Status: DISPENSED | OUTPATIENT
Start: 2024-05-08 | End: 2024-05-09

## 2024-05-08 RX ORDER — AMOXICILLIN 250 MG
1 CAPSULE ORAL 2 TIMES DAILY
Status: DISCONTINUED | OUTPATIENT
Start: 2024-05-08 | End: 2024-05-10 | Stop reason: HOSPADM

## 2024-05-08 RX ORDER — POLYETHYLENE GLYCOL 3350 17 G/17G
17 POWDER, FOR SOLUTION ORAL 2 TIMES DAILY
Start: 2024-05-08

## 2024-05-08 RX ORDER — METOPROLOL TARTRATE 25 MG/1
12.5 TABLET, FILM COATED ORAL 2 TIMES DAILY
Status: DISCONTINUED | OUTPATIENT
Start: 2024-05-08 | End: 2024-05-09

## 2024-05-08 RX ORDER — AMOXICILLIN 250 MG
1 CAPSULE ORAL NIGHTLY
Start: 2024-05-08 | End: 2024-05-09 | Stop reason: HOSPADM

## 2024-05-08 RX ADMIN — BUPROPION HYDROCHLORIDE 150 MG: 150 TABLET, EXTENDED RELEASE ORAL at 09:37

## 2024-05-08 RX ADMIN — HEPARIN SODIUM 1500 UNITS/HR: 10000 INJECTION, SOLUTION INTRAVENOUS at 12:49

## 2024-05-08 RX ADMIN — LOSARTAN POTASSIUM 100 MG: 100 TABLET, FILM COATED ORAL at 09:37

## 2024-05-08 RX ADMIN — POLYETHYLENE GLYCOL 3350 17 G: 17 POWDER, FOR SOLUTION ORAL at 20:53

## 2024-05-08 RX ADMIN — PANTOPRAZOLE SODIUM 40 MG: 40 TABLET, DELAYED RELEASE ORAL at 09:37

## 2024-05-08 RX ADMIN — SERTRALINE 100 MG: 100 TABLET, FILM COATED ORAL at 09:37

## 2024-05-08 RX ADMIN — PANTOPRAZOLE SODIUM 40 MG: 40 TABLET, DELAYED RELEASE ORAL at 16:26

## 2024-05-08 RX ADMIN — SENNOSIDES AND DOCUSATE SODIUM 1 TABLET: 50; 8.6 TABLET ORAL at 20:53

## 2024-05-08 RX ADMIN — SENNOSIDES AND DOCUSATE SODIUM 1 TABLET: 50; 8.6 TABLET ORAL at 09:37

## 2024-05-08 RX ADMIN — POVIDONE-IODINE: 100 OINTMENT TOPICAL at 09:00

## 2024-05-08 RX ADMIN — OXYCODONE HYDROCHLORIDE 5 MG: 5 TABLET ORAL at 09:40

## 2024-05-08 RX ADMIN — ASPIRIN 81 MG 81 MG: 81 TABLET ORAL at 09:37

## 2024-05-08 RX ADMIN — POLYETHYLENE GLYCOL 3350 17 G: 17 POWDER, FOR SOLUTION ORAL at 09:37

## 2024-05-08 RX ADMIN — METOPROLOL TARTRATE 12.5 MG: 25 TABLET, FILM COATED ORAL at 20:53

## 2024-05-08 ASSESSMENT — COGNITIVE AND FUNCTIONAL STATUS - GENERAL
WALKING IN HOSPITAL ROOM: A LOT
CLIMB 3 TO 5 STEPS WITH RAILING: TOTAL
HELP NEEDED FOR BATHING: A LITTLE
TOILETING: A LITTLE
MOBILITY SCORE: 12
TURNING FROM BACK TO SIDE WHILE IN FLAT BAD: A LOT
DRESSING REGULAR UPPER BODY CLOTHING: A LITTLE
MOVING FROM LYING ON BACK TO SITTING ON SIDE OF FLAT BED WITH BEDRAILS: A LITTLE
STANDING UP FROM CHAIR USING ARMS: A LOT
MOVING TO AND FROM BED TO CHAIR: A LOT
DRESSING REGULAR LOWER BODY CLOTHING: A LITTLE
DAILY ACTIVITIY SCORE: 20

## 2024-05-08 ASSESSMENT — PAIN - FUNCTIONAL ASSESSMENT
PAIN_FUNCTIONAL_ASSESSMENT: 0-10
PAIN_FUNCTIONAL_ASSESSMENT: 0-10

## 2024-05-08 ASSESSMENT — PAIN SCALES - GENERAL
PAINLEVEL_OUTOF10: 8
PAINLEVEL_OUTOF10: 0 - NO PAIN

## 2024-05-08 NOTE — PROGRESS NOTES
Physical Therapy    Therapy Communication Note    Patient Name: Brooklyn Read  MRN: [id number]  Today's Date: 5/8/2024     Discipline: Physical Therapy      Missed Visit: Yes  Missed Visit Reason:  (pt off floor)      05/08/24 at 8:11 AM   Cherelle Mora, PT

## 2024-05-08 NOTE — PROGRESS NOTES
Subjective   Brooklyn Read is a 79 y.o. female on hospital day 8 with no major events.  Patient denies any signs of bleeding/melena.  Continues to have some right leg pain. Denies hematemesis, hematochezia, melena, hemoptysis, profuse epistaxis or bleeding otherwise despite starting aspirin and heparin drip          Objective     Exam     Vitals:    05/07/24 2024 05/08/24 0514 05/08/24 0600 05/08/24 1258   BP: (!) 137/49 160/64  148/64   Patient Position:    Lying   Pulse: 106 88  88   Resp: 16 16  24   Temp: 36.8 °C (98.2 °F) 37 °C (98.6 °F)  36 °C (96.8 °F)   TempSrc:       SpO2: 97% 97%  98%   Weight:   73.8 kg (162 lb 11.2 oz)    Height:          Intake/Output last 3 shifts:  I/O last 3 completed shifts:  In: 54425.7 (203 mL/kg) [P.O.:480; Blood:18207.7]  Out: 900 (12.2 mL/kg) [Urine:900 (0.3 mL/kg/hr)]  Weight: 73.8 kg     Physical Exam  Vitals reviewed.   HENT:      Head: Normocephalic and atraumatic.      Mouth/Throat:      Mouth: Mucous membranes are moist.   Cardiovascular:      Rate and Rhythm: Normal rate and regular rhythm.      Pulses: Normal pulses.      Heart sounds: No murmur heard.     No friction rub. No gallop.   Pulmonary:      Effort: Pulmonary effort is normal.      Breath sounds: Normal breath sounds. No wheezing, rhonchi or rales.   Abdominal:      General: Bowel sounds are normal. There is no distension.      Palpations: Abdomen is soft.      Tenderness: There is no abdominal tenderness. There is no guarding or rebound.   Musculoskeletal:      Right lower leg: No edema.      Left lower leg: No edema.      Comments: Left leg cooler to the touch with diminished pulse.  Leg ulcer wrapped with dressing clean dry and intact   Skin:     General: Skin is warm and dry.   Neurological:      General: No focal deficit present.      Mental Status: She is alert and oriented to person, place, and time.   Psychiatric:         Mood and Affect: Mood normal.         Behavior: Behavior normal.             Medications   aspirin, 81 mg, oral, Daily  buPROPion XL, 150 mg, oral, Daily  tiotropium, 2 puff, inhalation, Daily   And  fluticasone furoate-vilanteroL, 1 puff, inhalation, Daily  insulin lispro, 0-5 Units, subcutaneous, TID with meals  losartan, 100 mg, oral, Daily  pantoprazole, 40 mg, oral, BID AC  polyethylene glycol, 17 g, oral, BID  povidone-iodine, , Topical, Daily  [START ON 5/9/2024] rivaroxaban, 2.5 mg, oral, BID with meals  sennosides-docusate sodium, 1 tablet, oral, BID  sertraline, 100 mg, oral, Daily       PRN medications: albuterol, dextrose, glucagon, heparin, oxyCODONE       Labs     All new labs reviewed:  some of the basic labs as follows -     Results from last 7 days   Lab Units 05/08/24  1227 05/08/24  0954 05/07/24  0955 05/06/24  0540 05/05/24  0921 05/04/24  0717 05/03/24  1209   WBC AUTO x10*3/uL 7.4 7.6 8.2 11.5* 14.3* 15.0* 15.8*   HEMOGLOBIN g/dL 8.2* 8.4* 8.5* 8.1* 8.4* 6.9* 7.2*   HEMATOCRIT % 24.8* 27.0* 26.7* 25.3* 26.8* 21.9* 21.6*   PLATELETS AUTO x10*3/uL 288 278 230 182 151 125* 99*   NEUTROS PCT AUTO %  --   --   --   --  72.5 71.5 74.7   LYMPHO PCT MAN %  --  11.1 8.9 4.3  --   --   --    LYMPHS PCT AUTO %  --   --   --   --  7.9 9.3 9.6   MONO PCT MAN %  --  4.3 9.8 8.6  --   --   --    MONOS PCT AUTO %  --   --   --   --  13.3 15.3 12.8   EOSINO PCT MAN %  --  6.0 6.3 2.6  --   --   --    EOS PCT AUTO %  --   --   --   --  1.6 1.1 1.0      Results from last 72 hours   Lab Units 05/08/24  1227   APTT seconds 79*     Results from last 72 hours   Lab Units 05/08/24  0954 05/07/24  0956 05/06/24  0540   SODIUM mmol/L 140 141 141   POTASSIUM mmol/L 3.7 3.9 4.0   CHLORIDE mmol/L 107 107 109*   CO2 mmol/L 25 25 24   BUN mg/dL 14 16 15   CREATININE mg/dL 0.61 0.64 0.67     Results from last 72 hours   Lab Units 05/08/24  0954 05/07/24  0956 05/06/24  0540   ALK PHOS U/L  --   --  62   AST U/L  --   --  50*   ALT U/L  --   --  32   BILIRUBIN TOTAL mg/dL  --   --  0.4   ALBUMIN  "g/dL 2.5* 2.5* 2.3*   PROTEIN TOTAL g/dL  --   --  4.6*     Results from last 72 hours   Lab Units 05/08/24  0954 05/07/24  0956 05/06/24  0540   GLUCOSE mg/dL 126* 93 98         No results found for: \"TR1\"  Lab Results   Component Value Date    BLOODCULT No growth at 4 days -  FINAL REPORT 04/17/2024            Imaging   Vascular US upper extremity venous duplex right  Preliminary Cardiology Report              [place] [address]    Tel [phone] and Fax [phone]             Preliminary Vascular Lab Report     VASC US UPPER EXTREMITY VENOUS DUPLEX RIGHT       Patient Name:      DORIAN BARRAZA Reading Physician:  [id number] Pieter Steel MD  Study Date:        5/8/2024         Ordering Physician: [id number] LOERA  MRN/PID:           [id number]         Technologist:       Janusz Rooney S  Accession#:        [id number]     Technologist 2:  Date of Birth/Age: 1945        Encounter#:         [id number]  Gender:            F  Admission Status:  Inpatient        Location Performed: [facility]       Diagnosis/ICD: Localized (leg) edema-R60.0  Indication:    Limb edema  Procedure/CPT: 70433 Peripheral venous duplex scan for DVT Limited       Patient History: LE Edema. Right arm sweilling.       PRELIMINARY CONCLUSIONS:  Right Upper Venous: No evidence of acute deep vein thrombus visualized in the right upper extremity. There is acute non-occlusive superficial venous thrombosis visualized in the basilic vein. Right basilic vein SVT is 1.6 cm from brachial vein.  Left Upper Venous: The subclavian vein demonstrates a normal spontaneous and phasic flow.     Imaging & Doppler Findings:     Right               Compressible      Thrombus              Flow  Internal Jugular        Yes             None         Spontaneous/Phasic  Subclavian              Yes             None  Subclavian Proximal     Yes             None         Spontaneous/Phasic  Subclavian Mid "          Yes             None         Spontaneous/Phasic  Subclavian Distal       Yes             None         Spontaneous/Phasic  Axillary                Yes             None         Spontaneous/Phasic  Brachial                Yes             None  Cephalic                Yes             None  Basilic               Partial    Acute non-occlusive       Left                       Flow  Subclavian Proximal Spontaneous/Phasic    VASCULAR PRELIMINARY REPORT  completed by Janusz SORIANO on 5/8/2024 at 9:18:44 AM       ** Final **     No results found for this or any previous visit from the past 1095 days.     Encounter Date: 04/30/24   ECG 12 lead   Result Value    Ventricular Rate 78    Atrial Rate 79    CT Interval 164    QRS Duration 123    QT Interval 412    QTC Calculation(Bazett) 470    P Axis 78    R Axis 65    T Axis 59    QRS Count 13    Q Onset 249    T Offset 455    QTC Fredericia 449    Narrative    Sinus rhythm      Confirmed by Víctor Regan ([id number]) on 4/30/2024 3:16:02 PM          Assessment and Plan     GI bleed: Hemoglobin stable.  No signs of further bleeding.  H. pylori is negative  -Continue twice daily PPI  -Trend hemoglobin and monitor stools for any gross blood losses  -Keep active type and screen at this time as well as maintain good IV access  -Transfuse for hemoglobin less than 7.    -Continue diet as tolerated  -Patient aware to avoid future use of NSAIDs  -Follow-up biopsy results  -If patient has significant rebleed will alert GI/interventional radiology  -Per team discussion with gastroenterology we are okay to rechallenge with aspirin and anticoagulation yesterday which is what we did after given clearance.  Patient continues to do well on heparin drip will convert over to DOAC     Peripheral artery disease: With significant discomfort   -Monitoring GI bleed.    -Follow-up with vascular surgery.  Tentatively following up as outpatient to determine revascularization plan  -Wound care to  ulcer  -Pain control  -Continue aspirin and anticoagulation as outlined above     Cardiovascular: Patient's blood pressure fluctuating and may be related to pain  -Continue losartan  -Continue aspirin  -Can resume Jardiance and spironolactone at this time  -CK remains elevated .  Unsure if related to poor blood flow to her right leg.  Will look to restart statin.  Will need to clarify home statin use     Acute renal failure: Appears to have resolved  -Monitor renal function panel  -Avoid NSAIDs and shock     DVT prophylaxis covered by anticoagulation as above    Physical therapy/Occupational Therapy when appropriate.  Patient may be ready for discharge as soon as tomorrow if continues to do well    Maximino Aragon MD     Of note the above was done with Dragon dictation system.  Note was proofread to minimize errors.

## 2024-05-08 NOTE — CARE PLAN
The patient's goals for the shift include Maintain pt safety    The clinical goals for the shift include Pt will rate her pain a 5/10 or less by the end of this shift.    Over the shift, the patient did not make progress toward the following goals. Recommendation to continue to medicate patient as ordered for pain relief. Pt will start Xarelto tomorrow AM per medical team to transition from heparin drip.

## 2024-05-08 NOTE — CARE PLAN
Problem: Pain  Goal: My pain/discomfort is manageable  Outcome: Progressing     Problem: Safety  Goal: Patient will be injury free during hospitalization  Outcome: Progressing  Goal: I will remain free of falls  Outcome: Progressing     Problem: Daily Care  Goal: Daily care needs are met  Outcome: Progressing     Problem: Psychosocial Needs  Goal: Demonstrates ability to cope with hospitalization/illness  Outcome: Progressing  Goal: Collaborate with me, my family, and caregiver to identify my specific goals  Outcome: Progressing     Problem: Discharge Barriers  Goal: My discharge needs are met  Outcome: Progressing     Problem: Skin  Goal: Decreased wound size/increased tissue granulation at next dressing change  Outcome: Progressing  Goal: Participates in plan/prevention/treatment measures  Outcome: Progressing  Goal: Prevent/manage excess moisture  Outcome: Progressing  Goal: Prevent/minimize sheer/friction injuries  Outcome: Progressing  Goal: Promote/optimize nutrition  Outcome: Progressing  Goal: Promote skin healing  Outcome: Progressing     Problem: Fall/Injury  Goal: Not fall by end of shift  Outcome: Progressing  Goal: Be free from injury by end of the shift  Outcome: Progressing  Goal: Verbalize understanding of personal risk factors for fall in the hospital  Outcome: Progressing  Goal: Verbalize understanding of risk factor reduction measures to prevent injury from fall in the home  Outcome: Progressing  Goal: Use assistive devices by end of the shift  Outcome: Progressing  Goal: Pace activities to prevent fatigue by end of the shift  Outcome: Progressing     Problem: Pain  Goal: Takes deep breaths with improved pain control throughout the shift  Outcome: Progressing  Goal: Turns in bed with improved pain control throughout the shift  Outcome: Progressing  Goal: Walks with improved pain control throughout the shift  Outcome: Progressing  Goal: Performs ADL's with improved pain control throughout  shift  Outcome: Progressing  Goal: Participates in PT with improved pain control throughout the shift  Outcome: Progressing  Goal: Free from opioid side effects throughout the shift  Outcome: Progressing  Goal: Free from acute confusion related to pain meds throughout the shift  Outcome: Progressing     Problem: Pain - Adult  Goal: Verbalizes/displays adequate comfort level or baseline comfort level  Outcome: Progressing     Problem: Safety - Adult  Goal: Free from fall injury  Outcome: Progressing     Problem: Discharge Planning  Goal: Discharge to home or other facility with appropriate resources  Outcome: Progressing     Problem: Chronic Conditions and Co-morbidities  Goal: Patient's chronic conditions and co-morbidity symptoms are monitored and maintained or improved  Outcome: Progressing

## 2024-05-08 NOTE — PROGRESS NOTES
"Brooklyn Read is a 79 y.o. female on day 8 of admission presenting with Upper GI bleed.    Subjective   Resting comfortably; Denies SOB, CP, hematemesis, hemoptysis, lightheadedness, dizziness, and ABD pain; tolerating PO well. Still has not stooled on BID Miralax and once daily senna.       Objective     Physical Exam  Constitutional:       General: She is not in acute distress.     Appearance: Normal appearance.   Cardiovascular:      Rate and Rhythm: Normal rate and regular rhythm.      Comments: Decreased pedal pulse in the RLE with dry, mottled skin. Clean wound dressing on distal RLE. LLE with similar chronic skin changes.   Pulmonary:      Effort: Pulmonary effort is normal.      Breath sounds: Normal breath sounds.   Abdominal:      General: Abdomen is flat. There is no distension.      Palpations: Abdomen is soft.      Tenderness: There is no abdominal tenderness.   Musculoskeletal:      Right lower leg: No edema.      Left lower leg: No edema.   Skin:     Capillary Refill: Capillary refill takes 2 to 3 seconds.   Neurological:      Mental Status: She is alert.         Last Recorded Vitals  Blood pressure 148/64, pulse 88, temperature 36 °C (96.8 °F), resp. rate 24, height 1.753 m (5' 9\"), weight 73.8 kg (162 lb 11.2 oz), SpO2 98%.  Intake/Output last 3 Shifts:  I/O last 3 completed shifts:  In: 50648.7 (203 mL/kg) [P.O.:480; Blood:95964.7]  Out: 900 (12.2 mL/kg) [Urine:900 (0.3 mL/kg/hr)]  Weight: 73.8 kg     Relevant Results        Scheduled medications  aspirin, 81 mg, oral, Daily  buPROPion XL, 150 mg, oral, Daily  tiotropium, 2 puff, inhalation, Daily   And  fluticasone furoate-vilanteroL, 1 puff, inhalation, Daily  insulin lispro, 0-5 Units, subcutaneous, TID with meals  losartan, 100 mg, oral, Daily  pantoprazole, 40 mg, oral, BID AC  polyethylene glycol, 17 g, oral, BID  povidone-iodine, , Topical, Daily  [START ON 5/9/2024] rivaroxaban, 2.5 mg, oral, BID with meals  sennosides-docusate sodium, 1 " tablet, oral, BID  sertraline, 100 mg, oral, Daily      Continuous medications  heparin, 0-4,500 Units/hr, Last Rate: 1,500 Units/hr (05/08/24 1249)      PRN medications  PRN medications: albuterol, dextrose, glucagon, heparin, oxyCODONE    Results for orders placed or performed during the hospital encounter of 04/30/24 (from the past 24 hour(s))   POCT GLUCOSE   Result Value Ref Range    POCT Glucose 105 (H) 74 - 99 mg/dL   POCT GLUCOSE   Result Value Ref Range    POCT Glucose 118 (H) 74 - 99 mg/dL   Heparin Assay, UFH   Result Value Ref Range    Heparin Unfractionated 0.2 See Comment Below for Therapeutic Ranges IU/mL   Heparin Assay, UFH   Result Value Ref Range    Heparin Unfractionated 0.6 See Comment Below for Therapeutic Ranges IU/mL   POCT GLUCOSE   Result Value Ref Range    POCT Glucose 107 (H) 74 - 99 mg/dL   CBC and Auto Differential   Result Value Ref Range    WBC 7.6 4.4 - 11.3 x10*3/uL    nRBC 0.0 0.0 - 0.0 /100 WBCs    RBC 2.93 (L) 4.00 - 5.20 x10*6/uL    Hemoglobin 8.4 (L) 12.0 - 16.0 g/dL    Hematocrit 27.0 (L) 36.0 - 46.0 %    MCV 92 80 - 100 fL    MCH 28.7 26.0 - 34.0 pg    MCHC 31.1 (L) 32.0 - 36.0 g/dL    RDW 17.5 (H) 11.5 - 14.5 %    Platelets 278 150 - 450 x10*3/uL    Immature Granulocytes %, Automated 7.4 (H) 0.0 - 0.9 %    Immature Granulocytes Absolute, Automated 0.56 (H) 0.00 - 0.50 x10*3/uL   Renal function panel   Result Value Ref Range    Glucose 126 (H) 74 - 99 mg/dL    Sodium 140 136 - 145 mmol/L    Potassium 3.7 3.5 - 5.3 mmol/L    Chloride 107 98 - 107 mmol/L    Bicarbonate 25 21 - 32 mmol/L    Anion Gap 12 10 - 20 mmol/L    Urea Nitrogen 14 6 - 23 mg/dL    Creatinine 0.61 0.50 - 1.05 mg/dL    eGFR >90 >60 mL/min/1.73m*2    Calcium 7.9 (L) 8.6 - 10.6 mg/dL    Phosphorus 3.2 2.5 - 4.9 mg/dL    Albumin 2.5 (L) 3.4 - 5.0 g/dL   aPTT - baseline   Result Value Ref Range    aPTT 46 (H) 27 - 38 seconds   Heparin Assay, UFH   Result Value Ref Range    Heparin Unfractionated 0.7 See Comment  Below for Therapeutic Ranges IU/mL   Manual Differential   Result Value Ref Range    Neutrophils %, Manual 75.2 40.0 - 80.0 %    Bands %, Manual 1.7 0.0 - 5.0 %    Lymphocytes %, Manual 11.1 13.0 - 44.0 %    Monocytes %, Manual 4.3 2.0 - 10.0 %    Eosinophils %, Manual 6.0 0.0 - 6.0 %    Basophils %, Manual 0.0 0.0 - 2.0 %    Metamyelocytes %, Manual 0.9 0.0 - 0.0 %    Myelocytes %, Manual 0.8 0.0 - 0.0 %    Seg Neutrophils Absolute, Manual 5.72 (H) 1.60 - 5.00 x10*3/uL    Bands Absolute, Manual 0.13 0.00 - 0.50 x10*3/uL    Lymphocytes Absolute, Manual 0.84 0.80 - 3.00 x10*3/uL    Monocytes Absolute, Manual 0.33 0.05 - 0.80 x10*3/uL    Eosinophils Absolute, Manual 0.46 (H) 0.00 - 0.40 x10*3/uL    Basophils Absolute, Manual 0.00 0.00 - 0.10 x10*3/uL    Metamyelocytes Absolute, Manual 0.07 0.00 - 0.00 x10*3/uL    Myelocytes Absolute, Manual 0.06 0.00 - 0.00 x10*3/uL    Total Cells Counted 117     Neutrophils Absolute, Manual 5.85 (H) 1.60 - 5.50 x10*3/uL    RBC Morphology No significant RBC morphology present    POCT GLUCOSE   Result Value Ref Range    POCT Glucose 109 (H) 74 - 99 mg/dL   CBC   Result Value Ref Range    WBC 7.4 4.4 - 11.3 x10*3/uL    nRBC 0.0 0.0 - 0.0 /100 WBCs    RBC 2.87 (L) 4.00 - 5.20 x10*6/uL    Hemoglobin 8.2 (L) 12.0 - 16.0 g/dL    Hematocrit 24.8 (L) 36.0 - 46.0 %    MCV 86 80 - 100 fL    MCH 28.6 26.0 - 34.0 pg    MCHC 33.1 32.0 - 36.0 g/dL    RDW 17.2 (H) 11.5 - 14.5 %    Platelets 288 150 - 450 x10*3/uL   aPTT - baseline   Result Value Ref Range    aPTT 79 (H) 27 - 38 seconds     Vascular US upper extremity venous duplex right    Result Date: 5/8/2024  Preliminary Cardiology Report           [place] [id number]   Tel [phone] and Fax [phone]        Preliminary Vascular Lab Report  VASC US UPPER EXTREMITY VENOUS DUPLEX RIGHT  Patient Name:      DORIAN Guerrero Physician:  [id number] Pieter Steel MD Study Date:        5/8/2024          Ordering Physician: [id number] SUZETTE LOERA MRN/PID:           [id number]         Technologist:       Janusz SORIANO Accession#:        [id number]     Technologist 2: Date of Birth/Age: 1945        Encounter#:         [id number] Gender:            F Admission Status:  Inpatient        Location Performed: [facility]  Diagnosis/ICD: Localized (leg) edema-R60.0 Indication:    Limb edema Procedure/CPT: 30501 Peripheral venous duplex scan for DVT Limited  Patient History: LE Edema. Right arm sweilling.  PRELIMINARY CONCLUSIONS: Right Upper Venous: No evidence of acute deep vein thrombus visualized in the right upper extremity. There is acute non-occlusive superficial venous thrombosis visualized in the basilic vein. Right basilic vein SVT is 1.6 cm from brachial vein. Left Upper Venous: The subclavian vein demonstrates a normal spontaneous and phasic flow.  Imaging & Doppler Findings:  Right               Compressible      Thrombus              Flow Internal Jugular        Yes             None         Spontaneous/Phasic Subclavian              Yes             None Subclavian Proximal     Yes             None         Spontaneous/Phasic Subclavian Mid          Yes             None         Spontaneous/Phasic Subclavian Distal       Yes             None         Spontaneous/Phasic Axillary                Yes             None         Spontaneous/Phasic Brachial                Yes             None Cephalic                Yes             None Basilic               Partial    Acute non-occlusive  Left                       Flow Subclavian Proximal Spontaneous/Phasic VASCULAR PRELIMINARY REPORT completed by Janusz SORIANO on 5/8/2024 at 9:18:44 AM  ** Final **     Vascular US upper extremity venous duplex left    Result Date: 5/4/2024  Interpreted By:  Judson Oleary, STUDY: [facility] US UPPER EXTREMITY VENOUS DUPLEX LEFT;  5/4/2024 5:47 am   INDICATION: Signs/Symptoms:swelling in Left upper extremity.   COMPARISON:  None.   ACCESSION NUMBER(S): [id number]   ORDERING CLINICIAN: JOHN DE LEÓN   TECHNIQUE: Vascular ultrasound of the  left upper extremity was performed. Evaluation was performed with grayscale, color, and spectral Doppler. When possible, compression views of the evaluated veins was also performed.   FINDINGS: Evaluation of the visualized portions of the  left internal jugular, innominate, subclavian, axillary, and brachial cephalic, and basilic veins was performed.   There is normal respiratory variation, normal compressibility, as well as normal color doppler signal in the visualized vessels. There is no evidence of thrombus.       No evidence for thrombus involving the visualized veins of the left upper extremity, as detailed above.   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 Arrived via [place] on RA and off pressors. Patient has had a total of 3 EGDS. EGD on 4/30 showed an ulcer in the prepyloric region with adherent clot as well as a large old clot in the gastric fundus. On 5/1/2024, epinephrine was injected into the ulcer in the prepyloric region. Clot was unable to be retrieved due to the location and patient continued to have bleeding. Repeat EGD 5/2/2024 again showed single large, cratered, round ulcer in the prepyloric region with adherent clot (Boogie IIB). A hemoclip was used to guillotine the adherent clot off the ulcer by placing it at the white fibrin base and pulling off. Epinephrine was injected on either side, heater probe coagulation  resulted in stoppage of the bleeding. Patient has not had any bleeding following EGD on 5/2. Unable to doppler pulses on right foot, has discolored skin and normal temperature with decreased sensation. Vascular surgery consulted for revascularization; recommend discharging on oral anticoagulants. GI approved anticoagulation trial 5/7 and pt was started in IV Heparin and ASA 81 mg 5/7 PM. Pt has remained stable without signs of bleeding.     Updates 5/8  - Tolerating IV heparin and ASA 81 well without signs of bleeding; plan to discontinue heparin and start Xarelto 2.5 mg BID per vascular surgery recs 5/8 AM  - Weaned O2     #Peptic Ulcer with hematemesis 2/2 ibuprofen use  :: EGD done 4/30 in [place] with multiple deep ulcers, no active bleeding  ::EGD done 5/1 shows Large deep ulcer with large adherent blood clot (20~25mm) at inferior posterior aspect of pre-pylorus. Clot retrieval with snares were attempted but unsuccessful due to difficult location.   :: EGD done 5/2/2024, hemoclip used to guillotine adherent clot, epinephrine injected and heater probe coagulation resulted in complete cessation of bleeding.  :: No bleeding since EGD 5/2  :: CTA 5/2 demonstrated no signs of active GI bleeding  :: Hgb 6.9 5/4 from 7.2 5/3, 1 unit pRBC transfused     - Most recent Hgb 8.2 5/8  - Continue oral PPI 40 mg BID for 12 weeks per GI recs  - Trend CBC, goal Hgb>7 and platelets >50  - stool H. Pylori antigen negative  - can consult IR for possible embolization if further bleeding occurs  - Follow up with outpatient GI in 3 months for EGD     # Right lower extremity ulcer  #Concern for limb ischemia   :: unable to feel pulses on R foot, discolored skin, normal temperature  :: RONALD 5/2: severe arterial occlusive disease in RLE at rest with decreased perfusion, moderate arterial occlusive disease in left lower extremity with decreased perfusion  :: Vascular surgery consulted; recommended 2 week outpatient follow-up for  revascularization and continued pain control; also recommended starting ASA 81 mg daily and Xarelto 2.5 mg BID on discharge   :: wounds on extremities of her foot and at ankle  :: wound care consulted  - Pain control with PRN oral Oxycodone 5 mg  - GI approved anticoagulation trial 5/7  - Tolerating IV Heparin and ASA81 well  - If continues to tolerate heparin, plan to discontinue 5/8 AM and start Xarelto 2.5 mg per vascular surgery recs      #Constipation  - Last BM 4 days ago  - Continue Miralax BID  - Increase nightly Senna tablet to BID    #Superficial vein thrombosis R. Basilic vein  - Vascular US upper extremity venous duplex right: There is acute non-occlusive superficial venous thrombosis visualized in the basilic vein. Right basilic vein SVT is 1.6 cm from brachial vein.   - Pt already on IV Heparin and ASA 81 mg; plan to discontinue heparin and start Xarelto 2.5 mg BID tomorrow per vascular surgery recs   - No additional treatment necessary at this time    #LUE swelling  :: swelling in the left upper extremity, noticeably larger than right upper extremity  - LUE ultrasound showing no thrombus 5/4/2024    # History of CVA  :: residual right lower extremity and right upper extremity weakness  :: holding home aspirin  -Continue with neurovascular check q1h to ensure there is no signs of acute limb ischemia      #HTN, HLD, diastolic HF  :: Goal MAP >65  :: holding home atorvastatin, rosuvastatin, Jardiance, spirinolactone  - Continue Losartan 100mg PO daily     #Hx of COPD, tobacco use  - continue home PRN albuterol and trelegy ellipta  - breathing comfortably on RA, goal SpO2 88-92%  - Continue incentive spirometry      #hx of CKD with bl Cr 0.94   # OTONIEL likely 2/2 shock  - Cr wnl at 0.67 5/6  - daily RFP     #hemorrhagic shock , resolved   :: s/p 7 pRBCs at OSH   :: lactate 1.0 5/2/2024  :: daily RFP  - maintain active T&S, last T & S 5/4/2024     #Leukocytosis, resolved  - WBC Down trending; 7.4 5/8  :: no  active signs of infection, likely 2/2 shock  :: continue monitor for signs of infection     Fluids: -  Diet: Adult low fiber   Bowel regimen: Miralax BID and Senna BID   PPX: Protonix BID   DVT PPX: -  Code status: DNR/DNI - on hold for procedures  Dispo: SNF ([facility])             Lana Wilburn

## 2024-05-08 NOTE — PROGRESS NOTES
Brooklyn Read is a 79 y.o. female on day 8 of admission presenting with Upper GI bleed.    Subjective   Received update that patients ADOD is tomorrow; requested that [facility] update [place] & complete 7000 in HENS.    -Candace MCKINNEY MA, LSW  [phone] or [facility]  Care Transitions

## 2024-05-09 ENCOUNTER — DOCUMENTATION (OUTPATIENT)
Dept: GASTROENTEROLOGY | Facility: HOSPITAL | Age: 79
End: 2024-05-09
Payer: MEDICARE

## 2024-05-09 LAB
ALBUMIN SERPL BCP-MCNC: 2.5 G/DL (ref 3.4–5)
ANION GAP SERPL CALC-SCNC: 13 MMOL/L (ref 10–20)
BASOPHILS # BLD AUTO: 0.06 X10*3/UL (ref 0–0.1)
BASOPHILS # BLD MANUAL: 0 X10*3/UL (ref 0–0.1)
BASOPHILS NFR BLD AUTO: 0.8 %
BASOPHILS NFR BLD MANUAL: 0 %
BUN SERPL-MCNC: 11 MG/DL (ref 6–23)
BURR CELLS BLD QL SMEAR: ABNORMAL
CALCIUM SERPL-MCNC: 8.2 MG/DL (ref 8.6–10.6)
CHLORIDE SERPL-SCNC: 106 MMOL/L (ref 98–107)
CO2 SERPL-SCNC: 27 MMOL/L (ref 21–32)
CREAT SERPL-MCNC: 0.64 MG/DL (ref 0.5–1.05)
EGFRCR SERPLBLD CKD-EPI 2021: >90 ML/MIN/1.73M*2
EOSINOPHIL # BLD AUTO: 0.37 X10*3/UL (ref 0–0.4)
EOSINOPHIL # BLD MANUAL: 0.3 X10*3/UL (ref 0–0.4)
EOSINOPHIL NFR BLD AUTO: 4.7 %
EOSINOPHIL NFR BLD MANUAL: 4.3 %
ERYTHROCYTE [DISTWIDTH] IN BLOOD BY AUTOMATED COUNT: 17.1 % (ref 11.5–14.5)
ERYTHROCYTE [DISTWIDTH] IN BLOOD BY AUTOMATED COUNT: 17.2 % (ref 11.5–14.5)
GLUCOSE BLD MANUAL STRIP-MCNC: 100 MG/DL (ref 74–99)
GLUCOSE BLD MANUAL STRIP-MCNC: 103 MG/DL (ref 74–99)
GLUCOSE BLD MANUAL STRIP-MCNC: 88 MG/DL (ref 74–99)
GLUCOSE SERPL-MCNC: 94 MG/DL (ref 74–99)
HCT VFR BLD AUTO: 28.8 % (ref 36–46)
HCT VFR BLD AUTO: 29.7 % (ref 36–46)
HGB BLD-MCNC: 8.9 G/DL (ref 12–16)
HGB BLD-MCNC: 9.5 G/DL (ref 12–16)
IMM GRANULOCYTES # BLD AUTO: 0.4 X10*3/UL (ref 0–0.5)
IMM GRANULOCYTES # BLD AUTO: 0.41 X10*3/UL (ref 0–0.5)
IMM GRANULOCYTES NFR BLD AUTO: 5 % (ref 0–0.9)
IMM GRANULOCYTES NFR BLD AUTO: 5.9 % (ref 0–0.9)
LYMPHOCYTES # BLD AUTO: 1.29 X10*3/UL (ref 0.8–3)
LYMPHOCYTES # BLD MANUAL: 1 X10*3/UL (ref 0.8–3)
LYMPHOCYTES NFR BLD AUTO: 16.3 %
LYMPHOCYTES NFR BLD MANUAL: 14.5 %
MCH RBC QN AUTO: 29 PG (ref 26–34)
MCH RBC QN AUTO: 29.4 PG (ref 26–34)
MCHC RBC AUTO-ENTMCNC: 30.9 G/DL (ref 32–36)
MCHC RBC AUTO-ENTMCNC: 32 G/DL (ref 32–36)
MCV RBC AUTO: 92 FL (ref 80–100)
MCV RBC AUTO: 94 FL (ref 80–100)
MONOCYTES # BLD AUTO: 0.95 X10*3/UL (ref 0.05–0.8)
MONOCYTES # BLD MANUAL: 0.83 X10*3/UL (ref 0.05–0.8)
MONOCYTES NFR BLD AUTO: 12 %
MONOCYTES NFR BLD MANUAL: 12 %
MYELOCYTES # BLD MANUAL: 0.23 X10*3/UL
MYELOCYTES NFR BLD MANUAL: 3.4 %
NEUTROPHILS # BLD AUTO: 4.86 X10*3/UL (ref 1.6–5.5)
NEUTROPHILS # BLD MANUAL: 4.54 X10*3/UL (ref 1.6–5.5)
NEUTROPHILS NFR BLD AUTO: 61.2 %
NEUTS BAND # BLD MANUAL: 0.47 X10*3/UL (ref 0–0.5)
NEUTS BAND NFR BLD MANUAL: 6.8 %
NEUTS SEG # BLD MANUAL: 4.07 X10*3/UL (ref 1.6–5)
NEUTS SEG NFR BLD MANUAL: 59 %
NRBC BLD-RTO: 0 /100 WBCS (ref 0–0)
NRBC BLD-RTO: 0 /100 WBCS (ref 0–0)
OVALOCYTES BLD QL SMEAR: ABNORMAL
PHOSPHATE SERPL-MCNC: 3.7 MG/DL (ref 2.5–4.9)
PLATELET # BLD AUTO: 355 X10*3/UL (ref 150–450)
PLATELET # BLD AUTO: 363 X10*3/UL (ref 150–450)
POLYCHROMASIA BLD QL SMEAR: ABNORMAL
POTASSIUM SERPL-SCNC: 4.4 MMOL/L (ref 3.5–5.3)
RBC # BLD AUTO: 3.07 X10*6/UL (ref 4–5.2)
RBC # BLD AUTO: 3.23 X10*6/UL (ref 4–5.2)
RBC MORPH BLD: ABNORMAL
SODIUM SERPL-SCNC: 142 MMOL/L (ref 136–145)
TOTAL CELLS COUNTED BLD: 117
UFH PPP CHRO-ACNC: 0.7 IU/ML
WBC # BLD AUTO: 6.9 X10*3/UL (ref 4.4–11.3)
WBC # BLD AUTO: 7.9 X10*3/UL (ref 4.4–11.3)

## 2024-05-09 PROCEDURE — 1090000001 HH PPS REVENUE CREDIT

## 2024-05-09 PROCEDURE — 2500000001 HC RX 250 WO HCPCS SELF ADMINISTERED DRUGS (ALT 637 FOR MEDICARE OP): Performed by: STUDENT IN AN ORGANIZED HEALTH CARE EDUCATION/TRAINING PROGRAM

## 2024-05-09 PROCEDURE — 84100 ASSAY OF PHOSPHORUS: CPT | Performed by: STUDENT IN AN ORGANIZED HEALTH CARE EDUCATION/TRAINING PROGRAM

## 2024-05-09 PROCEDURE — 2500000006 HC RX 250 W HCPCS SELF ADMINISTERED DRUGS (ALT 637 FOR ALL PAYERS): Mod: MUE | Performed by: STUDENT IN AN ORGANIZED HEALTH CARE EDUCATION/TRAINING PROGRAM

## 2024-05-09 PROCEDURE — 36415 COLL VENOUS BLD VENIPUNCTURE: CPT | Performed by: STUDENT IN AN ORGANIZED HEALTH CARE EDUCATION/TRAINING PROGRAM

## 2024-05-09 PROCEDURE — 99232 SBSQ HOSP IP/OBS MODERATE 35: CPT | Performed by: INTERNAL MEDICINE

## 2024-05-09 PROCEDURE — 82947 ASSAY GLUCOSE BLOOD QUANT: CPT

## 2024-05-09 PROCEDURE — 1090000002 HH PPS REVENUE DEBIT

## 2024-05-09 PROCEDURE — 85027 COMPLETE CBC AUTOMATED: CPT | Performed by: STUDENT IN AN ORGANIZED HEALTH CARE EDUCATION/TRAINING PROGRAM

## 2024-05-09 PROCEDURE — 94640 AIRWAY INHALATION TREATMENT: CPT

## 2024-05-09 PROCEDURE — 2500000004 HC RX 250 GENERAL PHARMACY W/ HCPCS (ALT 636 FOR OP/ED): Performed by: STUDENT IN AN ORGANIZED HEALTH CARE EDUCATION/TRAINING PROGRAM

## 2024-05-09 PROCEDURE — 2500000006 HC RX 250 W HCPCS SELF ADMINISTERED DRUGS (ALT 637 FOR ALL PAYERS): Performed by: STUDENT IN AN ORGANIZED HEALTH CARE EDUCATION/TRAINING PROGRAM

## 2024-05-09 PROCEDURE — 97110 THERAPEUTIC EXERCISES: CPT | Mod: GP

## 2024-05-09 PROCEDURE — 85520 HEPARIN ASSAY: CPT | Performed by: STUDENT IN AN ORGANIZED HEALTH CARE EDUCATION/TRAINING PROGRAM

## 2024-05-09 PROCEDURE — 85007 BL SMEAR W/DIFF WBC COUNT: CPT | Performed by: STUDENT IN AN ORGANIZED HEALTH CARE EDUCATION/TRAINING PROGRAM

## 2024-05-09 PROCEDURE — 85025 COMPLETE CBC W/AUTO DIFF WBC: CPT | Mod: 91 | Performed by: STUDENT IN AN ORGANIZED HEALTH CARE EDUCATION/TRAINING PROGRAM

## 2024-05-09 PROCEDURE — 1100000001 HC PRIVATE ROOM DAILY

## 2024-05-09 RX ORDER — BISACODYL 10 MG/1
10 SUPPOSITORY RECTAL DAILY PRN
Start: 2024-05-09

## 2024-05-09 RX ORDER — SPIRONOLACTONE 25 MG/1
25 TABLET ORAL DAILY
Start: 2024-05-10

## 2024-05-09 RX ORDER — BISACODYL 10 MG/1
10 SUPPOSITORY RECTAL DAILY PRN
Status: DISCONTINUED | OUTPATIENT
Start: 2024-05-09 | End: 2024-05-10 | Stop reason: HOSPADM

## 2024-05-09 RX ORDER — INSULIN LISPRO 100 [IU]/ML
0-5 INJECTION, SOLUTION INTRAVENOUS; SUBCUTANEOUS
Start: 2024-05-09

## 2024-05-09 RX ORDER — METOPROLOL SUCCINATE 25 MG/1
25 TABLET, EXTENDED RELEASE ORAL DAILY
Status: DISCONTINUED | OUTPATIENT
Start: 2024-05-09 | End: 2024-05-10 | Stop reason: HOSPADM

## 2024-05-09 RX ORDER — SPIRONOLACTONE 25 MG/1
25 TABLET ORAL DAILY
Status: DISCONTINUED | OUTPATIENT
Start: 2024-05-09 | End: 2024-05-10 | Stop reason: HOSPADM

## 2024-05-09 RX ORDER — METOPROLOL SUCCINATE 25 MG/1
25 TABLET, EXTENDED RELEASE ORAL DAILY
Start: 2024-05-09

## 2024-05-09 RX ORDER — AMOXICILLIN 250 MG
1 CAPSULE ORAL 2 TIMES DAILY
Start: 2024-05-09 | End: 2024-05-10 | Stop reason: HOSPADM

## 2024-05-09 RX ADMIN — TIOTROPIUM BROMIDE INHALATION SPRAY 2 PUFF: 3.12 SPRAY, METERED RESPIRATORY (INHALATION) at 08:19

## 2024-05-09 RX ADMIN — PANTOPRAZOLE SODIUM 40 MG: 40 TABLET, DELAYED RELEASE ORAL at 05:56

## 2024-05-09 RX ADMIN — LOSARTAN POTASSIUM 100 MG: 100 TABLET, FILM COATED ORAL at 09:14

## 2024-05-09 RX ADMIN — HEPARIN SODIUM 1500 UNITS/HR: 10000 INJECTION, SOLUTION INTRAVENOUS at 03:25

## 2024-05-09 RX ADMIN — SPIRONOLACTONE 25 MG: 25 TABLET, FILM COATED ORAL at 09:14

## 2024-05-09 RX ADMIN — BISACODYL 10 MG: 10 SUPPOSITORY RECTAL at 05:56

## 2024-05-09 RX ADMIN — OXYCODONE HYDROCHLORIDE 5 MG: 5 TABLET ORAL at 05:56

## 2024-05-09 RX ADMIN — PANTOPRAZOLE SODIUM 40 MG: 40 TABLET, DELAYED RELEASE ORAL at 17:57

## 2024-05-09 RX ADMIN — RIVAROXABAN 2.5 MG: 2.5 TABLET, FILM COATED ORAL at 09:14

## 2024-05-09 RX ADMIN — POVIDONE-IODINE: 100 OINTMENT TOPICAL at 09:00

## 2024-05-09 RX ADMIN — SERTRALINE 100 MG: 100 TABLET, FILM COATED ORAL at 09:14

## 2024-05-09 RX ADMIN — BUPROPION HYDROCHLORIDE 150 MG: 150 TABLET, EXTENDED RELEASE ORAL at 09:14

## 2024-05-09 RX ADMIN — METOPROLOL SUCCINATE 25 MG: 25 TABLET, EXTENDED RELEASE ORAL at 20:54

## 2024-05-09 RX ADMIN — METOPROLOL TARTRATE 12.5 MG: 25 TABLET, FILM COATED ORAL at 09:14

## 2024-05-09 RX ADMIN — RIVAROXABAN 2.5 MG: 2.5 TABLET, FILM COATED ORAL at 17:57

## 2024-05-09 RX ADMIN — EMPAGLIFLOZIN 10 MG: 10 TABLET, FILM COATED ORAL at 09:14

## 2024-05-09 RX ADMIN — ASPIRIN 81 MG 81 MG: 81 TABLET ORAL at 09:14

## 2024-05-09 RX ADMIN — POLYETHYLENE GLYCOL 3350 17 G: 17 POWDER, FOR SOLUTION ORAL at 09:14

## 2024-05-09 ASSESSMENT — COGNITIVE AND FUNCTIONAL STATUS - GENERAL
MOBILITY SCORE: 18
MOVING FROM LYING ON BACK TO SITTING ON SIDE OF FLAT BED WITH BEDRAILS: A LITTLE
STANDING UP FROM CHAIR USING ARMS: A LITTLE
MOVING TO AND FROM BED TO CHAIR: A LOT
WALKING IN HOSPITAL ROOM: A LITTLE
CLIMB 3 TO 5 STEPS WITH RAILING: A LITTLE
TURNING FROM BACK TO SIDE WHILE IN FLAT BAD: A LITTLE
MOBILITY SCORE: 13
WALKING IN HOSPITAL ROOM: A LOT
MOVING FROM LYING ON BACK TO SITTING ON SIDE OF FLAT BED WITH BEDRAILS: A LITTLE
STANDING UP FROM CHAIR USING ARMS: A LOT
CLIMB 3 TO 5 STEPS WITH RAILING: TOTAL
TURNING FROM BACK TO SIDE WHILE IN FLAT BAD: A LITTLE
MOVING TO AND FROM BED TO CHAIR: A LITTLE

## 2024-05-09 ASSESSMENT — PAIN - FUNCTIONAL ASSESSMENT
PAIN_FUNCTIONAL_ASSESSMENT: 0-10

## 2024-05-09 ASSESSMENT — PAIN SCALES - GENERAL
PAINLEVEL_OUTOF10: 0 - NO PAIN

## 2024-05-09 NOTE — PROGRESS NOTES
"Brooklyn Read is a 79 y.o. female on day 9 of admission presenting with Upper GI bleed.    Subjective   Pt is resting comfortably in NAD on RA. She denies hemoptysis, hematemesis, epistaxis, SOB, dizziness, and lightheadedness. Also reports well-controlled (5/10) RLE pain. She received a dulcolax suppository on pre-rounds and had a BM by rounds.        Objective     Physical Exam  Constitutional:       Appearance: Normal appearance.   HENT:      Head: Normocephalic and atraumatic.   Cardiovascular:      Rate and Rhythm: Normal rate and regular rhythm.      Comments: Decreased pedal pulse in the RLE with dry, mottled skin. Clean wound dressing on distal RLE. LLE with similar chronic skin changes.   Pulmonary:      Effort: Pulmonary effort is normal.      Breath sounds: Normal breath sounds.   Abdominal:      General: Abdomen is flat.      Palpations: Abdomen is soft.      Tenderness: There is no abdominal tenderness.   Skin:     General: Skin is warm and dry.      Capillary Refill: Capillary refill takes 2 to 3 seconds.   Neurological:      General: No focal deficit present.      Mental Status: She is alert.   Psychiatric:         Mood and Affect: Mood normal.         Behavior: Behavior normal.         Last Recorded Vitals  Blood pressure 157/85, pulse 81, temperature 36.2 °C (97.2 °F), temperature source Temporal, resp. rate 20, height 1.753 m (5' 9\"), weight 73.8 kg (162 lb 11.2 oz), SpO2 95%.  Intake/Output last 3 Shifts:  I/O last 3 completed shifts:  In: 57690.9 (207.3 mL/kg) [P.O.:480; I.V.:319.2 (4.3 mL/kg); Blood:44618.7]  Out: 1550 (21 mL/kg) [Urine:1550 (0.6 mL/kg/hr)]  Weight: 73.8 kg     Relevant Results        Scheduled medications  aspirin, 81 mg, oral, Daily  buPROPion XL, 150 mg, oral, Daily  empagliflozin, 10 mg, oral, Daily  tiotropium, 2 puff, inhalation, Daily   And  fluticasone furoate-vilanteroL, 1 puff, inhalation, Daily  insulin lispro, 0-5 Units, subcutaneous, TID with meals  losartan, 100 " mg, oral, Daily  metoprolol succinate XL, 25 mg, oral, Daily  pantoprazole, 40 mg, oral, BID AC  polyethylene glycol, 17 g, oral, BID  povidone-iodine, , Topical, Daily  rivaroxaban, 2.5 mg, oral, BID with meals  sennosides-docusate sodium, 1 tablet, oral, BID  sertraline, 100 mg, oral, Daily  spironolactone, 25 mg, oral, Daily    Continuous medications     PRN medications  PRN medications: albuterol, bisacodyl, dextrose, glucagon, heparin, oxyCODONE  Results for orders placed or performed during the hospital encounter of 04/30/24 (from the past 24 hour(s))   POCT GLUCOSE   Result Value Ref Range    POCT Glucose 109 (H) 74 - 99 mg/dL   POCT GLUCOSE   Result Value Ref Range    POCT Glucose 109 (H) 74 - 99 mg/dL   POCT GLUCOSE   Result Value Ref Range    POCT Glucose 103 (H) 74 - 99 mg/dL   CBC and Auto Differential   Result Value Ref Range    WBC 6.9 4.4 - 11.3 x10*3/uL    nRBC 0.0 0.0 - 0.0 /100 WBCs    RBC 3.07 (L) 4.00 - 5.20 x10*6/uL    Hemoglobin 8.9 (L) 12.0 - 16.0 g/dL    Hematocrit 28.8 (L) 36.0 - 46.0 %    MCV 94 80 - 100 fL    MCH 29.0 26.0 - 34.0 pg    MCHC 30.9 (L) 32.0 - 36.0 g/dL    RDW 17.2 (H) 11.5 - 14.5 %    Platelets 355 150 - 450 x10*3/uL    Immature Granulocytes %, Automated 5.9 (H) 0.0 - 0.9 %    Immature Granulocytes Absolute, Automated 0.41 0.00 - 0.50 x10*3/uL   Renal function panel   Result Value Ref Range    Glucose 94 74 - 99 mg/dL    Sodium 142 136 - 145 mmol/L    Potassium 4.4 3.5 - 5.3 mmol/L    Chloride 106 98 - 107 mmol/L    Bicarbonate 27 21 - 32 mmol/L    Anion Gap 13 10 - 20 mmol/L    Urea Nitrogen 11 6 - 23 mg/dL    Creatinine 0.64 0.50 - 1.05 mg/dL    eGFR >90 >60 mL/min/1.73m*2    Calcium 8.2 (L) 8.6 - 10.6 mg/dL    Phosphorus 3.7 2.5 - 4.9 mg/dL    Albumin 2.5 (L) 3.4 - 5.0 g/dL   Heparin Assay, UFH   Result Value Ref Range    Heparin Unfractionated 0.7 See Comment Below for Therapeutic Ranges IU/mL   Manual Differential   Result Value Ref Range    Neutrophils %, Manual 59.0  40.0 - 80.0 %    Bands %, Manual 6.8 0.0 - 5.0 %    Lymphocytes %, Manual 14.5 13.0 - 44.0 %    Monocytes %, Manual 12.0 2.0 - 10.0 %    Eosinophils %, Manual 4.3 0.0 - 6.0 %    Basophils %, Manual 0.0 0.0 - 2.0 %    Myelocytes %, Manual 3.4 0.0 - 0.0 %    Seg Neutrophils Absolute, Manual 4.07 1.60 - 5.00 x10*3/uL    Bands Absolute, Manual 0.47 0.00 - 0.50 x10*3/uL    Lymphocytes Absolute, Manual 1.00 0.80 - 3.00 x10*3/uL    Monocytes Absolute, Manual 0.83 (H) 0.05 - 0.80 x10*3/uL    Eosinophils Absolute, Manual 0.30 0.00 - 0.40 x10*3/uL    Basophils Absolute, Manual 0.00 0.00 - 0.10 x10*3/uL    Myelocytes Absolute, Manual 0.23 0.00 - 0.00 x10*3/uL    Total Cells Counted 117     Neutrophils Absolute, Manual 4.54 1.60 - 5.50 x10*3/uL    RBC Morphology See Below     Polychromasia Mild     Ovalocytes Few     Kwame Cells Few    POCT GLUCOSE   Result Value Ref Range    POCT Glucose 100 (H) 74 - 99 mg/dL     Vascular US upper extremity venous duplex right    Result Date: 5/8/2024            [place] [address]   Tel [phone] and Fax [phone]  [facility] UPPER EXTREM[facility]ENOUS DUPLEX RIGHT  Patient Name:      DORIAN BARRAZA     Reading Physician:  [id number] Pieter Steel MD Study Date:        5/8/2024             Ordering Physician: [id number] LOERA MRN/PID:           [id number]             Technologist:       Janusz SORIANO Accession#:        [id number]         Technologist 2: Date of Birth/Age: 1945 / 79 years Encounter#:         [id number] Gender:            F Admission Status:  Inpatient            Location Performed: [facility]  Diagnosis/ICD: Localized (leg) edema-R60.0 Indication:    Limb edema CPT Codes:     61029 Peripheral venous duplex scan for DVT Limited  Patient History LE Edema. Right arm sweilling.  CONCLUSIONS: Right Upper Venous: No evidence of acute deep vein thrombus visualized in the right upper extremity. There  is acute non-occlusive superficial venous thrombosis visualized in the basilic vein. Right basilic vein SVT is 1.6 cm from brachial vein. Left Upper Venous: The subclavian vein demonstrates a normal spontaneous and phasic flow.  Imaging & Doppler Findings:  Right               Compressible      Thrombus              Flow Internal Jugular        Yes             None         Spontaneous/Phasic Subclavian              Yes             None Subclavian Proximal     Yes             None         Spontaneous/Phasic Subclavian Mid          Yes             None         Spontaneous/Phasic Subclavian Distal       Yes             None         Spontaneous/Phasic Axillary                Yes             None         Spontaneous/Phasic Brachial                Yes             None Cephalic                Yes             None Basilic               Partial    Acute non-occlusive  Left                       Flow Subclavian Proximal Spontaneous/Phasic  [id number] Pieter Steel MD Electronically signed by [id number] Pieter Steel MD on 5/8/2024 at 4:04:04 PM  ** Final **          Assessment/Plan   Principal Problem:    Upper GI bleed    Brooklyn Read is a 78 y/o woman with a PMH of COPD, osteopenia, 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 Arrived via [place] on RA and off pressors. Patient has had a total of 3 EGDS. EGD on 4/30 showed an ulcer in the prepyloric region with adherent clot as well as a large old clot in the gastric fundus. On 5/1/2024, epinephrine was injected into the ulcer in the prepyloric region. Clot was unable to be retrieved due to the location and patient continued to have bleeding. Repeat EGD 5/2/2024 again showed single large, cratered, round ulcer in the prepyloric region with adherent clot (Boogie  IIB). A hemoclip was used to guillotine the adherent clot off the ulcer by placing it at the white fibrin base and pulling off. Epinephrine was injected on either side, heater probe coagulation resulted in stoppage of the bleeding. Patient has not had any bleeding following EGD on 5/2. Unable to doppler pulses on right foot, has discolored skin and normal temperature with decreased sensation. Vascular surgery consulted for revascularization; recommend discharging on oral anticoagulants. GI approved anticoagulation trial 5/7 and pt was started in IV Heparin and ASA 81 mg 5/7 PM. Pt has remained stable without signs of bleeding. Transitioned to vascular surgery recommended outpatient anticoagulant medications 5/9.      Updates 5/9  - Discontinued IV Heparin and started Xarelto 2.5 mg BID per vascular surgery recs. Tolerating regimen well without signs of bleeding.   - Outpatient vascular surgery follow up in 4 weeks (6/3); scheduled by vascular surgery  - Passed non-bloody BM      #Peptic Ulcer with hematemesis 2/2 ibuprofen use  :: EGD done 4/30 in [place] with multiple deep ulcers, no active bleeding  ::EGD done 5/1 shows Large deep ulcer with large adherent blood clot (20~25mm) at inferior posterior aspect of pre-pylorus. Clot retrieval with snares were attempted but unsuccessful due to difficult location.   :: EGD done 5/2/2024, hemoclip used to guillotine adherent clot, epinephrine injected and heater probe coagulation resulted in complete cessation of bleeding.  :: No bleeding since EGD 5/2  :: CTA 5/2 demonstrated no signs of active GI bleeding  :: Hgb 6.9 5/4 from 7.2 5/3, 1 unit pRBC transfused     - Passed non-bloody BM with no signs of hemodynamic instability  - Follow up PM CBC   - Most recent Hgb 8.9 5/9 AM, stable  - Continue oral PPI 40 mg BID for 12 weeks per GI recs  - Trend CBC, goal Hgb>7 and platelets >50  - stool H. Pylori antigen negative  - can consult IR for possible embolization if further  bleeding occurs  - Follow up with outpatient GI in 3 months for EGD      # Right lower extremity ulcer  #Concern for limb ischemia   :: unable to feel pulses on R foot, discolored skin, normal temperature  :: RONALD 5/2: severe arterial occlusive disease in RLE at rest with decreased perfusion, moderate arterial occlusive disease in left lower extremity with decreased perfusion  :: Vascular surgery consulted; first recommended 2 week outpatient follow-up, but d/t scheduling availability okay with 4 week follow-up 6/3 (scheduled) for revascularization and continued pain control; also recommended starting ASA 81 mg daily and Xarelto 2.5 mg BID on discharge   :: wounds on extremities of her foot and at ankle  :: wound care consulted  - Pain control with PRN oral Oxycodone 5 mg  - GI approved anticoagulation trial 5/7  - Stop IV heparin  - Start PO Xarelto 2.5 mg BID  - Continue ASA 81 mg     #Constipation  - Passed non-bloody stool today  - Continue Miralax BID and Senna tablet to BID d/t opioid pain medication     #Superficial vein thrombosis R. Basilic vein  - Vascular US upper extremity venous duplex right: There is acute non-occlusive superficial venous thrombosis visualized in the basilic vein. Right basilic vein SVT is 1.6 cm from brachial vein.   - Start Xarelto 2.5 mg BID and continue ASA 81 mg per vascular surgery recs   - No additional treatment necessary at this time     #LUE swelling  :: swelling in the left upper extremity, noticeably larger than right upper extremity  - LUE ultrasound showing no thrombus 5/4/2024     # History of CVA  :: residual right lower extremity and right upper extremity weakness  :: holding home aspirin  -Continue with neurovascular check q1h to ensure there is no signs of acute limb ischemia      #HTN, HLD, diastolic HF  :: Goal MAP >65  :: holding home rosuvastatin d/t increased CK (1295 5/5)   - No signs of bleeding, HR and BP stable  - Restart home Jardiance and Spironolactone  -  Switched metoprolol tartrate 12.5 mg BID to metoprolol succinate 25 mg daily  - Continue Losartan 100mg PO daily     #Hx of COPD, tobacco use  - continue home PRN albuterol and trelegy ellipta  - breathing comfortably on RA, goal SpO2 88-92%  - Continue incentive spirometry      #hx of CKD with bl Cr 0.94   # OTONIEL likely 2/2 shock  - Cr wnl at 0.64 5/9  - daily RFP     #hemorrhagic shock , resolved   :: s/p 7 pRBCs at OSH   :: lactate 1.0 5/2/2024  :: daily RFP  - maintain active T&S, last T & S 5/4/2024     #Leukocytosis, resolved  - WBC Down trending; 6.9 5/9  :: no active signs of infection, likely 2/2 shock  :: continue monitor for signs of infection      Fluids: -  Diet: Adult low fiber   Bowel regimen: Miralax BID and Senna BID   PPX: Protonix BID   DVT PPX: -  Code status: DNR/DNI - on hold for procedures  Dispo: SNF ([place])           Lana Wilburn, MS4

## 2024-05-09 NOTE — PROGRESS NOTES
Physical Therapy    Physical Therapy Treatment    Patient Name: Brooklyn Read  MRN: [id number]  Today's Date: 5/9/2024  Time Calculation  Start Time: 1148  Stop Time: 1202  Time Calculation (min): 14 min       Assessment/Plan   PT Assessment  End of Session Communication: Bedside nurse  End of Session Patient Position: Up in chair, Alarm on     PT Plan  Treatment/Interventions: Bed mobility, Transfer training, Gait training, Balance training, Strengthening, Therapeutic exercise, Therapeutic activity, Positioning, Postural re-education  PT Plan: Skilled PT  PT Frequency: 4 times per week  PT Discharge Recommendations: Moderate intensity level of continued care  PT Recommended Transfer Status: Assist x1  PT - OK to Discharge: Yes      General Visit Information:   PT  Visit  PT Received On: 05/09/24  Prior to Session Communication: Bedside nurse  Patient Position Received: Bed, 3 rail up, Alarm on  General Comment: supine in bed, willing to participate     Subjective   Precautions:  Precautions  Medical Precautions: Fall precautions       Objective   Pain:  Pain Assessment  Pain Assessment: 0-10  Pain Score: 0 - No pain         PT Treatments:  Therapeutic Exercise  Therapeutic Exercise Performed: Yes  Therapeutic Exercise Activity 1: seated BLE x10: AP, LAQ, hip flex, hip abd/add, GS        Bed Mobility  Bed Mobility: Yes  Bed Mobility 1  Bed Mobility 1: Supine to sitting  Level of Assistance 1: Minimum assistance  Bed Mobility Comments 1: HOB elevated, verbal cues for sequencing, use of bed rails     Transfers  Transfer: Yes  Transfer 1  Technique 1: Sit to stand, Stand to sit  Transfer Device 1: Walker  Transfer Level of Assistance 1: Moderate assistance  Trials/Comments 1: verbal cues for hand placement; stood 1x; declined additional trials of sit to stand due to fatigue  Transfers 2  Transfer From 2: Bed to  Transfer to 2: Chair with arms  Technique 2: Sit to stand, Stand to sit  Transfer Device 2:  Walker  Transfer Level of Assistance 2: Moderate assistance  Trials/Comments 2: verbal cues for sequencing       Outcome Measures:  [facility] Basic Mobility  Turning from your back to your side while in a flat bed without using bedrails: A little  Moving from lying on your back to sitting on the side of a flat bed without using bedrails: A little  Moving to and from bed to chair (including a wheelchair): A lot  Standing up from a chair using your arms (e.g. wheelchair or bedside chair): A lot  To walk in hospital room: A lot  Climbing 3-5 steps with railing: Total  Basic Mobility - Total Score: 13          Education Documentation  Precautions, taught by Cherelle Mora, PT at 5/9/2024 12:13 PM.  Learner: Patient  Readiness: Acceptance  Method: Explanation  Response: Verbalizes Understanding    Mobility Training, taught by Cherelle Mora PT at 5/9/2024 12:13 PM.  Learner: Patient  Readiness: Acceptance  Method: Explanation  Response: Verbalizes Understanding    Education Comments  No comments found.               Encounter Problems       Encounter Problems (Active)       PT Problem       Patient will complete bed mobility with CGx1  (Progressing)       Start:  05/03/24    Expected End:  05/24/24            Patient will complete STS with MINx1 using LRAD without acute LOB   (Progressing)       Start:  05/03/24    Expected End:  05/24/24            Patient will ambulate >/=30' with LRAD with MINx1 without acute LOB  (Progressing)       Start:  05/03/24    Expected End:  05/24/24            Patient will complete static (contact guard assist) and dynamic (minimal assist x1) standing balance activities using LRAD without acute LOB.  (Progressing)       Start:  05/03/24    Expected End:  05/24/24            Patient will participate in BLE there-ex program in order to assist in improving strength and to assist with the completion of functional mobility tasks.  (Progressing)       Start:  05/03/24    Expected End:   05/24/24               Pain - Adult                05/09/24 at 12:14 PM   Cherelle Mora, PT

## 2024-05-09 NOTE — PROGRESS NOTES
Subjective   Brooklyn Read is a 79 y.o. female on hospital day 9 with no major events.  Patient doing well on room air.  Patient had large bowel movement this morning which I saw and was brown in color.  Some of the stool got into her PureWick which tubing may have had a slight Crimson tinge to it although on closer inspection I felt it looked more brown in color    Objective     Exam     Vitals:    05/08/24 1258 05/08/24 2028 05/09/24 0555 05/09/24 0819   BP: 148/64 172/74 157/85    BP Location:  Left arm Right arm    Patient Position: Lying Sitting Lying    Pulse: 88 96 81    Resp: 24 19 20    Temp: 36 °C (96.8 °F) 36.5 °C (97.7 °F) 36.2 °C (97.2 °F)    TempSrc:  Temporal Temporal    SpO2: 98% 98% 93% 95%   Weight:       Height:          Intake/Output last 3 shifts:  I/O last 3 completed shifts:  In: 08417.9 (207.3 mL/kg) [P.O.:480; I.V.:319.2 (4.3 mL/kg); Blood:94208.7]  Out: 1550 (21 mL/kg) [Urine:1550 (0.6 mL/kg/hr)]  Weight: 73.8 kg     Physical Exam  Vitals reviewed.   HENT:      Head: Normocephalic and atraumatic.      Mouth/Throat:      Mouth: Mucous membranes are moist.   Cardiovascular:      Rate and Rhythm: Normal rate and regular rhythm.      Pulses: Normal pulses.      Heart sounds: No murmur heard.     No friction rub. No gallop.   Pulmonary:      Effort: Pulmonary effort is normal.      Breath sounds: Normal breath sounds. No wheezing, rhonchi or rales.   Abdominal:      General: Bowel sounds are normal. There is no distension.      Palpations: Abdomen is soft.      Tenderness: There is no abdominal tenderness. There is no guarding or rebound.   Musculoskeletal:      Right lower leg: No edema.      Left lower leg: No edema.      Comments: Left leg cooler to the touch with diminished pulse.  Leg ulcer wrapped with dressing clean dry and intact   Skin:     General: Skin is warm and dry.   Neurological:      General: No focal deficit present.      Mental Status: She is alert and oriented to person,  place, and time.   Psychiatric:         Mood and Affect: Mood normal.         Behavior: Behavior normal.            Medications   aspirin, 81 mg, oral, Daily  buPROPion XL, 150 mg, oral, Daily  empagliflozin, 10 mg, oral, Daily  tiotropium, 2 puff, inhalation, Daily   And  fluticasone furoate-vilanteroL, 1 puff, inhalation, Daily  insulin lispro, 0-5 Units, subcutaneous, TID with meals  losartan, 100 mg, oral, Daily  metoprolol succinate XL, 25 mg, oral, Daily  pantoprazole, 40 mg, oral, BID AC  polyethylene glycol, 17 g, oral, BID  povidone-iodine, , Topical, Daily  rivaroxaban, 2.5 mg, oral, BID with meals  sennosides-docusate sodium, 1 tablet, oral, BID  sertraline, 100 mg, oral, Daily  spironolactone, 25 mg, oral, Daily       PRN medications: albuterol, bisacodyl, dextrose, glucagon, heparin, oxyCODONE       Labs     All new labs reviewed:  some of the basic labs as follows -     Results from last 7 days   Lab Units 05/09/24  0848 05/08/24  1227 05/08/24  0954 05/07/24  0955 05/06/24  0540 05/05/24  0921 05/04/24  0717 05/03/24  1209   WBC AUTO x10*3/uL 6.9 7.4 7.6 8.2   < > 14.3* 15.0* 15.8*   HEMOGLOBIN g/dL 8.9* 8.2* 8.4* 8.5*   < > 8.4* 6.9* 7.2*   HEMATOCRIT % 28.8* 24.8* 27.0* 26.7*   < > 26.8* 21.9* 21.6*   PLATELETS AUTO x10*3/uL 355 288 278 230   < > 151 125* 99*   NEUTROS PCT AUTO %  --   --   --   --   --  72.5 71.5 74.7   LYMPHO PCT MAN % 14.5  --  11.1 8.9   < >  --   --   --    LYMPHS PCT AUTO %  --   --   --   --   --  7.9 9.3 9.6   MONO PCT MAN % 12.0  --  4.3 9.8   < >  --   --   --    MONOS PCT AUTO %  --   --   --   --   --  13.3 15.3 12.8   EOSINO PCT MAN % 4.3  --  6.0 6.3   < >  --   --   --    EOS PCT AUTO %  --   --   --   --   --  1.6 1.1 1.0    < > = values in this interval not displayed.      Results from last 72 hours   Lab Units 05/08/24  1227   APTT seconds 79*     Results from last 72 hours   Lab Units 05/09/24  0848 05/08/24  0954 05/07/24  0956   SODIUM mmol/L 142 140 141  "  POTASSIUM mmol/L 4.4 3.7 3.9   CHLORIDE mmol/L 106 107 107   CO2 mmol/L 27 25 25   BUN mg/dL 11 14 16   CREATININE mg/dL 0.64 0.61 0.64     Results from last 72 hours   Lab Units 05/09/24  0848 05/08/24  0954 05/07/24  0956   ALBUMIN g/dL 2.5* 2.5* 2.5*     Results from last 72 hours   Lab Units 05/09/24  0848 05/08/24  0954 05/07/24  0956   GLUCOSE mg/dL 94 126* 93         No results found for: \"TR1\"  Lab Results   Component Value Date    BLOODCULT No growth at 4 days -  FINAL REPORT 04/17/2024            Imaging   Vascular US upper extremity venous duplex right              [place] [address]    Tel [phone] and Fax [phone]       Vascular Lab Report  VASC US UPPER EXTREMITY VENOUS DUPLEX RIGHT       Patient Name:      DORIAN BARRAZA     Reading Physician:  [id number] Pieter Steel MD  Study Date:        5/8/2024             Ordering Physician: [id number] LOERA  MRN/PID:           [id number]             Technologist:       Janusz SORIANO  Accession#:        [id number]         Technologist 2:  Date of Birth/Age: 1945 / 79 years Encounter#:         [id number]  Gender:            F  Admission Status:  Inpatient            Location Performed: [facility]       Diagnosis/ICD: Localized (leg) edema-R60.0  Indication:    Limb edema  CPT Codes:     04782 Peripheral venous duplex scan for DVT Limited       Patient History LE Edema. Right arm sweilling.       CONCLUSIONS:  Right Upper Venous: No evidence of acute deep vein thrombus visualized in the right upper extremity. There is acute non-occlusive superficial venous thrombosis visualized in the basilic vein. Right basilic vein SVT is 1.6 cm from brachial vein.  Left Upper Venous: The subclavian vein demonstrates a normal spontaneous and phasic flow.     Imaging & Doppler Findings:     Right               Compressible      Thrombus              Flow  Internal Jugular        Yes             None    "      Spontaneous/Phasic  Subclavian              Yes             None  Subclavian Proximal     Yes             None         Spontaneous/Phasic  Subclavian Mid          Yes             None         Spontaneous/Phasic  Subclavian Distal       Yes             None         Spontaneous/Phasic  Axillary                Yes             None         Spontaneous/Phasic  Brachial                Yes             None  Cephalic                Yes             None  Basilic               Partial    Acute non-occlusive       Left                       Flow  Subclavian Proximal Spontaneous/Phasic       [id number] Pieter Steel MD  Electronically signed by [id number] Pieter Steel MD on 5/8/2024 at 4:04:04 PM       ** Final **     No results found for this or any previous visit from the past 1095 days.     Encounter Date: 04/30/24   ECG 12 lead   Result Value    Ventricular Rate 78    Atrial Rate 79    KY Interval 164    QRS Duration 123    QT Interval 412    QTC Calculation(Bazett) 470    P Axis 78    R Axis 65    T Axis 59    QRS Count 13    Q Onset 249    T Offset 455    QTC Fredericia 449    Narrative    Sinus rhythm      Confirmed by Víctor Regan ([id number]) on 4/30/2024 3:16:02 PM          Assessment and Plan     GI bleed: Hemoglobin stable/up today.  No signs of further bleeding.  H. pylori is negative  -Continue twice daily PPI  -Trend hemoglobin and monitor stools for any gross blood losses  -Transfuse for hemoglobin less than 7.    -Continue diet as tolerated  -Patient aware to avoid future use of NSAIDs  -Follow-up biopsy results.  Currently still pending  -If patient has significant rebleed will alert GI/interventional radiology     Peripheral artery disease: With significant discomfort   -Monitoring GI bleed.    -Follow-up with vascular surgery.  Tentatively following up as outpatient to determine revascularization plan  -Wound care to ulcer  -Pain control  -Continue aspirin and DOAC at this time     Cardiovascular: Patient's blood  pressure fluctuating and may be related to pain  -Continue losartan  -Continue aspirin  -Continue Jardiance and spironolactone at this time  -CK remains elevated .  Unsure if related to poor blood flow to her right leg.  Will look to restart statin.  Will need to clarify home statin use     Acute renal failure: Appears to have resolved  -Monitor renal function panel  -Avoid NSAIDs and shock     DVT prophylaxis covered by anticoagulation as above    Physical therapy/Occupational Therapy when appropriate.  Patient may be ready for discharge as soon as tomorrow if continues to do well    Maximino Aragon MD     Of note the above was done with Dragon dictation system.  Note was proofread to minimize errors.

## 2024-05-09 NOTE — CARE PLAN
Problem: Safety  Goal: Patient will be injury free during hospitalization  Outcome: Progressing  Goal: I will remain free of falls  Outcome: Progressing     Problem: Daily Care  Goal: Daily care needs are met  Outcome: Progressing   The patient's goals for the shift include Maintain pt safety    The clinical goals for the shift include Pt will rate her pain a 5/10 or less by the end of this shift.    Over the shift, the patient did not make progress toward the following goals.

## 2024-05-10 VITALS
DIASTOLIC BLOOD PRESSURE: 68 MMHG | RESPIRATION RATE: 18 BRPM | WEIGHT: 162.7 LBS | BODY MASS INDEX: 24.1 KG/M2 | HEART RATE: 78 BPM | OXYGEN SATURATION: 95 % | TEMPERATURE: 97.3 F | HEIGHT: 69 IN | SYSTOLIC BLOOD PRESSURE: 154 MMHG

## 2024-05-10 LAB
ABO GROUP (TYPE) IN BLOOD: NORMAL
ALBUMIN SERPL BCP-MCNC: 2.6 G/DL (ref 3.4–5)
ANION GAP SERPL CALC-SCNC: 13 MMOL/L (ref 10–20)
ANTIBODY SCREEN: NORMAL
BASOPHILS # BLD AUTO: 0.06 X10*3/UL (ref 0–0.1)
BASOPHILS NFR BLD AUTO: 0.8 %
BUN SERPL-MCNC: 11 MG/DL (ref 6–23)
CALCIUM SERPL-MCNC: 8.3 MG/DL (ref 8.6–10.6)
CHLORIDE SERPL-SCNC: 105 MMOL/L (ref 98–107)
CO2 SERPL-SCNC: 27 MMOL/L (ref 21–32)
CREAT SERPL-MCNC: 0.7 MG/DL (ref 0.5–1.05)
EGFRCR SERPLBLD CKD-EPI 2021: 89 ML/MIN/1.73M*2
EOSINOPHIL # BLD AUTO: 0.33 X10*3/UL (ref 0–0.4)
EOSINOPHIL NFR BLD AUTO: 4.6 %
ERYTHROCYTE [DISTWIDTH] IN BLOOD BY AUTOMATED COUNT: 16.5 % (ref 11.5–14.5)
GLUCOSE BLD MANUAL STRIP-MCNC: 92 MG/DL (ref 74–99)
GLUCOSE BLD MANUAL STRIP-MCNC: 98 MG/DL (ref 74–99)
GLUCOSE SERPL-MCNC: 74 MG/DL (ref 74–99)
HCT VFR BLD AUTO: 26.3 % (ref 36–46)
HGB BLD-MCNC: 8.6 G/DL (ref 12–16)
IMM GRANULOCYTES # BLD AUTO: 0.26 X10*3/UL (ref 0–0.5)
IMM GRANULOCYTES NFR BLD AUTO: 3.6 % (ref 0–0.9)
LYMPHOCYTES # BLD AUTO: 1.13 X10*3/UL (ref 0.8–3)
LYMPHOCYTES NFR BLD AUTO: 15.7 %
MCH RBC QN AUTO: 28.5 PG (ref 26–34)
MCHC RBC AUTO-ENTMCNC: 32.7 G/DL (ref 32–36)
MCV RBC AUTO: 87 FL (ref 80–100)
MONOCYTES # BLD AUTO: 0.87 X10*3/UL (ref 0.05–0.8)
MONOCYTES NFR BLD AUTO: 12.1 %
NEUTROPHILS # BLD AUTO: 4.56 X10*3/UL (ref 1.6–5.5)
NEUTROPHILS NFR BLD AUTO: 63.2 %
NRBC BLD-RTO: 0 /100 WBCS (ref 0–0)
PHOSPHATE SERPL-MCNC: 4.1 MG/DL (ref 2.5–4.9)
PLATELET # BLD AUTO: 367 X10*3/UL (ref 150–450)
POTASSIUM SERPL-SCNC: 4.5 MMOL/L (ref 3.5–5.3)
RBC # BLD AUTO: 3.02 X10*6/UL (ref 4–5.2)
RH FACTOR (ANTIGEN D): NORMAL
SODIUM SERPL-SCNC: 140 MMOL/L (ref 136–145)
WBC # BLD AUTO: 7.2 X10*3/UL (ref 4.4–11.3)

## 2024-05-10 PROCEDURE — 1090000001 HH PPS REVENUE CREDIT

## 2024-05-10 PROCEDURE — 86901 BLOOD TYPING SEROLOGIC RH(D): CPT | Performed by: STUDENT IN AN ORGANIZED HEALTH CARE EDUCATION/TRAINING PROGRAM

## 2024-05-10 PROCEDURE — 80069 RENAL FUNCTION PANEL: CPT | Performed by: STUDENT IN AN ORGANIZED HEALTH CARE EDUCATION/TRAINING PROGRAM

## 2024-05-10 PROCEDURE — 85025 COMPLETE CBC W/AUTO DIFF WBC: CPT | Performed by: STUDENT IN AN ORGANIZED HEALTH CARE EDUCATION/TRAINING PROGRAM

## 2024-05-10 PROCEDURE — 2500000001 HC RX 250 WO HCPCS SELF ADMINISTERED DRUGS (ALT 637 FOR MEDICARE OP): Performed by: STUDENT IN AN ORGANIZED HEALTH CARE EDUCATION/TRAINING PROGRAM

## 2024-05-10 PROCEDURE — 94640 AIRWAY INHALATION TREATMENT: CPT

## 2024-05-10 PROCEDURE — 82947 ASSAY GLUCOSE BLOOD QUANT: CPT

## 2024-05-10 PROCEDURE — 36415 COLL VENOUS BLD VENIPUNCTURE: CPT | Performed by: STUDENT IN AN ORGANIZED HEALTH CARE EDUCATION/TRAINING PROGRAM

## 2024-05-10 PROCEDURE — 99239 HOSP IP/OBS DSCHRG MGMT >30: CPT | Performed by: INTERNAL MEDICINE

## 2024-05-10 PROCEDURE — 2500000006 HC RX 250 W HCPCS SELF ADMINISTERED DRUGS (ALT 637 FOR ALL PAYERS): Performed by: STUDENT IN AN ORGANIZED HEALTH CARE EDUCATION/TRAINING PROGRAM

## 2024-05-10 PROCEDURE — 1090000002 HH PPS REVENUE DEBIT

## 2024-05-10 PROCEDURE — 2500000006 HC RX 250 W HCPCS SELF ADMINISTERED DRUGS (ALT 637 FOR ALL PAYERS): Mod: MUE | Performed by: STUDENT IN AN ORGANIZED HEALTH CARE EDUCATION/TRAINING PROGRAM

## 2024-05-10 RX ORDER — AMOXICILLIN 250 MG
1 CAPSULE ORAL 2 TIMES DAILY
Status: CANCELLED | OUTPATIENT
Start: 2024-05-10

## 2024-05-10 RX ORDER — POLYETHYLENE GLYCOL 3350 17 G/17G
17 POWDER, FOR SOLUTION ORAL DAILY
Status: DISCONTINUED | OUTPATIENT
Start: 2024-05-11 | End: 2024-05-10 | Stop reason: HOSPADM

## 2024-05-10 RX ORDER — OXYCODONE HYDROCHLORIDE 5 MG/1
5 TABLET ORAL EVERY 6 HOURS PRN
Qty: 15 TABLET | Refills: 0 | Status: SHIPPED | OUTPATIENT
Start: 2024-05-10 | End: 2024-05-13

## 2024-05-10 RX ADMIN — OXYCODONE HYDROCHLORIDE 5 MG: 5 TABLET ORAL at 06:04

## 2024-05-10 RX ADMIN — ASPIRIN 81 MG 81 MG: 81 TABLET ORAL at 09:59

## 2024-05-10 RX ADMIN — PANTOPRAZOLE SODIUM 40 MG: 40 TABLET, DELAYED RELEASE ORAL at 06:04

## 2024-05-10 RX ADMIN — POVIDONE-IODINE: 100 OINTMENT TOPICAL at 09:00

## 2024-05-10 RX ADMIN — RIVAROXABAN 2.5 MG: 2.5 TABLET, FILM COATED ORAL at 10:00

## 2024-05-10 RX ADMIN — LOSARTAN POTASSIUM 100 MG: 100 TABLET, FILM COATED ORAL at 09:59

## 2024-05-10 RX ADMIN — FLUTICASONE FUROATE AND VILANTEROL TRIFENATATE 1 PUFF: 200; 25 POWDER RESPIRATORY (INHALATION) at 11:41

## 2024-05-10 RX ADMIN — PANTOPRAZOLE SODIUM 40 MG: 40 TABLET, DELAYED RELEASE ORAL at 16:34

## 2024-05-10 RX ADMIN — EMPAGLIFLOZIN 10 MG: 10 TABLET, FILM COATED ORAL at 10:00

## 2024-05-10 RX ADMIN — RIVAROXABAN 2.5 MG: 2.5 TABLET, FILM COATED ORAL at 16:34

## 2024-05-10 RX ADMIN — TIOTROPIUM BROMIDE INHALATION SPRAY 2 PUFF: 3.12 SPRAY, METERED RESPIRATORY (INHALATION) at 11:38

## 2024-05-10 RX ADMIN — METOPROLOL SUCCINATE 25 MG: 25 TABLET, EXTENDED RELEASE ORAL at 09:59

## 2024-05-10 RX ADMIN — POVIDONE-IODINE: 100 OINTMENT TOPICAL at 06:24

## 2024-05-10 RX ADMIN — BUPROPION HYDROCHLORIDE 150 MG: 150 TABLET, EXTENDED RELEASE ORAL at 09:59

## 2024-05-10 RX ADMIN — SPIRONOLACTONE 25 MG: 25 TABLET, FILM COATED ORAL at 09:59

## 2024-05-10 RX ADMIN — SERTRALINE 100 MG: 100 TABLET, FILM COATED ORAL at 09:59

## 2024-05-10 ASSESSMENT — COGNITIVE AND FUNCTIONAL STATUS - GENERAL
DAILY ACTIVITIY SCORE: 20
MOVING TO AND FROM BED TO CHAIR: A LITTLE
WALKING IN HOSPITAL ROOM: A LOT
STANDING UP FROM CHAIR USING ARMS: A LITTLE
TOILETING: A LITTLE
TURNING FROM BACK TO SIDE WHILE IN FLAT BAD: A LITTLE
DRESSING REGULAR UPPER BODY CLOTHING: A LITTLE
CLIMB 3 TO 5 STEPS WITH RAILING: A LOT
MOVING FROM LYING ON BACK TO SITTING ON SIDE OF FLAT BED WITH BEDRAILS: A LITTLE
MOBILITY SCORE: 16
HELP NEEDED FOR BATHING: A LITTLE
DRESSING REGULAR LOWER BODY CLOTHING: A LITTLE

## 2024-05-10 ASSESSMENT — PAIN - FUNCTIONAL ASSESSMENT: PAIN_FUNCTIONAL_ASSESSMENT: 0-10

## 2024-05-10 ASSESSMENT — PAIN SCALES - GENERAL: PAINLEVEL_OUTOF10: 0 - NO PAIN

## 2024-05-10 NOTE — DISCHARGE SUMMARY
Discharge Diagnosis  Upper GI bleed    Issues Requiring Follow-Up  Upper GI Bleed  Chronic limb ischemia RLE    Test Results Pending At Discharge  Pending Labs       Order Current Status    Surgical Pathology Exam In process            Hospital Course  The pt is a 80 yo woman presenting from [facility] due to GI bleed. Pt has a PMH of osteopenia, COPD, anemia, CVA, CKD, Depression, HLD and HTN, Diastolic HF, chronic RLE wound. Pt presented to [place] ED yesterday due to hematemesis that started on 4/30 in the early morning. In the ED she was noted to have an anemia of 6.7 and new OTONIEL. Was given blood and admitted to the floor for GI consultation. GI saw the patient and suspected the GI bleed was 2/2 to ibuprofen use and planned to proceed with UGI endoscopy while inpatient. Pt become unstable overnight requiring MTP, and vasopressors thus was transferred to [facility]  MICU for further care.      Here she states that she is having significant RLE pain and some abdominal pain however her RLE pain is worse. She states she has has a sore on the extremity for 3 wks. She has been taking ibuprofen 6-8 pills/wk to treat this pain. Vascular surgery formally consulted 5/1 d/t no dopplerable pulses in BL LE (R>L) noted on arrival to [facility]. Re-engaged 5/6 after tx of GI bleed; recommended outpatient follow-up 6/3 for revascularization and discharge on oral anticoagulants.     EGD on 5/1 showed large deep ulcer with large adherent blood clot (20~25mm) at inferior posterior aspect of pre-pylorus. Epinephrine was injected into four quadrants around the ulcer. Clot retrieval with snares were attempted but unsuccessful due to difficult location. One clip was placed for identification. Esophagus/duodenum were unremarkable.      CTA 5/2 demonstrated no evidence of active gastrointestinal bleeding. Patient transferred to medicine floor on 5/3. Patient had Hgb drop from 7.2 to 6.9 5/4/2024 and received one unit pRBC. Swelling of the LUE was  Discharged by provider. Leaves ambulatory with discharge paperwork. Denies questions. noted 5/4; subsequent LUE US unremarkable. 5/6 GI recommended 12 weeks of PO PPI and follow up outpt EGD in 3 months. GI approved anticoagulation trial 5/7 with IV heparin and aspirin for chronic ischemia of RLE, which pt tolerated well. Weaned off of oxygen 5/8. RUE vascular US 5/8 revealed non-occlusive superficial venous thrombosis in the R basilic vein. 5/9 transitioned pt to PO anticoagulants per vascular surgery recs. Pt's hemoglobin and vitals remained stable. After 4 days of constipation, passed several non-bloody stools on 5/9, with Hgb and vitals remaining reassuring. Hgb 8.6 on day of discharge. Stopped atorvastatin upon admission d/t pt's increased CK, which remained elevated throughout her admission. Follow up restarting statin outpatient.         Pertinent Physical Exam At Time of Discharge  Physical Exam  Constitutional:       General: She is not in acute distress.     Appearance: Normal appearance.   HENT:      Head: Normocephalic and atraumatic.      Mouth/Throat:      Mouth: Mucous membranes are moist.   Eyes:      Extraocular Movements: Extraocular movements intact.      Pupils: Pupils are equal, round, and reactive to light.   Cardiovascular:      Rate and Rhythm: Normal rate and regular rhythm.      Comments: Decreased pedal pulse in the RLE with dry, mottled skin. Clean wound dressing on distal RLE. LLE with similar chronic skin changes.   Pulmonary:      Effort: Pulmonary effort is normal. No respiratory distress.      Breath sounds: Normal breath sounds.   Abdominal:      General: Abdomen is flat. Bowel sounds are normal. There is no distension.      Palpations: Abdomen is soft.      Tenderness: There is no abdominal tenderness.   Musculoskeletal:      Right lower leg: No edema.      Left lower leg: No edema.   Skin:     General: Skin is warm and dry.      Capillary Refill: Capillary refill takes 2 to 3 seconds.   Neurological:      General: No focal deficit present.      Mental Status: She  is alert.   Psychiatric:         Mood and Affect: Mood normal.         Behavior: Behavior normal.         Home Medications     Medication List      START taking these medications     bisacodyl 10 mg suppository; Commonly known as: Dulcolax; Insert 1   suppository (10 mg) into the rectum once daily as needed for constipation.   insulin lispro 100 unit/mL injection; Commonly known as: HumaLOG; Inject   0-0.05 mL (0-5 Units) under the skin 3 times a day with meals. Take as   directed per insulin instructions.   oxyCODONE 5 mg immediate release tablet; Commonly known as: Roxicodone;   Take 1 tablet (5 mg) by mouth every 6 hours if needed for severe pain (7 -   10) for up to 3 days.   pantoprazole 40 mg EC tablet; Commonly known as: ProtoNix; Take 1 tablet   (40 mg) by mouth 2 times a day before meals. Do not crush, chew, or split.   polyethylene glycol 17 gram packet; Commonly known as: Glycolax,   Miralax; Take 17 g by mouth 2 times a day.   povidone-iodine 10 % ointment; Commonly known as: Betadine; Apply   topically once daily.   rivaroxaban 2.5 mg tablet; Commonly known as: Xarelto; Take 1 tablet   (2.5 mg) by mouth 2 times a day with meals.   sennosides-docusate sodium 8.6-50 mg tablet; Commonly known as:   Ca-Colace; Take 1 tablet by mouth 2 times a day.   spironolactone 25 mg tablet; Commonly known as: Aldactone; Take 1 tablet   (25 mg) by mouth once daily.     CHANGE how you take these medications     metoprolol succinate XL 25 mg 24 hr tablet; Commonly known as:   Toprol-XL; Take 1 tablet (25 mg) by mouth once daily. Do not crush or   chew.; What changed: medication strength, how much to take, additional   instructions     CONTINUE taking these medications     albuterol 90 mcg/actuation inhaler; Inhale 2 puffs every 6 hours if   needed for wheezing.   allopurinol 100 mg tablet; Commonly known as: Zyloprim; Take 1 tablet   (100 mg) by mouth 2 times a day.   aspirin 81 mg EC tablet   buPROPion  mg 24 hr  tablet; Commonly known as: Wellbutrin XL; Take   1 tablet (150 mg) by mouth once daily in the morning.   cholecalciferol 5,000 Units tablet; Commonly known as: Vitamin D-3   empagliflozin 10 mg; Commonly known as: Jardiance; Take 1 tablet (10 mg)   by mouth once daily.   EPINEPHrine 0.3 mg/0.3 mL injection syringe; Commonly known as: Epipen   losartan 100 mg tablet; Commonly known as: Cozaar; Take 1 tablet (100   mg) by mouth once daily.   multivitamin with minerals tablet   sertraline 100 mg tablet; Commonly known as: Zoloft; Take 1 tablet (100   mg) by mouth once daily.   Trelegy Ellipta 200-62.5-25 mcg blister with device; Generic drug:   fluticasone-umeclidin-vilanter; Inhale 1 puff once daily.     STOP taking these medications     alendronate 70 mg tablet; Commonly known as: Fosamax   amoxicillin-pot clavulanate 875-125 mg tablet; Commonly known as:   Augmentin   atorvastatin 10 mg tablet; Commonly known as: Lipitor   furosemide 40 mg tablet; Commonly known as: Lasix   HYDROcodone-acetaminophen 5-325 mg tablet; Commonly known as: Norco   potassium chloride CR 20 mEq ER tablet; Commonly known as: Klor-Con M20   rosuvastatin 40 mg tablet; Commonly known as: Crestor   tiZANidine 2 mg capsule; Commonly known as: Zanaflex       Outpatient Follow-Up  Future Appointments   Date Time Provider Department Center   5/29/2024  2:00 PM PEGGY Cervantes-CNP [id number]   6/3/2024 10:00 AM Ayad Landeros MD PhD [facility]       Lana Wilburn MS4

## 2024-05-10 NOTE — PROGRESS NOTES
Brooklyn Read is a 79 y.o. female on day 10 of admission presenting with Upper GI bleed.    Subjective   Received update that patient is medically ready for discharge; patient has precert to go to [facility] in [place]. Transportation requested in [facility] for 1:00pm ; awaiting confirmed time. 7000 previously completed by [facility] & requested that they upload and send [place]/AVS to SNF.    UPDATE 0955 Received confirmed transportation time of 1:00pm via [facility] stretcher. SNF updated of transportation time by [facility]. Blue slip completed and provided to unit secretary.    Attempted to update patients daughter Jolene, however no answer. Spoke with patient and updated her regarding discharge/transport time. She reports that she will update her daughter.    -Candace MCKINNEY MA, LSW  [phone] or [facility]  Care Transitions

## 2024-05-10 NOTE — CARE PLAN
Problem: Pain  Goal: My pain/discomfort is manageable  Outcome: Progressing     Problem: Safety  Goal: Patient will be injury free during hospitalization  Outcome: Progressing  Goal: I will remain free of falls  Outcome: Progressing     Problem: Daily Care  Goal: Daily care needs are met  Outcome: Progressing     Problem: Psychosocial Needs  Goal: Demonstrates ability to cope with hospitalization/illness  Outcome: Progressing  Goal: Collaborate with me, my family, and caregiver to identify my specific goals  Outcome: Progressing     Problem: Discharge Barriers  Goal: My discharge needs are met  Outcome: Progressing     Problem: Skin  Goal: Decreased wound size/increased tissue granulation at next dressing change  Outcome: Progressing  Goal: Participates in plan/prevention/treatment measures  Outcome: Progressing  Goal: Prevent/manage excess moisture  Outcome: Progressing  Goal: Prevent/minimize sheer/friction injuries  Outcome: Progressing  Goal: Promote/optimize nutrition  Outcome: Progressing  Goal: Promote skin healing  Outcome: Progressing     Problem: Fall/Injury  Goal: Not fall by end of shift  Outcome: Progressing  Goal: Be free from injury by end of the shift  Outcome: Progressing  Goal: Verbalize understanding of personal risk factors for fall in the hospital  Outcome: Progressing  Goal: Verbalize understanding of risk factor reduction measures to prevent injury from fall in the home  Outcome: Progressing  Goal: Use assistive devices by end of the shift  Outcome: Progressing  Goal: Pace activities to prevent fatigue by end of the shift  Outcome: Progressing     Problem: Pain  Goal: Takes deep breaths with improved pain control throughout the shift  Outcome: Progressing  Goal: Turns in bed with improved pain control throughout the shift  Outcome: Progressing  Goal: Walks with improved pain control throughout the shift  Outcome: Progressing  Goal: Performs ADL's with improved pain control throughout  shift  Outcome: Progressing  Goal: Participates in PT with improved pain control throughout the shift  Outcome: Progressing  Goal: Free from opioid side effects throughout the shift  Outcome: Progressing  Goal: Free from acute confusion related to pain meds throughout the shift  Outcome: Progressing     Problem: Pain - Adult  Goal: Verbalizes/displays adequate comfort level or baseline comfort level  Outcome: Progressing     Problem: Safety - Adult  Goal: Free from fall injury  Outcome: Progressing     Problem: Discharge Planning  Goal: Discharge to home or other facility with appropriate resources  Outcome: Progressing     Problem: Chronic Conditions and Co-morbidities  Goal: Patient's chronic conditions and co-morbidity symptoms are monitored and maintained or improved  Outcome: Progressing   The patient's goals for the shift include Maintain pt safety    The clinical goals for the shift include Pt will have a BM by the end of this shift.    Over the shift, the patient did not make progress toward the following goals. Barriers to progression include . Recommendations to address these barriers include pt will be able to verbalize concerns of care.

## 2024-05-11 PROCEDURE — 1090000001 HH PPS REVENUE CREDIT

## 2024-05-11 PROCEDURE — 1090000002 HH PPS REVENUE DEBIT

## 2024-05-12 PROCEDURE — 1090000002 HH PPS REVENUE DEBIT

## 2024-05-12 PROCEDURE — 1090000001 HH PPS REVENUE CREDIT

## 2024-05-13 LAB
LAB AP ASR DISCLAIMER: NORMAL
LABORATORY COMMENT REPORT: NORMAL
PATH REPORT.FINAL DX SPEC: NORMAL
PATH REPORT.GROSS SPEC: NORMAL
PATH REPORT.TOTAL CANCER: NORMAL

## 2024-05-13 PROCEDURE — 1090000001 HH PPS REVENUE CREDIT

## 2024-05-13 PROCEDURE — 1090000002 HH PPS REVENUE DEBIT

## 2024-05-13 PROCEDURE — G0180 MD CERTIFICATION HHA PATIENT: HCPCS | Performed by: FAMILY MEDICINE

## 2024-05-14 PROCEDURE — 1090000002 HH PPS REVENUE DEBIT

## 2024-05-14 PROCEDURE — 1090000001 HH PPS REVENUE CREDIT

## 2024-05-15 PROCEDURE — 1090000002 HH PPS REVENUE DEBIT

## 2024-05-15 PROCEDURE — 1090000001 HH PPS REVENUE CREDIT

## 2024-05-16 PROCEDURE — 1090000001 HH PPS REVENUE CREDIT

## 2024-05-16 PROCEDURE — 1090000002 HH PPS REVENUE DEBIT

## 2024-05-17 PROCEDURE — 1090000001 HH PPS REVENUE CREDIT

## 2024-05-17 PROCEDURE — 1090000002 HH PPS REVENUE DEBIT

## 2024-05-18 PROCEDURE — 1090000002 HH PPS REVENUE DEBIT

## 2024-05-18 PROCEDURE — 1090000001 HH PPS REVENUE CREDIT

## 2024-05-19 PROCEDURE — 1090000001 HH PPS REVENUE CREDIT

## 2024-05-19 PROCEDURE — 1090000002 HH PPS REVENUE DEBIT

## 2024-05-20 ENCOUNTER — LAB REQUISITION (OUTPATIENT)
Dept: LAB | Facility: HOSPITAL | Age: 79
End: 2024-05-20
Payer: MEDICARE

## 2024-05-20 DIAGNOSIS — D64.9 ANEMIA, UNSPECIFIED: ICD-10-CM

## 2024-05-20 LAB
ERYTHROCYTE [DISTWIDTH] IN BLOOD BY AUTOMATED COUNT: 15.9 % (ref 11.5–14.5)
HCT VFR BLD AUTO: 29.6 % (ref 36–46)
HGB BLD-MCNC: 9.2 G/DL (ref 12–16)
MCH RBC QN AUTO: 28.2 PG (ref 26–34)
MCHC RBC AUTO-ENTMCNC: 31.1 G/DL (ref 32–36)
MCV RBC AUTO: 91 FL (ref 80–100)
NRBC BLD-RTO: 0 /100 WBCS (ref 0–0)
PLATELET # BLD AUTO: 364 X10*3/UL (ref 150–450)
RBC # BLD AUTO: 3.26 X10*6/UL (ref 4–5.2)
WBC # BLD AUTO: 5.2 X10*3/UL (ref 4.4–11.3)

## 2024-05-20 PROCEDURE — 85027 COMPLETE CBC AUTOMATED: CPT

## 2024-05-20 PROCEDURE — 1090000001 HH PPS REVENUE CREDIT

## 2024-05-20 PROCEDURE — 1090000002 HH PPS REVENUE DEBIT

## 2024-05-21 PROCEDURE — 1090000002 HH PPS REVENUE DEBIT

## 2024-05-21 PROCEDURE — 1090000001 HH PPS REVENUE CREDIT

## 2024-05-22 PROCEDURE — 1090000002 HH PPS REVENUE DEBIT

## 2024-05-22 PROCEDURE — 1090000001 HH PPS REVENUE CREDIT

## 2024-05-23 DIAGNOSIS — J42 CHRONIC BRONCHITIS, UNSPECIFIED CHRONIC BRONCHITIS TYPE (MULTI): Primary | ICD-10-CM

## 2024-05-23 PROCEDURE — 1090000001 HH PPS REVENUE CREDIT

## 2024-05-23 PROCEDURE — 1090000002 HH PPS REVENUE DEBIT

## 2024-05-23 PROCEDURE — 1090000003 HH PPS REVENUE ADJ

## 2024-05-24 ENCOUNTER — DOCUMENTATION (OUTPATIENT)
Dept: HOME HEALTH SERVICES | Facility: HOME HEALTH | Age: 79
End: 2024-05-24
Payer: MEDICARE

## 2024-05-24 PROBLEM — I83.009 VARICOSE ULCER OF LOWER EXTREMITY (MULTI): Status: ACTIVE | Noted: 2024-05-24

## 2024-05-24 PROBLEM — L97.909 VARICOSE ULCER OF LOWER EXTREMITY (MULTI): Status: ACTIVE | Noted: 2024-05-24

## 2024-05-24 PROBLEM — S89.91XA INJURY OF RIGHT LOWER EXTREMITY: Status: ACTIVE | Noted: 2024-04-17

## 2024-05-24 PROCEDURE — 1090000001 HH PPS REVENUE CREDIT

## 2024-05-24 PROCEDURE — 1090000002 HH PPS REVENUE DEBIT

## 2024-05-24 NOTE — HH CARE COORDINATION
Home Care received a Referral to Resume Care for Nursing, Physical Therapy, and Occupational Therapy. We have processed the referral for a Resumption of Care on 5.25.24 to 5.26.24.     If you have any questions or concerns, please feel free to contact us at [phone]. Follow the prompts, enter your five digit zip code, and you will be directed to your care team on EAST 3.

## 2024-05-25 ENCOUNTER — HOME CARE VISIT (OUTPATIENT)
Dept: HOME HEALTH SERVICES | Facility: HOME HEALTH | Age: 79
End: 2024-05-25
Payer: MEDICARE

## 2024-05-25 VITALS
SYSTOLIC BLOOD PRESSURE: 124 MMHG | RESPIRATION RATE: 20 BRPM | DIASTOLIC BLOOD PRESSURE: 50 MMHG | HEIGHT: 62 IN | HEART RATE: 84 BPM | OXYGEN SATURATION: 98 % | BODY MASS INDEX: 24.29 KG/M2 | TEMPERATURE: 97.5 F | WEIGHT: 132 LBS

## 2024-05-25 PROCEDURE — 1090000001 HH PPS REVENUE CREDIT

## 2024-05-25 PROCEDURE — 0023 HH SOC

## 2024-05-25 PROCEDURE — 1090000002 HH PPS REVENUE DEBIT

## 2024-05-25 PROCEDURE — G0162 HHC RN E&M PLAN SVS, 15 MIN: HCPCS | Mod: HHH

## 2024-05-25 ASSESSMENT — PAIN SCALES - PAIN ASSESSMENT IN ADVANCED DEMENTIA (PAINAD)
CONSOLABILITY: 0
BODYLANGUAGE: 0
NEGVOCALIZATION: 0 - NONE.
FACIALEXPRESSION: 0
BREATHING: 0
NEGVOCALIZATION: 0
CONSOLABILITY: 0 - NO NEED TO CONSOLE.
FACIALEXPRESSION: 0 - SMILING OR INEXPRESSIVE.
TOTALSCORE: 0
BODYLANGUAGE: 0 - RELAXED.

## 2024-05-25 ASSESSMENT — ENCOUNTER SYMPTOMS
APPETITE LEVEL: FAIR
PAIN LOCATION - PAIN SEVERITY: 6/10
PAIN SEVERITY GOAL: 0/10
PAIN LOCATION - PAIN QUALITY: BURNING
LOWEST PAIN SEVERITY IN PAST 24 HOURS: 0/10
PAIN: 1
HIGHEST PAIN SEVERITY IN PAST 24 HOURS: 6/10
PERSON REPORTING PAIN: PATIENT
SUBJECTIVE PAIN PROGRESSION: RAPIDLY IMPROVING
PAIN LOCATION - PAIN FREQUENCY: CONSTANT
CHANGE IN APPETITE: UNCHANGED
SKIN LESIONS: 1
PAIN LOCATION: RIGHT LEG
MUSCLE WEAKNESS: 1

## 2024-05-25 ASSESSMENT — ACTIVITIES OF DAILY LIVING (ADL): ENTERING_EXITING_HOME: SUPERVISION

## 2024-05-25 NOTE — HOME HEALTH
sn in home , dtr and grandauer present.  pt was found to have gi bleed and discovery of ulcer.  pt aware of s/s of bleeding .  procedure was done to correct but diffucutl and clip placed.  sn reviewed meds, dtr picking up today.      pt able to do wound care but sn did change today to assesss.    pain in right leg due to poor blood flow .  pt is following up with pcp Dr Macario

## 2024-05-26 ENCOUNTER — HOME CARE VISIT (OUTPATIENT)
Dept: HOME HEALTH SERVICES | Facility: HOME HEALTH | Age: 79
End: 2024-05-26
Payer: MEDICARE

## 2024-05-26 PROCEDURE — 1090000002 HH PPS REVENUE DEBIT

## 2024-05-26 PROCEDURE — 1090000001 HH PPS REVENUE CREDIT

## 2024-05-26 NOTE — CASE COMMUNICATION
No answer to call attempts at pt and cg numbers, for PT eval on 5/26/24.   VMs left.  Will continue to attempt to schedule after holiday 5/27/24.

## 2024-05-27 PROCEDURE — 1090000001 HH PPS REVENUE CREDIT

## 2024-05-27 PROCEDURE — 1090000002 HH PPS REVENUE DEBIT

## 2024-05-28 ENCOUNTER — PATIENT OUTREACH (OUTPATIENT)
Dept: HOME HEALTH SERVICES | Age: 79
End: 2024-05-28
Payer: MEDICARE

## 2024-05-28 ENCOUNTER — HOME CARE VISIT (OUTPATIENT)
Dept: HOME HEALTH SERVICES | Facility: HOME HEALTH | Age: 79
End: 2024-05-28
Payer: MEDICARE

## 2024-05-28 VITALS — HEART RATE: 72 BPM | OXYGEN SATURATION: 97 % | RESPIRATION RATE: 16 BRPM

## 2024-05-28 PROCEDURE — G0151 HHCP-SERV OF PT,EA 15 MIN: HCPCS | Mod: HHH

## 2024-05-28 PROCEDURE — 1090000001 HH PPS REVENUE CREDIT

## 2024-05-28 PROCEDURE — 1090000002 HH PPS REVENUE DEBIT

## 2024-05-28 SDOH — SOCIAL STABILITY: SOCIAL INSECURITY: WITHIN THE LAST YEAR, HAVE YOU BEEN AFRAID OF YOUR PARTNER OR EX-PARTNER?: NO

## 2024-05-28 SDOH — ECONOMIC STABILITY: FOOD INSECURITY: WITHIN THE PAST 12 MONTHS, THE FOOD YOU BOUGHT JUST DIDN'T LAST AND YOU DIDN'T HAVE MONEY TO GET MORE.: NEVER TRUE

## 2024-05-28 SDOH — HEALTH STABILITY: MENTAL HEALTH
STRESS IS WHEN SOMEONE FEELS TENSE, NERVOUS, ANXIOUS, OR CAN'T SLEEP AT NIGHT BECAUSE THEIR MIND IS TROUBLED. HOW STRESSED ARE YOU?: TO SOME EXTENT

## 2024-05-28 SDOH — SOCIAL STABILITY: SOCIAL NETWORK
DO YOU BELONG TO ANY CLUBS OR ORGANIZATIONS SUCH AS CHURCH GROUPS UNIONS, FRATERNAL OR ATHLETIC GROUPS, OR SCHOOL GROUPS?: NO

## 2024-05-28 SDOH — SOCIAL STABILITY: SOCIAL INSECURITY: WITHIN THE LAST YEAR, HAVE YOU BEEN HUMILIATED OR EMOTIONALLY ABUSED IN OTHER WAYS BY YOUR PARTNER OR EX-PARTNER?: NO

## 2024-05-28 SDOH — HEALTH STABILITY: MENTAL HEALTH
HOW OFTEN DO YOU NEED TO HAVE SOMEONE HELP YOU WHEN YOU READ INSTRUCTIONS, PAMPHLETS, OR OTHER WRITTEN MATERIAL FROM YOUR DOCTOR OR PHARMACY?: RARELY

## 2024-05-28 SDOH — HEALTH STABILITY: MENTAL HEALTH: HOW MANY STANDARD DRINKS CONTAINING ALCOHOL DO YOU HAVE ON A TYPICAL DAY?: 1 OR 2

## 2024-05-28 SDOH — ECONOMIC STABILITY: INCOME INSECURITY: IN THE LAST 12 MONTHS, WAS THERE A TIME WHEN YOU WERE NOT ABLE TO PAY THE MORTGAGE OR RENT ON TIME?: NO

## 2024-05-28 SDOH — HEALTH STABILITY: MENTAL HEALTH: HOW OFTEN DO YOU HAVE 6 OR MORE DRINKS ON ONE OCCASION?: NEVER

## 2024-05-28 SDOH — HEALTH STABILITY: MENTAL HEALTH: HOW OFTEN DO YOU HAVE A DRINK CONTAINING ALCOHOL?: MONTHLY OR LESS

## 2024-05-28 SDOH — SOCIAL STABILITY: SOCIAL NETWORK: ARE YOU MARRIED, WIDOWED, DIVORCED, SEPARATED, NEVER MARRIED, OR LIVING WITH A PARTNER?: DIVORCED

## 2024-05-28 SDOH — SOCIAL STABILITY: SOCIAL NETWORK: HOW OFTEN DO YOU GET TOGETHER WITH FRIENDS OR RELATIVES?: MORE THAN THREE TIMES A WEEK

## 2024-05-28 SDOH — SOCIAL STABILITY: SOCIAL NETWORK: HOW OFTEN DO YOU ATTEND CHURCH OR RELIGIOUS SERVICES?: NEVER

## 2024-05-28 SDOH — ECONOMIC STABILITY: HOUSING INSECURITY: IN THE LAST 12 MONTHS, HOW MANY PLACES HAVE YOU LIVED?: 1

## 2024-05-28 SDOH — HEALTH STABILITY: PHYSICAL HEALTH: ON AVERAGE, HOW MANY DAYS PER WEEK DO YOU ENGAGE IN MODERATE TO STRENUOUS EXERCISE (LIKE A BRISK WALK)?: 0 DAYS

## 2024-05-28 SDOH — SOCIAL STABILITY: SOCIAL NETWORK: HOW OFTEN DO YOU ATTENT MEETINGS OF THE CLUB OR ORGANIZATION YOU BELONG TO?: NEVER

## 2024-05-28 SDOH — ECONOMIC STABILITY: INCOME INSECURITY: IN THE PAST 12 MONTHS, HAS THE ELECTRIC, GAS, OIL, OR WATER COMPANY THREATENED TO SHUT OFF SERVICE IN YOUR HOME?: NO

## 2024-05-28 SDOH — ECONOMIC STABILITY: FOOD INSECURITY: WITHIN THE PAST 12 MONTHS, YOU WORRIED THAT YOUR FOOD WOULD RUN OUT BEFORE YOU GOT MONEY TO BUY MORE.: NEVER TRUE

## 2024-05-28 SDOH — ECONOMIC STABILITY: INCOME INSECURITY: HOW HARD IS IT FOR YOU TO PAY FOR THE VERY BASICS LIKE FOOD, HOUSING, MEDICAL CARE, AND HEATING?: NOT HARD AT ALL

## 2024-05-28 SDOH — SOCIAL STABILITY: SOCIAL NETWORK
IN A TYPICAL WEEK, HOW MANY TIMES DO YOU TALK ON THE PHONE WITH FAMILY, FRIENDS, OR NEIGHBORS?: MORE THAN THREE TIMES A WEEK

## 2024-05-28 SDOH — HEALTH STABILITY: PHYSICAL HEALTH: ON AVERAGE, HOW MANY MINUTES DO YOU ENGAGE IN EXERCISE AT THIS LEVEL?: 0 MIN

## 2024-05-28 ASSESSMENT — ENCOUNTER SYMPTOMS
PERSON REPORTING PAIN: PATIENT
DENIES PAIN: 1

## 2024-05-28 ASSESSMENT — ACTIVITIES OF DAILY LIVING (ADL)
PHYSICAL TRANSFERS ASSESSED: 1
AMBULATION ASSISTANCE ON FLAT SURFACES: 1
CURRENT_FUNCTION: INDEPENDENT
AMBULATION ASSISTANCE: INDEPENDENT
AMBULATION ASSISTANCE: 1
AMBULATION_DISTANCE/DURATION_TOLERATED: 50FT

## 2024-05-28 ASSESSMENT — LIFESTYLE VARIABLES
AUDIT-C TOTAL SCORE: 1
SKIP TO QUESTIONS 9-10: 1

## 2024-05-28 NOTE — PROGRESS NOTES
Enrollment call  Patient agreeable to [facility] ([facility]). Enrollment call completed and program overview explained to patient, with appropriate numbers/info given. Initial visit is 5/31 @ 2030    PT DC from [facility] 05/25.  DC from [facility] 05/10    Discharge Diagnosis  Upper GI bleed     Issues Requiring Follow-Up  Upper GI Bleed  Chronic limb ischemia RLE  [facility]80 [facility] Pt has a PMH of osteopenia, COPD, anemia, CVA, CKD, Depression, HLD and HTN, Diastolic HF, chronic RLE wound.     Outpatient Follow-Up         Future Appointments   Date Time Provider Department Center   5/29/2024  2:00 PM Shraddha Mckee, APRN-CNP [id number] Academic   6/3/2024 10:00 AM Ayad Landeros MD PhD [facility]    PCP DR Gianluca Macario last seen 4/17/2024  PT has [facility]  SN PT OT

## 2024-05-29 ENCOUNTER — APPOINTMENT (OUTPATIENT)
Dept: VASCULAR SURGERY | Facility: HOSPITAL | Age: 79
End: 2024-05-29
Payer: MEDICARE

## 2024-05-29 ENCOUNTER — PATIENT OUTREACH (OUTPATIENT)
Dept: HOME HEALTH SERVICES | Age: 79
End: 2024-05-29
Payer: MEDICARE

## 2024-05-29 PROCEDURE — 1090000002 HH PPS REVENUE DEBIT

## 2024-05-29 PROCEDURE — 1090000001 HH PPS REVENUE CREDIT

## 2024-05-29 NOTE — PROGRESS NOTES
Daily call completed. Pt states she is feeling good today. Pt has not done her vitals, states her nurse typically does them and she will come out again on Friday. Denies any CP/SOB/dizziness/lightheadedness. Pt denies any needs/questions/concerns at this time. Aware of upcoming appts. [facility] 5/31 at 2030.    Pt Education: na  Barriers: na  Topics for Daily Review: daily needs, vs when [facility] RN visits  Pt demonstrates clear understanding: Yes    Daily Weight:  There were no vitals filed for this visit.   Last 3 Weights:  Wt Readings from Last 7 Encounters:   05/25/24 59.9 kg (132 lb)   05/08/24 73.8 kg (162 lb 11.2 oz)   04/30/24 70.3 kg (154 lb 15.7 oz)   04/24/24 63.5 kg (140 lb)   04/17/24 72.6 kg (160 lb)   12/01/23 64 kg (141 lb)   10/04/23 65.3 kg (144 lb)       Masimo Device: No   Masimo Clinical Impression: na    Virtual Visits--Scheduled (Most Recent Date at Top)  Follow up Appointments  Recent Visits  No visits were found meeting these conditions.  Showing recent visits within past 30 days and meeting all other requirements  Future Appointments  Date Type Provider Dept   06/03/24 Appointment Gianluca Macario MD Do [id number]   Showing future appointments within next 90 days and meeting all other requirements       Frequency of RN Calls & Virtual Visits per Team Agreement: Healthy at Home Frequency: Daily    Medication issues Addressed (what was done): na    Follow up appointments scheduled by [facility] Staff: na  Referrals made by [facility] staff: na      [facility] Care Transitions Assessment    Patient's Address:   [address] [place] [id number]  **  If this is not the address patient will receive services - alert team and address in EMR**       Patient Contacts:  Extended Emergency Contact Information  Primary Emergency Contact: Jolene Waldrop  Home Phone: [phone]  Relation: Child                                Patient's Preferred Phone: [phone]  Patient's E-mail: No e-mail address on  record

## 2024-05-30 ENCOUNTER — PATIENT OUTREACH (OUTPATIENT)
Dept: HOME HEALTH SERVICES | Age: 79
End: 2024-05-30
Payer: MEDICARE

## 2024-05-30 PROCEDURE — 1090000002 HH PPS REVENUE DEBIT

## 2024-05-30 PROCEDURE — 1090000001 HH PPS REVENUE CREDIT

## 2024-05-30 ASSESSMENT — ACTIVITIES OF DAILY LIVING (ADL): OASIS_M1830: 03

## 2024-05-30 NOTE — PROGRESS NOTES
Spoke with pt. States she feels well and just woke up. Pt has not checked her vitals, C usually checks during their visits. Pt denies any needs or concerns at this time. Reminded [facility] appt tomorrow 5/31 at 2030.     Patient's Address:   [address] [place] [address]  **  If this is not the address patient will receive services - alert team and address in EMR**       Patient Contacts:  Extended Emergency Contact Information  Primary Emergency Contact: Jolene Waldrop  Home Phone: [phone]  Relation: Child                                Patient's Preferred Phone: [phone]  Patient's E-mail: No e-mail address on record

## 2024-05-31 ENCOUNTER — PATIENT OUTREACH (OUTPATIENT)
Dept: HOME HEALTH SERVICES | Age: 79
End: 2024-05-31

## 2024-05-31 PROCEDURE — 1090000001 HH PPS REVENUE CREDIT

## 2024-05-31 PROCEDURE — 1090000002 HH PPS REVENUE DEBIT

## 2024-06-01 ENCOUNTER — HOME CARE VISIT (OUTPATIENT)
Dept: HOME HEALTH SERVICES | Facility: HOME HEALTH | Age: 79
End: 2024-06-01
Payer: MEDICARE

## 2024-06-01 ENCOUNTER — PATIENT OUTREACH (OUTPATIENT)
Dept: HOME HEALTH SERVICES | Age: 79
End: 2024-06-01
Payer: MEDICARE

## 2024-06-01 PROCEDURE — 1090000002 HH PPS REVENUE DEBIT

## 2024-06-01 PROCEDURE — 1090000001 HH PPS REVENUE CREDIT

## 2024-06-01 NOTE — PROGRESS NOTES
Daily Call Note: Initial HHVC completed with Monique Pinedo CNP. Patient states she is feeling very well since discharge. Denies anys/s of GI bleeding or n/v since being home, appetite good. On RA, non-smoker, using nebulized meds. Monique Pinedo asked about right leg/foot wound & plan to place stent to treat. Patient aware of appointment with surgeon 6/3 & given appointment time & location. Has PCP appointment same day, but patient states she will reschedule since vascular appointment is more important per Shahida. Patient aware she needs to follow up with GI & requests we make appointment for her. Prefers afternoons & any day of the week is fine. Shahida placed referral for GI doc.      Instructed to obtain vitals daily & keep log. Confirms she owns BP cuff. Patient states meds should be affordable, but will let us know if anything is too expensive & was informed of PAP.     [facility] [place] will need to reconcile medications.     Scheduled GI appointment for 6/17. Patient will need notified. I was unable to make an appointment with Dr. Cary Castillo due to provider availability.     Pt Education: vitals   Barriers: none  Topics for Daily Review: vitals  Pt demonstrates clear understanding: Yes    Daily Weight:  There were no vitals filed for this visit.   Last 3 Weights:  Wt Readings from Last 7 Encounters:   05/25/24 59.9 kg (132 lb)   05/08/24 73.8 kg (162 lb 11.2 oz)   04/30/24 70.3 kg (154 lb 15.7 oz)   04/24/24 63.5 kg (140 lb)   04/17/24 72.6 kg (160 lb)   12/01/23 64 kg (141 lb)   10/04/23 65.3 kg (144 lb)       Masimo Device: No   Masimo Clinical Impression: none    Virtual Visits--Scheduled (Most Recent Date at Top)  Follow up Appointments  Recent Visits  No visits were found meeting these conditions.  Showing recent visits within past 30 days and meeting all other requirements  Future Appointments  Date Type Provider Dept   06/03/24 Appointment Gianluca Macario MD Do [id number]  [facility]   Showing future appointments within next 90 days and meeting all other requirements       Frequency of RN Calls & Virtual Visits per Team Agreement: Healthy at Home Frequency: Daily    Medication issues Addressed (what was done): none    Follow up appointments scheduled by [facility] Staff: GI follow up  Referrals made by [facility] staff: GI follow up

## 2024-06-01 NOTE — PROGRESS NOTES
Daily call completed. Pt states she is doing well. No new complaints with symptoms. Appts reviewed, RN offered to assist with rescheduling PCP appt. Pt states she would rather just call on Monday. Will let us know if she needs any further assistance. Pt informed of GI appt at [facility] on 6/17. Given address. Verbalizes understanding. Needs to find BP cuff, did not obtain vitals yet today. Denies any further questions/concerns/needs at this time. Aware of upcoming appts. [facility] 6/7 at 1400.    Pt Education: upcoming appts.  Barriers: na  Topics for Daily Review: daily needs  Pt demonstrates clear understanding: Yes    Daily Weight:  There were no vitals filed for this visit.   Last 3 Weights:  Wt Readings from Last 7 Encounters:   05/25/24 59.9 kg (132 lb)   05/08/24 73.8 kg (162 lb 11.2 oz)   04/30/24 70.3 kg (154 lb 15.7 oz)   04/24/24 63.5 kg (140 lb)   04/17/24 72.6 kg (160 lb)   12/01/23 64 kg (141 lb)   10/04/23 65.3 kg (144 lb)       Masimo Device: No   Masimo Clinical Impression: na    Virtual Visits--Scheduled (Most Recent Date at Top)  Follow up Appointments  Recent Visits  No visits were found meeting these conditions.  Showing recent visits within past 30 days and meeting all other requirements  Future Appointments  Date Type Provider Dept   06/03/24 Appointment Gianluca Macario MD Do [id number]   Showing future appointments within next 90 days and meeting all other requirements       Frequency of RN Calls & Virtual Visits per Team Agreement: Healthy at Home Frequency: Daily    Medication issues Addressed (what was done): na    Follow up appointments scheduled by [facility] Staff: na  Referrals made by [facility] staff: na      [facility] Care Transitions Assessment    Patient's Address:   [address] [place] [id number]  **  If this is not the address patient will receive services - alert team and address in EMR**       Patient Contacts:  Extended Emergency Contact Information  Primary Emergency  Contact: Jolene Waldrop  Home Phone: [phone]  Relation: Child                                Patient's Preferred Phone: [phone]  Patient's E-mail: No e-mail address on record

## 2024-06-02 PROCEDURE — 1090000001 HH PPS REVENUE CREDIT

## 2024-06-02 PROCEDURE — 1090000002 HH PPS REVENUE DEBIT

## 2024-06-03 ENCOUNTER — OFFICE VISIT (OUTPATIENT)
Dept: PRIMARY CARE | Facility: CLINIC | Age: 79
End: 2024-06-03
Payer: MEDICARE

## 2024-06-03 ENCOUNTER — APPOINTMENT (OUTPATIENT)
Dept: VASCULAR SURGERY | Facility: HOSPITAL | Age: 79
End: 2024-06-03
Payer: MEDICARE

## 2024-06-03 VITALS
SYSTOLIC BLOOD PRESSURE: 122 MMHG | HEART RATE: 67 BPM | WEIGHT: 141 LBS | TEMPERATURE: 97.7 F | BODY MASS INDEX: 25.79 KG/M2 | DIASTOLIC BLOOD PRESSURE: 74 MMHG | OXYGEN SATURATION: 98 %

## 2024-06-03 DIAGNOSIS — K25.4 CHRONIC GASTRIC ULCER WITH HEMORRHAGE: Primary | ICD-10-CM

## 2024-06-03 DIAGNOSIS — S81.801A WOUND OF RIGHT LOWER EXTREMITY, INITIAL ENCOUNTER: ICD-10-CM

## 2024-06-03 PROCEDURE — 99214 OFFICE O/P EST MOD 30 MIN: CPT | Performed by: FAMILY MEDICINE

## 2024-06-03 PROCEDURE — 3078F DIAST BP <80 MM HG: CPT | Performed by: FAMILY MEDICINE

## 2024-06-03 PROCEDURE — 1111F DSCHRG MED/CURRENT MED MERGE: CPT | Performed by: FAMILY MEDICINE

## 2024-06-03 PROCEDURE — 1090000001 HH PPS REVENUE CREDIT

## 2024-06-03 PROCEDURE — 1123F ACP DISCUSS/DSCN MKR DOCD: CPT | Performed by: FAMILY MEDICINE

## 2024-06-03 PROCEDURE — 1159F MED LIST DOCD IN RCRD: CPT | Performed by: FAMILY MEDICINE

## 2024-06-03 PROCEDURE — 1036F TOBACCO NON-USER: CPT | Performed by: FAMILY MEDICINE

## 2024-06-03 PROCEDURE — 1090000002 HH PPS REVENUE DEBIT

## 2024-06-03 PROCEDURE — 3074F SYST BP LT 130 MM HG: CPT | Performed by: FAMILY MEDICINE

## 2024-06-03 NOTE — PROGRESS NOTES
Subjective   Patient ID: Brooklyn Read is a 79 y.o. female who presents for Hospital Follow-up (From [facility] on 4/30-5/10 and [facility] on 5/10-5/24 Re: upper GI Bleed).  HPI  Patient presents from follow-up after recent hospitalization.  She was found to have a bleeding gastric ulcer.  This was cauterized.  She was in the rehab hospital for a while but has been discharged.  She reports that she has not had any episodes of bleeding since she was discharged.  She is taking pantoprazole.  Her ulcer is also getting better on her leg.  She is getting somewhat stronger.  Has not been able to tolerate iron in the past but was given iron in the hospital.    Patient Active Problem List   Diagnosis    Chronic obstructive pulmonary disease (Multi)    Cerebrovascular accident (CVA) (Multi)    Acute respiratory failure (Multi)    Edema, unspecified    Orthopnea    Chronic diastolic heart failure (Multi)    Arteriosclerosis of coronary artery    Aortic valve regurgitation    Unspecified dementia, mild, without behavioral disturbance, psychotic disturbance, mood disturbance, and anxiety (Multi)    Suspected severe acute respiratory syndrome coronavirus 2 (SARS-CoV-2) infection    Senile osteoporosis    Pneumonia due to infectious organism    Nicotine dependence    Mixed hyperlipidemia    Hypokalemia    Primary hypertension    Chronic gout without tophus    Chills    Bronchitis    Acute cough    Weakness    Anemia    Vitamin D deficiency, unspecified    Neoplasm of uncertain behavior of lung    Recurrent major depressive disorder, in full remission (CMS-HCC)    Stage 3a chronic kidney disease (Multi)    Upper GI bleeding    Acute blood loss anemia (ABLA)    Acute kidney injury (CMS-HCC)    Agatston coronary artery calcium score greater than 400    Upper GI bleed    Injury of right lower extremity    Varicose ulcer of lower extremity (Multi)       Social Connections: Socially Isolated (5/28/2024)    Social Connection and  Isolation Panel [NHANES]     Frequency of Communication with Friends and Family: More than three times a week     Frequency of Social Gatherings with Friends and Family: More than three times a week     Attends Latter day Services: Never     Active Member of Clubs or Organizations: No     Attends Club or Organization Meetings: Never     Marital Status:        Current Outpatient Medications on File Prior to Visit   Medication Sig Dispense Refill    albuterol 90 mcg/actuation inhaler Inhale 2 puffs every 6 hours if needed for wheezing. 18 g 3    allopurinol (Zyloprim) 100 mg tablet Take 1 tablet (100 mg) by mouth 2 times a day. 180 tablet 3    aspirin 81 mg EC tablet Take 1 tablet (81 mg) by mouth once daily.      bisacodyl (Dulcolax) 10 mg suppository Insert 1 suppository (10 mg) into the rectum once daily as needed for constipation.      buPROPion XL (Wellbutrin XL) 150 mg 24 hr tablet Take 1 tablet (150 mg) by mouth once daily in the morning. 90 tablet 3    cholecalciferol (Vitamin D-3) 5,000 Units tablet Take 1 tablet (5,000 Units) by mouth once daily.      empagliflozin (Jardiance) 10 mg Take 1 tablet (10 mg) by mouth once daily. 30 tablet 11    EPINEPHrine 0.3 mg/0.3 mL injection syringe Inject 0.3 mL (0.3 mg) into the muscle 1 time if needed for anaphylaxis.      insulin lispro (HumaLOG) 100 unit/mL injection Inject 0-0.05 mL (0-5 Units) under the skin 3 times a day with meals. Take as directed per insulin instructions.      losartan (Cozaar) 100 mg tablet Take 1 tablet (100 mg) by mouth once daily. 90 tablet 3    metoprolol succinate XL (Toprol-XL) 25 mg 24 hr tablet Take 1 tablet (25 mg) by mouth once daily. Do not crush or chew.      multivitamin with minerals tablet Take 1 tablet by mouth once daily.      pantoprazole (ProtoNix) 40 mg EC tablet Take 1 tablet (40 mg) by mouth 2 times a day before meals. Do not crush, chew, or split. 60 tablet 2    polyethylene glycol (Glycolax, Miralax) 17 gram packet  Take 17 g by mouth 2 times a day.      povidone-iodine (Betadine) 10 % ointment Apply topically once daily.      rivaroxaban (Xarelto) 2.5 mg tablet Take 1 tablet (2.5 mg) by mouth 2 times a day with meals.      sertraline (Zoloft) 100 mg tablet Take 1 tablet (100 mg) by mouth once daily. 90 tablet 3    spironolactone (Aldactone) 25 mg tablet Take 1 tablet (25 mg) by mouth once daily.      Trelegy Ellipta 200-62.5-25 mcg blister with device Inhale 1 puff once daily. 3 each 2     Current Facility-Administered Medications on File Prior to Visit   Medication Dose Route Frequency Provider Last Rate Last Admin    morphine injection 2 mg  2 mg intravenous Once Shayy Vicente, APRN-CNP            Vitals:    06/03/24 1155   BP: 122/74   Pulse: 67   Temp: 36.5 °C (97.7 °F)   SpO2: 98%     Vitals:    06/03/24 1155   Weight: 64 kg (141 lb)       Review of Systems   All other systems reviewed and are negative.      Objective     Physical Exam  Constitutional:       Appearance: Normal appearance. She is well-developed.   HENT:      Head: Atraumatic.   Cardiovascular:      Rate and Rhythm: Normal rate and regular rhythm.      Heart sounds: Normal heart sounds. No murmur heard.  Pulmonary:      Effort: Pulmonary effort is normal.      Breath sounds: Normal breath sounds.   Abdominal:      General: Bowel sounds are normal.      Palpations: Abdomen is soft.   Skin:     General: Skin is warm.      Comments: Patient's wound on her right leg is better than it was before.  Well granulating tissue.   Neurological:      General: No focal deficit present.      Mental Status: She is alert.   Psychiatric:         Mood and Affect: Mood normal.         Lab Requisition on 05/20/2024   Component Date Value Ref Range Status    WBC 05/20/2024 5.2  4.4 - 11.3 x10*3/uL Final    nRBC 05/20/2024 0.0  0.0 - 0.0 /100 WBCs Final    RBC 05/20/2024 3.26 (L)  4.00 - 5.20 x10*6/uL Final    Hemoglobin 05/20/2024 9.2 (L)  12.0 - 16.0 g/dL Final    Hematocrit  05/20/2024 29.6 (L)  36.0 - 46.0 % Final    MCV 05/20/2024 91  80 - 100 fL Final    MCH 05/20/2024 28.2  26.0 - 34.0 pg Final    MCHC 05/20/2024 31.1 (L)  32.0 - 36.0 g/dL Final    RDW 05/20/2024 15.9 (H)  11.5 - 14.5 % Final    Platelets 05/20/2024 364  150 - 450 x10*3/uL Final   No results displayed because visit has over 200 results.      Admission on 04/30/2024, Discharged on 04/30/2024   Component Date Value Ref Range Status    WBC 04/30/2024 8.9  4.4 - 11.3 x10*3/uL Final    nRBC 04/30/2024 0.0  0.0 - 0.0 /100 WBCs Final    RBC 04/30/2024 2.48 (L)  4.00 - 5.20 x10*6/uL Final    Hemoglobin 04/30/2024 6.7 (L)  12.0 - 16.0 g/dL Final    Hematocrit 04/30/2024 22.3 (L)  36.0 - 46.0 % Final    MCV 04/30/2024 90  80 - 100 fL Final    MCH 04/30/2024 27.0  26.0 - 34.0 pg Final    MCHC 04/30/2024 30.0 (L)  32.0 - 36.0 g/dL Final    RDW 04/30/2024 15.7 (H)  11.5 - 14.5 % Final    Platelets 04/30/2024 341  150 - 450 x10*3/uL Final    Neutrophils % 04/30/2024 61.5  40.0 - 80.0 % Final    Immature Granulocytes %, Automated 04/30/2024 0.6  0.0 - 0.9 % Final    Immature Granulocyte Count (IG) includes promyelocytes, myelocytes and metamyelocytes but does not include bands. Percent differential counts (%) should be interpreted in the context of the absolute cell counts (cells/UL).    Lymphocytes % 04/30/2024 23.4  13.0 - 44.0 % Final    Monocytes % 04/30/2024 10.0  2.0 - 10.0 % Final    Eosinophils % 04/30/2024 4.1  0.0 - 6.0 % Final    Basophils % 04/30/2024 0.4  0.0 - 2.0 % Final    Neutrophils Absolute 04/30/2024 5.48  1.60 - 5.50 x10*3/uL Final    Percent differential counts (%) should be interpreted in the context of the absolute cell counts (cells/uL).    Immature Granulocytes Absolute, Au* 04/30/2024 0.05  0.00 - 0.50 x10*3/uL Final    Lymphocytes Absolute 04/30/2024 2.09  0.80 - 3.00 x10*3/uL Final    Monocytes Absolute 04/30/2024 0.89 (H)  0.05 - 0.80 x10*3/uL Final    Eosinophils Absolute 04/30/2024 0.37  0.00 - 0.40  x10*3/uL Final    Basophils Absolute 04/30/2024 0.04  0.00 - 0.10 x10*3/uL Final    Phosphorus 04/30/2024 4.6  2.5 - 4.9 mg/dL Final    The performance characteristics of phosphorus testing in heparinized plasma have been validated by the individual  laboratory site where testing is performed. Testing on heparinized plasma is not approved by the FDA; however, such approval is not necessary.    Magnesium 04/30/2024 2.00  1.60 - 2.40 mg/dL Final    Glucose 04/30/2024 170 (H)  74 - 99 mg/dL Final    Sodium 04/30/2024 138  136 - 145 mmol/L Final    Potassium 04/30/2024 3.5  3.5 - 5.3 mmol/L Final    Chloride 04/30/2024 103  98 - 107 mmol/L Final    Bicarbonate 04/30/2024 24  21 - 32 mmol/L Final    Anion Gap 04/30/2024 15  10 - 20 mmol/L Final    Urea Nitrogen 04/30/2024 36 (H)  6 - 23 mg/dL Final    Creatinine 04/30/2024 1.59 (H)  0.50 - 1.05 mg/dL Final    eGFR 04/30/2024 33 (L)  >60 mL/min/1.73m*2 Final    Calculations of estimated GFR are performed using the 2021 CKD-EPI Study Refit equation without the race variable for the IDMS-Traceable creatinine methods.  https://jasn.asnjournals.org/content/early/2021/09/22/ASN.9602387198    Calcium 04/30/2024 8.1 (L)  8.6 - 10.3 mg/dL Final    Albumin 04/30/2024 2.9 (L)  3.4 - 5.0 g/dL Final    Alkaline Phosphatase 04/30/2024 80  33 - 136 U/L Final    Total Protein 04/30/2024 5.7 (L)  6.4 - 8.2 g/dL Final    AST 04/30/2024 23  9 - 39 U/L Final    Bilirubin, Total 04/30/2024 0.3  0.0 - 1.2 mg/dL Final    ALT 04/30/2024 14  7 - 45 U/L Final    Patients treated with Sulfasalazine may generate falsely decreased results for ALT.    Protime 04/30/2024 12.5  9.8 - 12.8 seconds Final    INR 04/30/2024 1.1  0.9 - 1.1 Final    aPTT 04/30/2024 25 (L)  27 - 38 seconds Final    Troponin I, High Sensitivity 04/30/2024 16 (H)  0 - 13 ng/L Final    ABO TYPE 04/30/2024 O   Final    Rh TYPE 04/30/2024 POS   Final    2nd ABO test required. Order and Collect VERAB    ANTIBODY SCREEN 04/30/2024  NEG   Final    Lipase 04/30/2024 27  9 - 82 U/L Final    Lactate 04/30/2024 2.2 (H)  0.4 - 2.0 mmol/L Final    Ammonia 04/30/2024 16  16 - 53 umol/L Final    Lactate 04/30/2024 2.1 (H)  0.4 - 2.0 mmol/L Final    RBC Morphology 04/30/2024 No significant RBC morphology present   Final    PRODUCT CODE 04/30/2024 [id number]   Final    Unit Number 04/30/2024 [id number]   Final    Unit ABO 04/30/2024 O   Final    Unit RH 04/30/2024 NEG   Final    XM INTEP 04/30/2024 COMP   Final    Dispense Status 04/30/2024 TR   Final    Blood Expiration Date 04/30/2024 May 03, 2024 23:59 EDT   Final    PRODUCT BLOOD TYPE 04/30/2024 9500   Final    UNIT VOLUME 04/30/2024 350   Final-Edited    ABO TYPE 04/30/2024 O   Final    Rh TYPE 04/30/2024 POS   Final    Hemoglobin 04/30/2024 5.9 (LL)  12.0 - 16.0 g/dL Final    Hematocrit 04/30/2024 18.2 (L)  36.0 - 46.0 % Final    Ventricular Rate 04/30/2024 78  BPM Final    Atrial Rate 04/30/2024 79  BPM Final    NE Interval 04/30/2024 164  ms Final    QRS Duration 04/30/2024 123  ms Final    QT Interval 04/30/2024 412  ms Final    QTC Calculation(Bazett) 04/30/2024 470  ms Final    P Axis 04/30/2024 78  degrees Final    R Axis 04/30/2024 65  degrees Final    T Axis 04/30/2024 59  degrees Final    QRS Count 04/30/2024 13  beats Final    Q Onset 04/30/2024 249  ms Final    T Offset 04/30/2024 455  ms Final    QTC Fredericia 04/30/2024 449  ms Final    PRODUCT CODE 04/30/2024 [id number]   Final    Unit Number 04/30/2024 [id number]   Final    Unit ABO 04/30/2024 O   Final    Unit RH 04/30/2024 POS   Final    XM INTEP 04/30/2024 COMP   Final    Dispense Status 04/30/2024 PT   Final    Blood Expiration Date 04/30/2024 May 17, 2024 23:59 EDT   Final    PRODUCT BLOOD TYPE 04/30/2024 5100   Final    UNIT VOLUME 04/30/2024 350   Final-Edited    PRODUCT CODE 04/30/2024 [id number]   Final    Unit Number 04/30/2024 [id number]   Final    Unit ABO 04/30/2024 O   Final    Unit RH 04/30/2024 POS   Final     XM INTEP 04/30/2024 COMP   Final    Dispense Status 04/30/2024 TR   Final    Blood Expiration Date 04/30/2024 May 20, 2024 23:59 EDT   Final    PRODUCT BLOOD TYPE 04/30/2024 5100   Final    UNIT VOLUME 04/30/2024 350   Final-Edited    PRODUCT CODE 04/30/2024 [id number]   Final    Unit Number 04/30/2024 [id number]   Final    Unit ABO 04/30/2024 O   Final    Unit RH 04/30/2024 POS   Final    XM INTEP 04/30/2024 COMP   Final    Dispense Status 04/30/2024 RE   Final    Blood Expiration Date 04/30/2024 May 20, 2024 23:59 EDT   Final    PRODUCT BLOOD TYPE 04/30/2024 5100   Final    UNIT VOLUME 04/30/2024 350   Final-Edited    PRODUCT CODE 04/30/2024 [id number]   Final    Unit Number 04/30/2024 [id number]   Final    Unit ABO 04/30/2024 O   Final    Unit RH 04/30/2024 POS   Final    XM INTEP 04/30/2024 COMP   Final    Dispense Status 04/30/2024 TR   Final    Blood Expiration Date 04/30/2024 May 20, 2024 23:59 EDT   Final    PRODUCT BLOOD TYPE 04/30/2024 5100   Final    UNIT VOLUME 04/30/2024 350   Final-Edited    PRODUCT CODE 04/30/2024 [id number]   Final    Unit Number 04/30/2024 [id number]   Final    Unit ABO 04/30/2024 O   Final    Unit RH 04/30/2024 POS   Final    XM INTEP 04/30/2024 COMP   Final    Dispense Status 04/30/2024 TR   Final    Blood Expiration Date 04/30/2024 May 20, 2024 23:59 EDT   Final    PRODUCT BLOOD TYPE 04/30/2024 5100   Final    UNIT VOLUME 04/30/2024 350   Final-Edited    PRODUCT CODE 04/30/2024 [id number]   Final    Unit Number 04/30/2024 [id number]   Final    Unit ABO 04/30/2024 O   Final    Unit RH 04/30/2024 POS   Final    XM INTEP 04/30/2024 COMP   Final    Dispense Status 04/30/2024 RE   Final    Blood Expiration Date 04/30/2024 May 23, 2024 23:59 EDT   Final    PRODUCT BLOOD TYPE 04/30/2024 5100   Final    UNIT VOLUME 04/30/2024 350   Final-Edited    PRODUCT CODE 04/30/2024 [id number]   Final    Unit Number 04/30/2024 [id number]   Final    Unit ABO 04/30/2024 O   Final     "Unit RH 04/30/2024 POS   Final    XM INTEP 04/30/2024 COMP   Final    Dispense Status 04/30/2024 RE   Final    Blood Expiration Date 04/30/2024 May 23, 2024 23:59 EDT   Final    PRODUCT BLOOD TYPE 04/30/2024 5100   Final    UNIT VOLUME 04/30/2024 350   Final-Edited    PRODUCT CODE 04/30/2024 [id number]   Final    Unit Number 04/30/2024 [id number]   Final    Unit ABO 04/30/2024 O   Final    Unit RH 04/30/2024 POS   Final    XM INTEP 04/30/2024 COMP   Final    Dispense Status 04/30/2024 RE   Final    Blood Expiration Date 04/30/2024 May 23, 2024 23:59 EDT   Final    PRODUCT BLOOD TYPE 04/30/2024 5100   Final    UNIT VOLUME 04/30/2024 350   Final-Edited    PRODUCT CODE 04/30/2024 [id number]   Final    Unit Number 04/30/2024 [id number]   Final    Unit ABO 04/30/2024 O   Final    Unit RH 04/30/2024 POS   Final    XM INTEP 04/30/2024 COMP   Final    Dispense Status 04/30/2024 RE   Final    Blood Expiration Date 04/30/2024 May 23, 2024 23:59 EDT   Final    PRODUCT BLOOD TYPE 04/30/2024 5100   Final    UNIT VOLUME 04/30/2024 350   Final-Edited   Lab on 04/26/2024   Component Date Value Ref Range Status    LDH 04/26/2024 132  84 - 246 U/L Final    Vitamin B12 04/26/2024 448  211 - 911 pg/mL Final    Folate, Serum 04/26/2024 13.0  >5.0 ng/mL Final    Retic % 04/26/2024 1.8  0.5 - 2.0 % Final    Retic Absolute 04/26/2024 0.052  0.017 - 0.110 x10*6/uL Final    Reticulocyte Hemoglobin 04/26/2024 21 (L)  28 - 38 pg Final    Immature Retic fraction 04/26/2024 22.7 (H)  <=16.0 % Final    Reticulocytes are measured based on a fluorescent technique. The IRF, or immature reticulocyte fraction, is the percent of reticulocytes that show medium (MFR) or high (HFR) fluorescence.  This value can be used to assess the relative maturity of the reticulocyte population in response to anemia. The \"shift reticulocytes\" are not measured by this technique, eliminating the need for their correction in the reticulocyte index.    Ferritin " 04/26/2024 17  8 - 150 ng/mL Final    Iron 04/26/2024 16 (L)  35 - 150 ug/dL Final    UIBC 04/26/2024 393 (H)  110 - 370 ug/dL Final    TIBC 04/26/2024 409  240 - 445 ug/dL Final    % Saturation 04/26/2024 4 (L)  25 - 45 % Final    WBC 04/26/2024 8.5  4.4 - 11.3 x10*3/uL Final    nRBC 04/26/2024 0.0  0.0 - 0.0 /100 WBCs Final    RBC 04/26/2024 2.93 (L)  4.00 - 5.20 x10*6/uL Final    Hemoglobin 04/26/2024 7.9 (L)  12.0 - 16.0 g/dL Final    Hematocrit 04/26/2024 27.4 (L)  36.0 - 46.0 % Final    MCV 04/26/2024 94  80 - 100 fL Final    MCH 04/26/2024 27.0  26.0 - 34.0 pg Final    MCHC 04/26/2024 28.8 (L)  32.0 - 36.0 g/dL Final    RDW 04/26/2024 15.5 (H)  11.5 - 14.5 % Final    Platelets 04/26/2024 366  150 - 450 x10*3/uL Final    Neutrophils % 04/26/2024 60.2  40.0 - 80.0 % Final    Immature Granulocytes %, Automated 04/26/2024 0.4  0.0 - 0.9 % Final    Immature Granulocyte Count (IG) includes promyelocytes, myelocytes and metamyelocytes but does not include bands. Percent differential counts (%) should be interpreted in the context of the absolute cell counts (cells/UL).    Lymphocytes % 04/26/2024 21.4  13.0 - 44.0 % Final    Monocytes % 04/26/2024 13.1  2.0 - 10.0 % Final    Eosinophils % 04/26/2024 4.3  0.0 - 6.0 % Final    Basophils % 04/26/2024 0.6  0.0 - 2.0 % Final    Neutrophils Absolute 04/26/2024 5.11  1.60 - 5.50 x10*3/uL Final    Percent differential counts (%) should be interpreted in the context of the absolute cell counts (cells/uL).    Immature Granulocytes Absolute, Au* 04/26/2024 0.03  0.00 - 0.50 x10*3/uL Final    Lymphocytes Absolute 04/26/2024 1.81  0.80 - 3.00 x10*3/uL Final    Monocytes Absolute 04/26/2024 1.11 (H)  0.05 - 0.80 x10*3/uL Final    Eosinophils Absolute 04/26/2024 0.36  0.00 - 0.40 x10*3/uL Final    Basophils Absolute 04/26/2024 0.05  0.00 - 0.10 x10*3/uL Final   Lab on 04/17/2024   Component Date Value Ref Range Status    WBC 04/17/2024 9.2  4.4 - 11.3 x10*3/uL Final    nRBC  04/17/2024 0.0  0.0 - 0.0 /100 WBCs Final    RBC 04/17/2024 3.11 (L)  4.00 - 5.20 x10*6/uL Final    Hemoglobin 04/17/2024 8.5 (L)  12.0 - 16.0 g/dL Final    Hematocrit 04/17/2024 29.7 (L)  36.0 - 46.0 % Final    MCV 04/17/2024 96  80 - 100 fL Final    MCH 04/17/2024 27.3  26.0 - 34.0 pg Final    MCHC 04/17/2024 28.6 (L)  32.0 - 36.0 g/dL Final    RDW 04/17/2024 15.5 (H)  11.5 - 14.5 % Final    Platelets 04/17/2024 294  150 - 450 x10*3/uL Final    Neutrophils % 04/17/2024 67.5  40.0 - 80.0 % Final    Immature Granulocytes %, Automated 04/17/2024 0.9  0.0 - 0.9 % Final    Immature Granulocyte Count (IG) includes promyelocytes, myelocytes and metamyelocytes but does not include bands. Percent differential counts (%) should be interpreted in the context of the absolute cell counts (cells/UL).    Lymphocytes % 04/17/2024 18.0  13.0 - 44.0 % Final    Monocytes % 04/17/2024 9.3  2.0 - 10.0 % Final    Eosinophils % 04/17/2024 3.8  0.0 - 6.0 % Final    Basophils % 04/17/2024 0.5  0.0 - 2.0 % Final    Neutrophils Absolute 04/17/2024 6.20 (H)  1.60 - 5.50 x10*3/uL Final    Percent differential counts (%) should be interpreted in the context of the absolute cell counts (cells/uL).    Immature Granulocytes Absolute, Au* 04/17/2024 0.08  0.00 - 0.50 x10*3/uL Final    Lymphocytes Absolute 04/17/2024 1.66  0.80 - 3.00 x10*3/uL Final    Monocytes Absolute 04/17/2024 0.86 (H)  0.05 - 0.80 x10*3/uL Final    Eosinophils Absolute 04/17/2024 0.35  0.00 - 0.40 x10*3/uL Final    Basophils Absolute 04/17/2024 0.05  0.00 - 0.10 x10*3/uL Final    Glucose 04/17/2024 81  74 - 99 mg/dL Final    Sodium 04/17/2024 137  136 - 145 mmol/L Final    Potassium 04/17/2024 4.2  3.5 - 5.3 mmol/L Final    Chloride 04/17/2024 109 (H)  98 - 107 mmol/L Final    Bicarbonate 04/17/2024 21  21 - 32 mmol/L Final    Anion Gap 04/17/2024 11  10 - 20 mmol/L Final    Urea Nitrogen 04/17/2024 29 (H)  6 - 23 mg/dL Final    Creatinine 04/17/2024 0.94  0.50 - 1.05 mg/dL  Final    eGFR 04/17/2024 62  >60 mL/min/1.73m*2 Final    Calculations of estimated GFR are performed using the 2021 CKD-EPI Study Refit equation without the race variable for the IDMS-Traceable creatinine methods.  https://jasn.asnjournals.org/content/early/2021/09/22/ASN.4611943686    Calcium 04/17/2024 8.4 (L)  8.6 - 10.3 mg/dL Final    Albumin 04/17/2024 3.4  3.4 - 5.0 g/dL Final    Alkaline Phosphatase 04/17/2024 69  33 - 136 U/L Final    Total Protein 04/17/2024 5.9 (L)  6.4 - 8.2 g/dL Final    AST 04/17/2024 18  9 - 39 U/L Final    Bilirubin, Total 04/17/2024 0.3  0.0 - 1.2 mg/dL Final    ALT 04/17/2024 11  7 - 45 U/L Final    Patients treated with Sulfasalazine may generate falsely decreased results for ALT.    Vitamin D, 25-Hydroxy, Total 04/17/2024 21 (L)  30 - 100 ng/mL Final    Thyroid Stimulating Hormone 04/17/2024 1.99  0.44 - 3.98 mIU/L Final    Blood Culture 04/17/2024 No growth at 4 days -  FINAL REPORT   Final    BNP 04/17/2024 77  0 - 99 pg/mL Final    Folate, Serum 04/17/2024 8.6  >5.0 ng/mL Final    Iron 04/17/2024 23 (L)  35 - 150 ug/dL Final    UIBC 04/17/2024 381 (H)  110 - 370 ug/dL Final    TIBC 04/17/2024 404  240 - 445 ug/dL Final    % Saturation 04/17/2024 6 (L)  25 - 45 % Final    Thyroid Stimulating Hormone 04/17/2024 2.07  0.44 - 3.98 mIU/L Final    Vitamin B12 04/17/2024 285  211 - 911 pg/mL Final       Assessment/Plan   Problem List Items Addressed This Visit    None  Visit Diagnoses         Codes    Chronic gastric ulcer with hemorrhage    -  Primary K25.4    Wound of right lower extremity, initial encounter     S81.801A          Try taking gentle iron as tolerated.  Continue wound care.  Monitor for signs of blood loss.

## 2024-06-04 PROCEDURE — 1090000001 HH PPS REVENUE CREDIT

## 2024-06-04 PROCEDURE — 1090000002 HH PPS REVENUE DEBIT

## 2024-06-05 ENCOUNTER — PATIENT OUTREACH (OUTPATIENT)
Dept: HOME HEALTH SERVICES | Age: 79
End: 2024-06-05
Payer: MEDICARE

## 2024-06-05 VITALS — DIASTOLIC BLOOD PRESSURE: 72 MMHG | SYSTOLIC BLOOD PRESSURE: 132 MMHG

## 2024-06-05 PROCEDURE — 1090000001 HH PPS REVENUE CREDIT

## 2024-06-05 PROCEDURE — 1090000002 HH PPS REVENUE DEBIT

## 2024-06-05 NOTE — PROGRESS NOTES
Daily call completed patient is doing well she had a doctors appointment yesterday no medication changes vitals are in the chart from yesterday patient states no SOB no medication needs questions and concerns addressed reviewed upcomming appointments

## 2024-06-06 ENCOUNTER — HOME CARE VISIT (OUTPATIENT)
Dept: HOME HEALTH SERVICES | Facility: HOME HEALTH | Age: 79
End: 2024-06-06
Payer: MEDICARE

## 2024-06-06 VITALS
HEART RATE: 77 BPM | SYSTOLIC BLOOD PRESSURE: 108 MMHG | DIASTOLIC BLOOD PRESSURE: 54 MMHG | RESPIRATION RATE: 18 BRPM | OXYGEN SATURATION: 95 %

## 2024-06-06 PROCEDURE — 1090000002 HH PPS REVENUE DEBIT

## 2024-06-06 PROCEDURE — G0300 HHS/HOSPICE OF LPN EA 15 MIN: HCPCS | Mod: HHH

## 2024-06-06 PROCEDURE — 1090000001 HH PPS REVENUE CREDIT

## 2024-06-06 ASSESSMENT — ENCOUNTER SYMPTOMS
DENIES PAIN: 1
CHANGE IN APPETITE: DECREASED
PAIN SEVERITY GOAL: 0/10
HIGHEST PAIN SEVERITY IN PAST 24 HOURS: 0/10
SUBJECTIVE PAIN PROGRESSION: UNCHANGED
LOWEST PAIN SEVERITY IN PAST 24 HOURS: 0/10
APPETITE LEVEL: POOR

## 2024-06-07 DIAGNOSIS — K92.2 UPPER GI BLEEDING: ICD-10-CM

## 2024-06-07 DIAGNOSIS — J44.9 CHRONIC OBSTRUCTIVE PULMONARY DISEASE, UNSPECIFIED COPD TYPE (MULTI): Primary | ICD-10-CM

## 2024-06-07 DIAGNOSIS — I50.32 CHRONIC DIASTOLIC HEART FAILURE (MULTI): ICD-10-CM

## 2024-06-07 PROCEDURE — 1090000002 HH PPS REVENUE DEBIT

## 2024-06-07 PROCEDURE — 1090000001 HH PPS REVENUE CREDIT

## 2024-06-08 ENCOUNTER — PATIENT OUTREACH (OUTPATIENT)
Dept: HOME HEALTH SERVICES | Age: 79
End: 2024-06-08
Payer: MEDICARE

## 2024-06-08 PROCEDURE — 1090000001 HH PPS REVENUE CREDIT

## 2024-06-08 PROCEDURE — 1090000002 HH PPS REVENUE DEBIT

## 2024-06-08 NOTE — PROGRESS NOTES
Pt states she feels good. Apologizes for missing [facility] yesterday, rescheduled for 6/10 at 1430.  BP today 122/52. Went over future scheduled appointments. Advised next call 6/10 and advised to call us any time for     [facility] Transitions Assessment    Patient's Address:   [address] [place] [id number]  **  If this is not the address patient will receive services - alert team and address in EMR**       Patient Contacts:  Extended Emergency Contact Information  Primary Emergency Contact: Jolene Waldrop  Home Phone: [phone]  Relation: Child                                Patient's Preferred Phone: [phone]  Patient's E-mail: No e-mail address on record

## 2024-06-09 ENCOUNTER — PATIENT OUTREACH (OUTPATIENT)
Dept: HOME HEALTH SERVICES | Age: 79
End: 2024-06-09
Payer: MEDICARE

## 2024-06-09 PROCEDURE — 1090000002 HH PPS REVENUE DEBIT

## 2024-06-09 PROCEDURE — 1090000001 HH PPS REVENUE CREDIT

## 2024-06-09 NOTE — PROGRESS NOTES
1930- Patient called in after missed call to recheck BP which was previously elevated. Patient insists x2 that she is fine. Does not know what her recheck BP was, did not write it down, but states she feels fine. Denied CP, headaches, dizziness. Asked patient to recheck BP while on phone if she didn't mind & denied wanting to. Encouraged to take BP in morning and have values logged for daily call tomorrow. Verbalized understanding. No other needs at this time.

## 2024-06-10 ENCOUNTER — APPOINTMENT (OUTPATIENT)
Dept: PHARMACY | Facility: HOSPITAL | Age: 79
End: 2024-06-10
Payer: MEDICARE

## 2024-06-10 ENCOUNTER — APPOINTMENT (OUTPATIENT)
Dept: CARE COORDINATION | Age: 79
End: 2024-06-10
Payer: MEDICARE

## 2024-06-10 ENCOUNTER — PATIENT OUTREACH (OUTPATIENT)
Dept: HOME HEALTH SERVICES | Age: 79
End: 2024-06-10

## 2024-06-10 PROCEDURE — 1090000001 HH PPS REVENUE CREDIT

## 2024-06-10 PROCEDURE — 1090000002 HH PPS REVENUE DEBIT

## 2024-06-11 ENCOUNTER — PATIENT OUTREACH (OUTPATIENT)
Dept: HOME HEALTH SERVICES | Age: 79
End: 2024-06-11
Payer: MEDICARE

## 2024-06-11 VITALS — HEART RATE: 66 BPM | SYSTOLIC BLOOD PRESSURE: 118 MMHG | DIASTOLIC BLOOD PRESSURE: 55 MMHG

## 2024-06-11 NOTE — PROGRESS NOTES
Daily Call Note:   Pt phoned in returning call. Daily call complete. Reports vitals and denies any new or worsening symptoms. Denies swelling or sob. Denies questions regarding medications.   She states she missed yesterday Dr call, states she could not get on Zoom.  Advised weekly call is phone call and rescheduled.    Pt Education: POC  Barriers: na   Topics for Daily Review: POC  Pt demonstrates clear understanding: Yes    Daily Weight:  There were no vitals filed for this visit.   Last 3 Weights:  Wt Readings from Last 7 Encounters:   06/03/24 64 kg (141 lb)   05/25/24 59.9 kg (132 lb)   05/08/24 73.8 kg (162 lb 11.2 oz)   04/30/24 70.3 kg (154 lb 15.7 oz)   04/24/24 63.5 kg (140 lb)   04/17/24 72.6 kg (160 lb)   12/01/23 64 kg (141 lb)       Masimo Device: No   Masimo Clinical Impression: na    Virtual Visits--Scheduled (Most Recent Date at Top)  Follow up Appointments  Recent Visits  Date Type Provider Dept   06/03/24 Office Visit Gianluca Macario MD Do [id number] [facility]   Showing recent visits within past 30 days and meeting all other requirements  Future Appointments  No visits were found meeting these conditions.  Showing future appointments within next 90 days and meeting all other requirements       Frequency of RN Calls & Virtual Visits per Team Agreement: [facility] Home Frequency: Daily    Medication issues Addressed (what was done): na    Follow up appointments scheduled by [facility] Staff: na  Referrals made by [facility] staff: marisol

## 2024-06-12 ENCOUNTER — PATIENT OUTREACH (OUTPATIENT)
Dept: CARE COORDINATION | Age: 79
End: 2024-06-12
Payer: MEDICARE

## 2024-06-12 VITALS — HEART RATE: 65 BPM | SYSTOLIC BLOOD PRESSURE: 155 MMHG | DIASTOLIC BLOOD PRESSURE: 71 MMHG

## 2024-06-12 NOTE — PROGRESS NOTES
Daily Call Note:   Pt will get weight tomorrow, has pulse oximeter but needs batteries. Pt stated she is doing well. Denies symptoms of SOB, chest pain, swelling, dizziness or lightheadedness. Eating and drinking well she stated. No other questions or concerns. [facility] 6/13 @2507.    Topics for Daily Review: VS, med's.  Pt demonstrates clear understanding: Yes    Daily VS:  /71   Pulse 65        Last 3 Weights:  Wt Readings from Last 7 Encounters:   06/03/24 64 kg (141 lb)   05/25/24 59.9 kg (132 lb)   05/08/24 73.8 kg (162 lb 11.2 oz)   04/30/24 70.3 kg (154 lb 15.7 oz)   04/24/24 63.5 kg (140 lb)   04/17/24 72.6 kg (160 lb)   12/01/23 64 kg (141 lb)       Masimo Device: No   Masimo Clinical Impression:     Virtual Visits--Scheduled (Most Recent Date at Top)  Follow up Appointments  Recent Visits  Date Type Provider Dept   06/10/24 Appointment [facility]   06/03/24 Office Visit Gianluca Macario MD Do [id number] [facility]   Showing recent visits within past 30 days and meeting all other requirements  Future Appointments  Date Type Provider Dept   06/13/24 Appointment [facility]   Showing future appointments within next 90 days and meeting all other requirements       Frequency of RN Calls & Virtual Visits per Team Agreement: [facility] Home Frequency: Daily      Follow up appointments scheduled by [facility] Staff: [facility] 6/13 @0140

## 2024-06-13 ENCOUNTER — HOME CARE VISIT (OUTPATIENT)
Dept: HOME HEALTH SERVICES | Facility: HOME HEALTH | Age: 79
End: 2024-06-13
Payer: MEDICARE

## 2024-06-13 ENCOUNTER — PATIENT OUTREACH (OUTPATIENT)
Dept: HOME HEALTH SERVICES | Age: 79
End: 2024-06-13

## 2024-06-13 ENCOUNTER — TELEMEDICINE (OUTPATIENT)
Dept: PHARMACY | Facility: HOSPITAL | Age: 79
End: 2024-06-13
Payer: MEDICARE

## 2024-06-13 ENCOUNTER — TELEMEDICINE CLINICAL SUPPORT (OUTPATIENT)
Dept: CARE COORDINATION | Age: 79
End: 2024-06-13
Payer: MEDICARE

## 2024-06-13 VITALS
SYSTOLIC BLOOD PRESSURE: 122 MMHG | RESPIRATION RATE: 20 BRPM | TEMPERATURE: 98.2 F | HEART RATE: 76 BPM | WEIGHT: 130 LBS | DIASTOLIC BLOOD PRESSURE: 62 MMHG | BODY MASS INDEX: 23.78 KG/M2

## 2024-06-13 VITALS
HEART RATE: 70 BPM | WEIGHT: 130 LBS | OXYGEN SATURATION: 92 % | RESPIRATION RATE: 18 BRPM | SYSTOLIC BLOOD PRESSURE: 150 MMHG | BODY MASS INDEX: 23.78 KG/M2 | DIASTOLIC BLOOD PRESSURE: 72 MMHG

## 2024-06-13 DIAGNOSIS — K92.2 UPPER GI BLEEDING: ICD-10-CM

## 2024-06-13 DIAGNOSIS — K29.51 GASTROINTESTINAL HEMORRHAGE ASSOCIATED WITH CHRONIC GASTRITIS: Primary | ICD-10-CM

## 2024-06-13 DIAGNOSIS — I50.32 CHRONIC DIASTOLIC HEART FAILURE (MULTI): ICD-10-CM

## 2024-06-13 DIAGNOSIS — J44.9 CHRONIC OBSTRUCTIVE PULMONARY DISEASE, UNSPECIFIED COPD TYPE (MULTI): ICD-10-CM

## 2024-06-13 PROCEDURE — G0299 HHS/HOSPICE OF RN EA 15 MIN: HCPCS | Mod: HHH

## 2024-06-13 RX ORDER — FERROUS SULFATE 325(65) MG
1 TABLET, DELAYED RELEASE (ENTERIC COATED) ORAL
Qty: 30 TABLET | Refills: 11 | Status: SHIPPED | OUTPATIENT
Start: 2024-06-13 | End: 2025-06-13

## 2024-06-13 SDOH — ECONOMIC STABILITY: GENERAL

## 2024-06-13 ASSESSMENT — ENCOUNTER SYMPTOMS
DENIES PAIN: 1
APPETITE LEVEL: FAIR

## 2024-06-13 ASSESSMENT — ACTIVITIES OF DAILY LIVING (ADL): MONEY MANAGEMENT (EXPENSES/BILLS): NEEDS ASSISTANCE

## 2024-06-13 NOTE — PROGRESS NOTES
Pharmacy Post-Discharge Visit    Brooklyn Read is a 79 y.o. female was referred to Clinical Pharmacy Team to complete a post-discharge medication optimization and monitoring visit.  The patient was referred for close monitoring after admission for GI bleed while currently enrolled in our [facility]. She also has a history of COPD and CHF and we will help with monitoring these as well.     Admission Date: 4/30/24  Discharge Date: 5/10/24    Referring Provider: Chadd Polk*  PCP: Gianluca Macario MD - last visit: 6/3/24, next visit: not scheduled       Subjective   Allergies   Allergen Reactions    Bee Venom Protein (Honey Bee) Anaphylaxis       [address] [facility]  [address] [facility] [address]  Phone: [phone] Fax: [phone]      Medication System Management:  Affordability/Accessibility: $0 co pays   Adherence/Organization: no concerns       Social History     Social History Narrative    Not on file        Notable Medication changes following discharge:  Start: none new  Stop: none  Change: none    HPI  CHF ASSESSMENT  Staging:  Most recent ejection fraction: 65-70%  NYHA Stage: III  ACC/AHA Stage: B    Symptom Assessment:  Weight changes/edema?: Yes - down 24 lbs from admission   Dyspnea?: With exertion  Dizziness/syncope/palpitations?: No    Current Regimen:  ARNI/ACEi/ARB: Yes - losartan  Beta Blocker: Yes - metoprolol  MRA: Yes - spironolactone   SGLT2i: Yes - jardiance     Other therapy:  Xarelto     Secondary Prevention:  The ASCVD Risk score (Felicity SPRAGUE, et al., 2019) failed to calculate for the following reasons:    The patient has a prior MI or stroke diagnosis    Aspirin 81mg? yes  Statin?: No  HTN?: No     PULMONARY ASSESSMENT  Patient has been diagnosed with: COPD  does see a pulmonologist  Last visit: n/a    Current Regimen:  Trelegy   Albuterol prn     Appropriate Inhaler Technique? yes  How often do you use your rescue inhaler?  Rarely     Symptom Assessment:  Current symptoms: dyspnea and fatigue  Symptoms are improved  Triggers: exertion, weather   Alleviating factors: staying inside when too hot out, rest, rescue inhaler     Exacerbation Hx:  When was your last hospitalization for an exacerbation? unknown  When was the last time you were treated with antibiotics and/or steroids? unknown     Historical Treatment:  Spiriva   Combivent     Immunization History:  Influenza: Date [9/14/2022]  PCV13: Date [n/a]  PPSV23: Date [n/a]  PCV20: Date [n/a]  COVID: Date [6/5/2021]  RSV: Date [n/a]    Smoking history:  She has a history of 62 pack years. She quit smoking approximately 2 years ago.     Review of Systems        Objective     There were no vitals taken for this visit.   BP Readings from Last 4 Encounters:   06/12/24 155/71   06/11/24 118/55   06/06/24 108/54   06/05/24 132/72      There were no vitals filed for this visit.     LAB  Lab Results   Component Value Date    BILITOT 0.4 05/06/2024    CALCIUM 8.3 (L) 05/10/2024    CO2 27 05/10/2024     05/10/2024    CREATININE 0.70 05/10/2024    GLUCOSE 74 05/10/2024    ALKPHOS 62 05/06/2024    K 4.5 05/10/2024    PROT 4.6 (L) 05/06/2024     05/10/2024    AST 50 (H) 05/06/2024    ALT 32 05/06/2024    BUN 11 05/10/2024    ANIONGAP 13 05/10/2024    MG 1.91 05/06/2024    PHOS 4.1 05/10/2024     04/26/2024    ALBUMIN 2.6 (L) 05/10/2024    LIPASE 27 04/30/2024    GFRF 59 (A) 03/08/2023     Lab Results   Component Value Date    TRIG 146 05/02/2024    CHOL 65 05/02/2024    LDLCALC 18 05/02/2024    HDL 18.3 05/02/2024     Lab Results   Component Value Date    HGBA1C 5.4 05/02/2024         Current Outpatient Medications   Medication Instructions    albuterol 90 mcg/actuation inhaler 2 puffs, inhalation, Every 6 hours PRN    allopurinol (ZYLOPRIM) 100 mg, oral, 2 times daily    aspirin 81 mg EC tablet 1 tablet, oral, Daily    bisacodyl (DULCOLAX) 10 mg, rectal, Daily PRN    buPROPion XL  (WELLBUTRIN XL) 150 mg, oral, Every morning    cholecalciferol (Vitamin D-3) 5,000 Units tablet 1 tablet, oral, Daily    empagliflozin (JARDIANCE) 10 mg, oral, Daily    EPINEPHrine 0.3 mg/0.3 mL injection syringe 0.3 mL, intramuscular, Once as needed    insulin lispro (HUMALOG) 0-5 Units, subcutaneous, 3 times daily (morning, midday, late afternoon), Take as directed per insulin instructions.    losartan (COZAAR) 100 mg, oral, Daily    metoprolol succinate XL (TOPROL-XL) 25 mg, oral, Daily, Do not crush or chew.    multivitamin with minerals tablet 1 tablet, oral, Daily    pantoprazole (PROTONIX) 40 mg, oral, 2 times daily before meals, Do not crush, chew, or split.    polyethylene glycol (GLYCOLAX, MIRALAX) 17 g, oral, 2 times daily    povidone-iodine (Betadine) 10 % ointment Topical, Daily    rivaroxaban (XARELTO) 2.5 mg, oral, 2 times daily (morning and late afternoon)    sertraline (ZOLOFT) 100 mg, oral, Daily    spironolactone (ALDACTONE) 25 mg, oral, Daily    Trelegy Ellipta 200-62.5-25 mcg blister with device 1 puff, inhalation, Daily        HISTORICAL PHARMACOTHERAPY  N/a    DRUG INTERACTIONS  None at time of review      Assessment/Plan   Problem List Items Addressed This Visit       Chronic obstructive pulmonary disease (Multi)    Chronic diastolic heart failure (Multi)    Upper GI bleeding     COPD  No trouble breathing, except does get SOB sometimes with exerton and when it is too hot outside   Continue with trelegy daily  Albuterol as needed   CHF  Most recent EF 65-70% from April  Current GDMT --> metoprolol, losartan, jardiance and spironolactone   Continue with daily weights and BP's  BP today 150/72 (before meds)  GI bleed  No current signs or symptoms of any bleeding  Has resumed her aspirin and xarelto   Has not started taking iron supplement --> going to send rx for EC tabs to help with constipation   Also needs to  refill for pantoprazole and continue taking BID     Follow Up: 1 week      Continue all meds under the continuation of care with the referring provider and clinical pharmacy team.    Roosevelt Carmen PharmD     Verbal consent to manage patient's drug therapy was obtained from the patient. They were informed they may decline to participate or withdraw from participation in pharmacy services at any time.

## 2024-06-13 NOTE — PROGRESS NOTES
Daily Call Note:   [facility] COMPLETED WITH REGINA VALADEZ NP AND ROOSEVELT    Ms. Read is to see GI next 6/17/24 @ 1:20.    Vascular Surgery 7/15/24 @ 13:30      Ms. Read denies dark stools or shortness of breathe.  150/72   70    oxygen 92   Wt 130 lb    Ms. Read states R leg is still painful.      Patient does not need prescription refills or does not have problems affording her medications.    Patient has not started taking iron pills yet.   Roosevelt sent a prescription for Iron pills to [facility].  Patient requested a refill on Protonix.  Patient has refills on Protonix at the pharmacy.    Ms. Read will  Iron and Protonix.    Patient is taking Aspirin and Xarelto as prescribed.      [facility] 6/20/24 @ 11:00    Pt Education:   Barriers:   Topics for Daily Review:   Pt demonstrates clear understanding:     Daily Weight:  There were no vitals filed for this visit.   Last 3 Weights:  Wt Readings from Last 7 Encounters:   06/03/24 64 kg (141 lb)   05/25/24 59.9 kg (132 lb)   05/08/24 73.8 kg (162 lb 11.2 oz)   04/30/24 70.3 kg (154 lb 15.7 oz)   04/24/24 63.5 kg (140 lb)   04/17/24 72.6 kg (160 lb)   12/01/23 64 kg (141 lb)       Masimo Device:    Masimo Clinical Impression:     Virtual Visits--Scheduled (Most Recent Date at Top)  Follow up Appointments  Recent Visits  Date Type Provider Dept   06/03/24 Office Visit Gianluca Macario MD Do [id number] [facility]   Showing recent visits within past 30 days and meeting all other requirements  Future Appointments  No visits were found meeting these conditions.  Showing future appointments within next 90 days and meeting all other requirements       Frequency of RN Calls & Virtual Visits per Team Agreement:     Medication issues Addressed (what was done):     Follow up appointments scheduled by [facility] Staff:   Referrals made by [facility] staff:

## 2024-06-14 ENCOUNTER — PATIENT OUTREACH (OUTPATIENT)
Dept: HOME HEALTH SERVICES | Age: 79
End: 2024-06-14
Payer: MEDICARE

## 2024-06-14 VITALS — DIASTOLIC BLOOD PRESSURE: 69 MMHG | OXYGEN SATURATION: 92 % | SYSTOLIC BLOOD PRESSURE: 143 MMHG | HEART RATE: 69 BPM

## 2024-06-14 NOTE — PROGRESS NOTES
Daily Call Note: Daily call complete. Patient states she is doing well. Denies pain and SOB, also denies any dark stools. She states she will  iron pills and protonix at pharmacy today. Discussed decreasing daily call frequency to 3x/week, Tues, Tues and Sat, she agrees. Next [facility] 6/20/24 at 1100, verbalized understanding. No other questions at this time.    Pt Education: per POC  Barriers: none  Topics for Daily Review: BP  Pt demonstrates clear understanding: Yes    Daily Weight:  There were no vitals filed for this visit.   Last 3 Weights:  Wt Readings from Last 7 Encounters:   06/13/24 59 kg (130 lb)   06/13/24 59 kg (130 lb)   06/03/24 64 kg (141 lb)   05/25/24 59.9 kg (132 lb)   05/08/24 73.8 kg (162 lb 11.2 oz)   04/30/24 70.3 kg (154 lb 15.7 oz)   04/24/24 63.5 kg (140 lb)       Masimo Device: No   Masimo Clinical Impression: n/a    Virtual Visits--Scheduled (Most Recent Date at Top)  Follow up Appointments  Recent Visits  Date Type Provider Dept   06/03/24 Office Visit Gianluca Macario MD Do [id number] [facility]   Showing recent visits within past 30 days and meeting all other requirements  Future Appointments  No visits were found meeting these conditions.  Showing future appointments within next 90 days and meeting all other requirements       Frequency of RN Calls & Virtual Visits per Team Agreement: [facility] Home Frequency: T/TH/SAT    Medication issues Addressed (what was done): none    Follow up appointments scheduled by [facility] Staff: none  Referrals made by [facility] staff: none

## 2024-06-15 ENCOUNTER — PATIENT OUTREACH (OUTPATIENT)
Dept: HOME HEALTH SERVICES | Age: 79
End: 2024-06-15
Payer: MEDICARE

## 2024-06-15 VITALS
DIASTOLIC BLOOD PRESSURE: 64 MMHG | HEART RATE: 60 BPM | OXYGEN SATURATION: 92 % | WEIGHT: 130 LBS | BODY MASS INDEX: 23.78 KG/M2 | SYSTOLIC BLOOD PRESSURE: 142 MMHG

## 2024-06-15 NOTE — PROGRESS NOTES
Daily call completed. Pt states she is doing well today. Denies any new symptoms. Pt states she was not able to  iron pills yesterday but is going to do so today. Denies any needs/questions/concerns at this time. Aware of upcoming appts. [facility] 6/20 at 1100.    Weight 130lb  142/64  HR 60  O2 92      Pt Education: na  Barriers: na  Topics for Daily Review: daily needs, vs, dw  Pt demonstrates clear understanding: Yes    Daily Weight:  There were no vitals filed for this visit.   Last 3 Weights:  Wt Readings from Last 7 Encounters:   06/13/24 59 kg (130 lb)   06/13/24 59 kg (130 lb)   06/03/24 64 kg (141 lb)   05/25/24 59.9 kg (132 lb)   05/08/24 73.8 kg (162 lb 11.2 oz)   04/30/24 70.3 kg (154 lb 15.7 oz)   04/24/24 63.5 kg (140 lb)       Masimo Device: No   Masimo Clinical Impression: na    Virtual Visits--Scheduled (Most Recent Date at Top)  Follow up Appointments  Recent Visits  Date Type Provider Dept   06/03/24 Office Visit Gianluca Macario MD Do [id number] [facility]   Showing recent visits within past 30 days and meeting all other requirements  Future Appointments  No visits were found meeting these conditions.  Showing future appointments within next 90 days and meeting all other requirements       Frequency of RN Calls & Virtual Visits per Team Agreement: Healthy at Home Frequency: Bi-Weekly    Medication issues Addressed (what was done): na    Follow up appointments scheduled by [facility] Staff: na  Referrals made by [facility] staff: [facility] Transitions Assessment    Patient's Address:   [address] [place] [address]  **  If this is not the address patient will receive services - alert team and address in EMR**       Patient Contacts:  Extended Emergency Contact Information  Primary Emergency Contact: Jolene Waldrop  Home Phone: [phone]  Relation: Child                                Patient's Preferred Phone: [phone]  Patient's E-mail: No e-mail address on record

## 2024-06-17 ENCOUNTER — APPOINTMENT (OUTPATIENT)
Dept: GASTROENTEROLOGY | Facility: HOSPITAL | Age: 79
End: 2024-06-17
Payer: MEDICARE

## 2024-06-18 ENCOUNTER — PATIENT OUTREACH (OUTPATIENT)
Dept: HOME HEALTH SERVICES | Age: 79
End: 2024-06-18
Payer: MEDICARE

## 2024-06-18 NOTE — PROGRESS NOTES
Daily outreach call to patient, n/a message left requesting she call back, need to verify if patient ever picked up meds at [facility] pharmacy (protonix, iron pills),  next [facility] 6/20 @1100

## 2024-06-20 ENCOUNTER — APPOINTMENT (OUTPATIENT)
Dept: CARE COORDINATION | Age: 79
End: 2024-06-20
Payer: MEDICARE

## 2024-06-20 ENCOUNTER — PATIENT OUTREACH (OUTPATIENT)
Dept: HOME HEALTH SERVICES | Age: 79
End: 2024-06-20

## 2024-06-20 ENCOUNTER — TELEMEDICINE (OUTPATIENT)
Dept: PHARMACY | Facility: HOSPITAL | Age: 79
End: 2024-06-20
Payer: MEDICARE

## 2024-06-20 VITALS
BODY MASS INDEX: 23.78 KG/M2 | OXYGEN SATURATION: 94 % | DIASTOLIC BLOOD PRESSURE: 50 MMHG | WEIGHT: 130 LBS | HEART RATE: 68 BPM | SYSTOLIC BLOOD PRESSURE: 107 MMHG

## 2024-06-20 DIAGNOSIS — I10 PRIMARY HYPERTENSION: ICD-10-CM

## 2024-06-20 DIAGNOSIS — I73.9 PAD (PERIPHERAL ARTERY DISEASE) (CMS-HCC): Primary | ICD-10-CM

## 2024-06-20 DIAGNOSIS — I50.32 CHRONIC DIASTOLIC HEART FAILURE (MULTI): ICD-10-CM

## 2024-06-20 DIAGNOSIS — K92.2 UPPER GI BLEEDING: ICD-10-CM

## 2024-06-20 DIAGNOSIS — J44.9 CHRONIC OBSTRUCTIVE PULMONARY DISEASE, UNSPECIFIED COPD TYPE (MULTI): ICD-10-CM

## 2024-06-20 DIAGNOSIS — K92.2 UPPER GI BLEED: ICD-10-CM

## 2024-06-20 DIAGNOSIS — J44.9 CHRONIC OBSTRUCTIVE PULMONARY DISEASE, UNSPECIFIED COPD TYPE (MULTI): Primary | ICD-10-CM

## 2024-06-20 NOTE — PROGRESS NOTES
Pt called in, said she missed call from Juanis yesterday and provided me with /50, 526, 91% room air, wt 130 lbs. I advised her she has a weekly [facility] appt scheduled for 1100 and we would be call her back in 20 mins for call. Pt verbalized understanding.       Patient's Address:   [address] [place] [address]  **  If this is not the address patient will receive services - alert team and address in EMR**       Patient Contacts:  Extended Emergency Contact Information  Primary Emergency Contact: Jolene Waldrop  Home Phone: [phone]  Relation: Child                                Patient's Preferred Phone: [phone]  Patient's E-mail: No e-mail address on record

## 2024-06-20 NOTE — PROGRESS NOTES
[facility] Assessment    Patient's Address:   [address] [place] [address]  **  If this is not the address patient will receive services - alert team and address in EMR**       Patient Contacts:  Extended Emergency Contact Information  Primary Emergency Contact: Jolene Waldrop  [place] Phone: [phone]  Relation: Child                                Patient's Preferred Phone: [phone]  Patient's E-mail: No e-mail address on record

## 2024-06-20 NOTE — PROGRESS NOTES
Weekly [facility] call with Dr Ramirez and Roosevelt from pharmacy patient has not been having any more bleeding issues she is still having the leg discomfort she is scheduled with vascular surgery for evaluation 7/15 vitals /50 HR 68 Pox 94% Weight 130 lbs patient states her follow up is scheduled with vascula surgery has been pushed out to October will call to follow up. No medication needs questions and concerns addressed

## 2024-06-20 NOTE — PROGRESS NOTES
Pharmacy Post-Discharge Visit - Follow Up     Brooklyn Read is a 79 y.o. female was referred to Clinical Pharmacy Team to complete a post-discharge medication optimization and monitoring visit.  The patient was referred for close monitoring after admission for GI bleed while currently enrolled in our [facility]. She also has a history of COPD and CHF and we will help with monitoring these as well.     Referring Provider: Radha Ramirez MD  PCP: Gianluca Macario MD - last visit: 6/3/24, next visit: not scheduled       Subjective   Allergies   Allergen Reactions    Bee Venom Protein (Honey Bee) Anaphylaxis       [address] [facility]  [address] [facility] [address]  Phone: [phone] Fax: [phone]      Medication System Management:  Affordability/Accessibility: $0 co pays   Adherence/Organization: no concerns       Social History     Social History Narrative    Not on file          HPI  CHF ASSESSMENT  Staging:  Most recent ejection fraction: 65-70%  NYHA Stage: III  ACC/AHA Stage: B     Symptom Assessment:  Weight changes/edema?: No - has been stable at 130 lbs  Dyspnea?: With exertion  Dizziness/syncope/palpitations?: No     Current Regimen:  ARNI/ACEi/ARB: Yes - losartan  Beta Blocker: Yes - metoprolol  MRA: Yes - spironolactone   SGLT2i: Yes - jardiance      Other therapy:  Xarelto      Secondary Prevention:  The ASCVD Risk score (Felicity SPRAGUE, et al., 2019) failed to calculate for the following reasons:    The patient has a prior MI or stroke diagnosis     Aspirin 81mg? yes  Statin?: No  HTN?: No      PULMONARY ASSESSMENT  Patient has been diagnosed with: COPD  does see a pulmonologist  Last visit: n/a     Current Regimen:  Trelegy   Albuterol prn      Appropriate Inhaler Technique? yes  How often do you use your rescue inhaler? Rarely      Symptom Assessment:  Current symptoms: dyspnea and fatigue  Symptoms are improved  Triggers: exertion, weather   Alleviating  factors: staying inside when too hot out, rest, rescue inhaler      Exacerbation Hx:  When was your last hospitalization for an exacerbation? unknown  When was the last time you were treated with antibiotics and/or steroids? unknown      Historical Treatment:  Spiriva   Combivent      Immunization History:  Influenza: Date [9/14/2022]  PCV13: Date [n/a]  PPSV23: Date [n/a]  PCV20: Date [n/a]  COVID: Date [6/5/2021]  RSV: Date [n/a]     Smoking history:  She has a history of 62 pack years. She quit smoking approximately 2 years ago.        Review of Systems        Objective     There were no vitals taken for this visit.   BP Readings from Last 4 Encounters:   06/15/24 142/64   06/14/24 143/69   06/13/24 122/62   06/13/24 150/72      There were no vitals filed for this visit.     LAB  Lab Results   Component Value Date    BILITOT 0.4 05/06/2024    CALCIUM 8.3 (L) 05/10/2024    CO2 27 05/10/2024     05/10/2024    CREATININE 0.70 05/10/2024    GLUCOSE 74 05/10/2024    ALKPHOS 62 05/06/2024    K 4.5 05/10/2024    PROT 4.6 (L) 05/06/2024     05/10/2024    AST 50 (H) 05/06/2024    ALT 32 05/06/2024    BUN 11 05/10/2024    ANIONGAP 13 05/10/2024    MG 1.91 05/06/2024    PHOS 4.1 05/10/2024     04/26/2024    ALBUMIN 2.6 (L) 05/10/2024    LIPASE 27 04/30/2024    GFRF 59 (A) 03/08/2023     Lab Results   Component Value Date    TRIG 146 05/02/2024    CHOL 65 05/02/2024    LDLCALC 18 05/02/2024    HDL 18.3 05/02/2024     Lab Results   Component Value Date    HGBA1C 5.4 05/02/2024         Current Outpatient Medications   Medication Instructions    albuterol 90 mcg/actuation inhaler 2 puffs, inhalation, Every 6 hours PRN    allopurinol (ZYLOPRIM) 100 mg, oral, 2 times daily    aspirin 81 mg EC tablet 1 tablet, oral, Daily    bisacodyl (DULCOLAX) 10 mg, rectal, Daily PRN    buPROPion XL (WELLBUTRIN XL) 150 mg, oral, Every morning    cholecalciferol (Vitamin D-3) 5,000 Units tablet 1 tablet, oral, Daily     empagliflozin (JARDIANCE) 10 mg, oral, Daily    EPINEPHrine 0.3 mg/0.3 mL injection syringe 0.3 mL, intramuscular, Once as needed    ferrous sulfate 325 (65 Fe) MG EC tablet 1 tablet, oral, Daily with breakfast, Do not crush, chew, or split.    insulin lispro (HUMALOG) 0-5 Units, subcutaneous, 3 times daily (morning, midday, late afternoon), Take as directed per insulin instructions.    losartan (COZAAR) 100 mg, oral, Daily    metoprolol succinate XL (TOPROL-XL) 25 mg, oral, Daily, Do not crush or chew.    multivitamin with minerals tablet 1 tablet, oral, Daily    pantoprazole (PROTONIX) 40 mg, oral, 2 times daily before meals, Do not crush, chew, or split.    polyethylene glycol (GLYCOLAX, MIRALAX) 17 g, oral, 2 times daily    povidone-iodine (Betadine) 10 % ointment Topical, Daily    rivaroxaban (XARELTO) 2.5 mg, oral, 2 times daily (morning and late afternoon)    sertraline (ZOLOFT) 100 mg, oral, Daily    spironolactone (ALDACTONE) 25 mg, oral, Daily    Trelegy Ellipta 200-62.5-25 mcg blister with device 1 puff, inhalation, Daily            Assessment/Plan   Problem List Items Addressed This Visit    None    COPD  No trouble breathing, except does get SOB sometimes with exerton and when it is too hot outside   Continue with trelegy daily  Albuterol as needed   CHF  Most recent EF 65-70% from April  Current GDMT --> metoprolol, losartan, jardiance and spironolactone   Continue with daily weights and BP's  BP today 107/50 - no lightheaded or dizziness   GI bleed  No current signs or symptoms of any bleeding  Has resumed her aspirin and xarelto   Continue with iron daily  Pantoprazole bid   Leg pain   Supposed to be following up with vascular surgery   Asked for pain meds but will need to defer to pcp or vascular to prescribe  Nursing going to try and help with getting earlier appointment     Follow Up: 1 week     Continue all meds under the continuation of care with the referring provider and clinical pharmacy  kemar.    Roosevelt Carmen PharmD     Verbal consent to manage patient's drug therapy was obtained from the patient and an individual authorized to act on behalf of a patient. They were informed they may decline to participate or withdraw from participation in pharmacy services at any time.

## 2024-06-21 ENCOUNTER — APPOINTMENT (OUTPATIENT)
Dept: HOME HEALTH SERVICES | Facility: HOME HEALTH | Age: 79
End: 2024-06-21
Payer: MEDICARE

## 2024-06-22 ENCOUNTER — PATIENT OUTREACH (OUTPATIENT)
Dept: CARE COORDINATION | Age: 79
End: 2024-06-22
Payer: MEDICARE

## 2024-06-22 VITALS
SYSTOLIC BLOOD PRESSURE: 138 MMHG | BODY MASS INDEX: 23.96 KG/M2 | HEART RATE: 69 BPM | WEIGHT: 131 LBS | DIASTOLIC BLOOD PRESSURE: 81 MMHG

## 2024-06-22 NOTE — PROGRESS NOTES
Daily Call Note:   Pt stated she is doing well. /81 HR 69. Next [facility] graduate. Still has some right leg discomfort but it is not worsening. No questions or concerns.     Topics for Daily Review: BS BP  Pt demonstrates clear understanding: Yes    Daily VS:  /81   Pulse 69   Wt 59.4 kg (131 lb)   BMI 23.96 kg/m²      Last 3 Weights:  Wt Readings from Last 7 Encounters:   06/22/24 59.4 kg (131 lb)   06/20/24 59 kg (130 lb)   06/15/24 59 kg (130 lb)   06/13/24 59 kg (130 lb)   06/13/24 59 kg (130 lb)   06/03/24 64 kg (141 lb)   05/25/24 59.9 kg (132 lb)       Masimo Device: No   Masimo Clinical Impression:     Virtual Visits--Scheduled (Most Recent Date at Top)  Follow up Appointments  Recent Visits  Date Type Provider Dept   06/20/24 Telemedicine Radha Ramirez MD [facility]   06/03/24 Office Visit Gianluca Macario MD Do [id number] [facility]   Showing recent visits within past 30 days and meeting all other requirements  Future Appointments  Date Type Provider Dept   06/27/24 Appointment [facility]   Showing future appointments within next 90 days and meeting all other requirements       Frequency of RN Calls & Virtual Visits per Team Agreement: [facility] Frequency: Bi-Weekly    Follow up appointments scheduled by [facility] Staff: 6/27 @1230 GRADUATE

## 2024-06-25 ENCOUNTER — PATIENT OUTREACH (OUTPATIENT)
Dept: HOME HEALTH SERVICES | Age: 79
End: 2024-06-25
Payer: MEDICARE

## 2024-06-25 VITALS
SYSTOLIC BLOOD PRESSURE: 152 MMHG | DIASTOLIC BLOOD PRESSURE: 76 MMHG | HEART RATE: 63 BPM | WEIGHT: 131 LBS | OXYGEN SATURATION: 94 % | BODY MASS INDEX: 23.96 KG/M2

## 2024-06-25 NOTE — PROGRESS NOTES
Daily call completed. Pt states she is doing okay. GI appt was canceled on the 17th due to insurance not paying for it. Pt was rescheduled for August for another GI appt. Pt denies any new or worsening symptoms. Pt [facility] was rescheduled for 6/27 at 2000 due to scheduling conflict. Denies any further needs/questions/concerns at this time. Aware of upcoming appts.     Weight 131lb  152/76  HR 63  O2 94      Pt Education: na  Barriers: na  Topics for Daily Review: daily needs, dw, vs  Pt demonstrates clear understanding: Yes    Daily Weight:  There were no vitals filed for this visit.   Last 3 Weights:  Wt Readings from Last 7 Encounters:   06/22/24 59.4 kg (131 lb)   06/20/24 59 kg (130 lb)   06/15/24 59 kg (130 lb)   06/13/24 59 kg (130 lb)   06/13/24 59 kg (130 lb)   06/03/24 64 kg (141 lb)   05/25/24 59.9 kg (132 lb)       Masimo Device: No   Masimo Clinical Impression: na    Virtual Visits--Scheduled (Most Recent Date at Top)  Follow up Appointments  Recent Visits  Date Type Provider Dept   06/03/24 Office Visit Gianluca Macario MD Do [id number] [facility]   Showing recent visits within past 30 days and meeting all other requirements  Future Appointments  No visits were found meeting these conditions.  Showing future appointments within next 90 days and meeting all other requirements       Frequency of RN Calls & Virtual Visits per Team Agreement: [facility] Frequency: Bi-Weekly    Medication issues Addressed (what was done): NA    Follow up appointments scheduled by [facility] Staff: NA  Referrals made by [facility] staff: na      [facility] Transitions Assessment    Patient's Address:   [address] [place] [id number]  **  If this is not the address patient will receive services - alert team and address in EMR**       Patient Contacts:  Extended Emergency Contact Information  Primary Emergency Contact: Jolene Waldrop  Home Phone: [phone]  Relation: Child                                Patient's  Preferred Phone: [phone]  Patient's E-mail: No e-mail address on record

## 2024-06-26 ENCOUNTER — HOME CARE VISIT (OUTPATIENT)
Dept: HOME HEALTH SERVICES | Facility: HOME HEALTH | Age: 79
End: 2024-06-26
Payer: MEDICARE

## 2024-06-26 ENCOUNTER — APPOINTMENT (OUTPATIENT)
Dept: VASCULAR SURGERY | Facility: HOSPITAL | Age: 79
End: 2024-06-26
Payer: MEDICARE

## 2024-06-26 VITALS
HEART RATE: 71 BPM | TEMPERATURE: 97.5 F | SYSTOLIC BLOOD PRESSURE: 130 MMHG | OXYGEN SATURATION: 95 % | DIASTOLIC BLOOD PRESSURE: 65 MMHG

## 2024-06-26 PROCEDURE — G0299 HHS/HOSPICE OF RN EA 15 MIN: HCPCS | Mod: HHH

## 2024-06-26 ASSESSMENT — ENCOUNTER SYMPTOMS
MUSCLE WEAKNESS: 1
LAST BOWEL MOVEMENT: 67016
DYSPNEA ON EXERTION: 1
CHANGE IN APPETITE: DECREASED
PAIN: 1
DYSPNEA ACTIVITY LEVEL: AFTER AMBULATING LESS THAN 10 FT
FATIGUES EASILY: 1
SHORTNESS OF BREATH: 1
PAIN LOCATION: RIGHT ANKLE
PAIN LOCATION - PAIN SEVERITY: 6/10
APPETITE LEVEL: POOR
SUBJECTIVE PAIN PROGRESSION: WAXING AND WANING
LOWER EXTREMITY EDEMA: 1
PERSON REPORTING PAIN: PATIENT
HYPERTENSION: 1

## 2024-06-26 ASSESSMENT — ACTIVITIES OF DAILY LIVING (ADL)
ENTERING_EXITING_HOME: SUPERVISION
OASIS_M1830: 02

## 2024-06-27 ENCOUNTER — APPOINTMENT (OUTPATIENT)
Dept: CARE COORDINATION | Age: 79
End: 2024-06-27
Payer: MEDICARE

## 2024-06-27 ENCOUNTER — PATIENT OUTREACH (OUTPATIENT)
Dept: HOME HEALTH SERVICES | Age: 79
End: 2024-06-27

## 2024-06-27 VITALS
DIASTOLIC BLOOD PRESSURE: 70 MMHG | WEIGHT: 130 LBS | BODY MASS INDEX: 23.78 KG/M2 | HEART RATE: 68 BPM | SYSTOLIC BLOOD PRESSURE: 141 MMHG | OXYGEN SATURATION: 92 %

## 2024-06-27 DIAGNOSIS — K92.2 UPPER GI BLEED: Primary | ICD-10-CM

## 2024-06-27 DIAGNOSIS — I73.9 PAD (PERIPHERAL ARTERY DISEASE) (CMS-HCC): ICD-10-CM

## 2024-06-28 NOTE — PROGRESS NOTES
"Daily Call Note:   [facility] call completed with Monique Schaefer CNP. Patient doing well. Asked for dates, times, location for GI & vascular appointments, provided.     Vitals: HR 68, /70, SpO2 92% RA, 130 lbs.     Patient states she is on a fixed income & unable to afford Ensure. Monique Schaefer SANDRO asked that we reach out to [facility] [employer] regarding, secure chat sent. Per Darya QUILES, \"Med wish might be an option but they have to go pick it up. We will follow up tomorrow.\"    Patient has no other needs or complaints at this time.   [facility] 7/2 @ 1030 with plan to graduate pending cost assistance for Ensure.     Pt Education: none   Barriers: none  Topics for Daily Review: vitals, weight  Pt demonstrates clear understanding: Yes    Daily Weight:  There were no vitals filed for this visit.   Last 3 Weights:  Wt Readings from Last 7 Encounters:   06/25/24 59.4 kg (131 lb)   06/22/24 59.4 kg (131 lb)   06/20/24 59 kg (130 lb)   06/15/24 59 kg (130 lb)   06/13/24 59 kg (130 lb)   06/13/24 59 kg (130 lb)   06/03/24 64 kg (141 lb)       Masimo Device: No   Masimo Clinical Impression: none    Virtual Visits--Scheduled (Most Recent Date at Top)  Follow up Appointments  Recent Visits  Date Type Provider Dept   06/03/24 Office Visit Gianluca Macario MD Do [id number] [facility]   Showing recent visits within past 30 days and meeting all other requirements  Future Appointments  No visits were found meeting these conditions.  Showing future appointments within next 90 days and meeting all other requirements       Frequency of RN Calls & Virtual Visits per Team Agreement: [facility] Frequency: TTHS    Medication issues Addressed (what was done): none    Follow up appointments scheduled by [facility] Staff: none  Referrals made by [facility] staff: none        "

## 2024-07-02 ENCOUNTER — TELEMEDICINE (OUTPATIENT)
Dept: PHARMACY | Facility: HOSPITAL | Age: 79
End: 2024-07-02
Payer: MEDICARE

## 2024-07-02 ENCOUNTER — APPOINTMENT (OUTPATIENT)
Dept: CARE COORDINATION | Age: 79
End: 2024-07-02
Payer: MEDICARE

## 2024-07-02 ENCOUNTER — PATIENT OUTREACH (OUTPATIENT)
Dept: HOME HEALTH SERVICES | Age: 79
End: 2024-07-02

## 2024-07-02 ENCOUNTER — DOCUMENTATION (OUTPATIENT)
Dept: CARE COORDINATION | Age: 79
End: 2024-07-02

## 2024-07-02 VITALS — SYSTOLIC BLOOD PRESSURE: 140 MMHG | HEART RATE: 69 BPM | DIASTOLIC BLOOD PRESSURE: 71 MMHG

## 2024-07-02 DIAGNOSIS — K92.2 UPPER GI BLEEDING: ICD-10-CM

## 2024-07-02 DIAGNOSIS — I73.9 PVD (PERIPHERAL VASCULAR DISEASE) (CMS-HCC): ICD-10-CM

## 2024-07-02 DIAGNOSIS — I50.32 CHRONIC DIASTOLIC HEART FAILURE (MULTI): ICD-10-CM

## 2024-07-02 DIAGNOSIS — I73.9 PAD (PERIPHERAL ARTERY DISEASE) (CMS-HCC): ICD-10-CM

## 2024-07-02 DIAGNOSIS — K92.2 UPPER GI BLEED: ICD-10-CM

## 2024-07-02 DIAGNOSIS — J44.9 CHRONIC OBSTRUCTIVE PULMONARY DISEASE, UNSPECIFIED COPD TYPE (MULTI): Primary | ICD-10-CM

## 2024-07-02 RX ORDER — SPIRONOLACTONE 25 MG/1
25 TABLET ORAL DAILY
Qty: 30 TABLET | Refills: 2 | Status: SHIPPED | OUTPATIENT
Start: 2024-07-02

## 2024-07-02 RX ORDER — METOPROLOL SUCCINATE 25 MG/1
25 TABLET, EXTENDED RELEASE ORAL DAILY
Qty: 30 TABLET | Refills: 2 | Status: SHIPPED | OUTPATIENT
Start: 2024-07-02

## 2024-07-02 NOTE — PROGRESS NOTES
Patient returned my call. I shared with her that Olivia will be in contact. And that she should contact her , which she stated she does have one, and they could assist her with obtaining a authorization for it.     Discussed the following topics on behalf of the patient:  []  Behavioral Health Assistance     []  Case Management  []   Assistance  []  Digital Equity Assistance  []  Dental Health Assistance  []  Education Assistance  []  Employment Assistance  []  Financial Strain Relief Assistance  [x]  Food Insecurity Assistance  []  Healthcare Coverage Assistance  []  Housing Stability Assistance  []  IP Violence Relief Assistance  []  Legal Assistance  []  Physical Activity Assistance  []  Social Connection Assistance  []  Stress Relief Assistance   []  Substance Abuse Assistance  []  Transportation Assistance  []  Utility Assistance  []  Other: [insert comment here]    Delia Persaud LCSW

## 2024-07-02 NOTE — PROGRESS NOTES
Weekly hhvc completed with [facility] Whitley, pt and RN. Pt states things have been going well aside from her legs still hurting. Pt states she is still following with her vascular surgeon. Next visit is July 12th. Pt states she has been taking tylenol with no relief. Pt denies CP/SOB/dizziness/lightheadedness. States she has no complaints aside from the leg pain. Pt endorses taking ASA. Pt denies any need for medication refills. Did not obtain her weight today. States it has been staying in the 130-131 range. Graduation was discussed with pt, all parties agreeable. Pt denies any further questions/needs/concerns at this time. Aware she is able to call back in for any future questions or concerns. Verbalizes understanding. Pt graduated from program at this time.     140/71  HR 69    Patient's Address:   [address] [place] [id number]  **  If this is not the address patient will receive services - alert team and address in EMR**       Patient Contacts:  Extended Emergency Contact Information  Primary Emergency Contact: Jolene Waldrop  Home Phone: [phone]  Relation: Child                                Patient's Preferred Phone: [phone]  Patient's E-mail: No e-mail address on record

## 2024-07-02 NOTE — PROGRESS NOTES
Pharmacy Post-Discharge Visit - Follow Up     Brooklyn Read is a 79 y.o. female was referred to Clinical Pharmacy Team to complete a post-discharge medication optimization and monitoring visit.  The patient was referred for their CHF and COPD management while also enrolled in [facility].     Referring Provider: Houston Mcmillan MD  PCP: Gianluca Macario MD - last visit: 6/3/24, next visit: not scheduled       Subjective   Allergies   Allergen Reactions    Bee Venom Protein (Honey Bee) Anaphylaxis       [address] [address]  Phone: [phone] Fax: [phone]      Medication System Management:  Affordability/Accessibility: $0 co pays   Adherence/Organization: no issues       Social History     Social History Narrative    Not on file          HPI  CHF ASSESSMENT  Staging:  Most recent ejection fraction: 65-70%  NYHA Stage: III  ACC/AHA Stage: B     Symptom Assessment:  Weight changes/edema?: No - has been stable at 130 lbs  Dyspnea?: With exertion  Dizziness/syncope/palpitations?: No     Current Regimen:  ARNI/ACEi/ARB: Yes - losartan  Beta Blocker: Yes - metoprolol  MRA: Yes - spironolactone   SGLT2i: Yes - jardiance      Other therapy:  Xarelto      Secondary Prevention:  The ASCVD Risk score (Felicity DK, et al., 2019) failed to calculate for the following reasons:    The patient has a prior MI or stroke diagnosis     Aspirin 81mg? yes  Statin?: No  HTN?: No      PULMONARY ASSESSMENT  Patient has been diagnosed with: COPD  does see a pulmonologist  Last visit: n/a     Current Regimen:  Trelegy   Albuterol prn      Appropriate Inhaler Technique? yes  How often do you use your rescue inhaler? Rarely      Symptom Assessment:  Current symptoms: dyspnea and fatigue  Symptoms are improved  Triggers: exertion, weather   Alleviating factors: staying inside when too hot out, rest, rescue inhaler      Exacerbation Hx:  When was your last hospitalization for an  exacerbation? unknown  When was the last time you were treated with antibiotics and/or steroids? unknown      Historical Treatment:  Spiriva   Combivent      Immunization History:  Influenza: Date [9/14/2022]  PCV13: Date [n/a]  PPSV23: Date [n/a]  PCV20: Date [n/a]  COVID: Date [6/5/2021]  RSV: Date [n/a]     Smoking history:  She has a history of 62 pack years. She quit smoking approximately 2 years ago.     Review of Systems        Objective     There were no vitals taken for this visit.   BP Readings from Last 4 Encounters:   06/27/24 141/70   06/26/24 130/65   06/25/24 152/76   06/22/24 138/81      There were no vitals filed for this visit.     LAB  Lab Results   Component Value Date    BILITOT 0.4 05/06/2024    CALCIUM 8.3 (L) 05/10/2024    CO2 27 05/10/2024     05/10/2024    CREATININE 0.70 05/10/2024    GLUCOSE 74 05/10/2024    ALKPHOS 62 05/06/2024    K 4.5 05/10/2024    PROT 4.6 (L) 05/06/2024     05/10/2024    AST 50 (H) 05/06/2024    ALT 32 05/06/2024    BUN 11 05/10/2024    ANIONGAP 13 05/10/2024    MG 1.91 05/06/2024    PHOS 4.1 05/10/2024     04/26/2024    ALBUMIN 2.6 (L) 05/10/2024    LIPASE 27 04/30/2024    GFRF 59 (A) 03/08/2023     Lab Results   Component Value Date    TRIG 146 05/02/2024    CHOL 65 05/02/2024    LDLCALC 18 05/02/2024    HDL 18.3 05/02/2024     Lab Results   Component Value Date    HGBA1C 5.4 05/02/2024         Current Outpatient Medications   Medication Instructions    albuterol 90 mcg/actuation inhaler 2 puffs, inhalation, Every 6 hours PRN    alendronate (FOSAMAX) 70 mg, oral, Every 7 days    allopurinol (ZYLOPRIM) 100 mg, oral, 2 times daily    aspirin 81 mg EC tablet 1 tablet, oral, Daily    bisacodyl (DULCOLAX) 10 mg, rectal, Daily PRN    buPROPion XL (WELLBUTRIN XL) 150 mg, oral, Every morning    cholecalciferol (Vitamin D-3) 5,000 Units tablet 1 tablet, oral, Daily    empagliflozin (JARDIANCE) 10 mg, oral, Daily    EPINEPHrine 0.3 mg/0.3 mL injection  syringe 0.3 mL, intramuscular, Once as needed    ferrous sulfate 325 (65 Fe) MG EC tablet 1 tablet, oral, Daily with breakfast, Do not crush, chew, or split.    insulin lispro (HUMALOG) 0-5 Units, subcutaneous, 3 times daily (morning, midday, late afternoon), Take as directed per insulin instructions.    losartan (COZAAR) 100 mg, oral, Daily    metoprolol succinate XL (TOPROL-XL) 25 mg, oral, Daily, Do not crush or chew.    multivitamin with minerals tablet 1 tablet, oral, Daily    pantoprazole (PROTONIX) 40 mg, oral, 2 times daily before meals, Do not crush, chew, or split.    polyethylene glycol (GLYCOLAX, MIRALAX) 17 g, oral, 2 times daily    povidone-iodine (Betadine) 10 % ointment Topical, Daily    rivaroxaban (XARELTO) 2.5 mg, oral, 2 times daily (morning and late afternoon)    sertraline (ZOLOFT) 100 mg, oral, Daily    spironolactone (ALDACTONE) 25 mg, oral, Daily    Trelegy Ellipta 200-62.5-25 mcg blister with device 1 puff, inhalation, Daily            Assessment/Plan   Problem List Items Addressed This Visit    None    COPD  No trouble breathing, except does get SOB sometimes with exerton and when it is too hot outside   Continue with trelegy daily  Albuterol as needed   CHF  Most recent EF 65-70% from April  Current GDMT --> metoprolol, losartan, jardiance and spironolactone   Continue with daily weights and BP's  BP today 140/71  GI bleed  No current signs or symptoms of any bleeding  No dark or bloody stools since being home   Has resumed her aspirin and xarelto   Continue with iron daily  Pantoprazole bid   Leg pain   Supposed to be following up with vascular surgery   Still having the pain and using tylenol as needed     Follow Up: as needed     Continue all meds under the continuation of care with the referring provider and clinical pharmacy team.    Roosevelt Carmen PharmD     Verbal consent to manage patient's drug therapy was obtained from the patient. They were informed they may decline to  participate or withdraw from participation in pharmacy services at any time.

## 2024-07-02 NOTE — PROGRESS NOTES
Attempted to contact [facility]: PT was not available, left a voice message w/my name and number for a return call back.  [facility] will be contacting the PT when they receive more protein drinks in.    Discussed the following topics on behalf of the patient:  []  Behavioral Health Assistance     []  Case Management  []   Assistance  []  Digital Equity Assistance  []  Dental Health Assistance  []  Education Assistance  []  Employment Assistance  []  Financial Strain Relief Assistance  [x]  Food Insecurity Assistance  []  Healthcare Coverage Assistance  []  Housing Stability Assistance  []  IP Violence Relief Assistance  []  Legal Assistance  []  Physical Activity Assistance  []  Social Connection Assistance  []  Stress Relief Assistance   []  Substance Abuse Assistance  []  Transportation Assistance  []  Utility Assistance  []  Other: [insert comment here]          Delia Persaud LCSW

## 2024-07-03 ENCOUNTER — HOME CARE VISIT (OUTPATIENT)
Dept: HOME HEALTH SERVICES | Facility: HOME HEALTH | Age: 79
End: 2024-07-03
Payer: MEDICARE

## 2024-07-03 VITALS
SYSTOLIC BLOOD PRESSURE: 118 MMHG | DIASTOLIC BLOOD PRESSURE: 62 MMHG | TEMPERATURE: 97.8 F | HEART RATE: 80 BPM | WEIGHT: 130 LBS | OXYGEN SATURATION: 92 % | RESPIRATION RATE: 24 BRPM | BODY MASS INDEX: 23.78 KG/M2

## 2024-07-03 PROCEDURE — G0299 HHS/HOSPICE OF RN EA 15 MIN: HCPCS | Mod: HHH

## 2024-07-03 SDOH — ECONOMIC STABILITY: GENERAL

## 2024-07-03 ASSESSMENT — ENCOUNTER SYMPTOMS
DENIES PAIN: 1
APPETITE LEVEL: FAIR

## 2024-07-03 ASSESSMENT — ACTIVITIES OF DAILY LIVING (ADL): MONEY MANAGEMENT (EXPENSES/BILLS): NEEDS ASSISTANCE

## 2024-07-13 ENCOUNTER — HOME CARE VISIT (OUTPATIENT)
Dept: HOME HEALTH SERVICES | Facility: HOME HEALTH | Age: 79
End: 2024-07-13
Payer: MEDICARE

## 2024-07-13 VITALS
SYSTOLIC BLOOD PRESSURE: 122 MMHG | TEMPERATURE: 98 F | HEART RATE: 90 BPM | DIASTOLIC BLOOD PRESSURE: 81 MMHG | RESPIRATION RATE: 16 BRPM | OXYGEN SATURATION: 98 %

## 2024-07-13 PROCEDURE — G0300 HHS/HOSPICE OF LPN EA 15 MIN: HCPCS | Mod: HHH

## 2024-07-13 ASSESSMENT — ENCOUNTER SYMPTOMS
PAIN LOCATION - PAIN QUALITY: ACHY
BOWEL PATTERN NORMAL: 1
PAIN LOCATION - PAIN FREQUENCY: CONSTANT
APPETITE LEVEL: POOR
STOOL FREQUENCY: DAILY
LOWEST PAIN SEVERITY IN PAST 24 HOURS: 4/10
HIGHEST PAIN SEVERITY IN PAST 24 HOURS: 8/10
LOWER EXTREMITY EDEMA: 1
PERSON REPORTING PAIN: PATIENT
PAIN: 1
PAIN LOCATION: RIGHT LEG
SUBJECTIVE PAIN PROGRESSION: UNCHANGED
PAIN LOCATION - PAIN SEVERITY: 6/10
CHANGE IN APPETITE: UNCHANGED
LAST BOWEL MOVEMENT: 67033
SHORTNESS OF BREATH: 1
PAIN SEVERITY GOAL: 0/10
DYSPNEA ACTIVITY LEVEL: AFTER AMBULATING MORE THAN 20 FT

## 2024-07-13 ASSESSMENT — PAIN SCALES - PAIN ASSESSMENT IN ADVANCED DEMENTIA (PAINAD)
FACIALEXPRESSION: 0 - SMILING OR INEXPRESSIVE.
BODYLANGUAGE: 0
NEGVOCALIZATION: 0
BREATHING: 0
TOTALSCORE: 0
NEGVOCALIZATION: 0 - NONE.
FACIALEXPRESSION: 0
BODYLANGUAGE: 0 - RELAXED.
CONSOLABILITY: 0
CONSOLABILITY: 0 - NO NEED TO CONSOLE.

## 2024-07-15 ENCOUNTER — OFFICE VISIT (OUTPATIENT)
Dept: VASCULAR SURGERY | Facility: HOSPITAL | Age: 79
End: 2024-07-15
Payer: MEDICARE

## 2024-07-15 VITALS
WEIGHT: 139 LBS | HEART RATE: 69 BPM | DIASTOLIC BLOOD PRESSURE: 60 MMHG | SYSTOLIC BLOOD PRESSURE: 157 MMHG | BODY MASS INDEX: 25.58 KG/M2 | HEIGHT: 62 IN

## 2024-07-15 DIAGNOSIS — I50.9 SYMPTOMATIC CONGESTIVE HEART FAILURE, PRE-OPERATIVE CARDIOVASCULAR EXAMINATION (MULTI): ICD-10-CM

## 2024-07-15 DIAGNOSIS — Z01.810 SYMPTOMATIC CONGESTIVE HEART FAILURE, PRE-OPERATIVE CARDIOVASCULAR EXAMINATION (MULTI): ICD-10-CM

## 2024-07-15 DIAGNOSIS — Z01.818 ENCOUNTER FOR PREOPERATIVE ANESTHESIOLOGY ASSESSMENT FOR CARDIAC SURGERY: ICD-10-CM

## 2024-07-15 DIAGNOSIS — I73.9 PVD (PERIPHERAL VASCULAR DISEASE) (CMS-HCC): Primary | ICD-10-CM

## 2024-07-15 PROCEDURE — 99215 OFFICE O/P EST HI 40 MIN: CPT | Performed by: SURGERY

## 2024-07-15 PROCEDURE — 1159F MED LIST DOCD IN RCRD: CPT | Performed by: SURGERY

## 2024-07-15 PROCEDURE — 1123F ACP DISCUSS/DSCN MKR DOCD: CPT | Performed by: SURGERY

## 2024-07-15 PROCEDURE — 99215 OFFICE O/P EST HI 40 MIN: CPT | Mod: 57 | Performed by: SURGERY

## 2024-07-15 PROCEDURE — 3078F DIAST BP <80 MM HG: CPT | Performed by: SURGERY

## 2024-07-15 PROCEDURE — 3074F SYST BP LT 130 MM HG: CPT | Performed by: SURGERY

## 2024-07-15 ASSESSMENT — ENCOUNTER SYMPTOMS
LOSS OF SENSATION IN FEET: 0
DEPRESSION: 0

## 2024-07-15 NOTE — H&P (VIEW-ONLY)
Vascular Surgery Clinic Note    Subjective    History of Present Illness  Brooklyn Read is a pleasant 79 y.o. female presenting to clinic for CLTI of the right leg with a wound and worsened motor function due to pain from the wound over 5 months. Her right heel wounds has been present for 4-5 months and failed to heel. She was seen in ED consult on 24, also had a GI bleed at the time.     Past Medical History  Ms. Brooklyn Read has a past medical history of Anemia, Anxiety, Cerebral vascular accident (Multi), CKD (chronic kidney disease), COPD (chronic obstructive pulmonary disease) (Multi), Depression, GI (gastrointestinal bleed), Hyperlipidemia, and Hypertension.    She has no past medical history of Diabetes mellitus (Multi) or Renal insufficiency.     Past Surgical History  Ms. Brooklyn Read has a past surgical history that includes Hysterectomy and Cholecystectomy.     Social History  Social History     Tobacco Use    Smoking status: Former     Current packs/day: 0.00     Average packs/day: 1 pack/day for 62.0 years (62.0 ttl pk-yrs)     Types: Cigarettes     Start date:      Quit date:      Years since quittin.5    Smokeless tobacco: Never   Substance Use Topics    Alcohol use: Never       Medications  Current Outpatient Medications   Medication Instructions    albuterol 90 mcg/actuation inhaler 2 puffs, inhalation, Every 6 hours PRN    alendronate (FOSAMAX) 70 mg, oral, Every 7 days    allopurinol (ZYLOPRIM) 100 mg, oral, 2 times daily    aspirin 81 mg EC tablet 1 tablet, oral, Daily    bisacodyl (DULCOLAX) 10 mg, rectal, Daily PRN    buPROPion XL (WELLBUTRIN XL) 150 mg, oral, Every morning    cholecalciferol (Vitamin D-3) 5,000 Units tablet 1 tablet, oral, Daily    empagliflozin (JARDIANCE) 10 mg, oral, Daily    EPINEPHrine 0.3 mg/0.3 mL injection syringe 0.3 mL, intramuscular, Once as needed    ferrous sulfate 325 (65 Fe) MG EC tablet 1 tablet, oral, Daily with breakfast, Do not  "crush, chew, or split.    insulin lispro (HUMALOG) 0-5 Units, subcutaneous, 3 times daily (morning, midday, late afternoon), Take as directed per insulin instructions.    losartan (COZAAR) 100 mg, oral, Daily    metoprolol succinate XL (TOPROL-XL) 25 mg, oral, Daily, Do not crush or chew.    multivitamin with minerals tablet 1 tablet, oral, Daily    pantoprazole (PROTONIX) 40 mg, oral, 2 times daily before meals, Do not crush, chew, or split.    polyethylene glycol (GLYCOLAX, MIRALAX) 17 g, oral, 2 times daily    povidone-iodine (Betadine) 10 % ointment Topical, Daily    rivaroxaban (XARELTO) 2.5 mg, oral, 2 times daily (morning and late afternoon)    sertraline (ZOLOFT) 100 mg, oral, Daily    spironolactone (ALDACTONE) 25 mg, oral, Daily    Trelegy Ellipta 200-62.5-25 mcg blister with device 1 puff, inhalation, Daily        Allergies  Allergies   Allergen Reactions    Bee Venom Protein (Honey Bee) Anaphylaxis           Objective   Last Recorded Vitals   Blood pressure 157/60, pulse 69, height 1.575 m (5' 2\"), weight 63 kg (139 lb).    Physical Exam  CONSTITUTIONAL: Alert and oriented. No acute distress. Well-nourished. Well-groomed.  EYES: No scleral icterus. Conjunctiva clear.  ENMT: No rhinorrhea. Moist oral mucosa.   NECK: Supple. No JVD. No pre- or post-auricular, cervical, supra- or infra-clavicular lymphadenopathy.   THORACIC: Symmetrical expansion and chest rise.  RESPIRATORY: Normal effort on room air. No stridor.  ABDOMINAL: Slightly protuberant. Nondistended. Soft. Nontender to palpation. Liver edge smooth.   SKIN: Normal turgor. No rashes or ulcers. Right heel ulcer with fibrinous debris  MUSCULOSKELETAL: Normal strength and tone.  EXTREMITIES: No digital clubbing or cyanosis. No gross deformity of the extremities.  NEUROLOGICAL: Grossly intact. No focal deficits. Right foot with minimal ability to dorsiflex or plantarflex  PSYCHIATRIC: Appropriate mood and affect.     CARDIAC: Regular rate and rhythm on " the monitor.  VASCULAR:  Normal carotid upstroke.   Normal, symmetric subclavian arteries on palpation.  Trace lower extremity edema  Multiphasic femoral signals. Monophasic left DP, no right foot signals  Right heel tissue loss.     Labs   Lab Results   Component Value Date     05/10/2024    K 4.5 05/10/2024     05/10/2024    BUN 11 05/10/2024    CREATININE 0.70 05/10/2024      Lab Results   Component Value Date    WBC 5.2 05/20/2024    HGB 9.2 (L) 05/20/2024    HCT 29.6 (L) 05/20/2024        Imaging   Imaging was reviewed independently by the vascular surgery team.        Assessment/Plan   Brooklyn Read is a pleasant 79 y.o. female with CLTI of the right leg with a wound and motor deficits at baseline with evidence of aortoiliac disease and also likely distal as well based on noninvasives. RONALD of 0 on the right.     Plan   Angiogram with possible angioplasty, stenting, and/or atherectomy  on 7/23/24  Will likely admit postprocedure for definitive revascularization given CLTI picture with worsening motor function  Continue Aspirin 81 mg daily  Hold Xarelto 2.5 mg 48 hours before angio  Hold ACE/ARB before angio procedure         Patient seen and discussed with the attending, Dr. Landeros.    Dallas Strickland MD  Vascular Surgery Fellow  Available on Secure Chat

## 2024-07-16 ENCOUNTER — TELEPHONE (OUTPATIENT)
Dept: VASCULAR SURGERY | Facility: HOSPITAL | Age: 79
End: 2024-07-16
Payer: MEDICARE

## 2024-07-16 ENCOUNTER — APPOINTMENT (OUTPATIENT)
Dept: PREADMISSION TESTING | Facility: HOSPITAL | Age: 79
End: 2024-07-16
Payer: MEDICARE

## 2024-07-16 PROBLEM — I73.9 PVD (PERIPHERAL VASCULAR DISEASE) (CMS-HCC): Status: ACTIVE | Noted: 2024-07-15

## 2024-07-16 NOTE — TELEPHONE ENCOUNTER
Spoke with patient and gave pre-op instructions for 7/23 surgery with Dr. Landeros. Pt will go for ordered blood work on 7/22 to  lab. Pt will hold xarelto for 2 days prior to surgery. Advised patient to be NPO after midnight on 7/22 can take asa, wellbutrin, metoprolol with small sip of water the morning of surgery; pts dtr will drive her.  Patient verbalized understanding of all instructions.   Encouraged her to call with questions/concerns in the meantime.

## 2024-07-17 ENCOUNTER — HOME CARE VISIT (OUTPATIENT)
Dept: HOME HEALTH SERVICES | Facility: HOME HEALTH | Age: 79
End: 2024-07-17
Payer: MEDICARE

## 2024-07-17 VITALS
DIASTOLIC BLOOD PRESSURE: 65 MMHG | RESPIRATION RATE: 16 BRPM | TEMPERATURE: 98 F | HEART RATE: 80 BPM | SYSTOLIC BLOOD PRESSURE: 122 MMHG | OXYGEN SATURATION: 98 %

## 2024-07-17 PROCEDURE — G0300 HHS/HOSPICE OF LPN EA 15 MIN: HCPCS | Mod: HHH

## 2024-07-17 ASSESSMENT — ENCOUNTER SYMPTOMS
LOWEST PAIN SEVERITY IN PAST 24 HOURS: 0/10
SHORTNESS OF BREATH: 1
APPETITE LEVEL: POOR
DYSPNEA ACTIVITY LEVEL: AFTER AMBULATING MORE THAN 20 FT
LAST BOWEL MOVEMENT: 67038
PERSON REPORTING PAIN: PATIENT
DENIES PAIN: 1
CHANGE IN APPETITE: UNCHANGED
HIGHEST PAIN SEVERITY IN PAST 24 HOURS: 0/10
LOWER EXTREMITY EDEMA: 1
SUBJECTIVE PAIN PROGRESSION: UNCHANGED
BOWEL PATTERN NORMAL: 1
STOOL FREQUENCY: DAILY
PAIN SEVERITY GOAL: 0/10

## 2024-07-17 ASSESSMENT — PAIN SCALES - PAIN ASSESSMENT IN ADVANCED DEMENTIA (PAINAD)
FACIALEXPRESSION: 0
NEGVOCALIZATION: 0
BODYLANGUAGE: 0 - RELAXED.
BODYLANGUAGE: 0
NEGVOCALIZATION: 0 - NONE.
FACIALEXPRESSION: 0 - SMILING OR INEXPRESSIVE.
CONSOLABILITY: 0 - NO NEED TO CONSOLE.
TOTALSCORE: 0
BREATHING: 0
CONSOLABILITY: 0

## 2024-07-19 ENCOUNTER — LAB (OUTPATIENT)
Dept: LAB | Facility: LAB | Age: 79
End: 2024-07-19
Payer: MEDICARE

## 2024-07-19 DIAGNOSIS — Z01.810 SYMPTOMATIC CONGESTIVE HEART FAILURE, PRE-OPERATIVE CARDIOVASCULAR EXAMINATION (MULTI): ICD-10-CM

## 2024-07-19 DIAGNOSIS — I73.9 PVD (PERIPHERAL VASCULAR DISEASE) (CMS-HCC): ICD-10-CM

## 2024-07-19 DIAGNOSIS — I50.9 SYMPTOMATIC CONGESTIVE HEART FAILURE, PRE-OPERATIVE CARDIOVASCULAR EXAMINATION (MULTI): ICD-10-CM

## 2024-07-19 DIAGNOSIS — Z01.818 ENCOUNTER FOR PREOPERATIVE ANESTHESIOLOGY ASSESSMENT FOR CARDIAC SURGERY: ICD-10-CM

## 2024-07-19 LAB
ANION GAP SERPL CALC-SCNC: 12 MMOL/L (ref 10–20)
BUN SERPL-MCNC: 25 MG/DL (ref 6–23)
CALCIUM SERPL-MCNC: 9.3 MG/DL (ref 8.6–10.3)
CHLORIDE SERPL-SCNC: 106 MMOL/L (ref 98–107)
CO2 SERPL-SCNC: 26 MMOL/L (ref 21–32)
CREAT SERPL-MCNC: 1.22 MG/DL (ref 0.5–1.05)
EGFRCR SERPLBLD CKD-EPI 2021: 46 ML/MIN/1.73M*2
ERYTHROCYTE [DISTWIDTH] IN BLOOD BY AUTOMATED COUNT: 16.5 % (ref 11.5–14.5)
GLUCOSE SERPL-MCNC: 128 MG/DL (ref 74–99)
HCT VFR BLD AUTO: 33.2 % (ref 36–46)
HGB BLD-MCNC: 9.5 G/DL (ref 12–16)
MCH RBC QN AUTO: 24.8 PG (ref 26–34)
MCHC RBC AUTO-ENTMCNC: 28.6 G/DL (ref 32–36)
MCV RBC AUTO: 87 FL (ref 80–100)
NRBC BLD-RTO: 0 /100 WBCS (ref 0–0)
PLATELET # BLD AUTO: 267 X10*3/UL (ref 150–450)
POTASSIUM SERPL-SCNC: 4.8 MMOL/L (ref 3.5–5.3)
RBC # BLD AUTO: 3.83 X10*6/UL (ref 4–5.2)
SODIUM SERPL-SCNC: 139 MMOL/L (ref 136–145)
WBC # BLD AUTO: 7.4 X10*3/UL (ref 4.4–11.3)

## 2024-07-19 PROCEDURE — 80048 BASIC METABOLIC PNL TOTAL CA: CPT

## 2024-07-19 PROCEDURE — 86900 BLOOD TYPING SEROLOGIC ABO: CPT

## 2024-07-19 PROCEDURE — 85027 COMPLETE CBC AUTOMATED: CPT

## 2024-07-19 PROCEDURE — 86901 BLOOD TYPING SEROLOGIC RH(D): CPT

## 2024-07-19 PROCEDURE — 86850 RBC ANTIBODY SCREEN: CPT

## 2024-07-19 PROCEDURE — 85730 THROMBOPLASTIN TIME PARTIAL: CPT

## 2024-07-19 PROCEDURE — 36415 COLL VENOUS BLD VENIPUNCTURE: CPT

## 2024-07-19 PROCEDURE — 85610 PROTHROMBIN TIME: CPT

## 2024-07-20 ENCOUNTER — LAB REQUISITION (OUTPATIENT)
Dept: LAB | Facility: HOSPITAL | Age: 79
End: 2024-07-20
Payer: MEDICARE

## 2024-07-20 DIAGNOSIS — I73.9 PERIPHERAL VASCULAR DISEASE, UNSPECIFIED (CMS-HCC): ICD-10-CM

## 2024-07-20 LAB
ABO GROUP (TYPE) IN BLOOD: NORMAL
ANTIBODY SCREEN: NORMAL
APTT PPP: 31 SECONDS (ref 27–38)
INR PPP: 1 (ref 0.9–1.1)
PROTHROMBIN TIME: 11.1 SECONDS (ref 9.8–12.8)
RH FACTOR (ANTIGEN D): NORMAL

## 2024-07-23 ENCOUNTER — HOSPITAL ENCOUNTER (INPATIENT)
Facility: HOSPITAL | Age: 79
LOS: 1 days | Discharge: HOME | DRG: 253 | End: 2024-07-26
Attending: SURGERY | Admitting: SURGERY
Payer: MEDICARE

## 2024-07-23 ENCOUNTER — ANESTHESIA EVENT (OUTPATIENT)
Dept: OPERATING ROOM | Facility: HOSPITAL | Age: 79
End: 2024-07-23
Payer: MEDICARE

## 2024-07-23 ENCOUNTER — APPOINTMENT (OUTPATIENT)
Dept: RADIOLOGY | Facility: HOSPITAL | Age: 79
DRG: 253 | End: 2024-07-23
Payer: MEDICARE

## 2024-07-23 ENCOUNTER — ANESTHESIA (OUTPATIENT)
Dept: OPERATING ROOM | Facility: HOSPITAL | Age: 79
End: 2024-07-23
Payer: MEDICARE

## 2024-07-23 DIAGNOSIS — Z01.818 ENCOUNTER FOR OTHER PREPROCEDURAL EXAMINATION: ICD-10-CM

## 2024-07-23 DIAGNOSIS — I70.234 ATHEROSCLEROSIS OF NATIVE ARTERIES OF RIGHT LEG WITH ULCERATION OF HEEL AND MIDFOOT (MULTI): ICD-10-CM

## 2024-07-23 DIAGNOSIS — I70.221 ATHEROSCLEROSIS OF NATIVE ARTERIES OF EXTREMITIES WITH REST PAIN, RIGHT LEG (MULTI): ICD-10-CM

## 2024-07-23 DIAGNOSIS — L97.509 TOE ULCER (MULTI): ICD-10-CM

## 2024-07-23 DIAGNOSIS — I70.222 CRITICAL LIMB ISCHEMIA OF LEFT LOWER EXTREMITY (MULTI): ICD-10-CM

## 2024-07-23 DIAGNOSIS — I73.9 PVD (PERIPHERAL VASCULAR DISEASE) (CMS-HCC): Primary | ICD-10-CM

## 2024-07-23 DIAGNOSIS — I70.0 ATHEROSCLEROSIS OF AORTA (CMS-HCC): ICD-10-CM

## 2024-07-23 PROBLEM — C96.9 HEMATOLOGIC MALIGNANCY (MULTI): Status: ACTIVE | Noted: 2024-07-23

## 2024-07-23 LAB
ABO GROUP (TYPE) IN BLOOD: NORMAL
ACT BLD: 227 SEC (ref 83–199)
ACT BLD: 260 SEC (ref 83–199)
ANTIBODY SCREEN: NORMAL
GLUCOSE BLD MANUAL STRIP-MCNC: 105 MG/DL (ref 74–99)
GLUCOSE BLD MANUAL STRIP-MCNC: 84 MG/DL (ref 74–99)
RH FACTOR (ANTIGEN D): NORMAL

## 2024-07-23 PROCEDURE — 3700000002 HC GENERAL ANESTHESIA TIME - EACH INCREMENTAL 1 MINUTE: Performed by: SURGERY

## 2024-07-23 PROCEDURE — 3700000001 HC GENERAL ANESTHESIA TIME - INITIAL BASE CHARGE: Performed by: SURGERY

## 2024-07-23 PROCEDURE — 2500000004 HC RX 250 GENERAL PHARMACY W/ HCPCS (ALT 636 FOR OP/ED)

## 2024-07-23 PROCEDURE — C1894 INTRO/SHEATH, NON-LASER: HCPCS | Performed by: SURGERY

## 2024-07-23 PROCEDURE — 75710 ARTERY X-RAYS ARM/LEG: CPT | Mod: RT | Performed by: SURGERY

## 2024-07-23 PROCEDURE — 36415 COLL VENOUS BLD VENIPUNCTURE: CPT | Performed by: ANESTHESIOLOGY

## 2024-07-23 PROCEDURE — 96372 THER/PROPH/DIAG INJ SC/IM: CPT

## 2024-07-23 PROCEDURE — 86901 BLOOD TYPING SEROLOGIC RH(D): CPT | Performed by: ANESTHESIOLOGY

## 2024-07-23 PROCEDURE — 75774 ARTERY X-RAY EACH VESSEL: CPT | Performed by: SURGERY

## 2024-07-23 PROCEDURE — 2720000007 HC OR 272 NO HCPCS: Performed by: SURGERY

## 2024-07-23 PROCEDURE — 7100000001 HC RECOVERY ROOM TIME - INITIAL BASE CHARGE: Performed by: SURGERY

## 2024-07-23 PROCEDURE — 3600000003 HC OR TIME - INITIAL BASE CHARGE - PROCEDURE LEVEL THREE: Performed by: SURGERY

## 2024-07-23 PROCEDURE — 85347 COAGULATION TIME ACTIVATED: CPT

## 2024-07-23 PROCEDURE — C1725 CATH, TRANSLUMIN NON-LASER: HCPCS | Performed by: SURGERY

## 2024-07-23 PROCEDURE — 3600000008 HC OR TIME - EACH INCREMENTAL 1 MINUTE - PROCEDURE LEVEL THREE: Performed by: SURGERY

## 2024-07-23 PROCEDURE — 7100000011 HC EXTENDED STAY RECOVERY HOURLY - NURSING UNIT

## 2024-07-23 PROCEDURE — 7100000002 HC RECOVERY ROOM TIME - EACH INCREMENTAL 1 MINUTE: Performed by: SURGERY

## 2024-07-23 PROCEDURE — 2500000004 HC RX 250 GENERAL PHARMACY W/ HCPCS (ALT 636 FOR OP/ED): Mod: SE

## 2024-07-23 PROCEDURE — C1887 CATHETER, GUIDING: HCPCS | Performed by: SURGERY

## 2024-07-23 PROCEDURE — 37224 PR REVSC OPN/PRG FEM/POP W/ANGIOPLASTY UNI: CPT | Performed by: SURGERY

## 2024-07-23 PROCEDURE — C1760 CLOSURE DEV, VASC: HCPCS | Performed by: SURGERY

## 2024-07-23 PROCEDURE — 2500000005 HC RX 250 GENERAL PHARMACY W/O HCPCS: Performed by: SURGERY

## 2024-07-23 PROCEDURE — 2500000001 HC RX 250 WO HCPCS SELF ADMINISTERED DRUGS (ALT 637 FOR MEDICARE OP): Mod: SE

## 2024-07-23 PROCEDURE — 2550000001 HC RX 255 CONTRASTS: Performed by: SURGERY

## 2024-07-23 PROCEDURE — 75630 X-RAY AORTA LEG ARTERIES: CPT | Performed by: SURGERY

## 2024-07-23 PROCEDURE — C1769 GUIDE WIRE: HCPCS | Performed by: SURGERY

## 2024-07-23 PROCEDURE — 82947 ASSAY GLUCOSE BLOOD QUANT: CPT

## 2024-07-23 PROCEDURE — 1100000001 HC PRIVATE ROOM DAILY

## 2024-07-23 PROCEDURE — 2780000003 HC OR 278 NO HCPCS: Performed by: SURGERY

## 2024-07-23 RX ORDER — SERTRALINE HYDROCHLORIDE 100 MG/1
100 TABLET, FILM COATED ORAL DAILY
Status: DISCONTINUED | OUTPATIENT
Start: 2024-07-24 | End: 2024-07-26 | Stop reason: HOSPADM

## 2024-07-23 RX ORDER — HYDROMORPHONE HYDROCHLORIDE 1 MG/ML
0.1 INJECTION, SOLUTION INTRAMUSCULAR; INTRAVENOUS; SUBCUTANEOUS EVERY 5 MIN PRN
Status: DISCONTINUED | OUTPATIENT
Start: 2024-07-23 | End: 2024-07-23

## 2024-07-23 RX ORDER — FENTANYL CITRATE 50 UG/ML
25 INJECTION, SOLUTION INTRAMUSCULAR; INTRAVENOUS EVERY 5 MIN PRN
OUTPATIENT
Start: 2024-07-23

## 2024-07-23 RX ORDER — NALOXONE HYDROCHLORIDE 0.4 MG/ML
0.2 INJECTION, SOLUTION INTRAMUSCULAR; INTRAVENOUS; SUBCUTANEOUS EVERY 5 MIN PRN
Status: DISCONTINUED | OUTPATIENT
Start: 2024-07-23 | End: 2024-07-26 | Stop reason: HOSPADM

## 2024-07-23 RX ORDER — DEXTROSE 50 % IN WATER (D50W) INTRAVENOUS SYRINGE
12.5
Status: DISCONTINUED | OUTPATIENT
Start: 2024-07-23 | End: 2024-07-26 | Stop reason: HOSPADM

## 2024-07-23 RX ORDER — HEPARIN SODIUM 1000 [USP'U]/ML
INJECTION, SOLUTION INTRAVENOUS; SUBCUTANEOUS AS NEEDED
Status: DISCONTINUED | OUTPATIENT
Start: 2024-07-23 | End: 2024-07-23

## 2024-07-23 RX ORDER — SPIRONOLACTONE 25 MG/1
25 TABLET ORAL DAILY
Status: DISCONTINUED | OUTPATIENT
Start: 2024-07-24 | End: 2024-07-26 | Stop reason: HOSPADM

## 2024-07-23 RX ORDER — IODIXANOL 320 MG/ML
INJECTION, SOLUTION INTRAVASCULAR AS NEEDED
Status: DISCONTINUED | OUTPATIENT
Start: 2024-07-23 | End: 2024-07-23 | Stop reason: HOSPADM

## 2024-07-23 RX ORDER — ALLOPURINOL 100 MG/1
100 TABLET ORAL 2 TIMES DAILY
Status: DISCONTINUED | OUTPATIENT
Start: 2024-07-23 | End: 2024-07-26 | Stop reason: HOSPADM

## 2024-07-23 RX ORDER — FENTANYL CITRATE 50 UG/ML
12.5 INJECTION, SOLUTION INTRAMUSCULAR; INTRAVENOUS EVERY 5 MIN PRN
OUTPATIENT
Start: 2024-07-23

## 2024-07-23 RX ORDER — DEXTROSE 50 % IN WATER (D50W) INTRAVENOUS SYRINGE
25
Status: DISCONTINUED | OUTPATIENT
Start: 2024-07-23 | End: 2024-07-26 | Stop reason: HOSPADM

## 2024-07-23 RX ORDER — CEFAZOLIN 1 G/1
INJECTION, POWDER, FOR SOLUTION INTRAVENOUS AS NEEDED
Status: DISCONTINUED | OUTPATIENT
Start: 2024-07-23 | End: 2024-07-23

## 2024-07-23 RX ORDER — ONDANSETRON HYDROCHLORIDE 2 MG/ML
4 INJECTION, SOLUTION INTRAVENOUS ONCE AS NEEDED
Status: DISCONTINUED | OUTPATIENT
Start: 2024-07-23 | End: 2024-07-23 | Stop reason: HOSPADM

## 2024-07-23 RX ORDER — DROPERIDOL 2.5 MG/ML
0.62 INJECTION, SOLUTION INTRAMUSCULAR; INTRAVENOUS ONCE AS NEEDED
OUTPATIENT
Start: 2024-07-23

## 2024-07-23 RX ORDER — LIDOCAINE HYDROCHLORIDE 10 MG/ML
INJECTION INFILTRATION; PERINEURAL AS NEEDED
Status: DISCONTINUED | OUTPATIENT
Start: 2024-07-23 | End: 2024-07-23 | Stop reason: HOSPADM

## 2024-07-23 RX ORDER — CHOLECALCIFEROL (VITAMIN D3) 25 MCG
5000 TABLET ORAL DAILY
Status: DISCONTINUED | OUTPATIENT
Start: 2024-07-24 | End: 2024-07-26 | Stop reason: HOSPADM

## 2024-07-23 RX ORDER — HYDROMORPHONE HYDROCHLORIDE 1 MG/ML
0.5 INJECTION, SOLUTION INTRAMUSCULAR; INTRAVENOUS; SUBCUTANEOUS EVERY 5 MIN PRN
Status: DISCONTINUED | OUTPATIENT
Start: 2024-07-23 | End: 2024-07-23 | Stop reason: HOSPADM

## 2024-07-23 RX ORDER — ACETAMINOPHEN 325 MG/1
975 TABLET ORAL EVERY 8 HOURS
Status: DISCONTINUED | OUTPATIENT
Start: 2024-07-23 | End: 2024-07-26 | Stop reason: HOSPADM

## 2024-07-23 RX ORDER — PANTOPRAZOLE SODIUM 40 MG/1
40 TABLET, DELAYED RELEASE ORAL
Status: DISCONTINUED | OUTPATIENT
Start: 2024-07-23 | End: 2024-07-26 | Stop reason: HOSPADM

## 2024-07-23 RX ORDER — PROPOFOL 10 MG/ML
INJECTION, EMULSION INTRAVENOUS CONTINUOUS PRN
Status: DISCONTINUED | OUTPATIENT
Start: 2024-07-23 | End: 2024-07-23

## 2024-07-23 RX ORDER — SODIUM CHLORIDE, SODIUM LACTATE, POTASSIUM CHLORIDE, CALCIUM CHLORIDE 600; 310; 30; 20 MG/100ML; MG/100ML; MG/100ML; MG/100ML
100 INJECTION, SOLUTION INTRAVENOUS CONTINUOUS
OUTPATIENT
Start: 2024-07-23

## 2024-07-23 RX ORDER — POLYETHYLENE GLYCOL 3350 17 G/17G
17 POWDER, FOR SOLUTION ORAL 2 TIMES DAILY
Status: DISCONTINUED | OUTPATIENT
Start: 2024-07-23 | End: 2024-07-24

## 2024-07-23 RX ORDER — POLYETHYLENE GLYCOL 3350 17 G/17G
17 POWDER, FOR SOLUTION ORAL DAILY
Status: DISCONTINUED | OUTPATIENT
Start: 2024-07-23 | End: 2024-07-26 | Stop reason: HOSPADM

## 2024-07-23 RX ORDER — INSULIN LISPRO 100 [IU]/ML
0-5 INJECTION, SOLUTION INTRAVENOUS; SUBCUTANEOUS
Status: DISCONTINUED | OUTPATIENT
Start: 2024-07-23 | End: 2024-07-26 | Stop reason: HOSPADM

## 2024-07-23 RX ORDER — ONDANSETRON HYDROCHLORIDE 2 MG/ML
4 INJECTION, SOLUTION INTRAVENOUS ONCE AS NEEDED
OUTPATIENT
Start: 2024-07-23

## 2024-07-23 RX ORDER — OXYCODONE HYDROCHLORIDE 5 MG/1
5 TABLET ORAL EVERY 6 HOURS PRN
Status: DISCONTINUED | OUTPATIENT
Start: 2024-07-23 | End: 2024-07-26 | Stop reason: HOSPADM

## 2024-07-23 RX ORDER — HYDROMORPHONE HYDROCHLORIDE 1 MG/ML
0.2 INJECTION, SOLUTION INTRAMUSCULAR; INTRAVENOUS; SUBCUTANEOUS EVERY 5 MIN PRN
Status: DISCONTINUED | OUTPATIENT
Start: 2024-07-23 | End: 2024-07-23 | Stop reason: HOSPADM

## 2024-07-23 RX ORDER — SODIUM CHLORIDE, SODIUM LACTATE, POTASSIUM CHLORIDE, CALCIUM CHLORIDE 600; 310; 30; 20 MG/100ML; MG/100ML; MG/100ML; MG/100ML
INJECTION, SOLUTION INTRAVENOUS CONTINUOUS PRN
Status: DISCONTINUED | OUTPATIENT
Start: 2024-07-23 | End: 2024-07-23

## 2024-07-23 RX ORDER — BUPROPION HYDROCHLORIDE 150 MG/1
150 TABLET ORAL EVERY MORNING
Status: DISCONTINUED | OUTPATIENT
Start: 2024-07-23 | End: 2024-07-26 | Stop reason: HOSPADM

## 2024-07-23 RX ORDER — PROTAMINE SULFATE 10 MG/ML
INJECTION, SOLUTION INTRAVENOUS AS NEEDED
Status: DISCONTINUED | OUTPATIENT
Start: 2024-07-23 | End: 2024-07-23

## 2024-07-23 RX ORDER — ASPIRIN 81 MG/1
81 TABLET ORAL DAILY
Status: DISCONTINUED | OUTPATIENT
Start: 2024-07-24 | End: 2024-07-26

## 2024-07-23 RX ORDER — SODIUM CHLORIDE, SODIUM LACTATE, POTASSIUM CHLORIDE, CALCIUM CHLORIDE 600; 310; 30; 20 MG/100ML; MG/100ML; MG/100ML; MG/100ML
100 INJECTION, SOLUTION INTRAVENOUS CONTINUOUS
Status: DISCONTINUED | OUTPATIENT
Start: 2024-07-23 | End: 2024-07-23 | Stop reason: HOSPADM

## 2024-07-23 RX ORDER — MIDAZOLAM HYDROCHLORIDE 1 MG/ML
INJECTION INTRAMUSCULAR; INTRAVENOUS AS NEEDED
Status: DISCONTINUED | OUTPATIENT
Start: 2024-07-23 | End: 2024-07-23

## 2024-07-23 RX ORDER — LIDOCAINE HYDROCHLORIDE 10 MG/ML
0.1 INJECTION INFILTRATION; PERINEURAL ONCE
OUTPATIENT
Start: 2024-07-23 | End: 2024-07-23

## 2024-07-23 RX ORDER — ALBUTEROL SULFATE 90 UG/1
2 AEROSOL, METERED RESPIRATORY (INHALATION) EVERY 6 HOURS PRN
Status: DISCONTINUED | OUTPATIENT
Start: 2024-07-23 | End: 2024-07-26 | Stop reason: HOSPADM

## 2024-07-23 RX ORDER — FENTANYL CITRATE 50 UG/ML
INJECTION, SOLUTION INTRAMUSCULAR; INTRAVENOUS AS NEEDED
Status: DISCONTINUED | OUTPATIENT
Start: 2024-07-23 | End: 2024-07-23

## 2024-07-23 RX ORDER — HEPARIN SODIUM 5000 [USP'U]/ML
5000 INJECTION, SOLUTION INTRAVENOUS; SUBCUTANEOUS EVERY 8 HOURS
Status: DISCONTINUED | OUTPATIENT
Start: 2024-07-23 | End: 2024-07-24

## 2024-07-23 RX ORDER — SODIUM CHLORIDE, SODIUM LACTATE, POTASSIUM CHLORIDE, CALCIUM CHLORIDE 600; 310; 30; 20 MG/100ML; MG/100ML; MG/100ML; MG/100ML
75 INJECTION, SOLUTION INTRAVENOUS CONTINUOUS
Status: DISCONTINUED | OUTPATIENT
Start: 2024-07-23 | End: 2024-07-23 | Stop reason: HOSPADM

## 2024-07-23 SDOH — HEALTH STABILITY: MENTAL HEALTH: CURRENT SMOKER: 0

## 2024-07-23 SDOH — SOCIAL STABILITY: SOCIAL INSECURITY: DOES ANYONE TRY TO KEEP YOU FROM HAVING/CONTACTING OTHER FRIENDS OR DOING THINGS OUTSIDE YOUR HOME?: NO

## 2024-07-23 SDOH — SOCIAL STABILITY: SOCIAL INSECURITY: HAS ANYONE EVER THREATENED TO HURT YOUR FAMILY OR YOUR PETS?: NO

## 2024-07-23 SDOH — SOCIAL STABILITY: SOCIAL INSECURITY: HAVE YOU HAD ANY THOUGHTS OF HARMING ANYONE ELSE?: NO

## 2024-07-23 SDOH — SOCIAL STABILITY: SOCIAL INSECURITY: ARE YOU OR HAVE YOU BEEN THREATENED OR ABUSED PHYSICALLY, EMOTIONALLY, OR SEXUALLY BY ANYONE?: NO

## 2024-07-23 SDOH — SOCIAL STABILITY: SOCIAL INSECURITY: DO YOU FEEL ANYONE HAS EXPLOITED OR TAKEN ADVANTAGE OF YOU FINANCIALLY OR OF YOUR PERSONAL PROPERTY?: NO

## 2024-07-23 SDOH — SOCIAL STABILITY: SOCIAL INSECURITY: HAVE YOU HAD THOUGHTS OF HARMING ANYONE ELSE?: NO

## 2024-07-23 SDOH — SOCIAL STABILITY: SOCIAL INSECURITY: ARE THERE ANY APPARENT SIGNS OF INJURIES/BEHAVIORS THAT COULD BE RELATED TO ABUSE/NEGLECT?: NO

## 2024-07-23 SDOH — SOCIAL STABILITY: SOCIAL INSECURITY: DO YOU FEEL UNSAFE GOING BACK TO THE PLACE WHERE YOU ARE LIVING?: NO

## 2024-07-23 SDOH — SOCIAL STABILITY: SOCIAL INSECURITY: ABUSE: ADULT

## 2024-07-23 SDOH — SOCIAL STABILITY: SOCIAL INSECURITY: WERE YOU ABLE TO COMPLETE ALL THE BEHAVIORAL HEALTH SCREENINGS?: YES

## 2024-07-23 ASSESSMENT — ACTIVITIES OF DAILY LIVING (ADL)
ADEQUATE_TO_COMPLETE_ADL: YES
WALKS IN HOME: INDEPENDENT
HEARING - RIGHT EAR: FUNCTIONAL
FEEDING YOURSELF: INDEPENDENT
LACK_OF_TRANSPORTATION: NO
HEARING - LEFT EAR: FUNCTIONAL
DRESSING YOURSELF: INDEPENDENT
GROOMING: INDEPENDENT
BATHING: INDEPENDENT
JUDGMENT_ADEQUATE_SAFELY_COMPLETE_DAILY_ACTIVITIES: YES
TOILETING: INDEPENDENT
PATIENT'S MEMORY ADEQUATE TO SAFELY COMPLETE DAILY ACTIVITIES?: YES

## 2024-07-23 ASSESSMENT — COGNITIVE AND FUNCTIONAL STATUS - GENERAL
STANDING UP FROM CHAIR USING ARMS: A LITTLE
DAILY ACTIVITIY SCORE: 20
HELP NEEDED FOR BATHING: A LITTLE
STANDING UP FROM CHAIR USING ARMS: A LITTLE
WALKING IN HOSPITAL ROOM: A LITTLE
WALKING IN HOSPITAL ROOM: A LITTLE
TOILETING: A LITTLE
MOBILITY SCORE: 17
HELP NEEDED FOR BATHING: A LITTLE
DAILY ACTIVITIY SCORE: 21
MOVING FROM LYING ON BACK TO SITTING ON SIDE OF FLAT BED WITH BEDRAILS: A LITTLE
MOVING FROM LYING ON BACK TO SITTING ON SIDE OF FLAT BED WITH BEDRAILS: A LITTLE
TURNING FROM BACK TO SIDE WHILE IN FLAT BAD: A LITTLE
TURNING FROM BACK TO SIDE WHILE IN FLAT BAD: A LITTLE
TOILETING: A LITTLE
DRESSING REGULAR UPPER BODY CLOTHING: A LITTLE
DRESSING REGULAR LOWER BODY CLOTHING: A LITTLE
PATIENT BASELINE BEDBOUND: NO
CLIMB 3 TO 5 STEPS WITH RAILING: A LOT
MOVING TO AND FROM BED TO CHAIR: A LITTLE
MOBILITY SCORE: 17
CLIMB 3 TO 5 STEPS WITH RAILING: A LOT
DRESSING REGULAR LOWER BODY CLOTHING: A LITTLE
MOVING TO AND FROM BED TO CHAIR: A LITTLE

## 2024-07-23 ASSESSMENT — PAIN SCALES - GENERAL
PAINLEVEL_OUTOF10: 4
PAINLEVEL_OUTOF10: 4
PAINLEVEL_OUTOF10: 8
PAINLEVEL_OUTOF10: 8
PAINLEVEL_OUTOF10: 4
PAINLEVEL_OUTOF10: 4
PAIN_LEVEL: 0
PAINLEVEL_OUTOF10: 4
PAINLEVEL_OUTOF10: 6
PAINLEVEL_OUTOF10: 6
PAINLEVEL_OUTOF10: 8
PAINLEVEL_OUTOF10: 7
PAINLEVEL_OUTOF10: 6

## 2024-07-23 ASSESSMENT — LIFESTYLE VARIABLES
HOW OFTEN DO YOU HAVE 6 OR MORE DRINKS ON ONE OCCASION: NEVER
AUDIT-C TOTAL SCORE: 0
HOW MANY STANDARD DRINKS CONTAINING ALCOHOL DO YOU HAVE ON A TYPICAL DAY: PATIENT DOES NOT DRINK
SKIP TO QUESTIONS 9-10: 1
HOW OFTEN DO YOU HAVE A DRINK CONTAINING ALCOHOL: NEVER
AUDIT-C TOTAL SCORE: 0

## 2024-07-23 ASSESSMENT — PATIENT HEALTH QUESTIONNAIRE - PHQ9
SUM OF ALL RESPONSES TO PHQ9 QUESTIONS 1 & 2: 0
1. LITTLE INTEREST OR PLEASURE IN DOING THINGS: NOT AT ALL
2. FEELING DOWN, DEPRESSED OR HOPELESS: NOT AT ALL

## 2024-07-23 ASSESSMENT — COLUMBIA-SUICIDE SEVERITY RATING SCALE - C-SSRS
1. IN THE PAST MONTH, HAVE YOU WISHED YOU WERE DEAD OR WISHED YOU COULD GO TO SLEEP AND NOT WAKE UP?: NO
6. HAVE YOU EVER DONE ANYTHING, STARTED TO DO ANYTHING, OR PREPARED TO DO ANYTHING TO END YOUR LIFE?: NO
2. HAVE YOU ACTUALLY HAD ANY THOUGHTS OF KILLING YOURSELF?: NO

## 2024-07-23 ASSESSMENT — PAIN - FUNCTIONAL ASSESSMENT: PAIN_FUNCTIONAL_ASSESSMENT: 0-10

## 2024-07-23 NOTE — Clinical Note
Patient Clipped and Prepped: right pedal. Prepped with Betadine and draped in sterile fashion. Right leg

## 2024-07-23 NOTE — Clinical Note
Angioplasty of the right SFA lesion. Inflation 1: Pressure = 8 magdalena; Duration = 20 sec. Inflation 2: Pressure = 8 magdalena; Duration = 25 sec. Inflation 3: Pressure = 8 magdalena; Duration = 15 sec.

## 2024-07-23 NOTE — ANESTHESIA POSTPROCEDURE EVALUATION
Patient: Brooklyn Read    Procedure Summary       Date: 07/23/24 Room / Location: [facility] OR 27 / Virtual [facility] OR    Anesthesia Start: 0723 Anesthesia Stop: 0916    Procedure: Angiogram Lower Extremity (Right) Diagnosis:       PVD (peripheral vascular disease) (CMS-HCC)      (PVD (peripheral vascular disease) (CMS-HCC) [I73.9])    Surgeons: Ayad Landeros MD PhD Responsible Provider: Anthony Dodson MD    Anesthesia Type: MAC ASA Status: 3            Anesthesia Type: MAC    Vitals Value Taken Time   /57 07/23/24 0915   Temp 36.9 07/23/24 0921   Pulse 71 07/23/24 0916   Resp 14 07/23/24 0921   SpO2 92 % 07/23/24 0916   Vitals shown include unfiled device data.    Anesthesia Post Evaluation    Patient location during evaluation: PACU  Patient participation: complete - patient participated  Level of consciousness: awake and alert  Pain score: 0  Pain management: adequate  Airway patency: patent  Cardiovascular status: acceptable  Respiratory status: acceptable  Hydration status: acceptable  Postoperative Nausea and Vomiting: none        No notable events documented.

## 2024-07-23 NOTE — OP NOTE
Date of Service: 7/23/2024    Preoperative diagnosis: Right lower extremity critical limb ischemia.     Postoperative diagnosis: Same    Procedures:  1.  Right superficial femoral artery balloon angioplasty.     2.  Diagnostic aortoiliac and right lower extremity angiogram.     3.  Ultrasound guided left common femoral artery retrograde access (See media for images)    4.  Left common femoral artery closure with Mynx.     5.  Radiographic supervision and interpretation      Surgeon:  Ayad Landeros MD, PhD.     Assistant:  Luisito Boo MD, and Juany Mcgovern MD.     Complications: None    Radiographic interpretation:  Patent abdominal aorta and bilateral iliac arteries.   Patent right common and deep femoral arteries without flow limiting stenosis.   Right proximal to mid superficial femoral artery with multi-level >90% stenosis.   Right distal and proximal popliteal artery with chronic total occlusion (100%) stenosis.   Patent right mid and distal popliteal artery.   Patent right anterior tibial and peroneal artery with reconstitution of the right DP and PT of the right foot.   Right posterior tibial artery with chronic total occlusion (100% stenosis) at the mid level to the ankle level.       Indications for procedure:   This is a 79 year old female with extensive PMH including but not limited to COPD and right lower extremity critical limb ischemia. CTA aorta and LE run-off (5/8/2024) independently reviewed.   Given the presence of right leg tissue loss, I recommended right leg revascularization.   I discussed the plan for aortoiliac and right lower extremity arteriography, and the possibility of endovascular therapy for treatment of arterial occlusive disease. The risks of arterial access, including vessel or nerve injury, bleeding, infection, limb ischemia, embolization, reaction to medication, or need for operation, were discussed. The risks of contrast administration, including renal failure or allergic type  reaction, were discussed. We also discussed the additional risks of intervention, including vessel injury, infection, residual or recurrent ischemia, need for operation or additional intervention, failure to resolve ischemia, limb loss, or other serious complications. The patient indicated understanding of the procedure, plan, alternatives, and risks, and voiced consent to proceed.   I discussed the plan for arteriography, and the possibility of endovascular therapy for treatment of arterial occlusive disease. The risks of arterial access, including vessel or nerve injury, bleeding, infection, limb ischemia, embolization, reaction to medication, or need for operation, were discussed. The risks of contrast administration, including renal failure or allergic type reaction, were discussed. We also discussed the additional risks of intervention, including vessel injury, infection, residual or recurrent ischemia, need for operation or additional intervention, failure to resolve ischemia, limb loss, or other serious complications. The patient indicated understanding of the procedure, plan, alternatives, and risks, and voiced consent to proceed.       Description of procedure:  The patient was placed in a supine position. The procedure was performed under MAC with anesthesia team. The bilateral groins were prepped and draped in the usual sterile fashion. The time out was completed verifying correct patient, site, procedure and positioning.     The left common femoral artery was visualized with ultrasound and retrograde left common femoral artery access was performed with microaccess system under ultrasound guidance. Over the wire the 5 Fr sheath was placed. The sheath was then flushed with heparinized solution.     Under the fluoroscopic guidance, soft 0.035 glidewire was advance to the abdominal aorta under the fluoroscopic guidance. Omniflush catheter was advanced over the wire and positioned at infra-renal aorta. The  catheter was connected to power injector and the system was free of air bubble by flushing out system. Aortogram was performed revealing patent abdominal aorta and bilateral iliac arteries.   Right common iliac artery was selected with soft 0.035 glidewire and with omniflush catheter.   Right external iliac artery was selected with soft 0.035 glidewire and with omniflush catheter.   Right common femoral artery was selected with soft 0.035 glidewire and with omniflush catheter.   Right lower extremity angiogram was performed revealing above findings under radiographic interpretation.   The soft glidewire was then exchanged with stiff glidewire.   5 Fr sheath was removed over the wire.     Systemic anticoagulation was achieved with IV heparin, 100 u/kg keeping ACT >200 throughout the case.   5 Fr. Destination sheath was placed over the wire.     The right superficial femoral artery balloon angioplasty was performed with 4 mm x 6 cm balloon to deliver the 4 Fr. Sheath. Through the 5 Fr. Sheath, 4 Fr. Sheath was advanced over the wire into the distal right superficial femoral artery. Using 0.014 crossing wire and catheter, attempts were made to cross the distal right superficial femoral artery but the distal cap was not able to be crossed in an antegrade fashion.    The balloon was removed and the sheath was removed over the wire.   The access site was closure with the Mynx.   Systemic anticoagulation was reversed with protamine.   Manual pressure was held for 3 minutes. Hemostasis was confirmed.   The patient tolerated the procedure well and was transferred to PACU in a stable condition.     I was physically present for the entire length of the case and scrubbed for the entire portions.     Ayad Landeros MD, PhD.   Clinical   [facility] [employer]  Co-Director, Aortic Center  [facility]

## 2024-07-23 NOTE — Clinical Note
Vessel(s): right SFA, right popliteal artery, right tibio-peroneal trunk and right dorsalis pedis artery.

## 2024-07-23 NOTE — Clinical Note
Did not visit as patient is COVID +. I called her sister, Caroline Seth, who is next of kin. Offered supportive listening and affirmation of leo as she shared her grief and her leo. Prayers were offered. Assured her of our continued availability and on going prayer support.  She expressed appreciation for hospice support, noting that the team has given her peace that her sister is being well cared for here at the hospital.  
 Vessel(s): right CFA. Injected with hand injections.

## 2024-07-23 NOTE — CARE PLAN
The patient's goals for the shift include      The clinical goals for the shift include Patient will remain HDS throughout shift      Problem: Skin  Goal: Decreased wound size/increased tissue granulation at next dressing change  Outcome: Progressing  Goal: Participates in plan/prevention/treatment measures  Outcome: Progressing  Goal: Prevent/manage excess moisture  Outcome: Progressing  Goal: Prevent/minimize sheer/friction injuries  Outcome: Progressing  Goal: Promote/optimize nutrition  Outcome: Progressing  Goal: Promote skin healing  Outcome: Progressing     Problem: Fall/Injury  Goal: Not fall by end of shift  Outcome: Progressing  Goal: Be free from injury by end of the shift  Outcome: Progressing  Goal: Verbalize understanding of personal risk factors for fall in the hospital  Outcome: Progressing  Goal: Verbalize understanding of risk factor reduction measures to prevent injury from fall in the home  Outcome: Progressing  Goal: Use assistive devices by end of the shift  Outcome: Progressing  Goal: Pace activities to prevent fatigue by end of the shift  Outcome: Progressing

## 2024-07-23 NOTE — INTERVAL H&P NOTE
I have reviewed the patient's History and Physical Examination. I have personally seen and evaluated the patient, repeating key portions. There is no significant interval change.     Surgery is still indicated. Yes    Consent reviewed and signed by patient/family: Yes    Operative site verified and marked: Yes right lower extremity    Luisito Boo MD  PGY-5 Vascular Surgery

## 2024-07-23 NOTE — CARE PLAN
The patient's goals for the shift include      The clinical goals for the shift include pt marbella remain free from injury this shift      Problem: Skin  Goal: Decreased wound size/increased tissue granulation at next dressing change  Outcome: Progressing  Goal: Participates in plan/prevention/treatment measures  Outcome: Progressing  Goal: Prevent/manage excess moisture  Outcome: Progressing  Goal: Prevent/minimize sheer/friction injuries  Outcome: Progressing  Goal: Promote/optimize nutrition  Outcome: Progressing  Goal: Promote skin healing  Outcome: Progressing     Problem: Fall/Injury  Goal: Not fall by end of shift  Outcome: Progressing  Goal: Be free from injury by end of the shift  Outcome: Progressing  Goal: Verbalize understanding of personal risk factors for fall in the hospital  Outcome: Progressing  Goal: Verbalize understanding of risk factor reduction measures to prevent injury from fall in the home  Outcome: Progressing  Goal: Use assistive devices by end of the shift  Outcome: Progressing  Goal: Pace activities to prevent fatigue by end of the shift  Outcome: Progressing

## 2024-07-23 NOTE — ANESTHESIA PREPROCEDURE EVALUATION
Patient: Brooklyn Read    Procedure Information       Anesthesia Start Date/Time: 07/23/24 0723    Procedure: Angiogram Lower Extremity (Right)    Location: [facility] OR 27 / [facility] OR    Surgeons: Ayad Landeros MD PhD            Relevant Problems   Cardiac   (+) Aortic valve regurgitation   (+) Arteriosclerosis of coronary artery   (+) Mixed hyperlipidemia   (+) PVD (peripheral vascular disease) (CMS-HCC)   (+) Primary hypertension      Pulmonary   (+) Chronic obstructive pulmonary disease (Multi)   (+) Orthopnea   (+) Pneumonia due to infectious organism      Neuro   (+) Cerebrovascular accident (CVA) (Multi)   (+) Recurrent major depressive disorder, in full remission (CMS-[facility])   (+) Unspecified dementia, mild, without behavioral disturbance, psychotic disturbance, mood disturbance, and anxiety (Multi)      GI   (+) Upper GI bleed   (+) Upper GI bleeding      /Renal (within normal limits)      Liver (within normal limits)      Endocrine (within normal limits)      Hematology   (+) Acute blood loss anemia (ABLA)   (+) Anemia      Musculoskeletal (within normal limits)      ID   (+) Pneumonia due to infectious organism      Skin (within normal limits)      GYN (within normal limits)       Clinical information reviewed:   Tobacco  Allergies  Meds   Med Hx  Surg Hx   Fam Hx  Soc Hx        NPO Detail:  NPO/Void Status  Date of Last Liquid: 07/23/24  Time of Last Liquid: 0330  Date of Last Solid: 07/23/24  Time of Last Solid: 0000  Last Intake Type: Clear fluids         PHYSICAL EXAM    Anesthesia Plan    History of general anesthesia?: yes  History of complications of general anesthesia?: no    ASA 3     MAC     The patient is not a current smoker.    Anesthetic plan and risks discussed with patient.  Use of blood products discussed with patient who.    Plan discussed with CAA and attending.

## 2024-07-23 NOTE — BRIEF OP NOTE
Date: 2024  OR Location: [facility] OR    Name: Brooklyn Reda, : 1945, Age: 79 y.o., MRN: [id number], Sex: female    Diagnosis  Pre-op Diagnosis      * PVD (peripheral vascular disease) (CMS-HCC) [I73.9] Post-op Diagnosis     * PVD (peripheral vascular disease) (CMS-HCC) [I73.9]     Procedures  Angiogram Lower Extremity  41297 - MI REVSC OPN/PRG FEM/POP W/ANGIOPLASTY UNI  Ultrasound guided access Left common femoral artery access, retrograde  Diagnostic Aortogram  Right lower extremity angiogram  Right superficial femoral artery balloon angioplasty  Closure of L CFA access with Mynx device    Surgeons      * Ayad Landeros - Primary    Resident/Fellow/Other Assistant:  Surgeons and Role:     * Juany Mcgovern MD - Resident - Assisting     * Luisito Boo MD - Fellow    Procedure Summary  Anesthesia: Monitor Anesthesia Care  ASA: III  Anesthesia Staff: Anesthesiologist: Anthony Dodson MD  C-AA: DEONTE King  Estimated Blood Loss: 15mL  Intra-op Medications: Administrations occurring from 0720 to 0905 on 24:  * No intraprocedure medications in log *           Anesthesia Record               Intraprocedure I/O Totals          Intake    Propofol Drip 0.00 mL    The total shown is the total volume documented since Anesthesia Start was filed.    lactated Ringer's 700.00 mL    Total Intake 700 mL       Output    Est. Blood Loss 5 mL    Total Output 5 mL       Net    Net Volume 695 mL          Specimen: No specimens collected     Staff:   Circulator: Awa  Scrub Person: Anais  Scrub Person: Jacinda  Circulator: [employer] [facility]    Findings:   Patent left iliac artery, patent right iliac artery  Abdominal aorta without disease  Patent R CFA and R profunda  R proximal and mid SFA with severe stenosis, multifocal  R popliteal artery with   Reconstitution at R distal popliteal artery with patent R anterior tibial and peroneal.   R posterior tibial with  and reconstitution via  collaterals    Complications:  None; patient tolerated the procedure well.     Disposition: PACU - hemodynamically stable.  Condition: stable  Specimens Collected: No specimens collected  Attending Attestation:     Ayad Landeros  Phone Number: [phone]

## 2024-07-24 ENCOUNTER — HOME CARE VISIT (OUTPATIENT)
Dept: HOME HEALTH SERVICES | Facility: HOME HEALTH | Age: 79
End: 2024-07-24
Payer: MEDICARE

## 2024-07-24 ENCOUNTER — APPOINTMENT (OUTPATIENT)
Dept: VASCULAR MEDICINE | Facility: HOSPITAL | Age: 79
DRG: 253 | End: 2024-07-24
Payer: MEDICARE

## 2024-07-24 ENCOUNTER — APPOINTMENT (OUTPATIENT)
Dept: CARDIOLOGY | Facility: HOSPITAL | Age: 79
DRG: 253 | End: 2024-07-24
Payer: MEDICARE

## 2024-07-24 ENCOUNTER — HOSPITAL ENCOUNTER (OUTPATIENT)
Facility: HOSPITAL | Age: 79
Setting detail: OUTPATIENT SURGERY
End: 2024-07-24
Attending: HOSPITALIST | Admitting: HOSPITALIST
Payer: MEDICARE

## 2024-07-24 DIAGNOSIS — I25.10 ATHEROSCLEROTIC HEART DISEASE OF NATIVE CORONARY ARTERY WITHOUT ANGINA PECTORIS: ICD-10-CM

## 2024-07-24 LAB
ANION GAP SERPL CALC-SCNC: 10 MMOL/L (ref 10–20)
BUN SERPL-MCNC: 19 MG/DL (ref 6–23)
CALCIUM SERPL-MCNC: 9 MG/DL (ref 8.6–10.6)
CHLORIDE SERPL-SCNC: 105 MMOL/L (ref 98–107)
CO2 SERPL-SCNC: 27 MMOL/L (ref 21–32)
CREAT SERPL-MCNC: 1.01 MG/DL (ref 0.5–1.05)
EGFRCR SERPLBLD CKD-EPI 2021: 57 ML/MIN/1.73M*2
ERYTHROCYTE [DISTWIDTH] IN BLOOD BY AUTOMATED COUNT: 16.2 % (ref 11.5–14.5)
GLUCOSE BLD MANUAL STRIP-MCNC: 100 MG/DL (ref 74–99)
GLUCOSE BLD MANUAL STRIP-MCNC: 101 MG/DL (ref 74–99)
GLUCOSE BLD MANUAL STRIP-MCNC: 82 MG/DL (ref 74–99)
GLUCOSE BLD MANUAL STRIP-MCNC: 84 MG/DL (ref 74–99)
GLUCOSE BLD MANUAL STRIP-MCNC: 97 MG/DL (ref 74–99)
GLUCOSE SERPL-MCNC: 89 MG/DL (ref 74–99)
HCT VFR BLD AUTO: 29.9 % (ref 36–46)
HGB BLD-MCNC: 8.4 G/DL (ref 12–16)
MAGNESIUM SERPL-MCNC: 1.94 MG/DL (ref 1.6–2.4)
MCH RBC QN AUTO: 24.7 PG (ref 26–34)
MCHC RBC AUTO-ENTMCNC: 28.1 G/DL (ref 32–36)
MCV RBC AUTO: 88 FL (ref 80–100)
NRBC BLD-RTO: 0 /100 WBCS (ref 0–0)
PLATELET # BLD AUTO: 217 X10*3/UL (ref 150–450)
POTASSIUM SERPL-SCNC: 4.4 MMOL/L (ref 3.5–5.3)
RBC # BLD AUTO: 3.4 X10*6/UL (ref 4–5.2)
SODIUM SERPL-SCNC: 138 MMOL/L (ref 136–145)
WBC # BLD AUTO: 7 X10*3/UL (ref 4.4–11.3)

## 2024-07-24 PROCEDURE — 99024 POSTOP FOLLOW-UP VISIT: CPT | Performed by: SURGERY

## 2024-07-24 PROCEDURE — 93970 EXTREMITY STUDY: CPT

## 2024-07-24 PROCEDURE — 93010 ELECTROCARDIOGRAM REPORT: CPT | Performed by: INTERNAL MEDICINE

## 2024-07-24 PROCEDURE — 93970 EXTREMITY STUDY: CPT | Performed by: SURGERY

## 2024-07-24 PROCEDURE — 36415 COLL VENOUS BLD VENIPUNCTURE: CPT

## 2024-07-24 PROCEDURE — 2500000002 HC RX 250 W HCPCS SELF ADMINISTERED DRUGS (ALT 637 FOR MEDICARE OP, ALT 636 FOR OP/ED): Mod: SE

## 2024-07-24 PROCEDURE — 83735 ASSAY OF MAGNESIUM: CPT

## 2024-07-24 PROCEDURE — 80048 BASIC METABOLIC PNL TOTAL CA: CPT

## 2024-07-24 PROCEDURE — 2500000001 HC RX 250 WO HCPCS SELF ADMINISTERED DRUGS (ALT 637 FOR MEDICARE OP): Mod: SE

## 2024-07-24 PROCEDURE — 93005 ELECTROCARDIOGRAM TRACING: CPT

## 2024-07-24 PROCEDURE — 96372 THER/PROPH/DIAG INJ SC/IM: CPT

## 2024-07-24 PROCEDURE — 82947 ASSAY GLUCOSE BLOOD QUANT: CPT

## 2024-07-24 PROCEDURE — 1100000001 HC PRIVATE ROOM DAILY

## 2024-07-24 PROCEDURE — 7100000011 HC EXTENDED STAY RECOVERY HOURLY - NURSING UNIT

## 2024-07-24 PROCEDURE — 85027 COMPLETE CBC AUTOMATED: CPT

## 2024-07-24 PROCEDURE — 2500000004 HC RX 250 GENERAL PHARMACY W/ HCPCS (ALT 636 FOR OP/ED): Mod: SE

## 2024-07-24 RX ORDER — METOPROLOL SUCCINATE 100 MG/1
100 TABLET, EXTENDED RELEASE ORAL DAILY
COMMUNITY
End: 2024-07-31 | Stop reason: SDUPTHER

## 2024-07-24 ASSESSMENT — COGNITIVE AND FUNCTIONAL STATUS - GENERAL
DAILY ACTIVITIY SCORE: 24
CLIMB 3 TO 5 STEPS WITH RAILING: A LITTLE
MOBILITY SCORE: 23

## 2024-07-24 ASSESSMENT — PAIN SCALES - GENERAL
PAINLEVEL_OUTOF10: 0 - NO PAIN
PAINLEVEL_OUTOF10: 0 - NO PAIN

## 2024-07-24 ASSESSMENT — PAIN - FUNCTIONAL ASSESSMENT: PAIN_FUNCTIONAL_ASSESSMENT: 0-10

## 2024-07-24 NOTE — PROGRESS NOTES
7/24/2024 Care coordination  Brooklyn Read is a 79 y.o. female on day 1 of admission presenting with PVD (peripheral vascular disease) (CMS-[facility]).  Pt lives with family.  Independent with [facility]'s PTA.  Uses no assistive devices.  Currently weaning 02.  Denies home 02. No skilled needs identified.

## 2024-07-24 NOTE — CONSULTS
PODIATRY CONSULT NOTE    SERVICE DATE: 2024   SERVICE TIME:  1330    REASON FOR CONSULT: Right lower extremity wound  REQUESTING PHYSICIAN: Ayad Landeros MD, PhD  PRIMARY CARE PHYSICIAN: Gianluca Macario MD    Subjective   HPI:   Ms. Read is a 79 y.o. female who presents for Right heel and leg wounds. Patient has past medical history of Anemia, Anxiety, Cerebral vascular accident (Multi), CKD (chronic kidney disease), COPD (chronic obstructive pulmonary disease) (Multi), Depression, GI (gastrointestinal bleed), Hyperlipidemia, and Hypertension     Podiatry was consulted for Right leg wounds.    PMH: Reviewed/documented below.  PSH:  Noncontributory except per HPI   FH: Reviewed and noncontributory   SOCIAL:  Reviewed/documented below.  ALLERGIES: Reviewed/documented below.  MEDS: Reviewed/documented below.  VS: Reviewed/documented below.    Past Medical History:   Diagnosis Date    Anemia     Anxiety     Cerebral vascular accident (Multi)     CKD (chronic kidney disease)     COPD (chronic obstructive pulmonary disease) (Multi)     Depression     GI (gastrointestinal bleed)     Hyperlipidemia     Hypertension      Past Surgical History:   Procedure Laterality Date    CHOLECYSTECTOMY      HYSTERECTOMY       No family history on file.  Social History     Tobacco Use    Smoking status: Former     Current packs/day: 0.00     Average packs/day: 1 pack/day for 62.0 years (62.0 ttl pk-yrs)     Types: Cigarettes     Start date:      Quit date:      Years since quittin.5    Smokeless tobacco: Never   Vaping Use    Vaping status: Never Used   Substance Use Topics    Alcohol use: Never    Drug use: Never      Medications Prior to Admission   Medication Sig Dispense Refill Last Dose    albuterol 90 mcg/actuation inhaler Inhale 2 puffs every 6 hours if needed for wheezing. 18 g 3 2024    alendronate (Fosamax) 70 mg tablet Take 1 tablet (70 mg) by mouth every 7 days.   Past Week    allopurinol (Zyloprim) 100  mg tablet Take 1 tablet (100 mg) by mouth 2 times a day. 180 tablet 3 7/23/2024    aspirin 81 mg EC tablet Take 1 tablet (81 mg) by mouth once daily.   7/23/2024    buPROPion XL (Wellbutrin XL) 150 mg 24 hr tablet Take 1 tablet (150 mg) by mouth once daily in the morning. 90 tablet 3 7/23/2024    empagliflozin (Jardiance) 10 mg Take 1 tablet (10 mg) by mouth once daily. 30 tablet 11 Past Week    multivitamin with minerals tablet Take 1 tablet by mouth once daily.   Past Week    pantoprazole (ProtoNix) 40 mg EC tablet Take 1 tablet (40 mg) by mouth 2 times a day before meals. Do not crush, chew, or split. 60 tablet 2 7/23/2024    polyethylene glycol (Glycolax, Miralax) 17 gram packet Take 17 g by mouth 2 times a day.   Past Month    povidone-iodine (Betadine) 10 % ointment Apply topically once daily.   7/22/2024    sertraline (Zoloft) 100 mg tablet Take 1 tablet (100 mg) by mouth once daily. 90 tablet 3 7/23/2024    spironolactone (Aldactone) 25 mg tablet Take 1 tablet (25 mg) by mouth once daily. 30 tablet 2 Past Week    Trelegy Ellipta 200-62.5-25 mcg blister with device Inhale 1 puff once daily. 3 each 2 7/22/2024    bisacodyl (Dulcolax) 10 mg suppository Insert 1 suppository (10 mg) into the rectum once daily as needed for constipation. (Patient not taking: Reported on 7/23/2024)   Not Taking    cholecalciferol (Vitamin D-3) 5,000 Units tablet Take 1 tablet (5,000 Units) by mouth once daily.   More than a month    EPINEPHrine 0.3 mg/0.3 mL injection syringe Inject 0.3 mL (0.3 mg) into the muscle 1 time if needed for anaphylaxis.   Not Taking    ferrous sulfate 325 (65 Fe) MG EC tablet Take 1 tablet by mouth once daily with breakfast. Do not crush, chew, or split. (Patient not taking: Reported on 7/23/2024) 30 tablet 11 Not Taking    insulin lispro (HumaLOG) 100 unit/mL injection Inject 0-0.05 mL (0-5 Units) under the skin 3 times a day with meals. Take as directed per insulin instructions. (Patient not taking:  Reported on 7/15/2024)   Not Taking    losartan (Cozaar) 100 mg tablet Take 1 tablet (100 mg) by mouth once daily. (Patient not taking: Reported on 7/23/2024) 90 tablet 3 Not Taking    metoprolol succinate XL (Toprol-XL) 100 mg 24 hr tablet Take 1 tablet (100 mg) by mouth once daily. Do not crush or chew.       metoprolol succinate XL (Toprol-XL) 25 mg 24 hr tablet Take 1 tablet (25 mg) by mouth once daily. Do not crush or chew. (Patient not taking: Reported on 7/23/2024) 30 tablet 2 Not Taking    rivaroxaban (Xarelto) 2.5 mg tablet Take 1 tablet (2.5 mg) by mouth 2 times daily (morning and late afternoon). (Patient not taking: Reported on 7/23/2024) 60 tablet 0 Not Taking        Medications:  Scheduled Meds: acetaminophen, 975 mg, oral, q8h  allopurinol, 100 mg, oral, BID  aspirin, 81 mg, oral, Daily  buPROPion XL, 150 mg, oral, q AM  cholecalciferol, 5,000 Units, oral, Daily  insulin lispro, 0-5 Units, subcutaneous, TID  pantoprazole, 40 mg, oral, BID AC  perflutren lipid microspheres, 0.5-10 mL of dilution, intravenous, Once in imaging  perflutren protein A microsphere, 0.5 mL, intravenous, Once in imaging  polyethylene glycol, 17 g, oral, BID  polyethylene glycol, 17 g, oral, Daily  rivaroxaban, 2.5 mg, oral, BID  sertraline, 100 mg, oral, Daily  spironolactone, 25 mg, oral, Daily  sulfur hexafluoride microsphr, 2 mL, intravenous, Once in imaging      Continuous Infusions:    PRN Meds: PRN medications: albuterol, dextrose, dextrose, dextrose, dextrose, glucagon, glucagon, glucagon, glucagon, naloxone, oxyCODONE    Allergies as of 07/15/2024 - Reviewed 07/15/2024   Allergen Reaction Noted    Bee venom protein (honey bee) Anaphylaxis 05/02/2024            Objective   PHYSICAL EXAM:  Physical Exam Performed:  Vitals:    07/24/24 1527   BP: 134/60   Pulse: 71   Resp: 17   Temp: 36.8 °C (98.2 °F)   SpO2: 94%     Body mass index is 26.09 kg/m².    Patient is AOx3 and in no acute distress. Patient is alert and  cooperative. Sitting comfortably in bed with dressing clean, dry and intact.     Vascular: Non-palpable Dorsalis Pedis /Posterior Tibial pulses bilaterally. Mild non-pitting edema noted bilaterally. Hair growth absent B/L. Capillary Fill Time is greater than 5 seconds to bilaterally hallux. Temperature is warm to cool from tibial tuberosity to distal digits bilaterally. No lymphatic streaking noted bilaterally.    Musculoskeletal: Gross active and passive ROM intact to age and activity level. Moves all extremities spontaneously. No pain to palpation at feet bilaterally.     Neurological: Intact light touch sensation bilaterally. Pain stimuli diminished bilaterally. Denies any numbness, burning or tingling.    Dermatologic: Nails  1-5  are thickened, elongated, discolored with subungual debris B/L. Skin appears diffusely xerotic B/L. Web spaces  1-4  bilaterally are clean, dry and intact. No rashes or nodules noted bilaterally. No hyperkeratotic tissue noted bilaterally.     Wound:  Right heel   Mixed wound base of Fibrotic and necrotic tissue.   Minimal serosanguinous drainage with fibrotic slough.   Minimal claire-wound maceration.   Mild claire-wound erythema.   No evidence of ascending cellulitis or lymphangitis.  No palpable fluctuance. No malodor. Mild increased warmth.   No probe to bone or deep structures within the wound base.   No tunneling or tracking.   No undermining. Skin edges irregular but intact.    Right distal leg  Mixed wound base of Fibrotic and necrotic tissue tissue.   Minimal serosanguinous drainage with fibrotic slough.   Minimal claire-wound maceration.   Minimal claire-wound erythema.   No evidence of ascending cellulitis or lymphangitis.  No palpable fluctuance. No malodor. Minimal increased warmth.   No probe to bone or deep structures within the wound base.   No tunneling or tracking.   No undermining. Skin edges irregular but intact.    LABS:   Results for orders placed or performed during the  "hospital encounter of 07/23/24 (from the past 24 hour(s))   POCT GLUCOSE   Result Value Ref Range    POCT Glucose 84 74 - 99 mg/dL   POCT GLUCOSE   Result Value Ref Range    POCT Glucose 105 (H) 74 - 99 mg/dL   CBC   Result Value Ref Range    WBC 7.0 4.4 - 11.3 x10*3/uL    nRBC 0.0 0.0 - 0.0 /100 WBCs    RBC 3.40 (L) 4.00 - 5.20 x10*6/uL    Hemoglobin 8.4 (L) 12.0 - 16.0 g/dL    Hematocrit 29.9 (L) 36.0 - 46.0 %    MCV 88 80 - 100 fL    MCH 24.7 (L) 26.0 - 34.0 pg    MCHC 28.1 (L) 32.0 - 36.0 g/dL    RDW 16.2 (H) 11.5 - 14.5 %    Platelets 217 150 - 450 x10*3/uL   Basic metabolic panel   Result Value Ref Range    Glucose 89 74 - 99 mg/dL    Sodium 138 136 - 145 mmol/L    Potassium 4.4 3.5 - 5.3 mmol/L    Chloride 105 98 - 107 mmol/L    Bicarbonate 27 21 - 32 mmol/L    Anion Gap 10 10 - 20 mmol/L    Urea Nitrogen 19 6 - 23 mg/dL    Creatinine 1.01 0.50 - 1.05 mg/dL    eGFR 57 (L) >60 mL/min/1.73m*2    Calcium 9.0 8.6 - 10.6 mg/dL   Magnesium   Result Value Ref Range    Magnesium 1.94 1.60 - 2.40 mg/dL   POCT GLUCOSE   Result Value Ref Range    POCT Glucose 84 74 - 99 mg/dL   POCT GLUCOSE   Result Value Ref Range    POCT Glucose 82 74 - 99 mg/dL   POCT GLUCOSE   Result Value Ref Range    POCT Glucose 100 (H) 74 - 99 mg/dL   Electrocardiogram, 12-lead PRN ACS symptoms   Result Value Ref Range    Ventricular Rate 65 BPM    Atrial Rate 65 BPM    WV Interval 162 ms    QRS Duration 76 ms    QT Interval 424 ms    QTC Calculation(Bazett) 440 ms    P Axis 64 degrees    R Axis 67 degrees    T Axis 60 degrees    QRS Count 10 beats    Q Onset 222 ms    P Onset 141 ms    P Offset 191 ms    T Offset 434 ms    QTC Fredericia 435 ms   POCT GLUCOSE   Result Value Ref Range    POCT Glucose 97 74 - 99 mg/dL      Lab Results   Component Value Date    HGBA1C 5.4 05/02/2024      No results found for: \"CRP\"   No results found for: \"SEDRATE\"     Results from last 7 days   Lab Units 07/24/24  0801   WBC AUTO x10*3/uL 7.0   RBC AUTO x10*6/uL " 3.40*   HEMOGLOBIN g/dL 8.4*   HEMATOCRIT % 29.9*     Results from last 7 days   Lab Units 07/24/24  0801   SODIUM mmol/L 138   POTASSIUM mmol/L 4.4   CHLORIDE mmol/L 105   CO2 mmol/L 27   BUN mg/dL 19   CREATININE mg/dL 1.01   CALCIUM mg/dL 9.0   MAGNESIUM mg/dL 1.94           IMAGING REVIEW:  Electrocardiogram, 12-lead PRN ACS symptoms    Result Date: 7/24/2024  Normal sinus rhythm Normal ECG No previous ECGs available    Upper extremity vein mapping bilateral    Result Date: 7/24/2024            [place] [address]   Tel [phone] and Fax [phone]  Vascular Lab Report [facility] US UPPER EXTREMITY VEIN MAPPING BILATERAL  Patient Name:      DORIAN BARRAZA    Reading Physician:  [id number] Heller MD Study Date:        7/24/2024           Ordering Physician: [id number] HELLER MRN/PID:           [id number]            Technologist:       Kimberley Wagner RVT Accession#:        [id number]        Technologist 2:     Clotilde Davis RVT Date of Birth/Age: 1945 / 79      Encounter#:         [id number]                    years Gender:            F Admission Status:  Inpatient           Location Performed: [facility]  Diagnosis/ICD: Encounter for other preprocedural examination-Z01.818;                Atherosclerosis of native arteries of extremities with rest pain,                right leg-I70.221 Indication:    Pre-operative exam CPT Codes:     04056 Vein mapping complete  CONCLUSIONS: Right Upper Vein Mapping: Right upper extremity vein: Unable to obtain measurments of the basilic vein in the forearm due to its small caliber. The cephalic vein in the forearm was not visualized. Remainder visualized vessels appear patent with no evidence of thrombosis or fibrosis. Left Upper Vein Mapping: Left upper extremity vein: The basilic vein in the forearm was not visualized. The cephalic vein in the  upperarm was not visualized. Remainder visualized vessels appear patent with no evidence of thrombosis or fibrosis.  Imaging & Doppler Findings:  Right                  Compress Thrombus  Diam Cephalic Prox Arm        Yes      None   1.8 mm Cephalic Mid Arm         Yes      None   2.4 mm Cephalic Distal Arm      Yes      None   2.8 mm Basilic Prox Arm         Yes      None   3.0 mm Basilic Mid Arm          Yes      None   3.1 mm Basilic Distal Arm       Yes      None   1.9 mm Basilic Prox Forearm            Fibrotic Basilic Mid Forearm             Fibrotic Basilic Distal Forearm          Fibrotic  Left                    Compress Thrombus  Diam Cephalic Prox Forearm     Yes      None   1.6 mm Cephalic Mid Forearm      Yes      None   1.0 mm Cephalic Distal Forearm   Yes      None   1.3 mm Basilic Prox Arm          Yes      None   3.0 mm Basilic Mid Arm           Yes      None   3.1 mm Basilic Distal Arm        Yes      None   3.0 mm  [id number] Ayad Landeros MD Electronically signed by [id number] Ayad Landeros MD on 7/24/2024 at 12:33:05 PM  ** Final **     Lower extremity vein mapping bilateral    Result Date: 7/24/2024            [place] [address]   Tel [phone] and Fax [phone]  Vascular Lab Report [facility] LOWER EXTREMITY VEIN MAPPING BILATERAL  Patient Name:      DORIAN RODRIGES CHRISTI    Reading Physician:  [id number] Landeros MD Study Date:        7/24/2024           Ordering Physician: Lorene LANDEROS MRN/PID:           [id number]            Technologist:       Kimberley Wagner T Accession#:        [id number]        Technologist 2: Date of Birth/Age: 1945 / 79      Encounter#:         [id number]                    years Gender:            F Admission Status:  Inpatient           Location Performed: [facility]  Diagnosis/ICD: Encounter for other preprocedural examination-Z01.818;                 Atherosclerosis of native arteries of extremities with rest pain,                right leg-I70.221; Peripheral vascular disease, unspecified-I73.9 Indication:    Pre-operative exam CPT Codes:     18496 Vein mapping complete  CONCLUSIONS: Right Lower Vein Mapping: Right lower extremity vein: The right great saphenous vein appears widely patent with no evidence of thrombosis or fibrosis. Left Lower Vein Mapping: Left lower extremity vein: Mid to distal GSV of the calf was not visualized due to multiple branching, however remainder visualized great saphenous vein appears widely patent with no evidence of thrombosis or fibrosis.  Imaging & Doppler Findings:  Right          Compress Thrombus  Diam SFJ              Yes      None   11.0 mm Prox Thigh GSV   Yes      None   6.2 mm Mid Thigh GSV    Yes      None   5.6 mm Knee GSV         Yes      None   7.5 mm Prox Calf GSV    Yes      None   4.1 mm Mid Calf GSV     Yes      None   4.0 mm Dist Calf GSV    Yes      None   3.2 mm  Left           Compress Thrombus  Diam SFJ              Yes      None   14.8 mm Prox Thigh GSV   Yes      None   9.1 mm Mid Thigh GSV    Yes      None   6.1 mm Knee GSV         Yes      None   6.9 mm Prox Calf GSV    Yes      None   5.0 mm  Right       Compressible Thrombus CFV             Yes        None FV Proximal     Yes        None  Left        Compress Thrombus CFV           Yes      None FV Proximal   Yes      None  [id number] Ayad Landeros MD Electronically signed by [id number] Ayad Landeros MD on 7/24/2024 at 12:32:49 PM  ** Final **     IR angiogram lower extremity right    Result Date: 7/23/2024  These images are not reportable by radiology and will not be interpreted by  Radiologists.            Assessment/Plan   ASSESSMENT & PLAN:    #Peripheral vascular disease  #Chronic ulceration of right foot    - Patient was seen and evaluated; all findings were discussed and all questions were answered to patient's satisfaction.  - Charts, labs, vitals and  imaging all reviewed.   - Labs: WBC 7.0  - PVRs: 05/02/2024 Right Evidence of severe arterial occlusive disease, left  Evidence of moderate arterial occlusive disease     Plan:  - Antibiotics: Continue with primary team recommendations  - May require surgical intervention with debridement of soft tissue and possibly bone this hospital visit. Further studies needed along with clearance from vascular at this time. Will continue to monitor   - Dressings: Betadine soaked gauze, dry gauze, Kerlix loosely placed   - Nursing staff is able to change/reinforce dressing if & as necessary until next dressing change. Thank you.  - Podiatry will follow peripherally while waiting for vascular intervention.       Patient evaluated with attending Dr. Elvis Herrera DPM    Case to be discussed with attending, A&P above reflects a tentative plan. Please await for the final signature from the attending physician on service.    Edwina Tristan DPM PGY-1  Podiatric Medicine & Surgery    I saw and evaluated the patient. I personally obtained the key and critical portions of the history and physical exam or was physically present for key and critical portions performed by the resident/fellow. I reviewed the resident/fellow's documentation and discussed the patient with the resident/fellow. I agree with the resident/fellow's medical decision making as documented in the note.    I spent 60 minutes in the professional and overall care of this patient.    Elvis Herrera DPM

## 2024-07-24 NOTE — PROGRESS NOTES
[facility]  VASCULAR SURGERY - PROGRESS NOTE    Patient Name: Brooklyn Read  MRN: [id number]  Admit Date: 723  : 1945  AGE: 79 y.o.   GENDER: female  ==============================================================================  TODAY'S ASSESSMENT AND PLAN OF CARE:  80yo F with PMHx of COPD who presented with RLE critical limb ischemia now s/p R SFA balloon angioplasty via L CFA perc access. Unable to cross SFA lesion. Will plan for EVLS team to perform pedal approach for recanalization as outpatient next week. If unable to open, will anticipate peripheral bypass (targets visualized on diagnostic angiogram). Vein mapping completed today. Pending echo.     - podiatry consulted today for right heel and leg wounds  - vein mapping  - echo  - outpatient EVLS intervention, discussed with their team    Neuro  - multimodal pain regimen with scheduled tylenol, oxy 5 q6h  - sertraline  - home bupropion    CV  - q4h vitals  - xarelto 2.5mg  - aspirin 81mg  - pending echo  - vein mapping obtained for possible bypass    GI  - regular diet  - PPI      - no cloud  - Is and Os    Endo  - SSI  - POCT glucose checks  - home allopurinol    Wound  - podiatry consulted  - Betadine soaked gauze, dry gauze, Kerlix loosely placed     Dispo: anticipated tomorrow pending echo    Discussed with Dr. Landeros.     Juany Mcgovern MD  PGY-2 Vascular Surgery Resident  [id number]    ==============================================================================  CHIEF COMPLAINT / EVENTS LAST 24HRS / HPI:  NAEON. Pain is unchanged from yesterday. Tolerating diet, no groin pain. No numbness or tingling in either extremity.     PHYSICAL EXAM:  Heart Rate:  [62-71]   Temp:  [35.9 °C (96.6 °F)-36.8 °C (98.2 °F)]   Resp:  [17-20]   BP: (110-159)/(53-73)   Weight:  [64.7 kg (142 lb 10.2 oz)]   SpO2:  [94 %-100 %]   Constitutional: no acute distress, pleasant, nontoxic  Neuro: A/O x4, no gross deficits   Psych: normal  affect  HEENT: No deformities, no scleral icterus   Cardiac: RRR  Pulmonary: unlabored respirations   Abdomen: soft, non distended, non tender  Extremities: R insonable pedal signals. R heel wound with betadine gauze. L groin without hematoma. L multiphasic DP and PT    IMAGING SUMMARY:  (summary of new imaging findings, not a copy of dictation)  NA    LABS:  Results from last 7 days   Lab Units 07/24/24  0801 07/19/24  1312   WBC AUTO x10*3/uL 7.0 7.4   HEMOGLOBIN g/dL 8.4* 9.5*   HEMATOCRIT % 29.9* 33.2*   PLATELETS AUTO x10*3/uL 217 267     Results from last 7 days   Lab Units 07/19/24  1312   APTT seconds 31   INR  1.0     Results from last 7 days   Lab Units 07/24/24  0801 07/19/24  1312   SODIUM mmol/L 138 139   POTASSIUM mmol/L 4.4 4.8   CHLORIDE mmol/L 105 106   CO2 mmol/L 27 26   BUN mg/dL 19 25*   CREATININE mg/dL 1.01 1.22*   CALCIUM mg/dL 9.0 9.3   GLUCOSE mg/dL 89 128*               I have reviewed all medications, laboratory results, and imaging pertinent for today's encounter.

## 2024-07-24 NOTE — PROGRESS NOTES
Pharmacy Medication History Review    Brooklyn Read is a 79 y.o. female admitted for PVD (peripheral vascular disease) (CMS-HCC). Pharmacy reviewed the patient's [id number] medications and allergies for accuracy.    The list below reflects the updated PTA list. Comments regarding how patient may be taking medications differently can be found in the Admit Orders Activity  Prior to Admission Medications   Prescriptions Last Dose Informant Patient Reported? Taking?   EPINEPHrine 0.3 mg/0.3 mL injection syringe Not Taking Self Yes No   Sig: Inject 0.3 mL (0.3 mg) into the muscle 1 time if needed for anaphylaxis.   Trelegy Ellipta 200-62.5-25 mcg blister with device 7/22/2024 Self No Yes   Sig: Inhale 1 puff once daily.   albuterol 90 mcg/actuation inhaler 7/23/2024 Self No Yes   Sig: Inhale 2 puffs every 6 hours if needed for wheezing.   alendronate (Fosamax) 70 mg tablet Past Week  Yes Yes   Sig: Take 1 tablet (70 mg) by mouth every 7 days.   allopurinol (Zyloprim) 100 mg tablet 7/23/2024 Self No Yes   Sig: Take 1 tablet (100 mg) by mouth 2 times a day.   aspirin 81 mg EC tablet 7/23/2024 Self Yes Yes   Sig: Take 1 tablet (81 mg) by mouth once daily.   bisacodyl (Dulcolax) 10 mg suppository Not Taking  No No   Sig: Insert 1 suppository (10 mg) into the rectum once daily as needed for constipation.   Patient not taking: Reported on 7/23/2024      buPROPion XL (Wellbutrin XL) 150 mg 24 hr tablet 7/23/2024 Self No Yes   Sig: Take 1 tablet (150 mg) by mouth once daily in the morning.   cholecalciferol (Vitamin D-3) 5,000 Units tablet More than a month Self Yes No   Sig: Take 1 tablet (5,000 Units) by mouth once daily.   empagliflozin (Jardiance) 10 mg Past Week Self No Yes   Sig: Take 1 tablet (10 mg) by mouth once daily.   ferrous sulfate 325 (65 Fe) MG EC tablet Not Taking  No No   Sig: Take 1 tablet by mouth once daily with breakfast. Do not crush, chew, or split.   Patient not taking: Reported on 7/23/2024       insulin lispro (HumaLOG) 100 unit/mL injection Not Taking  No No   Sig: Inject 0-0.05 mL (0-5 Units) under the skin 3 times a day with meals. Take as directed per insulin instructions.   Patient not taking: Reported on 7/15/2024   losartan (Cozaar) 100 mg tablet Not Taking Self No No   Sig: Take 1 tablet (100 mg) by mouth once daily.   Patient not taking: Reported on 7/23/2024      metoprolol succinate XL (Toprol-XL) 100 mg 24 hr tablet   Yes No   Sig: Take 1 tablet (100 mg) by mouth once daily. Do not crush or chew.   metoprolol succinate XL (Toprol-XL) 25 mg 24 hr tablet Not Taking  No No   Sig: Take 1 tablet (25 mg) by mouth once daily. Do not crush or chew.   Patient not taking: Reported on 7/23/2024   multivitamin with minerals tablet Past Week Self Yes Yes   Sig: Take 1 tablet by mouth once daily.   pantoprazole (ProtoNix) 40 mg EC tablet 7/23/2024  No Yes   Sig: Take 1 tablet (40 mg) by mouth 2 times a day before meals. Do not crush, chew, or split.   polyethylene glycol (Glycolax, Miralax) 17 gram packet Past Month  No Yes   Sig: Take 17 g by mouth 2 times a day.   povidone-iodine (Betadine) 10 % ointment 7/22/2024  No Yes   Sig: Apply topically once daily.   rivaroxaban (Xarelto) 2.5 mg tablet Not Taking  No No   Sig: Take 1 tablet (2.5 mg) by mouth 2 times daily (morning and late afternoon).   Patient not taking: Reported on 7/23/2024   sertraline (Zoloft) 100 mg tablet 7/23/2024 Self No Yes   Sig: Take 1 tablet (100 mg) by mouth once daily.   spironolactone (Aldactone) 25 mg tablet Past Week  No Yes   Sig: Take 1 tablet (25 mg) by mouth once daily.      Facility-Administered Medications: None       The list below reflects the updated allergy list. Please review each documented allergy for additional clarification and justification.  Allergies  Reviewed by Joanna Rodriguez RN on 7/23/2024        Severity Reactions Comments    Bee Venom Protein (honey Bee) High Anaphylaxis             Patient  declines M2B at discharge. Pharmacy has been updated to [facility] in [place].    Sources used to complete the med history include out patient fill history, OARRS, and patient interview    Fair historian. Patient interviewed at bedside  Vascular Surgery note 7/15/2024    Medications ADDED:  Metoprolol succinate 100mg    Below are additional concerns with the patient's PTA list.  Patient reports taking metoprolol succinate 100mg, consistent with fill history. Had 2 week course of 25mg overlapping in May    Rossy Lim PharmD  Transitions of Care Pharmacist  [facility]  Please reach out via Secure Chat for questions, or if no response call [facility] or [facility]

## 2024-07-24 NOTE — CARE PLAN
The patient's goals for the shift include      The clinical goals for the shift include pt marbella remain free from injury this shift

## 2024-07-25 ENCOUNTER — APPOINTMENT (OUTPATIENT)
Dept: CARDIOLOGY | Facility: HOSPITAL | Age: 79
DRG: 253 | End: 2024-07-25
Payer: MEDICARE

## 2024-07-25 PROBLEM — I70.221 ATHEROSCLEROSIS OF NATIVE ARTERIES OF EXTREMITIES WITH REST PAIN, RIGHT LEG (MULTI): Status: ACTIVE | Noted: 2024-07-15

## 2024-07-25 LAB
ACT BLD: 297 SEC (ref 83–199)
ANION GAP SERPL CALC-SCNC: 14 MMOL/L (ref 10–20)
ATRIAL RATE: 65 BPM
BUN SERPL-MCNC: 17 MG/DL (ref 6–23)
CALCIUM SERPL-MCNC: 8.5 MG/DL (ref 8.6–10.6)
CHLORIDE SERPL-SCNC: 104 MMOL/L (ref 98–107)
CO2 SERPL-SCNC: 26 MMOL/L (ref 21–32)
CREAT SERPL-MCNC: 0.97 MG/DL (ref 0.5–1.05)
EGFRCR SERPLBLD CKD-EPI 2021: 60 ML/MIN/1.73M*2
EJECTION FRACTION APICAL 4 CHAMBER: 75.8
EJECTION FRACTION: 71 %
ERYTHROCYTE [DISTWIDTH] IN BLOOD BY AUTOMATED COUNT: 15.9 % (ref 11.5–14.5)
GLUCOSE BLD MANUAL STRIP-MCNC: 88 MG/DL (ref 74–99)
GLUCOSE BLD MANUAL STRIP-MCNC: 89 MG/DL (ref 74–99)
GLUCOSE BLD MANUAL STRIP-MCNC: 97 MG/DL (ref 74–99)
GLUCOSE BLD MANUAL STRIP-MCNC: 98 MG/DL (ref 74–99)
GLUCOSE SERPL-MCNC: 86 MG/DL (ref 74–99)
HCT VFR BLD AUTO: 28.1 % (ref 36–46)
HGB BLD-MCNC: 8.2 G/DL (ref 12–16)
LEFT ATRIUM VOLUME AREA LENGTH INDEX BSA: 30 ML/M2
LEFT VENTRICLE INTERNAL DIMENSION DIASTOLE: 4.6 CM (ref 3.5–6)
LEFT VENTRICULAR OUTFLOW TRACT DIAMETER: 2 CM
MAGNESIUM SERPL-MCNC: 1.88 MG/DL (ref 1.6–2.4)
MCH RBC QN AUTO: 24.8 PG (ref 26–34)
MCHC RBC AUTO-ENTMCNC: 29.2 G/DL (ref 32–36)
MCV RBC AUTO: 85 FL (ref 80–100)
MITRAL VALVE E/A RATIO: 0.84
NRBC BLD-RTO: 0 /100 WBCS (ref 0–0)
P AXIS: 64 DEGREES
P OFFSET: 191 MS
P ONSET: 141 MS
PLATELET # BLD AUTO: 228 X10*3/UL (ref 150–450)
POTASSIUM SERPL-SCNC: 3.9 MMOL/L (ref 3.5–5.3)
PR INTERVAL: 162 MS
Q ONSET: 222 MS
QRS COUNT: 10 BEATS
QRS DURATION: 76 MS
QT INTERVAL: 424 MS
QTC CALCULATION(BAZETT): 440 MS
QTC FREDERICIA: 435 MS
R AXIS: 67 DEGREES
RBC # BLD AUTO: 3.31 X10*6/UL (ref 4–5.2)
RIGHT VENTRICLE FREE WALL PEAK S': 13.3 CM/S
RIGHT VENTRICLE PEAK SYSTOLIC PRESSURE: 26.4 MMHG
SODIUM SERPL-SCNC: 140 MMOL/L (ref 136–145)
T AXIS: 60 DEGREES
T OFFSET: 434 MS
TRICUSPID ANNULAR PLANE SYSTOLIC EXCURSION: 2.4 CM
VENTRICULAR RATE: 65 BPM
WBC # BLD AUTO: 6.1 X10*3/UL (ref 4.4–11.3)

## 2024-07-25 PROCEDURE — 2500000002 HC RX 250 W HCPCS SELF ADMINISTERED DRUGS (ALT 637 FOR MEDICARE OP, ALT 636 FOR OP/ED): Mod: SE

## 2024-07-25 PROCEDURE — 80048 BASIC METABOLIC PNL TOTAL CA: CPT

## 2024-07-25 PROCEDURE — C1894 INTRO/SHEATH, NON-LASER: HCPCS | Performed by: INTERNAL MEDICINE

## 2024-07-25 PROCEDURE — 2500000005 HC RX 250 GENERAL PHARMACY W/O HCPCS: Performed by: INTERNAL MEDICINE

## 2024-07-25 PROCEDURE — 93308 TTE F-UP OR LMTD: CPT

## 2024-07-25 PROCEDURE — 2500000004 HC RX 250 GENERAL PHARMACY W/ HCPCS (ALT 636 FOR OP/ED): Performed by: INTERNAL MEDICINE

## 2024-07-25 PROCEDURE — 2500000001 HC RX 250 WO HCPCS SELF ADMINISTERED DRUGS (ALT 637 FOR MEDICARE OP): Mod: SE

## 2024-07-25 PROCEDURE — 99153 MOD SED SAME PHYS/QHP EA: CPT | Performed by: INTERNAL MEDICINE

## 2024-07-25 PROCEDURE — 37224 HC REVASCULARIZE FEM/POP ARTERY,ANGIOPLASTY: CPT | Performed by: INTERNAL MEDICINE

## 2024-07-25 PROCEDURE — 99152 MOD SED SAME PHYS/QHP 5/>YRS: CPT | Performed by: INTERNAL MEDICINE

## 2024-07-25 PROCEDURE — 99223 1ST HOSP IP/OBS HIGH 75: CPT | Performed by: NURSE PRACTITIONER

## 2024-07-25 PROCEDURE — C1769 GUIDE WIRE: HCPCS | Performed by: INTERNAL MEDICINE

## 2024-07-25 PROCEDURE — 7100000011 HC EXTENDED STAY RECOVERY HOURLY - NURSING UNIT

## 2024-07-25 PROCEDURE — 1100000001 HC PRIVATE ROOM DAILY

## 2024-07-25 PROCEDURE — 75774 ARTERY X-RAY EACH VESSEL: CPT | Mod: 59 | Performed by: INTERNAL MEDICINE

## 2024-07-25 PROCEDURE — 2720000007 HC OR 272 NO HCPCS: Performed by: INTERNAL MEDICINE

## 2024-07-25 PROCEDURE — 83735 ASSAY OF MAGNESIUM: CPT

## 2024-07-25 PROCEDURE — 75710 ARTERY X-RAYS ARM/LEG: CPT | Performed by: INTERNAL MEDICINE

## 2024-07-25 PROCEDURE — C1725 CATH, TRANSLUMIN NON-LASER: HCPCS | Performed by: INTERNAL MEDICINE

## 2024-07-25 PROCEDURE — 37224 PR REVSC OPN/PRG FEM/POP W/ANGIOPLASTY UNI: CPT | Performed by: INTERNAL MEDICINE

## 2024-07-25 PROCEDURE — C1887 CATHETER, GUIDING: HCPCS | Performed by: INTERNAL MEDICINE

## 2024-07-25 PROCEDURE — C1760 CLOSURE DEV, VASC: HCPCS | Performed by: INTERNAL MEDICINE

## 2024-07-25 PROCEDURE — G0269 OCCLUSIVE DEVICE IN VEIN ART: HCPCS | Mod: TC | Performed by: INTERNAL MEDICINE

## 2024-07-25 PROCEDURE — 93321 DOPPLER ECHO F-UP/LMTD STD: CPT | Performed by: STUDENT IN AN ORGANIZED HEALTH CARE EDUCATION/TRAINING PROGRAM

## 2024-07-25 PROCEDURE — 96373 THER/PROPH/DIAG INJ IA: CPT | Performed by: INTERNAL MEDICINE

## 2024-07-25 PROCEDURE — 85347 COAGULATION TIME ACTIVATED: CPT | Performed by: INTERNAL MEDICINE

## 2024-07-25 PROCEDURE — 82947 ASSAY GLUCOSE BLOOD QUANT: CPT

## 2024-07-25 PROCEDURE — 85347 COAGULATION TIME ACTIVATED: CPT

## 2024-07-25 PROCEDURE — 93325 DOPPLER ECHO COLOR FLOW MAPG: CPT | Performed by: STUDENT IN AN ORGANIZED HEALTH CARE EDUCATION/TRAINING PROGRAM

## 2024-07-25 PROCEDURE — 85027 COMPLETE CBC AUTOMATED: CPT

## 2024-07-25 PROCEDURE — B410YZZ FLUOROSCOPY OF ABDOMINAL AORTA USING OTHER CONTRAST: ICD-10-PCS | Performed by: INTERNAL MEDICINE

## 2024-07-25 PROCEDURE — 047K3Z1 DILATION OF RIGHT FEMORAL ARTERY USING DRUG-COATED BALLOON, PERCUTANEOUS APPROACH: ICD-10-PCS | Performed by: INTERNAL MEDICINE

## 2024-07-25 PROCEDURE — 2780000003 HC OR 278 NO HCPCS: Performed by: INTERNAL MEDICINE

## 2024-07-25 PROCEDURE — 99232 SBSQ HOSP IP/OBS MODERATE 35: CPT | Performed by: SURGERY

## 2024-07-25 PROCEDURE — 93308 TTE F-UP OR LMTD: CPT | Performed by: STUDENT IN AN ORGANIZED HEALTH CARE EDUCATION/TRAINING PROGRAM

## 2024-07-25 PROCEDURE — 36415 COLL VENOUS BLD VENIPUNCTURE: CPT

## 2024-07-25 PROCEDURE — B41FYZZ FLUOROSCOPY OF RIGHT LOWER EXTREMITY ARTERIES USING OTHER CONTRAST: ICD-10-PCS | Performed by: INTERNAL MEDICINE

## 2024-07-25 PROCEDURE — 2500000001 HC RX 250 WO HCPCS SELF ADMINISTERED DRUGS (ALT 637 FOR MEDICARE OP): Mod: SE | Performed by: INTERNAL MEDICINE

## 2024-07-25 PROCEDURE — 2550000001 HC RX 255 CONTRASTS: Performed by: INTERNAL MEDICINE

## 2024-07-25 PROCEDURE — C2623 CATH, TRANSLUMIN, DRUG-COAT: HCPCS | Performed by: INTERNAL MEDICINE

## 2024-07-25 RX ORDER — ACETAMINOPHEN 325 MG/1
975 TABLET ORAL EVERY 8 HOURS
Start: 2024-07-25

## 2024-07-25 RX ORDER — VANCOMYCIN HYDROCHLORIDE 1 G/20ML
INJECTION, POWDER, LYOPHILIZED, FOR SOLUTION INTRAVENOUS AS NEEDED
Status: DISCONTINUED | OUTPATIENT
Start: 2024-07-25 | End: 2024-07-25 | Stop reason: HOSPADM

## 2024-07-25 RX ORDER — LIDOCAINE HYDROCHLORIDE 20 MG/ML
INJECTION, SOLUTION INFILTRATION; PERINEURAL AS NEEDED
Status: DISCONTINUED | OUTPATIENT
Start: 2024-07-25 | End: 2024-07-25 | Stop reason: HOSPADM

## 2024-07-25 RX ORDER — HEPARIN SODIUM 1000 [USP'U]/ML
INJECTION, SOLUTION INTRAVENOUS; SUBCUTANEOUS AS NEEDED
Status: DISCONTINUED | OUTPATIENT
Start: 2024-07-25 | End: 2024-07-25 | Stop reason: HOSPADM

## 2024-07-25 RX ORDER — IODIXANOL 320 MG/ML
INJECTION, SOLUTION INTRAVASCULAR AS NEEDED
Status: DISCONTINUED | OUTPATIENT
Start: 2024-07-25 | End: 2024-07-25 | Stop reason: HOSPADM

## 2024-07-25 RX ORDER — FENTANYL CITRATE 50 UG/ML
INJECTION, SOLUTION INTRAMUSCULAR; INTRAVENOUS AS NEEDED
Status: DISCONTINUED | OUTPATIENT
Start: 2024-07-25 | End: 2024-07-25 | Stop reason: HOSPADM

## 2024-07-25 RX ORDER — CLOPIDOGREL BISULFATE 75 MG/1
300 TABLET ORAL ONCE
Status: COMPLETED | OUTPATIENT
Start: 2024-07-25 | End: 2024-07-25

## 2024-07-25 RX ORDER — MIDAZOLAM HYDROCHLORIDE 1 MG/ML
INJECTION, SOLUTION INTRAMUSCULAR; INTRAVENOUS AS NEEDED
Status: DISCONTINUED | OUTPATIENT
Start: 2024-07-25 | End: 2024-07-25 | Stop reason: HOSPADM

## 2024-07-25 ASSESSMENT — COGNITIVE AND FUNCTIONAL STATUS - GENERAL
DAILY ACTIVITIY SCORE: 24
DAILY ACTIVITIY SCORE: 24
CLIMB 3 TO 5 STEPS WITH RAILING: A LITTLE
MOBILITY SCORE: 23
MOBILITY SCORE: 23
CLIMB 3 TO 5 STEPS WITH RAILING: A LITTLE

## 2024-07-25 ASSESSMENT — PAIN - FUNCTIONAL ASSESSMENT: PAIN_FUNCTIONAL_ASSESSMENT: 0-10

## 2024-07-25 ASSESSMENT — PAIN SCALES - GENERAL
PAINLEVEL_OUTOF10: 4
PAINLEVEL_OUTOF10: 0 - NO PAIN
PAINLEVEL_OUTOF10: 0 - NO PAIN

## 2024-07-25 NOTE — SIGNIFICANT EVENT
Patient agreed to reversal of DNR for 24 hours during and after the PAD procedure - Lower extremity angiogram +/- intervention and agrees to be full code during this time.

## 2024-07-25 NOTE — PROGRESS NOTES
[facility]  VASCULAR SURGERY - PROGRESS NOTE    Patient Name: Brooklyn Read  MRN: [id number]  Admit Date: 723  : 1945  AGE: 79 y.o.   GENDER: female  ==============================================================================  TODAY'S ASSESSMENT AND PLAN OF CARE:  80yo F with PMHx of COPD who presented with RLE critical limb ischemia now s/p R SFA balloon angioplasty via L CFA perc access. Unable to cross SFA lesion. Will plan for EVLS team to perform pedal approach for recanalization as outpatient next week. If unable to open, will anticipate peripheral bypass (targets visualized on diagnostic angiogram). Vein mapping and echo completed.     - EVLS available for angiogram today. Plan to cross SFA lesion and recanalize.   - discharge tomorrow  - if crossed, plan to re engage podiatry to evaluate for any podiatric intervention/wound debridement    Neuro  - multimodal pain regimen with scheduled tylenol, oxy 5 q6h  - sertraline  - home bupropion    CV  - q4h vitals  - xarelto 2.5mg  - aspirin 81mg  - echo completed this morning  - vein mapping obtained for possible bypass    GI  - regular diet  - PPI      - no cloud  - Is and Os    Endo  - SSI  - POCT glucose checks  - home allopurinol    Wound  - podiatry consulted  - Betadine soaked gauze, dry gauze, Kerlix loosely placed     Dispo: anticipated tomorrow s/p angiogram today    Discussed with Dr. Landeros.     Juany Mcgovern MD  PGY-2 Vascular Surgery Resident  [id number]    ==============================================================================  CHIEF COMPLAINT / EVENTS LAST 24HRS / HPI:  NAEON. Patient in good spirits and is agreeable to EVLS procedure. No change in leg pain.     PHYSICAL EXAM:  Heart Rate:  [70-79]   Temp:  [36.4 °C (97.5 °F)-36.6 °C (97.9 °F)]   Resp:  [16-19]   BP: (144-181)/(64-76)   Weight:  [62.6 kg (138 lb 0.1 oz)]   SpO2:  [92 %-99 %]   Constitutional: no acute distress, comfortable,  conversational  Neuro: A/O x4, no gross deficits   Psych: normal affect  HEENT: No deformities, no scleral icterus   Pulmonary: unlabored respirations on RA  Abdomen: soft, non distended, non tender  Extremities: R insonable pedal signals. R heel wound with betadine gauze. L groin without hematoma. L multiphasic DP and PT     IMAGING SUMMARY:  (summary of new imaging findings, not a copy of dictation)  NA    LABS:  Results from last 7 days   Lab Units 07/25/24  0644 07/24/24  0801 07/19/24  1312   WBC AUTO x10*3/uL 6.1 7.0 7.4   HEMOGLOBIN g/dL 8.2* 8.4* 9.5*   HEMATOCRIT % 28.1* 29.9* 33.2*   PLATELETS AUTO x10*3/uL 228 217 267     Results from last 7 days   Lab Units 07/19/24  1312   APTT seconds 31   INR  1.0     Results from last 7 days   Lab Units 07/25/24  0644 07/24/24  0801 07/19/24  1312   SODIUM mmol/L 140 138 139   POTASSIUM mmol/L 3.9 4.4 4.8   CHLORIDE mmol/L 104 105 106   CO2 mmol/L 26 27 26   BUN mg/dL 17 19 25*   CREATININE mg/dL 0.97 1.01 1.22*   CALCIUM mg/dL 8.5* 9.0 9.3   GLUCOSE mg/dL 86 89 128*               I have reviewed all medications, laboratory results, and imaging pertinent for today's encounter.

## 2024-07-25 NOTE — H&P (VIEW-ONLY)
Consults  History Of Present Illness:    Brooklyn Read is a 79 y.o. female presenting with Right heel and leg wounds. Patient has past medical history of Anemia, Anxiety, Cerebral vascular accident (Multi), CKD (chronic kidney disease), COPD (chronic obstructive pulmonary disease) (Multi), Depression, GI (gastrointestinal bleed) 5/1/24, Hyperlipidemia, and Hypertension. Pt reported she has had these wounds since Feb of this year and follows up with wound care at [place]. She was seen by vascular surgery with RONALD of 0. 7/23 underwent angiogram that showed multifocal SFA disease distal SFA occlusion with AK pop reconstitution. Attempted to get through lesion without success. EVLS consulted to attempt retrograde pedal access to revascularize.        Last Recorded Vitals:  Vitals:    07/24/24 2331 07/25/24 0453 07/25/24 0731 07/25/24 1124   BP: 146/67 161/75 144/65 148/68   Pulse: 70 77 76 75   Resp: 19 19 17 18   Temp: 36.5 °C (97.7 °F) 36.4 °C (97.5 °F) 36.4 °C (97.5 °F) 36.5 °C (97.7 °F)   TempSrc:       SpO2: 95% 94% 94% 93%   Weight:  62.6 kg (138 lb 0.1 oz)     Height:           Last Labs:  CBC - 7/25/2024:  6:44 AM  6.1 8.2 228    28.1      CMP - 7/25/2024:  6:44 AM  8.5 4.6 50 --- 0.4   4.1 2.6 32 62      PTT - 7/19/2024:  1:12 PM  1.0   11.1 31     Troponin I, High Sensitivity   Date/Time Value Ref Range Status   04/30/2024 06:12 AM 16 (H) 0 - 13 ng/L Final     BNP   Date/Time Value Ref Range Status   04/17/2024 12:59 PM 77 0 - 99 pg/mL Final   12/07/2023 11:59  (H) 0 - 99 pg/mL Final     Hemoglobin A1C   Date/Time Value Ref Range Status   05/02/2024 11:11 AM 5.4 see below % Final   11/03/2020 12:00 PM 5.8 % Final     Comment:          Diagnosis of Diabetes-Adults   Non-Diabetic: < or = 5.6%   Increased risk for developing diabetes: 5.7-6.4%   Diagnostic of diabetes: > or = 6.5%  .       Monitoring of Diabetes                Age (y)     Therapeutic Goal (%)   Adults:          >18           <7.0    "Pediatrics:    13-18           <7.5                   7-12           <8.0                   0- 6            7.5-8.5   American Diabetes Association. Diabetes Care 33(S1), Jan 2010.       LDL Calculated   Date/Time Value Ref Range Status   05/02/2024 05:35 AM 18 <=99 mg/dL Final     Comment:                                 Near   Borderline      AGE      Desirable  Optimal    High     High     Very High     0-19 Y     0 - 109     ---    110-129   >/= 130     ----    20-24 Y     0 - 119     ---    120-159   >/= 160     ----      >24 Y     0 -  99   100-129  130-159   160-189     >/=190       VLDL   Date/Time Value Ref Range Status   05/02/2024 05:35 AM 29 0 - 40 mg/dL Final   09/01/2021 10:44 AM 11 0 - 40 mg/dL Final   07/07/2020 03:00 PM 22 0 - 40 mg/dL Final   02/03/2020 09:19 AM 22 0 - 40 mg/dL Final      Last I/O:  No intake/output data recorded.    Past Cardiology Tests (Last 3 Years):  EKG:  Electrocardiogram, 12-lead PRN ACS symptoms 07/24/2024      ECG 12 lead 04/30/2024      ECG 12 lead (Clinic Performed) 12/01/2023    Echo:  No results found for this or any previous visit from the past 1095 days.    Ejection Fractions:  No results found for: \"EF\"  Cath:  No results found for this or any previous visit from the past 1095 days.    Stress Test:  Nuclear Stress Test 12/26/2023    Cardiac Imaging:  No results found for this or any previous visit from the past 1095 days.      Past Medical History:  She has a past medical history of Anemia, Anxiety, Cerebral vascular accident (Multi), CKD (chronic kidney disease), COPD (chronic obstructive pulmonary disease) (Multi), Depression, GI (gastrointestinal bleed), Hyperlipidemia, and Hypertension.    She has no past medical history of Diabetes mellitus (Multi) or Renal insufficiency.    Past Surgical History:  She has a past surgical history that includes Hysterectomy and Cholecystectomy.      Social History:  She reports that she quit smoking about 2 years ago. Her " smoking use included cigarettes. She started smoking about 64 years ago. She has a 62 pack-year smoking history. She has never used smokeless tobacco. She reports that she does not drink alcohol and does not use drugs.    Family History:  No family history on file.     Allergies:  Bee venom protein (honey bee)    Inpatient Medications:  Scheduled medications   Medication Dose Route Frequency    acetaminophen  975 mg oral q8h    allopurinol  100 mg oral BID    aspirin  81 mg oral Daily    buPROPion XL  150 mg oral q AM    cholecalciferol  5,000 Units oral Daily    insulin lispro  0-5 Units subcutaneous TID    pantoprazole  40 mg oral BID AC    perflutren lipid microspheres  0.5-10 mL of dilution intravenous Once in imaging    perflutren protein A microsphere  0.5 mL intravenous Once in imaging    polyethylene glycol  17 g oral Daily    rivaroxaban  2.5 mg oral BID    sertraline  100 mg oral Daily    spironolactone  25 mg oral Daily    sulfur hexafluoride microsphr  2 mL intravenous Once in imaging     PRN medications   Medication    albuterol    dextrose    dextrose    dextrose    dextrose    glucagon    glucagon    glucagon    glucagon    naloxone    oxyCODONE     Continuous Medications   Medication Dose Last Rate     Outpatient Medications:  Current Outpatient Medications   Medication Instructions    acetaminophen (TYLENOL) 975 mg, oral, Every 8 hours    albuterol 90 mcg/actuation inhaler 2 puffs, inhalation, Every 6 hours PRN    alendronate (FOSAMAX) 70 mg, oral, Every 7 days    allopurinol (ZYLOPRIM) 100 mg, oral, 2 times daily    aspirin 81 mg EC tablet 1 tablet, oral, Daily    bisacodyl (DULCOLAX) 10 mg, rectal, Daily PRN    buPROPion XL (WELLBUTRIN XL) 150 mg, oral, Every morning    cholecalciferol (Vitamin D-3) 5,000 Units tablet 1 tablet, oral, Daily    empagliflozin (JARDIANCE) 10 mg, oral, Daily    EPINEPHrine 0.3 mg/0.3 mL injection syringe 0.3 mL, intramuscular, Once as needed    ferrous sulfate 325 (65  Fe) MG EC tablet 1 tablet, oral, Daily with breakfast, Do not crush, chew, or split.    insulin lispro (HUMALOG) 0-5 Units, subcutaneous, 3 times daily (morning, midday, late afternoon), Take as directed per insulin instructions.    losartan (COZAAR) 100 mg, oral, Daily    metoprolol succinate XL (TOPROL-XL) 25 mg, oral, Daily, Do not crush or chew.    metoprolol succinate XL (TOPROL-XL) 100 mg, oral, Daily, Do not crush or chew.    multivitamin with minerals tablet 1 tablet, oral, Daily    pantoprazole (PROTONIX) 40 mg, oral, 2 times daily before meals, Do not crush, chew, or split.    polyethylene glycol (GLYCOLAX, MIRALAX) 17 g, oral, 2 times daily    povidone-iodine (Betadine) 10 % ointment Topical, Daily    rivaroxaban (XARELTO) 2.5 mg, oral, 2 times daily (morning and late afternoon)    sertraline (ZOLOFT) 100 mg, oral, Daily    spironolactone (ALDACTONE) 25 mg, oral, Daily    Trelegy Ellipta 200-62.5-25 mcg blister with device 1 puff, inhalation, Daily     Vascular US Ankle Brachial Index (RONALD) Without Exercise 05/02/2024    [place] [address]  Tel [phone] and Fax [phone]      Vascular Lab Report  [facility] US ANKLE BRACHIAL INDEX (RONALD) WITHOUT EXERCISE      Patient Name:      DORIAN Guerrero Physician:  [id number] Jose Cazares DO  Study Date:        5/2/2024             Ordering Physician: [id number] SUZETTE ARANGO  MRN/PID:           [id number]             Technologist:       Ambrosio De La Rosa T  Accession#:        [id number]         Technologist 2:  Date of Birth/Age: 1945 / 79 years Encounter#:         [id number]  Gender:            F  Admission Status:  Inpatient            Location Performed: [facility]      Diagnosis/ICD: Peripheral vascular disease, unspecified-I73.9  CPT Codes:     31967 Peripheral artery RONALD Only      **CRITICAL RESULT**  Critical Result: Severe PAD in right leg.  Notification called to Suzette Arango MD  on 5/2/2024 at 10:59:13 AM by Ambrosio De La Rosa RVT.    CONCLUSIONS:  Right Lower PVR: Evidence of severe arterial occlusive disease in the right lower extremity at rest. Decreased digital perfusion noted. Biphasic flow is noted in the right common femoral artery.  Left Lower PVR: Evidence of moderate arterial occlusive disease in the left lower extremity at rest. Decreased digital perfusion noted. Monophasic flow is noted in the left dorsalis pedis artery and left posterior tibial artery. Biphasic flow is noted in the left common femoral artery.    Imaging & Doppler Findings:    RIGHT Lower PVR                Pressures Ratios  Right Posterior Tibial (Ankle) 0 mmHg    0.00  Right Dorsalis Pedis (Ankle)   0 mmHg    0.00  Right Digit (Great Toe)        31 mmHg   0.19    LEFT Lower PVR                Pressures Ratios  Left Posterior Tibial (Ankle) 57 mmHg   0.35  Left Dorsalis Pedis (Ankle)   85 mmHg   0.52  Left Digit (Great Toe)        49 mmHg   0.30    Left  Brachial Pressure 162 mmHg    CT angio aorta and bilateral iliofemoral runoff w and or wo IV contrast  5/2/2024  Impression   1. No evidence of active gastrointestinal bleeding.  2. Severe atherosclerosis of the bilateral lower extremity arteries, most pronounced in the superficial femoral and popliteal arteries  bilaterally where there is moderate to high-grade stenosis.  Evaluation of the arteries below the knees is limited due to venous  contamination.  3. No acute abnormality within the abdomen or pelvis.  4. Patchy ground-glass opacities within the partially visualized  right middle lobe which may be seen in the setting of an infectious  or inflammatory process.       Physical Exam:  Constitutional:  NAD, pleasant, cooperative     Head/Neck: Neck supple, No JVD, no carotid bruits           Respiratory/Thorax: CTAB, thorax symmetric     Cardiovascular: Regular, rate and rhythm, no murmurs, normal S 1 and S 2    Gastrointestinal: obese, soft, non-tender, +BS        Skin: Warm and dry             Extremities: SMITH, RLE +2 edema, LLE +1 edema, RLE wound with dressing, monophasic LLE Doppler signal of PT/DP, no signal on Rt DP/PT   Neuro: non-focal, awake/alert/oriented x3, Right foot with minimal ability to dorsiflex or plantarflex   Psychological: Appropriate mood and behavior        Assessment/Plan   Brooklyn Read is a pleasant 79 y.o. female with CLTI of the right leg with a wounds. Has multifocal SFA disease per VS angiogram. RONALD/TBI Rt 0/0.19 Lt 0.52/0.30. Pt currently on ASA and Xarelto 2.5 BID.     - tentative planning for angioplasty with Dr Mcfadden.  - Continue ASA, hold Xarelto for now will resume post procedure.  - Start high intensity statin therapy  - Wound care per podiatry recs  - All other care per primary team.         Peripheral IV 07/23/24 20 G Left;Posterior Wrist (Active)   Site Assessment Clean;Dry;Intact 07/25/24 0811   Dressing Status Clean;Dry 07/25/24 0811   Number of days: 2       Code Status:  DNR        PEGGY Dixon-CNP  Endovascular/Limb Salvage Service   Day: 82330/Haiku/Night HHVI [id number]

## 2024-07-25 NOTE — DISCHARGE SUMMARY
Discharge Diagnosis  PVD (peripheral vascular disease) (CMS-HCC)  Multilevel RLE stenosis at SFA s/p EVLS procedure to recanalize    Issues Requiring Follow-Up  Outpatient follow up with EVLS and podiatry teams    Hospital Course   Mrs. Read is a 79-year-old with past medical history including COPD, PAD, PVD who presents with right lower extremity critical limb ischemia who underwent a right lower extremity angiogram to evaluate her patency and runoff.  Unable to recanalize patient with multilevel stenosis greater than 90% of her superficial femoral artery. Patient with wounds on right foot for which podiatry team was consulted. Wound care with daily betadine soaked kerlix. Discussed with EVLS team who were able to perform RLE angiogram with successful recanalization of SFA and post procedure multiphasic DP and PT. Discussed podiatric intervention with podiatry team who will plan for outpatient elective debridement if indicated.     Patient comfortable on day of discharge. Pain was well controlled, was voiding spontaneously, in good spirits regarding finally going home. Understands the discharge plan and will follow up with EVLS team in clinic. Does not need scheduled follow up with Dr. Landeros and vascular team.     Discharge on plavix and xarelto per EVLS recommendation.     Pertinent Physical Exam At Time of Discharge  Constitutional: no acute distress, comfortable, conversational  Neuro: A/O x4, no gross deficits   Psych: normal affect  HEENT: No deformities, no scleral icterus   Pulmonary: unlabored respirations on RA  Abdomen: soft, non distended, non tender  Extremities: R multiphasic DP and PT signals. R heel and posterior ankle full thickness wounds painted with betadine and mepilex. Both groin sites without hematoma. L multiphasic DP and PT.    Home Medications     Medication List      START taking these medications     acetaminophen 325 mg tablet; Commonly known as: Tylenol; Take 3 tablets   (975 mg) by  mouth every 8 hours.   rivaroxaban 2.5 mg tablet; Commonly known as: Xarelto; Take 1 tablet   (2.5 mg) by mouth 2 times daily (morning and late afternoon).     CHANGE how you take these medications     metoprolol succinate  mg 24 hr tablet; Commonly known as:   Toprol-XL; What changed: Another medication with the same name was   removed. Continue taking this medication, and follow the directions you   see here.     CONTINUE taking these medications     albuterol 90 mcg/actuation inhaler; Inhale 2 puffs every 6 hours if   needed for wheezing.   alendronate 70 mg tablet; Commonly known as: Fosamax   allopurinol 100 mg tablet; Commonly known as: Zyloprim; Take 1 tablet   (100 mg) by mouth 2 times a day.   aspirin 81 mg EC tablet   buPROPion  mg 24 hr tablet; Commonly known as: Wellbutrin XL; Take   1 tablet (150 mg) by mouth once daily in the morning.   cholecalciferol 5,000 Units tablet; Commonly known as: Vitamin D-3   empagliflozin 10 mg; Commonly known as: Jardiance; Take 1 tablet (10 mg)   by mouth once daily.   EPINEPHrine 0.3 mg/0.3 mL injection syringe; Commonly known as: Epipen   multivitamin with minerals tablet   pantoprazole 40 mg EC tablet; Commonly known as: ProtoNix; Take 1 tablet   (40 mg) by mouth 2 times a day before meals. Do not crush, chew, or split.   polyethylene glycol 17 gram packet; Commonly known as: Glycolax,   Miralax; Take 17 g by mouth 2 times a day.   povidone-iodine 10 % ointment; Commonly known as: Betadine; Apply   topically once daily.   sertraline 100 mg tablet; Commonly known as: Zoloft; Take 1 tablet (100   mg) by mouth once daily.   spironolactone 25 mg tablet; Commonly known as: Aldactone; Take 1 tablet   (25 mg) by mouth once daily.   Trelegy Ellipta 200-62.5-25 mcg blister with device; Generic drug:   fluticasone-umeclidin-vilanter; Inhale 1 puff once daily.     STOP taking these medications     bisacodyl 10 mg suppository; Commonly known as: Dulcolax   ferrous  sulfate 325 (65 Fe) MG EC tablet     ASK your doctor about these medications     insulin lispro 100 unit/mL injection; Commonly known as: HumaLOG; Inject   0-0.05 mL (0-5 Units) under the skin 3 times a day with meals. Take as   directed per insulin instructions.   losartan 100 mg tablet; Commonly known as: Cozaar; Take 1 tablet (100   mg) by mouth once daily.       Outpatient Follow-Up  Future Appointments   Date Time Provider Department Center   8/21/2024 10:30 AM PEGGY Meadows-CNP [id number] [facility]   8/27/2024  1:00 PM [facility] VASC 4 [id number] [facility]   8/27/2024  2:00 PM Guanako Mcfadden DO [id number] Academic       Juany Mcgovern MD

## 2024-07-25 NOTE — CARE PLAN
The patient's goals for the shift include  to get lower extremity angiogram completed/     The clinical goals for the shift include to remain free of falls and injury throughout shift

## 2024-07-25 NOTE — DISCHARGE INSTRUCTIONS
You were admitted for your peripheral vascular disease and poor wound healing. You underwent an angiogram that showed a blockage of the main artery in your right leg. We were unable to open that blockage during that procedure but you will be scheduled for intervention with the endovascular cardiology team on Monday as an outpatient.     Meds: continue to take xarelto and aspirin. Very important.     Activity: No restrictions, weight bearing as tolerated    Showering: no restrictions    Pain: take tylenol as needed

## 2024-07-25 NOTE — CARE PLAN
The patient's goals for the shift include      The clinical goals for the shift include to wean off oxygen      Problem: Skin  Goal: Participates in plan/prevention/treatment measures  Outcome: Progressing  Goal: Prevent/manage excess moisture  Outcome: Progressing  Goal: Prevent/minimize sheer/friction injuries  Outcome: Progressing  Goal: Promote/optimize nutrition  Outcome: Progressing  Goal: Promote skin healing  Outcome: Progressing     Problem: Fall/Injury  Goal: Not fall by end of shift  Outcome: Progressing  Goal: Be free from injury by end of the shift  Outcome: Progressing  Goal: Verbalize understanding of personal risk factors for fall in the hospital  Outcome: Progressing  Goal: Pace activities to prevent fatigue by end of the shift  Outcome: Progressing

## 2024-07-25 NOTE — CONSULTS
Consults  History Of Present Illness:    Brooklyn Read is a 79 y.o. female presenting with Right heel and leg wounds. Patient has past medical history of Anemia, Anxiety, Cerebral vascular accident (Multi), CKD (chronic kidney disease), COPD (chronic obstructive pulmonary disease) (Multi), Depression, GI (gastrointestinal bleed) 5/1/24, Hyperlipidemia, and Hypertension. Pt reported she has had these wounds since Feb of this year and follows up with wound care at [facility]. She was seen by vascular surgery with RONALD of 0. 7/23 underwent angiogram that showed multifocal SFA disease distal SFA occlusion with AK pop reconstitution. Attempted to get through lesion without success. EVLS consulted to attempt retrograde pedal access to revascularize.        Last Recorded Vitals:  Vitals:    07/24/24 2331 07/25/24 0453 07/25/24 0731 07/25/24 1124   BP: 146/67 161/75 144/65 148/68   Pulse: 70 77 76 75   Resp: 19 19 17 18   Temp: 36.5 °C (97.7 °F) 36.4 °C (97.5 °F) 36.4 °C (97.5 °F) 36.5 °C (97.7 °F)   TempSrc:       SpO2: 95% 94% 94% 93%   Weight:  62.6 kg (138 lb 0.1 oz)     Height:           Last Labs:  CBC - 7/25/2024:  6:44 AM  6.1 8.2 228    28.1      CMP - 7/25/2024:  6:44 AM  8.5 4.6 50 --- 0.4   4.1 2.6 32 62      PTT - 7/19/2024:  1:12 PM  1.0   11.1 31     Troponin I, High Sensitivity   Date/Time Value Ref Range Status   04/30/2024 06:12 AM 16 (H) 0 - 13 ng/L Final     BNP   Date/Time Value Ref Range Status   04/17/2024 12:59 PM 77 0 - 99 pg/mL Final   12/07/2023 11:59  (H) 0 - 99 pg/mL Final     Hemoglobin A1C   Date/Time Value Ref Range Status   05/02/2024 11:11 AM 5.4 see below % Final   11/03/2020 12:00 PM 5.8 % Final     Comment:          Diagnosis of Diabetes-Adults   Non-Diabetic: < or = 5.6%   Increased risk for developing diabetes: 5.7-6.4%   Diagnostic of diabetes: > or = 6.5%  .       Monitoring of Diabetes                Age (y)     Therapeutic Goal (%)   Adults:          >18           <7.0    "Pediatrics:    13-18           <7.5                   7-12           <8.0                   0- 6            7.5-8.5   American Diabetes Association. Diabetes Care 33(S1), Jan 2010.       LDL Calculated   Date/Time Value Ref Range Status   05/02/2024 05:35 AM 18 <=99 mg/dL Final     Comment:                                 Near   Borderline      AGE      Desirable  Optimal    High     High     Very High     0-19 Y     0 - 109     ---    110-129   >/= 130     ----    20-24 Y     0 - 119     ---    120-159   >/= 160     ----      >24 Y     0 -  99   100-129  130-159   160-189     >/=190       VLDL   Date/Time Value Ref Range Status   05/02/2024 05:35 AM 29 0 - 40 mg/dL Final   09/01/2021 10:44 AM 11 0 - 40 mg/dL Final   07/07/2020 03:00 PM 22 0 - 40 mg/dL Final   02/03/2020 09:19 AM 22 0 - 40 mg/dL Final      Last I/O:  No intake/output data recorded.    Past Cardiology Tests (Last 3 Years):  EKG:  Electrocardiogram, 12-lead PRN ACS symptoms 07/24/2024      ECG 12 lead 04/30/2024      ECG 12 lead (Clinic Performed) 12/01/2023    Echo:  No results found for this or any previous visit from the past 1095 days.    Ejection Fractions:  No results found for: \"EF\"  Cath:  No results found for this or any previous visit from the past 1095 days.    Stress Test:  Nuclear Stress Test 12/26/2023    Cardiac Imaging:  No results found for this or any previous visit from the past 1095 days.      Past Medical History:  She has a past medical history of Anemia, Anxiety, Cerebral vascular accident (Multi), CKD (chronic kidney disease), COPD (chronic obstructive pulmonary disease) (Multi), Depression, GI (gastrointestinal bleed), Hyperlipidemia, and Hypertension.    She has no past medical history of Diabetes mellitus (Multi) or Renal insufficiency.    Past Surgical History:  She has a past surgical history that includes Hysterectomy and Cholecystectomy.      Social History:  She reports that she quit smoking about 2 years ago. Her " smoking use included cigarettes. She started smoking about 64 years ago. She has a 62 pack-year smoking history. She has never used smokeless tobacco. She reports that she does not drink alcohol and does not use drugs.    Family History:  No family history on file.     Allergies:  Bee venom protein (honey bee)    Inpatient Medications:  Scheduled medications   Medication Dose Route Frequency    acetaminophen  975 mg oral q8h    allopurinol  100 mg oral BID    aspirin  81 mg oral Daily    buPROPion XL  150 mg oral q AM    cholecalciferol  5,000 Units oral Daily    insulin lispro  0-5 Units subcutaneous TID    pantoprazole  40 mg oral BID AC    perflutren lipid microspheres  0.5-10 mL of dilution intravenous Once in imaging    perflutren protein A microsphere  0.5 mL intravenous Once in imaging    polyethylene glycol  17 g oral Daily    rivaroxaban  2.5 mg oral BID    sertraline  100 mg oral Daily    spironolactone  25 mg oral Daily    sulfur hexafluoride microsphr  2 mL intravenous Once in imaging     PRN medications   Medication    albuterol    dextrose    dextrose    dextrose    dextrose    glucagon    glucagon    glucagon    glucagon    naloxone    oxyCODONE     Continuous Medications   Medication Dose Last Rate     Outpatient Medications:  Current Outpatient Medications   Medication Instructions    acetaminophen (TYLENOL) 975 mg, oral, Every 8 hours    albuterol 90 mcg/actuation inhaler 2 puffs, inhalation, Every 6 hours PRN    alendronate (FOSAMAX) 70 mg, oral, Every 7 days    allopurinol (ZYLOPRIM) 100 mg, oral, 2 times daily    aspirin 81 mg EC tablet 1 tablet, oral, Daily    bisacodyl (DULCOLAX) 10 mg, rectal, Daily PRN    buPROPion XL (WELLBUTRIN XL) 150 mg, oral, Every morning    cholecalciferol (Vitamin D-3) 5,000 Units tablet 1 tablet, oral, Daily    empagliflozin (JARDIANCE) 10 mg, oral, Daily    EPINEPHrine 0.3 mg/0.3 mL injection syringe 0.3 mL, intramuscular, Once as needed    ferrous sulfate 325 (65  Fe) MG EC tablet 1 tablet, oral, Daily with breakfast, Do not crush, chew, or split.    insulin lispro (HUMALOG) 0-5 Units, subcutaneous, 3 times daily (morning, midday, late afternoon), Take as directed per insulin instructions.    losartan (COZAAR) 100 mg, oral, Daily    metoprolol succinate XL (TOPROL-XL) 25 mg, oral, Daily, Do not crush or chew.    metoprolol succinate XL (TOPROL-XL) 100 mg, oral, Daily, Do not crush or chew.    multivitamin with minerals tablet 1 tablet, oral, Daily    pantoprazole (PROTONIX) 40 mg, oral, 2 times daily before meals, Do not crush, chew, or split.    polyethylene glycol (GLYCOLAX, MIRALAX) 17 g, oral, 2 times daily    povidone-iodine (Betadine) 10 % ointment Topical, Daily    rivaroxaban (XARELTO) 2.5 mg, oral, 2 times daily (morning and late afternoon)    sertraline (ZOLOFT) 100 mg, oral, Daily    spironolactone (ALDACTONE) 25 mg, oral, Daily    Trelegy Ellipta 200-62.5-25 mcg blister with device 1 puff, inhalation, Daily     Vascular US Ankle Brachial Index (RONALD) Without Exercise 05/02/2024    [place] [address]  Tel [phone] and Fax [phone]      Vascular Lab Report  [facility] US ANKLE BRACHIAL INDEX (RONALD) WITHOUT EXERCISE      Patient Name:      DORIAN Guerrero Physician:  [id number] Jose Cazares DO  Study Date:        5/2/2024             Ordering Physician: [id number] SUZETTE ARANGO  MRN/PID:           [id number]             Technologist:       Ambrosio De La Rosa T  Accession#:        [id number]         Technologist 2:  Date of Birth/Age: 1945 / 79 years Encounter#:         [id number]  Gender:            F  Admission Status:  Inpatient            Location Performed: [facility]      Diagnosis/ICD: Peripheral vascular disease, unspecified-I73.9  CPT Codes:     75646 Peripheral artery RONALD Only      **CRITICAL RESULT**  Critical Result: Severe PAD in right leg.  Notification called to Suzette Arango MD  on 5/2/2024 at 10:59:13 AM by Ambrosio De La Rosa RVT.    CONCLUSIONS:  Right Lower PVR: Evidence of severe arterial occlusive disease in the right lower extremity at rest. Decreased digital perfusion noted. Biphasic flow is noted in the right common femoral artery.  Left Lower PVR: Evidence of moderate arterial occlusive disease in the left lower extremity at rest. Decreased digital perfusion noted. Monophasic flow is noted in the left dorsalis pedis artery and left posterior tibial artery. Biphasic flow is noted in the left common femoral artery.    Imaging & Doppler Findings:    RIGHT Lower PVR                Pressures Ratios  Right Posterior Tibial (Ankle) 0 mmHg    0.00  Right Dorsalis Pedis (Ankle)   0 mmHg    0.00  Right Digit (Great Toe)        31 mmHg   0.19    LEFT Lower PVR                Pressures Ratios  Left Posterior Tibial (Ankle) 57 mmHg   0.35  Left Dorsalis Pedis (Ankle)   85 mmHg   0.52  Left Digit (Great Toe)        49 mmHg   0.30    Left  Brachial Pressure 162 mmHg    CT angio aorta and bilateral iliofemoral runoff w and or wo IV contrast  5/2/2024  Impression   1. No evidence of active gastrointestinal bleeding.  2. Severe atherosclerosis of the bilateral lower extremity arteries, most pronounced in the superficial femoral and popliteal arteries  bilaterally where there is moderate to high-grade stenosis.  Evaluation of the arteries below the knees is limited due to venous  contamination.  3. No acute abnormality within the abdomen or pelvis.  4. Patchy ground-glass opacities within the partially visualized  right middle lobe which may be seen in the setting of an infectious  or inflammatory process.       Physical Exam:  Constitutional:  NAD, pleasant, cooperative     Head/Neck: Neck supple, No JVD, no carotid bruits           Respiratory/Thorax: CTAB, thorax symmetric     Cardiovascular: Regular, rate and rhythm, no murmurs, normal S 1 and S 2    Gastrointestinal: obese, soft, non-tender, +BS        Skin: Warm and dry             Extremities: SMITH, RLE +2 edema, LLE +1 edema, RLE wound with dressing, monophasic LLE Doppler signal of PT/DP, no signal on Rt DP/PT   Neuro: non-focal, awake/alert/oriented x3, Right foot with minimal ability to dorsiflex or plantarflex   Psychological: Appropriate mood and behavior        Assessment/Plan   Brooklyn Read is a pleasant 79 y.o. female with CLTI of the right leg with a wounds. Has multifocal SFA disease per VS angiogram. RONALD/TBI Rt 0/0.19 Lt 0.52/0.30. Pt currently on ASA and Xarelto 2.5 BID.       RLE, CLTI RC-V  RLE ulcers      - tentative planning for angioplasty with Dr Mcfadden.  - Continue ASA, hold Xarelto for now will resume post procedure.  - Start high intensity statin therapy  - Wound care per podiatry recs  - All other care per primary team.         Peripheral IV 07/23/24 20 G Left;Posterior Wrist (Active)   Site Assessment Clean;Dry;Intact 07/25/24 0811   Dressing Status Clean;Dry 07/25/24 0811   Number of days: 2       Code Status:  DNR        PEGGY Dixon-CNP  Endovascular/Limb Salvage Service   Day: 46762/Haiku/Night [facility]VI [id number]

## 2024-07-25 NOTE — POST-PROCEDURE NOTE
Physician Transition of Care Summary  Invasive Cardiovascular Lab    Procedure Date: 7/25/2024  Attending:    * Guanako Mcfadden - Primary  Resident/Fellow/Other Assistant: Surgeons and Role:     * Ryan Stroud MD - Fellow    Indications:   Pre-op Diagnosis      * Atherosclerosis of native arteries of extremities with rest pain, right leg (Multi) [I70.221]    Post-procedure diagnosis:   Post-op Diagnosis     * Atherosclerosis of native arteries of extremities with rest pain, right leg (Multi) [I70.221]    Procedure(s):   Lower Extremity Angiogram  71868 - CHG ANGIOGRAPHY EXTREMITY UNILATERAL RS&I    CHG ANGIOGRAPHY EXTREMITY UNILATERAL RS&I [57763]    Procedure Findings:   Complete occlusion of mid-distal SFA  Successful angioplasty/DCB of SFA     Access/Closure:  Left Common femoral artery/Angioseal      Complications:   None    Stents/Implants:   Implants       No implant documentation for this case.            Anticoagulation/Antiplatelet Plan:   Xarelto and Plavix    Estimated Blood Loss:   10 mL    Anesthesia: Moderate Sedation Anesthesia Staff: No anesthesia staff entered.    Any Specimen(s) Removed:   No specimens collected during this procedure.    Disposition:   Inpatient      Electronically signed by: Ryan Storud MD, 7/25/2024 3:15 PM

## 2024-07-26 ENCOUNTER — HOME CARE VISIT (OUTPATIENT)
Dept: HOME HEALTH SERVICES | Facility: HOME HEALTH | Age: 79
End: 2024-07-26
Payer: MEDICARE

## 2024-07-26 ENCOUNTER — PHARMACY VISIT (OUTPATIENT)
Dept: PHARMACY | Facility: CLINIC | Age: 79
End: 2024-07-26
Payer: MEDICARE

## 2024-07-26 VITALS
RESPIRATION RATE: 16 BRPM | OXYGEN SATURATION: 94 % | WEIGHT: 138.01 LBS | DIASTOLIC BLOOD PRESSURE: 77 MMHG | TEMPERATURE: 98.4 F | HEART RATE: 94 BPM | HEIGHT: 62 IN | BODY MASS INDEX: 25.4 KG/M2 | SYSTOLIC BLOOD PRESSURE: 167 MMHG

## 2024-07-26 LAB
ANION GAP SERPL CALC-SCNC: 16 MMOL/L (ref 10–20)
BUN SERPL-MCNC: 14 MG/DL (ref 6–23)
CALCIUM SERPL-MCNC: 8.9 MG/DL (ref 8.6–10.6)
CHLORIDE SERPL-SCNC: 102 MMOL/L (ref 98–107)
CO2 SERPL-SCNC: 24 MMOL/L (ref 21–32)
CREAT SERPL-MCNC: 0.92 MG/DL (ref 0.5–1.05)
EGFRCR SERPLBLD CKD-EPI 2021: 64 ML/MIN/1.73M*2
ERYTHROCYTE [DISTWIDTH] IN BLOOD BY AUTOMATED COUNT: 16.3 % (ref 11.5–14.5)
GLUCOSE BLD MANUAL STRIP-MCNC: 95 MG/DL (ref 74–99)
GLUCOSE BLD MANUAL STRIP-MCNC: 99 MG/DL (ref 74–99)
GLUCOSE SERPL-MCNC: 128 MG/DL (ref 74–99)
HCT VFR BLD AUTO: 31 % (ref 36–46)
HGB BLD-MCNC: 9.1 G/DL (ref 12–16)
MAGNESIUM SERPL-MCNC: 1.87 MG/DL (ref 1.6–2.4)
MCH RBC QN AUTO: 24.3 PG (ref 26–34)
MCHC RBC AUTO-ENTMCNC: 29.4 G/DL (ref 32–36)
MCV RBC AUTO: 83 FL (ref 80–100)
NRBC BLD-RTO: 0 /100 WBCS (ref 0–0)
PLATELET # BLD AUTO: 251 X10*3/UL (ref 150–450)
POTASSIUM SERPL-SCNC: 4 MMOL/L (ref 3.5–5.3)
RBC # BLD AUTO: 3.75 X10*6/UL (ref 4–5.2)
SODIUM SERPL-SCNC: 138 MMOL/L (ref 136–145)
WBC # BLD AUTO: 6.6 X10*3/UL (ref 4.4–11.3)

## 2024-07-26 PROCEDURE — 99232 SBSQ HOSP IP/OBS MODERATE 35: CPT | Performed by: NURSE PRACTITIONER

## 2024-07-26 PROCEDURE — 85027 COMPLETE CBC AUTOMATED: CPT

## 2024-07-26 PROCEDURE — 83735 ASSAY OF MAGNESIUM: CPT

## 2024-07-26 PROCEDURE — 2500000001 HC RX 250 WO HCPCS SELF ADMINISTERED DRUGS (ALT 637 FOR MEDICARE OP)

## 2024-07-26 PROCEDURE — 1100000001 HC PRIVATE ROOM DAILY

## 2024-07-26 PROCEDURE — RXMED WILLOW AMBULATORY MEDICATION CHARGE

## 2024-07-26 PROCEDURE — 82947 ASSAY GLUCOSE BLOOD QUANT: CPT

## 2024-07-26 PROCEDURE — 2500000002 HC RX 250 W HCPCS SELF ADMINISTERED DRUGS (ALT 637 FOR MEDICARE OP, ALT 636 FOR OP/ED)

## 2024-07-26 PROCEDURE — 36415 COLL VENOUS BLD VENIPUNCTURE: CPT

## 2024-07-26 PROCEDURE — 80048 BASIC METABOLIC PNL TOTAL CA: CPT

## 2024-07-26 PROCEDURE — 2500000001 HC RX 250 WO HCPCS SELF ADMINISTERED DRUGS (ALT 637 FOR MEDICARE OP): Performed by: STUDENT IN AN ORGANIZED HEALTH CARE EDUCATION/TRAINING PROGRAM

## 2024-07-26 RX ORDER — CLOPIDOGREL BISULFATE 75 MG/1
75 TABLET ORAL DAILY
Qty: 30 TABLET | Refills: 2 | Status: SHIPPED | OUTPATIENT
Start: 2024-07-26 | End: 2024-10-24

## 2024-07-26 RX ORDER — CLOPIDOGREL BISULFATE 75 MG/1
75 TABLET ORAL DAILY
Status: DISCONTINUED | OUTPATIENT
Start: 2024-07-26 | End: 2024-07-26 | Stop reason: HOSPADM

## 2024-07-26 NOTE — PROGRESS NOTES
Subjective Data:  Pt seen and assessed at the bedside. Sitting at the side of the. She is doing well. No c/o pain. Pt updated on plan for follow up and medications.     Overnight Events:    None reported      Objective Data:  Last Recorded Vitals:  Vitals:    07/25/24 2005 07/26/24 0037 07/26/24 0440 07/26/24 0808   BP: 158/68 102/60 154/72 142/73   Pulse: 76 77 85 85   Resp: 18 18 18 18   Temp: 36.7 °C (98.1 °F) 36.7 °C (98.1 °F) 36.7 °C (98.1 °F) 36.7 °C (98.1 °F)   TempSrc: Temporal Temporal Temporal    SpO2: 95% 94% 93% 95%   Weight:       Height:           Last Labs:  CBC - 7/26/2024:  9:06 AM  6.6 9.1 251    31.0      CMP - 7/25/2024:  6:44 AM  8.5 4.6 50 --- 0.4   4.1 2.6 32 62      PTT - 7/19/2024:  1:12 PM  1.0   11.1 31     TROPHS   Date/Time Value Ref Range Status   04/30/2024 06:12 AM 16 0 - 13 ng/L Final     BNP   Date/Time Value Ref Range Status   04/17/2024 12:59 PM 77 0 - 99 pg/mL Final   12/07/2023 11:59  0 - 99 pg/mL Final     HGBA1C   Date/Time Value Ref Range Status   05/02/2024 11:11 AM 5.4 see below % Final   11/03/2020 12:00 PM 5.8 % Final     Comment:          Diagnosis of Diabetes-Adults   Non-Diabetic: < or = 5.6%   Increased risk for developing diabetes: 5.7-6.4%   Diagnostic of diabetes: > or = 6.5%  .       Monitoring of Diabetes                Age (y)     Therapeutic Goal (%)   Adults:          >18           <7.0   Pediatrics:    13-18           <7.5                   7-12           <8.0                   0- 6            7.5-8.5   American Diabetes Association. Diabetes Care 33(S1), Jan 2010.       LDLCALC   Date/Time Value Ref Range Status   05/02/2024 05:35 AM 18 <=99 mg/dL Final     Comment:                                 Near   Borderline      AGE      Desirable  Optimal    High     High     Very High     0-19 Y     0 - 109     ---    110-129   >/= 130     ----    20-24 Y     0 - 119     ---    120-159   >/= 160     ----      >24 Y     0 -  99   100-129  130-159   160-189      >/=190       VLDL   Date/Time Value Ref Range Status   05/02/2024 05:35 AM 29 0 - 40 mg/dL Final   09/01/2021 10:44 AM 11 0 - 40 mg/dL Final   07/07/2020 03:00 PM 22 0 - 40 mg/dL Final   02/03/2020 09:19 AM 22 0 - 40 mg/dL Final      Last I/O:  I/O last 3 completed shifts:  In: 360 (5.8 mL/kg) [P.O.:360]  Out: 10 (0.2 mL/kg) [Blood:10]  Weight: 62.6 kg     Past Cardiology Tests (Last 3 Years):  EKG:  Electrocardiogram, 12-lead PRN ACS symptoms 07/24/2024      ECG 12 lead 04/30/2024      ECG 12 lead (Clinic Performed) 12/01/2023    Echo:  Transthoracic Echo (TTE) Limited 07/25/2024    Ejection Fractions:  EF   Date/Time Value Ref Range Status   07/25/2024 09:41 AM 71 %      Cath:  No results found for this or any previous visit from the past 1095 days.    Stress Test:  Nuclear Stress Test 12/26/2023    Cardiac Imaging:  No results found for this or any previous visit from the past 1095 days.      Inpatient Medications:  Scheduled medications   Medication Dose Route Frequency    acetaminophen  975 mg oral q8h    allopurinol  100 mg oral BID    buPROPion XL  150 mg oral q AM    cholecalciferol  5,000 Units oral Daily    clopidogrel  75 mg oral Daily    insulin lispro  0-5 Units subcutaneous TID    pantoprazole  40 mg oral BID AC    perflutren lipid microspheres  0.5-10 mL of dilution intravenous Once in imaging    perflutren protein A microsphere  0.5 mL intravenous Once in imaging    polyethylene glycol  17 g oral Daily    rivaroxaban  2.5 mg oral BID    sertraline  100 mg oral Daily    spironolactone  25 mg oral Daily    sulfur hexafluoride microsphr  2 mL intravenous Once in imaging     PRN medications   Medication    albuterol    dextrose    dextrose    dextrose    dextrose    glucagon    glucagon    glucagon    glucagon    naloxone    oxyCODONE     Continuous Medications   Medication Dose Last Rate       Physical Exam:  Constitutional:  NAD, pleasant, cooperative     Head/Neck: Neck supple, No JVD, no carotid  bruits           Respiratory/Thorax: CTAB, thorax symmetric     Cardiovascular: Regular, rate and rhythm, no murmurs, normal S 1 and S 2    Gastrointestinal: obese, soft, non-tender, +BS       Skin: Warm and dry, left groin access site soft, nontender, no oozing/hematoma, dressing removed applied island               Extremities: SMITH, RLE +2 edema, LLE +1 edema, RLE wound with Mepliex dressing, multiphasic Doppler signal on Rt DP/PT   monophasic LLE Doppler signal of PT/DP,   Neuro: non-focal, awake/alert/oriented x3, Right foot with minimal ability to dorsiflex or plantarflex   Psychological: Appropriate mood and behavior     Assessment/Plan   Brooklyn Read is a pleasant 79 y.o. female with CLTI of the right leg with a wounds. Has multifocal SFA disease per VS angiogram. Pre procedure RONALD/TBI Rt 0/0.19 Lt 0.52/0.30.         RLE, CLTI RC-V  RLE ulcers      7/25 s/p Successful angioplasty/DCB of SFA   - Continue Xarelto 2.5 BID, start Plavix 75 daily was loaded in the cath lab  - Start high intensity statin therapy  - Wound care per podiatry recs  - 1 month FU in EVLS clinic scheduled   - From our standpoint pt stable to discharge with close follow up at wound clinic   - All other care per primary team.      Peripheral IV 07/23/24 20 G Left;Posterior Wrist (Active)   Site Assessment Clean;Dry;Intact 07/25/24 2107   Dressing Type Transparent 07/25/24 2107   Line Status Flushed;Saline locked 07/25/24 2107   Dressing Status Clean;Dry;Occlusive 07/25/24 2107   Dressing Intervention Dressing changed 07/25/24 2107   Number of days: 3       Code Status:  DNR    I spent 30 minutes in the professional and overall care of this patient.    Thank you for the consult, If you have a questions or concerns please fo not hesitate to contact us. EV will continue to follow patient.      PEGGY Dixon-CNP  Endovascular/Limb Salvage Service   Day: 99923/Haiku/Night [facility] [id number]

## 2024-07-26 NOTE — CARE PLAN
The patient's goals for the shift include      The clinical goals for the shift include patient will be free from falls throughout shift      Problem: Skin  Goal: Decreased wound size/increased tissue granulation at next dressing change  Outcome: Progressing  Goal: Participates in plan/prevention/treatment measures  Outcome: Progressing  Goal: Prevent/manage excess moisture  Outcome: Progressing  Goal: Prevent/minimize sheer/friction injuries  Outcome: Progressing  Goal: Promote/optimize nutrition  Outcome: Progressing  Goal: Promote skin healing  Outcome: Progressing     Problem: Fall/Injury  Goal: Not fall by end of shift  Outcome: Progressing  Goal: Be free from injury by end of the shift  Outcome: Progressing  Goal: Pace activities to prevent fatigue by end of the shift  Outcome: Progressing

## 2024-07-26 NOTE — NURSING NOTE
Pt given all discharge instructions.  Pt does not have any further questions.  Attempted to call daughter to pick her up and had to LM.

## 2024-07-29 ENCOUNTER — PATIENT OUTREACH (OUTPATIENT)
Dept: CARE COORDINATION | Facility: CLINIC | Age: 79
End: 2024-07-29
Payer: MEDICARE

## 2024-07-29 NOTE — PROGRESS NOTES
Outreach call to patient to support a smooth transition of care from recent admission.  No answer.  Enrolled patient in [employer] chatbot for additional support and patient education through transition period.  Will continue to monitor through transition period.  Rohit Collazo RN [facility]  [phone]     Verbal/bedside report received from Autumn Valentin RN using [place] [facility] [place] SBAR

## 2024-07-31 ENCOUNTER — TELEPHONE (OUTPATIENT)
Dept: PRIMARY CARE | Facility: CLINIC | Age: 79
End: 2024-07-31
Payer: MEDICARE

## 2024-07-31 DIAGNOSIS — I10 PRIMARY HYPERTENSION: Primary | ICD-10-CM

## 2024-07-31 RX ORDER — METOPROLOL SUCCINATE 100 MG/1
100 TABLET, EXTENDED RELEASE ORAL DAILY
Qty: 90 TABLET | Refills: 1 | Status: SHIPPED | OUTPATIENT
Start: 2024-07-31

## 2024-07-31 NOTE — PROGRESS NOTES
Subjective   Patient ID: Brooklyn Read is a 79 y.o. female who presents for No chief complaint on file..  HPI    Patient Active Problem List   Diagnosis    Chronic obstructive pulmonary disease (Multi)    Cerebrovascular accident (CVA) (Multi)    Acute respiratory failure (Multi)    Edema, unspecified    Orthopnea    Chronic diastolic heart failure (Multi)    Arteriosclerosis of coronary artery    Aortic valve regurgitation    Unspecified dementia, mild, without behavioral disturbance, psychotic disturbance, mood disturbance, and anxiety (Multi)    Suspected severe acute respiratory syndrome coronavirus 2 (SARS-CoV-2) infection    Senile osteoporosis    Pneumonia due to infectious organism    Nicotine dependence    Mixed hyperlipidemia    Hypokalemia    Primary hypertension    Chronic gout without tophus    Chills    Bronchitis    Acute cough    Weakness    Anemia    Vitamin D deficiency, unspecified    Neoplasm of uncertain behavior of lung    Recurrent major depressive disorder, in full remission (CMS-HCC)    Stage 3a chronic kidney disease (Multi)    Upper GI bleeding    Acute blood loss anemia (ABLA)    Acute kidney injury (CMS-HCC)    Agatston coronary artery calcium score greater than 400    Upper GI bleed    Injury of right lower extremity    Varicose ulcer of lower extremity (Multi)    PVD (peripheral vascular disease) (CMS-HCC)    Atherosclerosis of native arteries of extremities with rest pain, right leg (Multi)       Social Connections: Socially Isolated (5/28/2024)    Social Connection and Isolation Panel [NHANES]     Frequency of Communication with Friends and Family: More than three times a week     Frequency of Social Gatherings with Friends and Family: More than three times a week     Attends [employer] Services: Never     Active Member of Clubs or Organizations: No     Attends Club or Organization Meetings: Never     Marital Status:        Current Outpatient Medications on File Prior to  Visit   Medication Sig Dispense Refill    acetaminophen (Tylenol) 325 mg tablet Take 3 tablets (975 mg) by mouth every 8 hours.      albuterol 90 mcg/actuation inhaler Inhale 2 puffs every 6 hours if needed for wheezing. 18 g 3    alendronate (Fosamax) 70 mg tablet Take 1 tablet (70 mg) by mouth every 7 days.      allopurinol (Zyloprim) 100 mg tablet Take 1 tablet (100 mg) by mouth 2 times a day. 180 tablet 3    buPROPion XL (Wellbutrin XL) 150 mg 24 hr tablet Take 1 tablet (150 mg) by mouth once daily in the morning. 90 tablet 3    cholecalciferol (Vitamin D-3) 5,000 Units tablet Take 1 tablet (5,000 Units) by mouth once daily.      clopidogrel (Plavix) 75 mg tablet Take 1 tablet (75 mg) by mouth once daily. 30 tablet 2    empagliflozin (Jardiance) 10 mg Take 1 tablet (10 mg) by mouth once daily. 30 tablet 11    EPINEPHrine 0.3 mg/0.3 mL injection syringe Inject 0.3 mL (0.3 mg) into the muscle 1 time if needed for anaphylaxis.      insulin lispro (HumaLOG) 100 unit/mL injection Inject 0-0.05 mL (0-5 Units) under the skin 3 times a day with meals. Take as directed per insulin instructions. (Patient not taking: Reported on 7/15/2024)      losartan (Cozaar) 100 mg tablet Take 1 tablet (100 mg) by mouth once daily. (Patient not taking: Reported on 7/23/2024) 90 tablet 3    metoprolol succinate XL (Toprol-XL) 100 mg 24 hr tablet Take 1 tablet (100 mg) by mouth once daily. Do not crush or chew.      multivitamin with minerals tablet Take 1 tablet by mouth once daily.      pantoprazole (ProtoNix) 40 mg EC tablet Take 1 tablet (40 mg) by mouth 2 times a day before meals. Do not crush, chew, or split. 60 tablet 2    polyethylene glycol (Glycolax, Miralax) 17 gram packet Take 17 g by mouth 2 times a day.      povidone-iodine (Betadine) 10 % ointment Apply topically once daily.      rivaroxaban (Xarelto) 2.5 mg tablet Take 1 tablet (2.5 mg) by mouth 2 times daily (morning and late afternoon). 60 tablet 0    sertraline  (Zoloft) 100 mg tablet Take 1 tablet (100 mg) by mouth once daily. 90 tablet 3    spironolactone (Aldactone) 25 mg tablet Take 1 tablet (25 mg) by mouth once daily. 30 tablet 2    Trelegy Ellipta 200-62.5-25 mcg blister with device Inhale 1 puff once daily. 3 each 2    [DISCONTINUED] aspirin 81 mg EC tablet Take 1 tablet (81 mg) by mouth once daily.      [DISCONTINUED] bisacodyl (Dulcolax) 10 mg suppository Insert 1 suppository (10 mg) into the rectum once daily as needed for constipation. (Patient not taking: Reported on 7/23/2024)      [DISCONTINUED] ferrous sulfate 325 (65 Fe) MG EC tablet Take 1 tablet by mouth once daily with breakfast. Do not crush, chew, or split. (Patient not taking: Reported on 7/23/2024) 30 tablet 11    [DISCONTINUED] metoprolol succinate XL (Toprol-XL) 25 mg 24 hr tablet Take 1 tablet (25 mg) by mouth once daily. Do not crush or chew. (Patient not taking: Reported on 7/23/2024) 30 tablet 2     No current facility-administered medications on file prior to visit.        There were no vitals filed for this visit.  There were no vitals filed for this visit.    Review of Systems    Objective     Physical Exam    Preadmission on 07/29/2024   Component Date Value Ref Range Status    POCT Activated Clotting Time Low R* 07/25/2024 297 (H)  83 - 199 sec Final     Target ACT range will vary based on the patient population,   clinical status, and surgical intervention occurring.   Admission on 07/23/2024, Discharged on 07/26/2024   Component Date Value Ref Range Status    ABO TYPE 07/23/2024 O   Final    Rh TYPE 07/23/2024 POS   Final    ANTIBODY SCREEN 07/23/2024 NEG   Final    POCT Activated Clotting Time Low R* 07/23/2024 227 (H)  83 - 199 sec Final     Target ACT range will vary based on the patient population,   clinical status, and surgical intervention occurring.    POCT Activated Clotting Time Low R* 07/23/2024 260 (H)  83 - 199 sec Final     Target ACT range will vary based on the patient  population,   clinical status, and surgical intervention occurring.    POCT Glucose 07/23/2024 84  74 - 99 mg/dL Final    WBC 07/24/2024 7.0  4.4 - 11.3 x10*3/uL Final    nRBC 07/24/2024 0.0  0.0 - 0.0 /100 WBCs Final    RBC 07/24/2024 3.40 (L)  4.00 - 5.20 x10*6/uL Final    Hemoglobin 07/24/2024 8.4 (L)  12.0 - 16.0 g/dL Final    Hematocrit 07/24/2024 29.9 (L)  36.0 - 46.0 % Final    MCV 07/24/2024 88  80 - 100 fL Final    MCH 07/24/2024 24.7 (L)  26.0 - 34.0 pg Final    MCHC 07/24/2024 28.1 (L)  32.0 - 36.0 g/dL Final    RDW 07/24/2024 16.2 (H)  11.5 - 14.5 % Final    Platelets 07/24/2024 217  150 - 450 x10*3/uL Final    Glucose 07/24/2024 89  74 - 99 mg/dL Final    Sodium 07/24/2024 138  136 - 145 mmol/L Final    Potassium 07/24/2024 4.4  3.5 - 5.3 mmol/L Final    Chloride 07/24/2024 105  98 - 107 mmol/L Final    Bicarbonate 07/24/2024 27  21 - 32 mmol/L Final    Anion Gap 07/24/2024 10  10 - 20 mmol/L Final    Urea Nitrogen 07/24/2024 19  6 - 23 mg/dL Final    Creatinine 07/24/2024 1.01  0.50 - 1.05 mg/dL Final    eGFR 07/24/2024 57 (L)  >60 mL/min/1.73m*2 Final    Calculations of estimated GFR are performed using the 2021 CKD-EPI Study Refit equation without the race variable for the IDMS-Traceable creatinine methods.  https://jasn.asnjournals.org/content/early/2021/09/22/ASN.9954188008    Calcium 07/24/2024 9.0  8.6 - 10.6 mg/dL Final    Magnesium 07/24/2024 1.94  1.60 - 2.40 mg/dL Final    POCT Glucose 07/23/2024 105 (H)  74 - 99 mg/dL Final    LVOT diam 07/25/2024 2.00  cm Final    MV E/A ratio 07/25/2024 0.84   Final    LA vol index A/L 07/25/2024 30.0  ml/m2 Final    Tricuspid annular plane systolic e* 07/25/2024 2.4  cm Final    LV EF 07/25/2024 71  % Final    RV free wall pk S' 07/25/2024 13.30  cm/s Final    LVIDd 07/25/2024 4.60  cm Final    RVSP 07/25/2024 26.4  mmHg Final    LV A4C EF 07/25/2024 75.8   Final    POCT Glucose 07/24/2024 84  74 - 99 mg/dL Final    POCT Glucose 07/24/2024 82  74 - 99  mg/dL Final    POCT Glucose 07/24/2024 100 (H)  74 - 99 mg/dL Final    Ventricular Rate 07/24/2024 65  BPM Final    Atrial Rate 07/24/2024 65  BPM Final    ND Interval 07/24/2024 162  ms Final    QRS Duration 07/24/2024 76  ms Final    QT Interval 07/24/2024 424  ms Final    QTC Calculation(Bazett) 07/24/2024 440  ms Final    P Axis 07/24/2024 64  degrees Final    R Axis 07/24/2024 67  degrees Final    T Axis 07/24/2024 60  degrees Final    QRS Count 07/24/2024 10  beats Final    Q Onset 07/24/2024 222  ms Final    P Onset 07/24/2024 141  ms Final    P Offset 07/24/2024 191  ms Final    T Offset 07/24/2024 434  ms Final    QTC Fredericia 07/24/2024 435  ms Final    POCT Glucose 07/24/2024 97  74 - 99 mg/dL Final    WBC 07/25/2024 6.1  4.4 - 11.3 x10*3/uL Final    nRBC 07/25/2024 0.0  0.0 - 0.0 /100 WBCs Final    RBC 07/25/2024 3.31 (L)  4.00 - 5.20 x10*6/uL Final    Hemoglobin 07/25/2024 8.2 (L)  12.0 - 16.0 g/dL Final    Hematocrit 07/25/2024 28.1 (L)  36.0 - 46.0 % Final    MCV 07/25/2024 85  80 - 100 fL Final    MCH 07/25/2024 24.8 (L)  26.0 - 34.0 pg Final    MCHC 07/25/2024 29.2 (L)  32.0 - 36.0 g/dL Final    RDW 07/25/2024 15.9 (H)  11.5 - 14.5 % Final    Platelets 07/25/2024 228  150 - 450 x10*3/uL Final    Glucose 07/25/2024 86  74 - 99 mg/dL Final    Sodium 07/25/2024 140  136 - 145 mmol/L Final    Potassium 07/25/2024 3.9  3.5 - 5.3 mmol/L Final    Chloride 07/25/2024 104  98 - 107 mmol/L Final    Bicarbonate 07/25/2024 26  21 - 32 mmol/L Final    Anion Gap 07/25/2024 14  10 - 20 mmol/L Final    Urea Nitrogen 07/25/2024 17  6 - 23 mg/dL Final    Creatinine 07/25/2024 0.97  0.50 - 1.05 mg/dL Final    eGFR 07/25/2024 60 (L)  >60 mL/min/1.73m*2 Final    Calculations of estimated GFR are performed using the 2021 CKD-EPI Study Refit equation without the race variable for the IDMS-Traceable creatinine methods.  https://jasn.asnjournals.org/content/early/2021/09/22/ASN.7678094323    Calcium 07/25/2024 8.5 (L)   8.6 - 10.6 mg/dL Final    Magnesium 07/25/2024 1.88  1.60 - 2.40 mg/dL Final    POCT Glucose 07/24/2024 101 (H)  74 - 99 mg/dL Final    POCT Glucose 07/25/2024 88  74 - 99 mg/dL Final    POCT Glucose 07/25/2024 98  74 - 99 mg/dL Final    POCT Glucose 07/25/2024 97  74 - 99 mg/dL Final    WBC 07/26/2024 6.6  4.4 - 11.3 x10*3/uL Final    nRBC 07/26/2024 0.0  0.0 - 0.0 /100 WBCs Final    RBC 07/26/2024 3.75 (L)  4.00 - 5.20 x10*6/uL Final    Hemoglobin 07/26/2024 9.1 (L)  12.0 - 16.0 g/dL Final    Hematocrit 07/26/2024 31.0 (L)  36.0 - 46.0 % Final    MCV 07/26/2024 83  80 - 100 fL Final    MCH 07/26/2024 24.3 (L)  26.0 - 34.0 pg Final    MCHC 07/26/2024 29.4 (L)  32.0 - 36.0 g/dL Final    RDW 07/26/2024 16.3 (H)  11.5 - 14.5 % Final    Platelets 07/26/2024 251  150 - 450 x10*3/uL Final    Glucose 07/26/2024 128 (H)  74 - 99 mg/dL Final    Sodium 07/26/2024 138  136 - 145 mmol/L Final    Potassium 07/26/2024 4.0  3.5 - 5.3 mmol/L Final    Chloride 07/26/2024 102  98 - 107 mmol/L Final    Bicarbonate 07/26/2024 24  21 - 32 mmol/L Final    Anion Gap 07/26/2024 16  10 - 20 mmol/L Final    Urea Nitrogen 07/26/2024 14  6 - 23 mg/dL Final    Creatinine 07/26/2024 0.92  0.50 - 1.05 mg/dL Final    eGFR 07/26/2024 64  >60 mL/min/1.73m*2 Final    Calculations of estimated GFR are performed using the 2021 CKD-EPI Study Refit equation without the race variable for the IDMS-Traceable creatinine methods.  https://jasn.asnjournals.org/content/early/2021/09/22/ASN.7653509729    Calcium 07/26/2024 8.9  8.6 - 10.6 mg/dL Final    Magnesium 07/26/2024 1.87  1.60 - 2.40 mg/dL Final    POCT Glucose 07/25/2024 89  74 - 99 mg/dL Final    POCT Glucose 07/26/2024 99  74 - 99 mg/dL Final    POCT Glucose 07/26/2024 95  74 - 99 mg/dL Final   Lab Requisition on 07/19/2024   Component Date Value Ref Range Status    ABO TYPE 07/19/2024 O   Final    Rh TYPE 07/19/2024 POS   Final    ANTIBODY SCREEN 07/19/2024 NEG   Final   Lab on 07/19/2024    Component Date Value Ref Range Status    Protime 07/19/2024 11.1  9.8 - 12.8 seconds Final    INR 07/19/2024 1.0  0.9 - 1.1 Final    aPTT 07/19/2024 31  27 - 38 seconds Final    WBC 07/19/2024 7.4  4.4 - 11.3 x10*3/uL Final    nRBC 07/19/2024 0.0  0.0 - 0.0 /100 WBCs Final    RBC 07/19/2024 3.83 (L)  4.00 - 5.20 x10*6/uL Final    Hemoglobin 07/19/2024 9.5 (L)  12.0 - 16.0 g/dL Final    Hematocrit 07/19/2024 33.2 (L)  36.0 - 46.0 % Final    MCV 07/19/2024 87  80 - 100 fL Final    MCH 07/19/2024 24.8 (L)  26.0 - 34.0 pg Final    MCHC 07/19/2024 28.6 (L)  32.0 - 36.0 g/dL Final    RDW 07/19/2024 16.5 (H)  11.5 - 14.5 % Final    Platelets 07/19/2024 267  150 - 450 x10*3/uL Final    Glucose 07/19/2024 128 (H)  74 - 99 mg/dL Final    Sodium 07/19/2024 139  136 - 145 mmol/L Final    Potassium 07/19/2024 4.8  3.5 - 5.3 mmol/L Final    Chloride 07/19/2024 106  98 - 107 mmol/L Final    Bicarbonate 07/19/2024 26  21 - 32 mmol/L Final    Anion Gap 07/19/2024 12  10 - 20 mmol/L Final    Urea Nitrogen 07/19/2024 25 (H)  6 - 23 mg/dL Final    Creatinine 07/19/2024 1.22 (H)  0.50 - 1.05 mg/dL Final    eGFR 07/19/2024 46 (L)  >60 mL/min/1.73m*2 Final    Calculations of estimated GFR are performed using the 2021 CKD-EPI Study Refit equation without the race variable for the IDMS-Traceable creatinine methods.  https://jasn.asnjournals.org/content/early/2021/09/22/ASN.2856796580    Calcium 07/19/2024 9.3  8.6 - 10.3 mg/dL Final       Assessment/Plan

## 2024-08-05 ENCOUNTER — PATIENT OUTREACH (OUTPATIENT)
Dept: CARE COORDINATION | Facility: CLINIC | Age: 79
End: 2024-08-05
Payer: MEDICARE

## 2024-08-05 NOTE — PROGRESS NOTES
Outreach call to follow up on conversa alerts, phone rung sounds like someone picked up but did not say anything after me saying hello.

## 2024-08-12 ENCOUNTER — PATIENT OUTREACH (OUTPATIENT)
Dept: CARE COORDINATION | Facility: CLINIC | Age: 79
End: 2024-08-12
Payer: MEDICARE

## 2024-08-12 NOTE — PROGRESS NOTES
Outreach call to fu on PCP visit after dc and shannan alert, pt states she made her fu appt for 8/14/24 and she had no other questions or concerns. Will continue to follow.  Rohit Collazo RN [facility] [place]  [phone]

## 2024-08-12 NOTE — PROGRESS NOTES
EDUARDO outreach after provider fu appt completed, checked off task from previous outreach call documented.   Rohit Collazo RN [facility] [place]  [phone]

## 2024-08-12 NOTE — PROGRESS NOTES
Outreach call to rita campbell alert, no answer.   Rohit Collazo RN [facility] [place]  [phone]

## 2024-08-14 ENCOUNTER — APPOINTMENT (OUTPATIENT)
Dept: PRIMARY CARE | Facility: CLINIC | Age: 79
End: 2024-08-14
Payer: MEDICARE

## 2024-08-14 VITALS
TEMPERATURE: 97.6 F | BODY MASS INDEX: 25.06 KG/M2 | WEIGHT: 137 LBS | OXYGEN SATURATION: 96 % | SYSTOLIC BLOOD PRESSURE: 124 MMHG | HEART RATE: 69 BPM | DIASTOLIC BLOOD PRESSURE: 64 MMHG

## 2024-08-14 DIAGNOSIS — I73.9 PERIPHERAL VASCULAR DISEASE (CMS-HCC): Primary | ICD-10-CM

## 2024-08-14 PROCEDURE — 3078F DIAST BP <80 MM HG: CPT | Performed by: FAMILY MEDICINE

## 2024-08-14 PROCEDURE — 1159F MED LIST DOCD IN RCRD: CPT | Performed by: FAMILY MEDICINE

## 2024-08-14 PROCEDURE — 1036F TOBACCO NON-USER: CPT | Performed by: FAMILY MEDICINE

## 2024-08-14 PROCEDURE — 3074F SYST BP LT 130 MM HG: CPT | Performed by: FAMILY MEDICINE

## 2024-08-14 PROCEDURE — 99213 OFFICE O/P EST LOW 20 MIN: CPT | Performed by: FAMILY MEDICINE

## 2024-08-14 PROCEDURE — 1111F DSCHRG MED/CURRENT MED MERGE: CPT | Performed by: FAMILY MEDICINE

## 2024-08-14 PROCEDURE — 1123F ACP DISCUSS/DSCN MKR DOCD: CPT | Performed by: FAMILY MEDICINE

## 2024-08-14 RX ORDER — HYDROCODONE BITARTRATE AND ACETAMINOPHEN 5; 325 MG/1; MG/1
1 TABLET ORAL EVERY 6 HOURS PRN
Qty: 28 TABLET | Refills: 0 | Status: SHIPPED | OUTPATIENT
Start: 2024-08-14 | End: 2024-08-21

## 2024-08-14 NOTE — PROGRESS NOTES
Subjective   Patient ID: Brooklyn Read is a 79 y.o. female who presents for Hospital Follow-up ([facility] on 7/24-7/26 Re: PVD).  HPI patient presents for follow-up after she was admitted to the hospital.  She had some stents placed in her leg after some critical right lower extremity limb ischemia.  She was consulted on by the ED VLS team and podiatry.  She reports that she is feeling okay.  Still having a lot of pain in the area.  Having a lot of tingling in her feet.  Denies any fever or chills.  She has been trying to stay active.  Her wound is being dressed daily and is not exuding any foul-smelling odors.    Patient Active Problem List   Diagnosis    Chronic obstructive pulmonary disease (Multi)    Cerebrovascular accident (CVA) (Multi)    Acute respiratory failure (Multi)    Edema, unspecified    Orthopnea    Chronic diastolic heart failure (Multi)    Arteriosclerosis of coronary artery    Aortic valve regurgitation    Unspecified dementia, mild, without behavioral disturbance, psychotic disturbance, mood disturbance, and anxiety (Multi)    Suspected severe acute respiratory syndrome coronavirus 2 (SARS-CoV-2) infection    Senile osteoporosis    Pneumonia due to infectious organism    Nicotine dependence    Mixed hyperlipidemia    Hypokalemia    Primary hypertension    Chronic gout without tophus    Chills    Bronchitis    Acute cough    Weakness    Anemia    Vitamin D deficiency, unspecified    Neoplasm of uncertain behavior of lung    Recurrent major depressive disorder, in full remission (CMS-HCC)    Stage 3a chronic kidney disease (Multi)    Upper GI bleeding    Acute blood loss anemia (ABLA)    Acute kidney injury (CMS-[facility])    Agatston coronary artery calcium score greater than 400    Upper GI bleed    Injury of right lower extremity    Varicose ulcer of lower extremity (Multi)    PVD (peripheral vascular disease) (CMS-HCC)    Atherosclerosis of native arteries of extremities with rest pain, right leg  (Multi)       Social Connections: Socially Isolated (5/28/2024)    Social Connection and Isolation Panel [NHANES]     Frequency of Communication with Friends and Family: More than three times a week     Frequency of Social Gatherings with Friends and Family: More than three times a week     Attends Scientologist Services: Never     Active Member of Clubs or Organizations: No     Attends Club or Organization Meetings: Never     Marital Status:        Current Outpatient Medications on File Prior to Visit   Medication Sig Dispense Refill    acetaminophen (Tylenol) 325 mg tablet Take 3 tablets (975 mg) by mouth every 8 hours.      albuterol 90 mcg/actuation inhaler Inhale 2 puffs every 6 hours if needed for wheezing. 18 g 3    alendronate (Fosamax) 70 mg tablet Take 1 tablet (70 mg) by mouth every 7 days.      allopurinol (Zyloprim) 100 mg tablet Take 1 tablet (100 mg) by mouth 2 times a day. 180 tablet 3    buPROPion XL (Wellbutrin XL) 150 mg 24 hr tablet Take 1 tablet (150 mg) by mouth once daily in the morning. 90 tablet 3    cholecalciferol (Vitamin D-3) 5,000 Units tablet Take 1 tablet (5,000 Units) by mouth once daily.      clopidogrel (Plavix) 75 mg tablet Take 1 tablet (75 mg) by mouth once daily. 30 tablet 2    empagliflozin (Jardiance) 10 mg Take 1 tablet (10 mg) by mouth once daily. 30 tablet 11    EPINEPHrine 0.3 mg/0.3 mL injection syringe Inject 0.3 mL (0.3 mg) into the muscle 1 time if needed for anaphylaxis.      metoprolol succinate XL (Toprol-XL) 100 mg 24 hr tablet Take 1 tablet (100 mg) by mouth once daily. Do not crush or chew. 90 tablet 1    multivitamin with minerals tablet Take 1 tablet by mouth once daily.      polyethylene glycol (Glycolax, Miralax) 17 gram packet Take 17 g by mouth 2 times a day.      povidone-iodine (Betadine) 10 % ointment Apply topically once daily.      rivaroxaban (Xarelto) 2.5 mg tablet Take 1 tablet (2.5 mg) by mouth 2 times daily (morning and late afternoon). 60  tablet 0    sertraline (Zoloft) 100 mg tablet Take 1 tablet (100 mg) by mouth once daily. 90 tablet 3    spironolactone (Aldactone) 25 mg tablet Take 1 tablet (25 mg) by mouth once daily. 30 tablet 2    Trelegy Ellipta 200-62.5-25 mcg blister with device Inhale 1 puff once daily. 3 each 2    insulin lispro (HumaLOG) 100 unit/mL injection Inject 0-0.05 mL (0-5 Units) under the skin 3 times a day with meals. Take as directed per insulin instructions. (Patient not taking: Reported on 8/14/2024)      losartan (Cozaar) 100 mg tablet Take 1 tablet (100 mg) by mouth once daily. (Patient not taking: Reported on 7/23/2024) 90 tablet 3    pantoprazole (ProtoNix) 40 mg EC tablet Take 1 tablet (40 mg) by mouth 2 times a day before meals. Do not crush, chew, or split. 60 tablet 2     No current facility-administered medications on file prior to visit.        Vitals:    08/14/24 1450   BP: 124/64   Pulse: 69   Temp: 36.4 °C (97.6 °F)   SpO2: 96%     Vitals:    08/14/24 1450   Weight: 62.1 kg (137 lb)       Review of Systems   All other systems reviewed and are negative.      Objective     Physical Exam  Constitutional:       Appearance: Normal appearance. She is well-developed.   HENT:      Head: Atraumatic.   Cardiovascular:      Rate and Rhythm: Normal rate and regular rhythm.      Heart sounds: Normal heart sounds. No murmur heard.  Pulmonary:      Effort: Pulmonary effort is normal.      Breath sounds: Normal breath sounds.   Abdominal:      General: Bowel sounds are normal.      Palpations: Abdomen is soft.   Skin:     General: Skin is warm.      Comments: Wound on right lower extremity is granulating well.  No signs of infection.   Neurological:      General: No focal deficit present.      Mental Status: She is alert.   Psychiatric:         Mood and Affect: Mood normal.         Preadmission on 07/29/2024   Component Date Value Ref Range Status    POCT Activated Clotting Time Low R* 07/25/2024 297 (H)  83 - 199 sec Final      Target ACT range will vary based on the patient population,   clinical status, and surgical intervention occurring.   Admission on 07/23/2024, Discharged on 07/26/2024   Component Date Value Ref Range Status    ABO TYPE 07/23/2024 O   Final    Rh TYPE 07/23/2024 POS   Final    ANTIBODY SCREEN 07/23/2024 NEG   Final    POCT Activated Clotting Time Low R* 07/23/2024 227 (H)  83 - 199 sec Final     Target ACT range will vary based on the patient population,   clinical status, and surgical intervention occurring.    POCT Activated Clotting Time Low R* 07/23/2024 260 (H)  83 - 199 sec Final     Target ACT range will vary based on the patient population,   clinical status, and surgical intervention occurring.    POCT Glucose 07/23/2024 84  74 - 99 mg/dL Final    WBC 07/24/2024 7.0  4.4 - 11.3 x10*3/uL Final    nRBC 07/24/2024 0.0  0.0 - 0.0 /100 WBCs Final    RBC 07/24/2024 3.40 (L)  4.00 - 5.20 x10*6/uL Final    Hemoglobin 07/24/2024 8.4 (L)  12.0 - 16.0 g/dL Final    Hematocrit 07/24/2024 29.9 (L)  36.0 - 46.0 % Final    MCV 07/24/2024 88  80 - 100 fL Final    MCH 07/24/2024 24.7 (L)  26.0 - 34.0 pg Final    MCHC 07/24/2024 28.1 (L)  32.0 - 36.0 g/dL Final    RDW 07/24/2024 16.2 (H)  11.5 - 14.5 % Final    Platelets 07/24/2024 217  150 - 450 x10*3/uL Final    Glucose 07/24/2024 89  74 - 99 mg/dL Final    Sodium 07/24/2024 138  136 - 145 mmol/L Final    Potassium 07/24/2024 4.4  3.5 - 5.3 mmol/L Final    Chloride 07/24/2024 105  98 - 107 mmol/L Final    Bicarbonate 07/24/2024 27  21 - 32 mmol/L Final    Anion Gap 07/24/2024 10  10 - 20 mmol/L Final    Urea Nitrogen 07/24/2024 19  6 - 23 mg/dL Final    Creatinine 07/24/2024 1.01  0.50 - 1.05 mg/dL Final    eGFR 07/24/2024 57 (L)  >60 mL/min/1.73m*2 Final    Calculations of estimated GFR are performed using the 2021 CKD-EPI Study Refit equation without the race variable for the IDMS-Traceable creatinine  methods.  https://jasn.asnjournals.org/content/early/2021/09/22/ASN.3345669078    Calcium 07/24/2024 9.0  8.6 - 10.6 mg/dL Final    Magnesium 07/24/2024 1.94  1.60 - 2.40 mg/dL Final    POCT Glucose 07/23/2024 105 (H)  74 - 99 mg/dL Final    LVOT diam 07/25/2024 2.00  cm Final    MV E/A ratio 07/25/2024 0.84   Final    LA vol index A/L 07/25/2024 30.0  ml/m2 Final    Tricuspid annular plane systolic e* 07/25/2024 2.4  cm Final    LV EF 07/25/2024 71  % Final    RV free wall pk S' 07/25/2024 13.30  cm/s Final    LVIDd 07/25/2024 4.60  cm Final    RVSP 07/25/2024 26.4  mmHg Final    LV A4C EF 07/25/2024 75.8   Final    POCT Glucose 07/24/2024 84  74 - 99 mg/dL Final    POCT Glucose 07/24/2024 82  74 - 99 mg/dL Final    POCT Glucose 07/24/2024 100 (H)  74 - 99 mg/dL Final    Ventricular Rate 07/24/2024 65  BPM Final    Atrial Rate 07/24/2024 65  BPM Final    TX Interval 07/24/2024 162  ms Final    QRS Duration 07/24/2024 76  ms Final    QT Interval 07/24/2024 424  ms Final    QTC Calculation(Bazett) 07/24/2024 440  ms Final    P Axis 07/24/2024 64  degrees Final    R Axis 07/24/2024 67  degrees Final    T Axis 07/24/2024 60  degrees Final    QRS Count 07/24/2024 10  beats Final    Q Onset 07/24/2024 222  ms Final    P Onset 07/24/2024 141  ms Final    P Offset 07/24/2024 191  ms Final    T Offset 07/24/2024 434  ms Final    QTC Fredericia 07/24/2024 435  ms Final    POCT Glucose 07/24/2024 97  74 - 99 mg/dL Final    WBC 07/25/2024 6.1  4.4 - 11.3 x10*3/uL Final    nRBC 07/25/2024 0.0  0.0 - 0.0 /100 WBCs Final    RBC 07/25/2024 3.31 (L)  4.00 - 5.20 x10*6/uL Final    Hemoglobin 07/25/2024 8.2 (L)  12.0 - 16.0 g/dL Final    Hematocrit 07/25/2024 28.1 (L)  36.0 - 46.0 % Final    MCV 07/25/2024 85  80 - 100 fL Final    MCH 07/25/2024 24.8 (L)  26.0 - 34.0 pg Final    MCHC 07/25/2024 29.2 (L)  32.0 - 36.0 g/dL Final    RDW 07/25/2024 15.9 (H)  11.5 - 14.5 % Final    Platelets 07/25/2024 228  150 - 450 x10*3/uL Final     Glucose 07/25/2024 86  74 - 99 mg/dL Final    Sodium 07/25/2024 140  136 - 145 mmol/L Final    Potassium 07/25/2024 3.9  3.5 - 5.3 mmol/L Final    Chloride 07/25/2024 104  98 - 107 mmol/L Final    Bicarbonate 07/25/2024 26  21 - 32 mmol/L Final    Anion Gap 07/25/2024 14  10 - 20 mmol/L Final    Urea Nitrogen 07/25/2024 17  6 - 23 mg/dL Final    Creatinine 07/25/2024 0.97  0.50 - 1.05 mg/dL Final    eGFR 07/25/2024 60 (L)  >60 mL/min/1.73m*2 Final    Calculations of estimated GFR are performed using the 2021 CKD-EPI Study Refit equation without the race variable for the IDMS-Traceable creatinine methods.  https://jasn.asnjournals.org/content/early/2021/09/22/ASN.8610236608    Calcium 07/25/2024 8.5 (L)  8.6 - 10.6 mg/dL Final    Magnesium 07/25/2024 1.88  1.60 - 2.40 mg/dL Final    POCT Glucose 07/24/2024 101 (H)  74 - 99 mg/dL Final    POCT Glucose 07/25/2024 88  74 - 99 mg/dL Final    POCT Glucose 07/25/2024 98  74 - 99 mg/dL Final    POCT Glucose 07/25/2024 97  74 - 99 mg/dL Final    WBC 07/26/2024 6.6  4.4 - 11.3 x10*3/uL Final    nRBC 07/26/2024 0.0  0.0 - 0.0 /100 WBCs Final    RBC 07/26/2024 3.75 (L)  4.00 - 5.20 x10*6/uL Final    Hemoglobin 07/26/2024 9.1 (L)  12.0 - 16.0 g/dL Final    Hematocrit 07/26/2024 31.0 (L)  36.0 - 46.0 % Final    MCV 07/26/2024 83  80 - 100 fL Final    MCH 07/26/2024 24.3 (L)  26.0 - 34.0 pg Final    MCHC 07/26/2024 29.4 (L)  32.0 - 36.0 g/dL Final    RDW 07/26/2024 16.3 (H)  11.5 - 14.5 % Final    Platelets 07/26/2024 251  150 - 450 x10*3/uL Final    Glucose 07/26/2024 128 (H)  74 - 99 mg/dL Final    Sodium 07/26/2024 138  136 - 145 mmol/L Final    Potassium 07/26/2024 4.0  3.5 - 5.3 mmol/L Final    Chloride 07/26/2024 102  98 - 107 mmol/L Final    Bicarbonate 07/26/2024 24  21 - 32 mmol/L Final    Anion Gap 07/26/2024 16  10 - 20 mmol/L Final    Urea Nitrogen 07/26/2024 14  6 - 23 mg/dL Final    Creatinine 07/26/2024 0.92  0.50 - 1.05 mg/dL Final    eGFR 07/26/2024 64  >60  mL/min/1.73m*2 Final    Calculations of estimated GFR are performed using the 2021 CKD-EPI Study Refit equation without the race variable for the IDMS-Traceable creatinine methods.  https://jasn.asnjournals.org/content/early/2021/09/22/ASN.9074646665    Calcium 07/26/2024 8.9  8.6 - 10.6 mg/dL Final    Magnesium 07/26/2024 1.87  1.60 - 2.40 mg/dL Final    POCT Glucose 07/25/2024 89  74 - 99 mg/dL Final    POCT Glucose 07/26/2024 99  74 - 99 mg/dL Final    POCT Glucose 07/26/2024 95  74 - 99 mg/dL Final   Lab Requisition on 07/19/2024   Component Date Value Ref Range Status    ABO TYPE 07/19/2024 O   Final    Rh TYPE 07/19/2024 POS   Final    ANTIBODY SCREEN 07/19/2024 NEG   Final   Lab on 07/19/2024   Component Date Value Ref Range Status    Protime 07/19/2024 11.1  9.8 - 12.8 seconds Final    INR 07/19/2024 1.0  0.9 - 1.1 Final    aPTT 07/19/2024 31  27 - 38 seconds Final    WBC 07/19/2024 7.4  4.4 - 11.3 x10*3/uL Final    nRBC 07/19/2024 0.0  0.0 - 0.0 /100 WBCs Final    RBC 07/19/2024 3.83 (L)  4.00 - 5.20 x10*6/uL Final    Hemoglobin 07/19/2024 9.5 (L)  12.0 - 16.0 g/dL Final    Hematocrit 07/19/2024 33.2 (L)  36.0 - 46.0 % Final    MCV 07/19/2024 87  80 - 100 fL Final    MCH 07/19/2024 24.8 (L)  26.0 - 34.0 pg Final    MCHC 07/19/2024 28.6 (L)  32.0 - 36.0 g/dL Final    RDW 07/19/2024 16.5 (H)  11.5 - 14.5 % Final    Platelets 07/19/2024 267  150 - 450 x10*3/uL Final    Glucose 07/19/2024 128 (H)  74 - 99 mg/dL Final    Sodium 07/19/2024 139  136 - 145 mmol/L Final    Potassium 07/19/2024 4.8  3.5 - 5.3 mmol/L Final    Chloride 07/19/2024 106  98 - 107 mmol/L Final    Bicarbonate 07/19/2024 26  21 - 32 mmol/L Final    Anion Gap 07/19/2024 12  10 - 20 mmol/L Final    Urea Nitrogen 07/19/2024 25 (H)  6 - 23 mg/dL Final    Creatinine 07/19/2024 1.22 (H)  0.50 - 1.05 mg/dL Final    eGFR 07/19/2024 46 (L)  >60 mL/min/1.73m*2 Final    Calculations of estimated GFR are performed using the 2021 CKD-EPI Study Refit  equation without the race variable for the IDMS-Traceable creatinine methods.  https://jasn.asnjournals.org/content/early/2021/09/22/ASN.2761849019    Calcium 07/19/2024 9.3  8.6 - 10.3 mg/dL Final       Assessment/Plan   Problem List Items Addressed This Visit    None  Visit Diagnoses         Codes    Peripheral vascular disease (CMS-[facility])    -  Primary I73.9    Relevant Medications    HYDROcodone-acetaminophen (Norco) 5-325 mg tablet        Patient is doing well.  Continue follow-up with vascular.  Wrote for #28 hydrocodone for patient's pain.  Continue to stay active and try to eat a healthy diet.

## 2024-08-15 ENCOUNTER — APPOINTMENT (OUTPATIENT)
Dept: GASTROENTEROLOGY | Facility: CLINIC | Age: 79
End: 2024-08-15
Payer: MEDICARE

## 2024-08-19 ENCOUNTER — PATIENT OUTREACH (OUTPATIENT)
Dept: CARE COORDINATION | Facility: CLINIC | Age: 79
End: 2024-08-19
Payer: MEDICARE

## 2024-08-19 NOTE — PROGRESS NOTES
Outreach call to fu on conversa chat alert, no answer.   Rohit Collazo RN [facility] [place]  [phone]

## 2024-08-21 ENCOUNTER — APPOINTMENT (OUTPATIENT)
Dept: GASTROENTEROLOGY | Facility: CLINIC | Age: 79
End: 2024-08-21
Payer: MEDICARE

## 2024-08-27 ENCOUNTER — APPOINTMENT (OUTPATIENT)
Dept: CARDIOLOGY | Facility: CLINIC | Age: 79
End: 2024-08-27
Payer: MEDICARE

## 2024-08-29 ENCOUNTER — APPOINTMENT (OUTPATIENT)
Dept: VASCULAR MEDICINE | Facility: HOSPITAL | Age: 79
End: 2024-08-29
Payer: MEDICARE

## 2024-08-29 ENCOUNTER — APPOINTMENT (OUTPATIENT)
Dept: CARDIOLOGY | Facility: HOSPITAL | Age: 79
End: 2024-08-29
Payer: MEDICARE

## 2024-09-03 ENCOUNTER — PATIENT OUTREACH (OUTPATIENT)
Dept: CARE COORDINATION | Facility: CLINIC | Age: 79
End: 2024-09-03
Payer: MEDICARE

## 2024-09-03 NOTE — PROGRESS NOTES
Outreach call to patient to follow up after 30 days of hospital discharge.   No answer.   Rohit Collazo RN [facility] [place]  [phone]

## 2024-09-04 ENCOUNTER — APPOINTMENT (OUTPATIENT)
Dept: GASTROENTEROLOGY | Facility: HOSPITAL | Age: 79
End: 2024-09-04
Payer: MEDICARE

## 2024-09-10 ENCOUNTER — TELEPHONE (OUTPATIENT)
Dept: PRIMARY CARE | Facility: CLINIC | Age: 79
End: 2024-09-10
Payer: MEDICARE

## 2024-09-10 NOTE — TELEPHONE ENCOUNTER
SALOMON----Leena left message on cancellation line patient had Health Care Accessment with them and can be review on the Portal.

## 2024-10-04 ENCOUNTER — TELEPHONE (OUTPATIENT)
Dept: PRIMARY CARE | Facility: CLINIC | Age: 79
End: 2024-10-04
Payer: MEDICARE

## 2024-10-04 DIAGNOSIS — I73.9 PVD (PERIPHERAL VASCULAR DISEASE) (CMS-HCC): ICD-10-CM

## 2024-10-04 DIAGNOSIS — K92.2 UPPER GI BLEED: ICD-10-CM

## 2024-10-04 DIAGNOSIS — J18.9 PNEUMONIA OF BOTH LOWER LOBES DUE TO INFECTIOUS ORGANISM: ICD-10-CM

## 2024-10-04 RX ORDER — PANTOPRAZOLE SODIUM 40 MG/1
40 TABLET, DELAYED RELEASE ORAL
Qty: 60 TABLET | Refills: 0 | Status: SHIPPED | OUTPATIENT
Start: 2024-10-04

## 2024-10-04 RX ORDER — ALBUTEROL SULFATE 90 UG/1
2 INHALANT RESPIRATORY (INHALATION) EVERY 6 HOURS PRN
Qty: 18 G | Refills: 0 | Status: SHIPPED | OUTPATIENT
Start: 2024-10-04 | End: 2025-10-04

## 2024-10-04 NOTE — TELEPHONE ENCOUNTER
Patient is out of medicine is asking if another doctor can address [place] [facility] [place].  albuterol 90 mcg/actuation inhaler   pantoprazole (ProtoNix) 40 mg EC tablet

## 2024-11-20 DIAGNOSIS — J18.9 PNEUMONIA OF BOTH LOWER LOBES DUE TO INFECTIOUS ORGANISM: ICD-10-CM

## 2024-11-20 RX ORDER — ALBUTEROL SULFATE 90 UG/1
2 INHALANT RESPIRATORY (INHALATION) EVERY 6 HOURS PRN
Qty: 18 G | Refills: 3 | Status: SHIPPED | OUTPATIENT
Start: 2024-11-20 | End: 2025-11-20

## 2024-12-13 ENCOUNTER — APPOINTMENT (OUTPATIENT)
Dept: WOUND CARE | Facility: CLINIC | Age: 79
End: 2024-12-13
Payer: MEDICARE

## 2024-12-13 ENCOUNTER — OFFICE VISIT (OUTPATIENT)
Dept: WOUND CARE | Facility: CLINIC | Age: 79
End: 2024-12-13
Payer: MEDICARE

## 2024-12-13 PROCEDURE — 99213 OFFICE O/P EST LOW 20 MIN: CPT | Mod: 25

## 2024-12-13 PROCEDURE — 11045 DBRDMT SUBQ TISS EACH ADDL: CPT

## 2024-12-13 PROCEDURE — 11042 DBRDMT SUBQ TIS 1ST 20SQCM/<: CPT

## 2024-12-17 ENCOUNTER — OFFICE VISIT (OUTPATIENT)
Dept: PRIMARY CARE | Facility: CLINIC | Age: 79
End: 2024-12-17
Payer: MEDICARE

## 2024-12-17 VITALS
TEMPERATURE: 97.5 F | DIASTOLIC BLOOD PRESSURE: 78 MMHG | HEART RATE: 76 BPM | OXYGEN SATURATION: 90 % | SYSTOLIC BLOOD PRESSURE: 126 MMHG

## 2024-12-17 DIAGNOSIS — J44.1 CHRONIC OBSTRUCTIVE PULMONARY DISEASE WITH ACUTE EXACERBATION (MULTI): ICD-10-CM

## 2024-12-17 DIAGNOSIS — J06.9 UPPER RESPIRATORY TRACT INFECTION, UNSPECIFIED TYPE: Primary | ICD-10-CM

## 2024-12-17 DIAGNOSIS — J18.9 PNEUMONIA OF BOTH LOWER LOBES DUE TO INFECTIOUS ORGANISM: ICD-10-CM

## 2024-12-17 LAB
FLUAV RNA RESP QL NAA+PROBE: NOT DETECTED
FLUBV RNA RESP QL NAA+PROBE: NOT DETECTED
RSV RNA RESP QL NAA+PROBE: DETECTED

## 2024-12-17 PROCEDURE — 3074F SYST BP LT 130 MM HG: CPT | Performed by: FAMILY MEDICINE

## 2024-12-17 PROCEDURE — 99213 OFFICE O/P EST LOW 20 MIN: CPT | Performed by: FAMILY MEDICINE

## 2024-12-17 PROCEDURE — 1123F ACP DISCUSS/DSCN MKR DOCD: CPT | Performed by: FAMILY MEDICINE

## 2024-12-17 PROCEDURE — 87636 SARSCOV2 & INF A&B AMP PRB: CPT

## 2024-12-17 PROCEDURE — 3078F DIAST BP <80 MM HG: CPT | Performed by: FAMILY MEDICINE

## 2024-12-17 PROCEDURE — 87634 RSV DNA/RNA AMP PROBE: CPT

## 2024-12-17 RX ORDER — ALBUTEROL SULFATE 90 UG/1
2 INHALANT RESPIRATORY (INHALATION) EVERY 6 HOURS PRN
Qty: 18 G | Refills: 3 | Status: SHIPPED | OUTPATIENT
Start: 2024-12-17 | End: 2025-12-17

## 2024-12-17 RX ORDER — PREDNISONE 10 MG/1
TABLET ORAL
Qty: 63 TABLET | Refills: 0 | Status: SHIPPED | OUTPATIENT
Start: 2024-12-17 | End: 2025-01-03

## 2024-12-17 RX ORDER — AZITHROMYCIN 250 MG/1
TABLET, FILM COATED ORAL
Qty: 6 TABLET | Refills: 0 | Status: SHIPPED | OUTPATIENT
Start: 2024-12-17 | End: 2024-12-22

## 2024-12-17 ASSESSMENT — ENCOUNTER SYMPTOMS
COUGH: 1
FEVER: 1
SHORTNESS OF BREATH: 1

## 2024-12-17 NOTE — PROGRESS NOTES
Subjective   Patient ID: Brooklyn Read is a 79 y.o. female who presents for Cough, Shortness of Breath, Fever, and Chills (X 4 days).  Cough  Associated symptoms include a fever and shortness of breath.   Shortness of Breath  Associated symptoms include a fever.   Fever   Associated symptoms include coughing.     Cough, chills and SOB x 4 days.  No fever.  Winded when she walks.  Doesn't want O2.    Patient Active Problem List   Diagnosis    Chronic obstructive pulmonary disease (Multi)    Cerebrovascular accident (CVA) (Multi)    Acute respiratory failure    Edema, unspecified    Orthopnea    Chronic diastolic heart failure    Arteriosclerosis of coronary artery    Aortic valve regurgitation    Unspecified dementia, mild, without behavioral disturbance, psychotic disturbance, mood disturbance, and anxiety    Suspected severe acute respiratory syndrome coronavirus 2 (SARS-CoV-2) infection    Senile osteoporosis    Pneumonia due to infectious organism    Nicotine dependence    Mixed hyperlipidemia    Hypokalemia    Primary hypertension    Chronic gout without tophus    Chills    Bronchitis    Acute cough    Weakness    Anemia    Vitamin D deficiency, unspecified    Neoplasm of uncertain behavior of lung    Recurrent major depressive disorder, in full remission (CMS-HCC)    Stage 3a chronic kidney disease (Multi)    Upper GI bleeding    Acute blood loss anemia (ABLA)    Acute kidney injury (CMS-HCC)    Agatston coronary artery calcium score greater than 400    Upper GI bleed    Injury of right lower extremity    Varicose ulcer of lower extremity (Multi)    PVD (peripheral vascular disease) (CMS-HCC)    Atherosclerosis of native arteries of extremities with rest pain, right leg (Multi)       Social Connections: Socially Isolated (5/28/2024)    Social Connection and Isolation Panel [NHANES]     Frequency of Communication with Friends and Family: More than three times a week     Frequency of Social Gatherings with  Friends and Family: More than three times a week     Attends [employer] Services: Never     Active Member of Clubs or Organizations: No     Attends Club or Organization Meetings: Never     Marital Status:        Current Outpatient Medications on File Prior to Visit   Medication Sig Dispense Refill    acetaminophen (Tylenol) 325 mg tablet Take 3 tablets (975 mg) by mouth every 8 hours.      albuterol 90 mcg/actuation inhaler Inhale 2 puffs every 6 hours if needed for wheezing. 18 g 3    alendronate (Fosamax) 70 mg tablet Take 1 tablet (70 mg) by mouth every 7 days.      allopurinol (Zyloprim) 100 mg tablet Take 1 tablet (100 mg) by mouth 2 times a day. 180 tablet 3    buPROPion XL (Wellbutrin XL) 150 mg 24 hr tablet Take 1 tablet (150 mg) by mouth once daily in the morning. 90 tablet 3    cholecalciferol (Vitamin D-3) 5,000 Units tablet Take 1 tablet (5,000 Units) by mouth once daily.      EPINEPHrine 0.3 mg/0.3 mL injection syringe Inject 0.3 mL (0.3 mg) into the muscle 1 time if needed for anaphylaxis.      insulin lispro (HumaLOG) 100 unit/mL injection Inject 0-0.05 mL (0-5 Units) under the skin 3 times a day with meals. Take as directed per insulin instructions.      losartan (Cozaar) 100 mg tablet Take 1 tablet (100 mg) by mouth once daily. 90 tablet 3    metoprolol succinate XL (Toprol-XL) 100 mg 24 hr tablet Take 1 tablet (100 mg) by mouth once daily. Do not crush or chew. 90 tablet 1    multivitamin with minerals tablet Take 1 tablet by mouth once daily.      pantoprazole (ProtoNix) 40 mg EC tablet Take 1 tablet (40 mg) by mouth 2 times a day before meals. Do not crush, chew, or split. 60 tablet 0    polyethylene glycol (Glycolax, Miralax) 17 gram packet Take 17 g by mouth 2 times a day.      povidone-iodine (Betadine) 10 % ointment Apply topically once daily.      rivaroxaban (Xarelto) 2.5 mg tablet Take 1 tablet (2.5 mg) by mouth 2 times daily (morning and late afternoon). 60 tablet 0    sertraline  (Zoloft) 100 mg tablet Take 1 tablet (100 mg) by mouth once daily. 90 tablet 3    spironolactone (Aldactone) 25 mg tablet Take 1 tablet (25 mg) by mouth once daily. 30 tablet 2    Trelegy Ellipta 200-62.5-25 mcg blister with device Inhale 1 puff once daily. 3 each 2    empagliflozin (Jardiance) 10 mg Take 1 tablet (10 mg) by mouth once daily. 30 tablet 11     No current facility-administered medications on file prior to visit.        Vitals:    12/17/24 1034   BP: 126/78   Pulse: 76   Temp: 36.4 °C (97.5 °F)   SpO2: 90%     There were no vitals filed for this visit.    Review of Systems   Constitutional:  Positive for fever.   Respiratory:  Positive for cough and shortness of breath.    All other systems reviewed and are negative.      Objective     Physical Exam  Constitutional:       Appearance: Normal appearance. She is well-developed.   HENT:      Head: Atraumatic.   Cardiovascular:      Rate and Rhythm: Normal rate and regular rhythm.      Heart sounds: Normal heart sounds. No murmur heard.  Pulmonary:      Effort: Pulmonary effort is normal.      Breath sounds: Decreased air movement present. Wheezing present.   Abdominal:      General: Bowel sounds are normal.      Palpations: Abdomen is soft.   Skin:     General: Skin is warm.   Neurological:      General: No focal deficit present.      Mental Status: She is alert.   Psychiatric:         Mood and Affect: Mood normal.         No visits with results within 2 Month(s) from this visit.   Latest known visit with results is:   Admission on 07/23/2024, Discharged on 07/26/2024   Component Date Value Ref Range Status    ABO TYPE 07/23/2024 O   Final    Rh TYPE 07/23/2024 POS   Final    ANTIBODY SCREEN 07/23/2024 NEG   Final    POCT Activated Clotting Time Low R* 07/23/2024 227 (H)  83 - 199 sec Final     Target ACT range will vary based on the patient population,   clinical status, and surgical intervention occurring.    POCT Activated Clotting Time Low R*  07/23/2024 260 (H)  83 - 199 sec Final     Target ACT range will vary based on the patient population,   clinical status, and surgical intervention occurring.    POCT Glucose 07/23/2024 84  74 - 99 mg/dL Final    WBC 07/24/2024 7.0  4.4 - 11.3 x10*3/uL Final    nRBC 07/24/2024 0.0  0.0 - 0.0 /100 WBCs Final    RBC 07/24/2024 3.40 (L)  4.00 - 5.20 x10*6/uL Final    Hemoglobin 07/24/2024 8.4 (L)  12.0 - 16.0 g/dL Final    Hematocrit 07/24/2024 29.9 (L)  36.0 - 46.0 % Final    MCV 07/24/2024 88  80 - 100 fL Final    MCH 07/24/2024 24.7 (L)  26.0 - 34.0 pg Final    MCHC 07/24/2024 28.1 (L)  32.0 - 36.0 g/dL Final    RDW 07/24/2024 16.2 (H)  11.5 - 14.5 % Final    Platelets 07/24/2024 217  150 - 450 x10*3/uL Final    Glucose 07/24/2024 89  74 - 99 mg/dL Final    Sodium 07/24/2024 138  136 - 145 mmol/L Final    Potassium 07/24/2024 4.4  3.5 - 5.3 mmol/L Final    Chloride 07/24/2024 105  98 - 107 mmol/L Final    Bicarbonate 07/24/2024 27  21 - 32 mmol/L Final    Anion Gap 07/24/2024 10  10 - 20 mmol/L Final    Urea Nitrogen 07/24/2024 19  6 - 23 mg/dL Final    Creatinine 07/24/2024 1.01  0.50 - 1.05 mg/dL Final    eGFR 07/24/2024 57 (L)  >60 mL/min/1.73m*2 Final    Calculations of estimated GFR are performed using the 2021 CKD-EPI Study Refit equation without the race variable for the IDMS-Traceable creatinine methods.  https://jasn.asnjournals.org/content/early/2021/09/22/ASN.4067803282    Calcium 07/24/2024 9.0  8.6 - 10.6 mg/dL Final    Magnesium 07/24/2024 1.94  1.60 - 2.40 mg/dL Final    POCT Glucose 07/23/2024 105 (H)  74 - 99 mg/dL Final    LVOT diam 07/25/2024 2.00  cm Final    MV E/A ratio 07/25/2024 0.84   Final    LA vol index A/L 07/25/2024 30.0  ml/m2 Final    Tricuspid annular plane systolic e* 07/25/2024 2.4  cm Final    LV EF 07/25/2024 71  % Final    RV free wall pk S' 07/25/2024 13.30  cm/s Final    LVIDd 07/25/2024 4.60  cm Final    RVSP 07/25/2024 26.4  mmHg Final    LV A4C EF 07/25/2024 75.8   Final     POCT Glucose 07/24/2024 84  74 - 99 mg/dL Final    POCT Glucose 07/24/2024 82  74 - 99 mg/dL Final    POCT Glucose 07/24/2024 100 (H)  74 - 99 mg/dL Final    Ventricular Rate 07/24/2024 65  BPM Final    Atrial Rate 07/24/2024 65  BPM Final    NV Interval 07/24/2024 162  ms Final    QRS Duration 07/24/2024 76  ms Final    QT Interval 07/24/2024 424  ms Final    QTC Calculation(Bazett) 07/24/2024 440  ms Final    P Axis 07/24/2024 64  degrees Final    R Axis 07/24/2024 67  degrees Final    T Axis 07/24/2024 60  degrees Final    QRS Count 07/24/2024 10  beats Final    Q Onset 07/24/2024 222  ms Final    P Onset 07/24/2024 141  ms Final    P Offset 07/24/2024 191  ms Final    T Offset 07/24/2024 434  ms Final    QTC Fredericia 07/24/2024 435  ms Final    POCT Glucose 07/24/2024 97  74 - 99 mg/dL Final    WBC 07/25/2024 6.1  4.4 - 11.3 x10*3/uL Final    nRBC 07/25/2024 0.0  0.0 - 0.0 /100 WBCs Final    RBC 07/25/2024 3.31 (L)  4.00 - 5.20 x10*6/uL Final    Hemoglobin 07/25/2024 8.2 (L)  12.0 - 16.0 g/dL Final    Hematocrit 07/25/2024 28.1 (L)  36.0 - 46.0 % Final    MCV 07/25/2024 85  80 - 100 fL Final    MCH 07/25/2024 24.8 (L)  26.0 - 34.0 pg Final    MCHC 07/25/2024 29.2 (L)  32.0 - 36.0 g/dL Final    RDW 07/25/2024 15.9 (H)  11.5 - 14.5 % Final    Platelets 07/25/2024 228  150 - 450 x10*3/uL Final    Glucose 07/25/2024 86  74 - 99 mg/dL Final    Sodium 07/25/2024 140  136 - 145 mmol/L Final    Potassium 07/25/2024 3.9  3.5 - 5.3 mmol/L Final    Chloride 07/25/2024 104  98 - 107 mmol/L Final    Bicarbonate 07/25/2024 26  21 - 32 mmol/L Final    Anion Gap 07/25/2024 14  10 - 20 mmol/L Final    Urea Nitrogen 07/25/2024 17  6 - 23 mg/dL Final    Creatinine 07/25/2024 0.97  0.50 - 1.05 mg/dL Final    eGFR 07/25/2024 60 (L)  >60 mL/min/1.73m*2 Final    Calculations of estimated GFR are performed using the 2021 CKD-EPI Study Refit equation without the race variable for the IDMS-Traceable creatinine  methods.  https://jasn.asnjournals.org/content/early/2021/09/22/ASN.7668877709    Calcium 07/25/2024 8.5 (L)  8.6 - 10.6 mg/dL Final    Magnesium 07/25/2024 1.88  1.60 - 2.40 mg/dL Final    POCT Glucose 07/24/2024 101 (H)  74 - 99 mg/dL Final    POCT Glucose 07/25/2024 88  74 - 99 mg/dL Final    POCT Glucose 07/25/2024 98  74 - 99 mg/dL Final    POCT Glucose 07/25/2024 97  74 - 99 mg/dL Final    WBC 07/26/2024 6.6  4.4 - 11.3 x10*3/uL Final    nRBC 07/26/2024 0.0  0.0 - 0.0 /100 WBCs Final    RBC 07/26/2024 3.75 (L)  4.00 - 5.20 x10*6/uL Final    Hemoglobin 07/26/2024 9.1 (L)  12.0 - 16.0 g/dL Final    Hematocrit 07/26/2024 31.0 (L)  36.0 - 46.0 % Final    MCV 07/26/2024 83  80 - 100 fL Final    MCH 07/26/2024 24.3 (L)  26.0 - 34.0 pg Final    MCHC 07/26/2024 29.4 (L)  32.0 - 36.0 g/dL Final    RDW 07/26/2024 16.3 (H)  11.5 - 14.5 % Final    Platelets 07/26/2024 251  150 - 450 x10*3/uL Final    Glucose 07/26/2024 128 (H)  74 - 99 mg/dL Final    Sodium 07/26/2024 138  136 - 145 mmol/L Final    Potassium 07/26/2024 4.0  3.5 - 5.3 mmol/L Final    Chloride 07/26/2024 102  98 - 107 mmol/L Final    Bicarbonate 07/26/2024 24  21 - 32 mmol/L Final    Anion Gap 07/26/2024 16  10 - 20 mmol/L Final    Urea Nitrogen 07/26/2024 14  6 - 23 mg/dL Final    Creatinine 07/26/2024 0.92  0.50 - 1.05 mg/dL Final    eGFR 07/26/2024 64  >60 mL/min/1.73m*2 Final    Calculations of estimated GFR are performed using the 2021 CKD-EPI Study Refit equation without the race variable for the IDMS-Traceable creatinine methods.  https://jasn.asnjournals.org/content/early/2021/09/22/ASN.2016124194    Calcium 07/26/2024 8.9  8.6 - 10.6 mg/dL Final    Magnesium 07/26/2024 1.87  1.60 - 2.40 mg/dL Final    POCT Glucose 07/25/2024 89  74 - 99 mg/dL Final    POCT Glucose 07/26/2024 99  74 - 99 mg/dL Final    POCT Glucose 07/26/2024 95  74 - 99 mg/dL Final       Assessment/Plan   Problem List Items Addressed This Visit             ICD-10-CM    Chronic  obstructive pulmonary disease (Multi) J44.9    Relevant Medications    predniSONE (Deltasone) 10 mg tablet    azithromycin (Zithromax) 250 mg tablet    Pneumonia due to infectious organism J18.9    Relevant Medications    albuterol 90 mcg/actuation inhaler     Other Visit Diagnoses         Codes    Upper respiratory tract infection, unspecified type    -  Primary J06.9    Relevant Orders    RSV PCR    Influenza A, and B PCR    Sars-CoV-2 PCR          Prednisone burst and taper.  Azithro.  If not feeling better go to the ED.

## 2024-12-18 ENCOUNTER — TELEPHONE (OUTPATIENT)
Dept: PRIMARY CARE | Facility: CLINIC | Age: 79
End: 2024-12-18
Payer: MEDICARE

## 2024-12-18 LAB — SARS-COV-2 ORF1AB RESP QL NAA+PROBE: NOT DETECTED

## 2024-12-18 NOTE — TELEPHONE ENCOUNTER
----- Message from Gianluca Macario sent at 12/18/2024  8:22 AM EST -----  Pts test showed RSV.  Should resolve on its own.

## 2025-01-10 ENCOUNTER — OFFICE VISIT (OUTPATIENT)
Dept: WOUND CARE | Facility: CLINIC | Age: 80
End: 2025-01-10
Payer: MEDICARE

## 2025-01-10 PROCEDURE — 11042 DBRDMT SUBQ TIS 1ST 20SQCM/<: CPT

## 2025-01-17 ENCOUNTER — APPOINTMENT (OUTPATIENT)
Dept: WOUND CARE | Facility: CLINIC | Age: 80
End: 2025-01-17
Payer: MEDICARE

## 2025-01-24 ENCOUNTER — APPOINTMENT (OUTPATIENT)
Dept: WOUND CARE | Facility: CLINIC | Age: 80
End: 2025-01-24
Payer: MEDICARE

## 2025-01-27 DIAGNOSIS — K92.2 UPPER GI BLEED: ICD-10-CM

## 2025-01-27 DIAGNOSIS — I73.9 PVD (PERIPHERAL VASCULAR DISEASE) (CMS-HCC): ICD-10-CM

## 2025-01-27 RX ORDER — PANTOPRAZOLE SODIUM 40 MG/1
TABLET, DELAYED RELEASE ORAL
Qty: 90 TABLET | Refills: 1 | Status: SHIPPED | OUTPATIENT
Start: 2025-01-27

## 2025-01-29 ENCOUNTER — HOSPITAL ENCOUNTER (OUTPATIENT)
Dept: VASCULAR MEDICINE | Facility: HOSPITAL | Age: 80
Discharge: HOME | End: 2025-01-29
Payer: MEDICARE

## 2025-01-29 DIAGNOSIS — R60.1 GENERALIZED EDEMA: ICD-10-CM

## 2025-01-29 DIAGNOSIS — M79.89 OTHER SPECIFIED SOFT TISSUE DISORDERS: ICD-10-CM

## 2025-01-29 PROCEDURE — 93970 EXTREMITY STUDY: CPT | Performed by: INTERNAL MEDICINE

## 2025-01-29 PROCEDURE — 93970 EXTREMITY STUDY: CPT

## 2025-01-31 ENCOUNTER — OFFICE VISIT (OUTPATIENT)
Dept: WOUND CARE | Facility: CLINIC | Age: 80
End: 2025-01-31
Payer: MEDICARE

## 2025-01-31 PROCEDURE — 11042 DBRDMT SUBQ TIS 1ST 20SQCM/<: CPT

## 2025-02-07 ENCOUNTER — OFFICE VISIT (OUTPATIENT)
Dept: WOUND CARE | Facility: CLINIC | Age: 80
End: 2025-02-07
Payer: MEDICARE

## 2025-02-07 PROCEDURE — 11042 DBRDMT SUBQ TIS 1ST 20SQCM/<: CPT

## 2025-02-14 ENCOUNTER — APPOINTMENT (OUTPATIENT)
Dept: WOUND CARE | Facility: CLINIC | Age: 80
End: 2025-02-14
Payer: MEDICARE

## 2025-02-21 ENCOUNTER — APPOINTMENT (OUTPATIENT)
Dept: WOUND CARE | Facility: CLINIC | Age: 80
End: 2025-02-21
Payer: MEDICARE

## 2025-02-25 ENCOUNTER — APPOINTMENT (OUTPATIENT)
Dept: PRIMARY CARE | Facility: CLINIC | Age: 80
End: 2025-02-25
Payer: MEDICARE

## 2025-02-25 VITALS
BODY MASS INDEX: 23.78 KG/M2 | TEMPERATURE: 96.9 F | WEIGHT: 130 LBS | DIASTOLIC BLOOD PRESSURE: 80 MMHG | HEART RATE: 72 BPM | SYSTOLIC BLOOD PRESSURE: 124 MMHG

## 2025-02-25 DIAGNOSIS — I73.9 PERIPHERAL VASCULAR DISEASE (CMS-HCC): ICD-10-CM

## 2025-02-25 DIAGNOSIS — N18.31 STAGE 3A CHRONIC KIDNEY DISEASE (MULTI): ICD-10-CM

## 2025-02-25 DIAGNOSIS — F03.A0 MILD DEMENTIA WITHOUT BEHAVIORAL DISTURBANCE, PSYCHOTIC DISTURBANCE, MOOD DISTURBANCE, OR ANXIETY, UNSPECIFIED DEMENTIA TYPE: ICD-10-CM

## 2025-02-25 DIAGNOSIS — R73.01 IFG (IMPAIRED FASTING GLUCOSE): ICD-10-CM

## 2025-02-25 DIAGNOSIS — J44.9 CHRONIC OBSTRUCTIVE PULMONARY DISEASE, UNSPECIFIED COPD TYPE (MULTI): ICD-10-CM

## 2025-02-25 DIAGNOSIS — I73.9 PVD (PERIPHERAL VASCULAR DISEASE) (CMS-HCC): Primary | ICD-10-CM

## 2025-02-25 DIAGNOSIS — J44.1 CHRONIC OBSTRUCTIVE PULMONARY DISEASE WITH ACUTE EXACERBATION (MULTI): ICD-10-CM

## 2025-02-25 DIAGNOSIS — I50.32 CHRONIC DIASTOLIC HEART FAILURE: ICD-10-CM

## 2025-02-25 DIAGNOSIS — I74.09 OTHER ARTERIAL EMBOLISM AND THROMBOSIS OF ABDOMINAL AORTA (MULTI): ICD-10-CM

## 2025-02-25 DIAGNOSIS — I10 PRIMARY HYPERTENSION: ICD-10-CM

## 2025-02-25 DIAGNOSIS — F33.42 RECURRENT MAJOR DEPRESSIVE DISORDER, IN FULL REMISSION (CMS-HCC): ICD-10-CM

## 2025-02-25 PROBLEM — J96.00 ACUTE RESPIRATORY FAILURE: Status: RESOLVED | Noted: 2023-10-04 | Resolved: 2025-02-25

## 2025-02-25 PROCEDURE — 1159F MED LIST DOCD IN RCRD: CPT | Performed by: FAMILY MEDICINE

## 2025-02-25 PROCEDURE — 1123F ACP DISCUSS/DSCN MKR DOCD: CPT | Performed by: FAMILY MEDICINE

## 2025-02-25 PROCEDURE — 3079F DIAST BP 80-89 MM HG: CPT | Performed by: FAMILY MEDICINE

## 2025-02-25 PROCEDURE — 1036F TOBACCO NON-USER: CPT | Performed by: FAMILY MEDICINE

## 2025-02-25 PROCEDURE — 3074F SYST BP LT 130 MM HG: CPT | Performed by: FAMILY MEDICINE

## 2025-02-25 PROCEDURE — 99214 OFFICE O/P EST MOD 30 MIN: CPT | Performed by: FAMILY MEDICINE

## 2025-02-25 PROCEDURE — G2211 COMPLEX E/M VISIT ADD ON: HCPCS | Performed by: FAMILY MEDICINE

## 2025-02-25 RX ORDER — SERTRALINE HYDROCHLORIDE 100 MG/1
100 TABLET, FILM COATED ORAL DAILY
Qty: 90 TABLET | Refills: 3 | Status: SHIPPED | OUTPATIENT
Start: 2025-02-25 | End: 2026-02-25

## 2025-02-25 RX ORDER — LOSARTAN POTASSIUM 100 MG/1
100 TABLET ORAL DAILY
Qty: 90 TABLET | Refills: 3 | Status: SHIPPED | OUTPATIENT
Start: 2025-02-25 | End: 2026-02-25

## 2025-02-25 RX ORDER — FLUTICASONE FUROATE, UMECLIDINIUM BROMIDE AND VILANTEROL TRIFENATATE 200; 62.5; 25 UG/1; UG/1; UG/1
1 POWDER RESPIRATORY (INHALATION) DAILY
Qty: 3 EACH | Refills: 2 | Status: SHIPPED | OUTPATIENT
Start: 2025-02-25

## 2025-02-25 ASSESSMENT — ENCOUNTER SYMPTOMS: HYPERTENSION: 1

## 2025-02-25 ASSESSMENT — PATIENT HEALTH QUESTIONNAIRE - PHQ9
1. LITTLE INTEREST OR PLEASURE IN DOING THINGS: NOT AT ALL
2. FEELING DOWN, DEPRESSED OR HOPELESS: NOT AT ALL
SUM OF ALL RESPONSES TO PHQ9 QUESTIONS 1 AND 2: 0

## 2025-02-25 NOTE — PROGRESS NOTES
Subjective   Patient ID: Brooklyn Read is a 79 y.o. female who presents for Hypertension and Hyperlipidemia.  Hypertension    Hyperlipidemia        HTN: well controlled currently.  PAD: stable, not having much pain in her legs  COPD: taking Trelegy and breathing is stable  Hyperlipidemia:  well controlled at last check    Patient Active Problem List   Diagnosis    Chronic obstructive pulmonary disease (Multi)    Cerebrovascular accident (CVA) (Multi)    Edema, unspecified    Orthopnea    Chronic diastolic heart failure    Arteriosclerosis of coronary artery    Aortic valve regurgitation    Unspecified dementia, mild, without behavioral disturbance, psychotic disturbance, mood disturbance, and anxiety    Suspected severe acute respiratory syndrome coronavirus 2 (SARS-CoV-2) infection    Senile osteoporosis    Pneumonia due to infectious organism    Nicotine dependence    Mixed hyperlipidemia    Hypokalemia    Primary hypertension    Chronic gout without tophus    Chills    Bronchitis    Acute cough    Weakness    Anemia    Vitamin D deficiency, unspecified    Neoplasm of uncertain behavior of lung    Recurrent major depressive disorder, in full remission (CMS-HCC)    Stage 3a chronic kidney disease (Multi)    Upper GI bleeding    Acute blood loss anemia (ABLA)    Acute kidney injury (CMS-HCC)    Agatston coronary artery calcium score greater than 400    Upper GI bleed    Injury of right lower extremity    Varicose ulcer of lower extremity (Multi)    PVD (peripheral vascular disease) (CMS-HCC)    Atherosclerosis of native arteries of extremities with rest pain, right leg (Multi)       Social Connections: Socially Isolated (5/28/2024)    Social Connection and Isolation Panel [NHANES]     Frequency of Communication with Friends and Family: More than three times a week     Frequency of Social Gatherings with Friends and Family: More than three times a week     Attends Religion Services: Never     Active Member of  Clubs or Organizations: No     Attends Club or Organization Meetings: Never     Marital Status:        Current Outpatient Medications on File Prior to Visit   Medication Sig Dispense Refill    acetaminophen (Tylenol) 325 mg tablet Take 3 tablets (975 mg) by mouth every 8 hours.      albuterol 90 mcg/actuation inhaler Inhale 2 puffs every 6 hours if needed for wheezing. 18 g 3    alendronate (Fosamax) 70 mg tablet Take 1 tablet (70 mg) by mouth every 7 days.      allopurinol (Zyloprim) 100 mg tablet Take 1 tablet (100 mg) by mouth 2 times a day. 180 tablet 3    buPROPion XL (Wellbutrin XL) 150 mg 24 hr tablet Take 1 tablet (150 mg) by mouth once daily in the morning. 90 tablet 3    cholecalciferol (Vitamin D-3) 5,000 Units tablet Take 1 tablet (5,000 Units) by mouth once daily.      empagliflozin (Jardiance) 10 mg Take 1 tablet (10 mg) by mouth once daily. 30 tablet 11    EPINEPHrine 0.3 mg/0.3 mL injection syringe Inject 0.3 mL (0.3 mg) into the muscle 1 time if needed for anaphylaxis.      losartan (Cozaar) 100 mg tablet Take 1 tablet (100 mg) by mouth once daily. 90 tablet 3    metoprolol succinate XL (Toprol-XL) 100 mg 24 hr tablet Take 1 tablet (100 mg) by mouth once daily. Do not crush or chew. 90 tablet 1    multivitamin with minerals tablet Take 1 tablet by mouth once daily.      pantoprazole (ProtoNix) 40 mg EC tablet TAKE ONE TABLET BY MOUTH TWICE A DAY BEFORE MEALS, DO NOT CRUSH, CHEW OR SPLIT 90 tablet 1    polyethylene glycol (Glycolax, Miralax) 17 gram packet Take 17 g by mouth 2 times a day.      povidone-iodine (Betadine) 10 % ointment Apply topically once daily.      rivaroxaban (Xarelto) 2.5 mg tablet Take 1 tablet (2.5 mg) by mouth 2 times daily (morning and late afternoon). 60 tablet 0    sertraline (Zoloft) 100 mg tablet Take 1 tablet (100 mg) by mouth once daily. 90 tablet 3    spironolactone (Aldactone) 25 mg tablet Take 1 tablet (25 mg) by mouth once daily. 30 tablet 2    Trelegy  Ellipta 200-62.5-25 mcg blister with device Inhale 1 puff once daily. 3 each 2    [DISCONTINUED] insulin lispro (HumaLOG) 100 unit/mL injection Inject 0-0.05 mL (0-5 Units) under the skin 3 times a day with meals. Take as directed per insulin instructions. (Patient not taking: Reported on 2/25/2025)       No current facility-administered medications on file prior to visit.        Vitals:    02/25/25 1206   BP: 124/80   Pulse: 72   Temp: 36.1 °C (96.9 °F)     Vitals:    02/25/25 1206   Weight: 59 kg (130 lb)       Review of Systems   All other systems reviewed and are negative.      Objective     Physical Exam  Constitutional:       Appearance: Normal appearance. She is well-developed.   HENT:      Head: Atraumatic.   Cardiovascular:      Rate and Rhythm: Normal rate and regular rhythm.      Heart sounds: Normal heart sounds. No murmur heard.  Pulmonary:      Effort: Pulmonary effort is normal.      Breath sounds: Normal breath sounds.   Abdominal:      General: Bowel sounds are normal.      Palpations: Abdomen is soft.   Skin:     General: Skin is warm.   Neurological:      General: No focal deficit present.      Mental Status: She is alert.   Psychiatric:         Mood and Affect: Mood normal.         No visits with results within 2 Month(s) from this visit.   Latest known visit with results is:   Office Visit on 12/17/2024   Component Date Value Ref Range Status    Coronavirus 2019, PCR 12/17/2024 Not Detected  Not Detected Final    Flu A Result 12/17/2024 Not Detected  Not Detected Final    Flu B Result 12/17/2024 Not Detected  Not Detected Final    RSV PCR 12/17/2024 Detected (A)  Not Detected Final       Assessment/Plan   Problem List Items Addressed This Visit             ICD-10-CM    Chronic obstructive pulmonary disease (Multi) J44.9    Chronic diastolic heart failure I50.32    Unspecified dementia, mild, without behavioral disturbance, psychotic disturbance, mood disturbance, and anxiety F03.A0    Primary  hypertension I10    Recurrent major depressive disorder, in full remission (CMS-[facility]) F33.42    Stage 3a chronic kidney disease (Multi) N18.31    PVD (peripheral vascular disease) (CMS-HCC) - Primary I73.9     Other Visit Diagnoses         Codes    Peripheral vascular disease (CMS-[facility])     I73.9    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   Checking BW.   Fu in 6 mo

## 2025-02-26 ENCOUNTER — TELEPHONE (OUTPATIENT)
Dept: PRIMARY CARE | Facility: CLINIC | Age: 80
End: 2025-02-26
Payer: MEDICARE

## 2025-02-26 LAB
ALBUMIN SERPL-MCNC: 4.2 G/DL (ref 3.6–5.1)
ALP SERPL-CCNC: 85 U/L (ref 37–153)
ALT SERPL-CCNC: 8 U/L (ref 6–29)
ANION GAP SERPL CALCULATED.4IONS-SCNC: 14 MMOL/L (CALC) (ref 7–17)
AST SERPL-CCNC: 18 U/L (ref 10–35)
BILIRUB SERPL-MCNC: 0.4 MG/DL (ref 0.2–1.2)
BUN SERPL-MCNC: 25 MG/DL (ref 7–25)
CALCIUM SERPL-MCNC: 9.1 MG/DL (ref 8.6–10.4)
CHLORIDE SERPL-SCNC: 108 MMOL/L (ref 98–110)
CHOLEST SERPL-MCNC: 150 MG/DL
CHOLEST/HDLC SERPL: 3.3 (CALC)
CO2 SERPL-SCNC: 19 MMOL/L (ref 20–32)
CREAT SERPL-MCNC: 1 MG/DL (ref 0.6–1)
EGFRCR SERPLBLD CKD-EPI 2021: 58 ML/MIN/1.73M2
EST. AVERAGE GLUCOSE BLD GHB EST-MCNC: 117 MG/DL
EST. AVERAGE GLUCOSE BLD GHB EST-SCNC: 6.5 MMOL/L
GLUCOSE SERPL-MCNC: 93 MG/DL (ref 65–99)
HBA1C MFR BLD: 5.7 % OF TOTAL HGB
HDLC SERPL-MCNC: 46 MG/DL
LDLC SERPL CALC-MCNC: 87 MG/DL (CALC)
NONHDLC SERPL-MCNC: 104 MG/DL (CALC)
POTASSIUM SERPL-SCNC: 4.7 MMOL/L (ref 3.5–5.3)
PROT SERPL-MCNC: 6.8 G/DL (ref 6.1–8.1)
SODIUM SERPL-SCNC: 141 MMOL/L (ref 135–146)
TRIGL SERPL-MCNC: 79 MG/DL

## 2025-02-26 NOTE — TELEPHONE ENCOUNTER
----- Message from Gianluca Macario sent at 2/26/2025 11:57 AM EST -----  patient's blood work looks good

## 2025-02-27 DIAGNOSIS — I10 PRIMARY HYPERTENSION: ICD-10-CM

## 2025-02-27 NOTE — TELEPHONE ENCOUNTER
Pt is out of rx, requesting rf.    Medication Name: Metoprolol  Pharmacy Name: [facility] [place]  Last OV: 2/25/25  Next OV: 8/25/25

## 2025-02-28 ENCOUNTER — APPOINTMENT (OUTPATIENT)
Dept: WOUND CARE | Facility: CLINIC | Age: 80
End: 2025-02-28
Payer: MEDICARE

## 2025-02-28 RX ORDER — METOPROLOL SUCCINATE 100 MG/1
100 TABLET, EXTENDED RELEASE ORAL DAILY
Qty: 90 TABLET | Refills: 1 | Status: SHIPPED | OUTPATIENT
Start: 2025-02-28

## 2025-03-07 ENCOUNTER — APPOINTMENT (OUTPATIENT)
Dept: WOUND CARE | Facility: CLINIC | Age: 80
End: 2025-03-07
Payer: MEDICARE

## 2025-03-24 ENCOUNTER — OFFICE VISIT (OUTPATIENT)
Dept: WOUND CARE | Facility: CLINIC | Age: 80
End: 2025-03-24
Payer: MEDICARE

## 2025-03-24 DIAGNOSIS — L97.812: Primary | ICD-10-CM

## 2025-03-24 PROCEDURE — 99213 OFFICE O/P EST LOW 20 MIN: CPT | Mod: 25

## 2025-03-24 PROCEDURE — 11042 DBRDMT SUBQ TIS 1ST 20SQCM/<: CPT

## 2025-03-30 LAB
BACTERIA SPEC AEROBE CULT: NORMAL
BACTERIA SPEC ANAEROBE CULT: NORMAL

## 2025-03-31 ENCOUNTER — OFFICE VISIT (OUTPATIENT)
Dept: WOUND CARE | Facility: CLINIC | Age: 80
End: 2025-03-31
Payer: MEDICARE

## 2025-03-31 PROCEDURE — 11042 DBRDMT SUBQ TIS 1ST 20SQCM/<: CPT

## 2025-04-07 ENCOUNTER — OFFICE VISIT (OUTPATIENT)
Dept: WOUND CARE | Facility: CLINIC | Age: 80
End: 2025-04-07
Payer: MEDICARE

## 2025-04-07 PROCEDURE — 11042 DBRDMT SUBQ TIS 1ST 20SQCM/<: CPT

## 2025-04-14 ENCOUNTER — OFFICE VISIT (OUTPATIENT)
Dept: WOUND CARE | Facility: CLINIC | Age: 80
End: 2025-04-14
Payer: MEDICARE

## 2025-04-14 PROCEDURE — 11042 DBRDMT SUBQ TIS 1ST 20SQCM/<: CPT

## 2025-04-21 ENCOUNTER — OFFICE VISIT (OUTPATIENT)
Dept: WOUND CARE | Facility: CLINIC | Age: 80
End: 2025-04-21
Payer: MEDICARE

## 2025-04-21 PROCEDURE — 11042 DBRDMT SUBQ TIS 1ST 20SQCM/<: CPT

## 2025-04-28 ENCOUNTER — OFFICE VISIT (OUTPATIENT)
Dept: WOUND CARE | Facility: CLINIC | Age: 80
End: 2025-04-28
Payer: MEDICARE

## 2025-04-28 PROCEDURE — 11042 DBRDMT SUBQ TIS 1ST 20SQCM/<: CPT

## 2025-05-05 ENCOUNTER — OFFICE VISIT (OUTPATIENT)
Dept: WOUND CARE | Facility: CLINIC | Age: 80
End: 2025-05-05
Payer: MEDICARE

## 2025-05-05 PROCEDURE — 11042 DBRDMT SUBQ TIS 1ST 20SQCM/<: CPT

## 2025-05-12 ENCOUNTER — OFFICE VISIT (OUTPATIENT)
Dept: WOUND CARE | Facility: CLINIC | Age: 80
End: 2025-05-12
Payer: MEDICARE

## 2025-05-12 PROCEDURE — 11042 DBRDMT SUBQ TIS 1ST 20SQCM/<: CPT

## 2025-05-19 ENCOUNTER — OFFICE VISIT (OUTPATIENT)
Dept: WOUND CARE | Facility: CLINIC | Age: 80
End: 2025-05-19
Payer: MEDICARE

## 2025-05-19 PROCEDURE — 11042 DBRDMT SUBQ TIS 1ST 20SQCM/<: CPT

## 2025-05-27 ENCOUNTER — APPOINTMENT (OUTPATIENT)
Dept: WOUND CARE | Facility: CLINIC | Age: 80
End: 2025-05-27
Payer: MEDICARE

## 2025-05-28 DIAGNOSIS — F33.42 RECURRENT MAJOR DEPRESSIVE DISORDER, IN FULL REMISSION: ICD-10-CM

## 2025-05-28 DIAGNOSIS — M1A.9XX0 CHRONIC GOUT WITHOUT TOPHUS, UNSPECIFIED CAUSE, UNSPECIFIED SITE: ICD-10-CM

## 2025-05-28 DIAGNOSIS — J44.9 CHRONIC OBSTRUCTIVE PULMONARY DISEASE, UNSPECIFIED COPD TYPE (MULTI): ICD-10-CM

## 2025-05-28 DIAGNOSIS — J18.9 PNEUMONIA OF BOTH LOWER LOBES DUE TO INFECTIOUS ORGANISM: ICD-10-CM

## 2025-05-28 RX ORDER — FLUTICASONE FUROATE, UMECLIDINIUM BROMIDE AND VILANTEROL TRIFENATATE 200; 62.5; 25 UG/1; UG/1; UG/1
1 POWDER RESPIRATORY (INHALATION) DAILY
Qty: 3 EACH | Refills: 2 | Status: SHIPPED | OUTPATIENT
Start: 2025-05-28

## 2025-05-28 RX ORDER — BUPROPION HYDROCHLORIDE 150 MG/1
150 TABLET ORAL EVERY MORNING
Qty: 90 TABLET | Refills: 3 | Status: SHIPPED | OUTPATIENT
Start: 2025-05-28 | End: 2026-05-28

## 2025-05-28 RX ORDER — ALBUTEROL SULFATE 90 UG/1
2 INHALANT RESPIRATORY (INHALATION) EVERY 6 HOURS PRN
Qty: 18 G | Refills: 3 | Status: SHIPPED | OUTPATIENT
Start: 2025-05-28 | End: 2026-05-28

## 2025-05-28 RX ORDER — ALLOPURINOL 100 MG/1
100 TABLET ORAL 2 TIMES DAILY
Qty: 180 TABLET | Refills: 3 | Status: SHIPPED | OUTPATIENT
Start: 2025-05-28 | End: 2026-05-28

## 2025-05-28 NOTE — TELEPHONE ENCOUNTER
Patient will be out of medicine tomorrow Please send to [facility]    metoprolol succinate XL (Toprol-XL) 100 mg 24 hr table     losartan (Cozaar) 100 mg tablet     Trelegy Ellipta 200-62.5-25 mcg blister with device     albuterol 90 mcg/actuation inhaler     buPROPion XL (Wellbutrin XL) 150 mg     allopurinol (Zyloprim) 100 mg tablet

## 2025-06-02 ENCOUNTER — OFFICE VISIT (OUTPATIENT)
Dept: WOUND CARE | Facility: CLINIC | Age: 80
End: 2025-06-02
Payer: MEDICARE

## 2025-06-02 PROCEDURE — 11042 DBRDMT SUBQ TIS 1ST 20SQCM/<: CPT

## 2025-06-09 ENCOUNTER — OFFICE VISIT (OUTPATIENT)
Dept: WOUND CARE | Facility: CLINIC | Age: 80
End: 2025-06-09
Payer: MEDICARE

## 2025-06-09 PROCEDURE — 11042 DBRDMT SUBQ TIS 1ST 20SQCM/<: CPT

## 2025-06-16 ENCOUNTER — APPOINTMENT (OUTPATIENT)
Dept: WOUND CARE | Facility: CLINIC | Age: 80
End: 2025-06-16
Payer: MEDICARE

## 2025-06-23 ENCOUNTER — OFFICE VISIT (OUTPATIENT)
Dept: WOUND CARE | Facility: CLINIC | Age: 80
End: 2025-06-23
Payer: MEDICARE

## 2025-06-23 PROCEDURE — 11042 DBRDMT SUBQ TIS 1ST 20SQCM/<: CPT

## 2025-06-30 ENCOUNTER — OFFICE VISIT (OUTPATIENT)
Dept: WOUND CARE | Facility: CLINIC | Age: 80
End: 2025-06-30
Payer: MEDICARE

## 2025-06-30 PROCEDURE — 11042 DBRDMT SUBQ TIS 1ST 20SQCM/<: CPT

## 2025-07-07 ENCOUNTER — OFFICE VISIT (OUTPATIENT)
Dept: WOUND CARE | Facility: CLINIC | Age: 80
End: 2025-07-07
Payer: MEDICARE

## 2025-07-07 PROCEDURE — 11042 DBRDMT SUBQ TIS 1ST 20SQCM/<: CPT

## 2025-07-14 ENCOUNTER — OFFICE VISIT (OUTPATIENT)
Dept: WOUND CARE | Facility: CLINIC | Age: 80
End: 2025-07-14
Payer: MEDICARE

## 2025-07-14 DIAGNOSIS — L97.812 NON-PRESSURE CHRONIC ULCER OF OTHER PART OF RIGHT LOWER LEG WITH FAT LAYER EXPOSED: Primary | ICD-10-CM

## 2025-07-14 PROCEDURE — 11042 DBRDMT SUBQ TIS 1ST 20SQCM/<: CPT

## 2025-07-20 LAB
BACTERIA SPEC AEROBE CULT: ABNORMAL
BACTERIA SPEC ANAEROBE CULT: ABNORMAL

## 2025-07-21 ENCOUNTER — APPOINTMENT (OUTPATIENT)
Dept: WOUND CARE | Facility: CLINIC | Age: 80
End: 2025-07-21
Payer: MEDICARE

## 2025-07-28 ENCOUNTER — OFFICE VISIT (OUTPATIENT)
Dept: WOUND CARE | Facility: CLINIC | Age: 80
End: 2025-07-28
Payer: MEDICARE

## 2025-07-28 PROCEDURE — 11042 DBRDMT SUBQ TIS 1ST 20SQCM/<: CPT

## 2025-08-04 ENCOUNTER — APPOINTMENT (OUTPATIENT)
Dept: WOUND CARE | Facility: CLINIC | Age: 80
End: 2025-08-04
Payer: MEDICARE

## 2025-08-11 ENCOUNTER — APPOINTMENT (OUTPATIENT)
Dept: WOUND CARE | Facility: CLINIC | Age: 80
End: 2025-08-11
Payer: MEDICARE

## 2025-08-18 ENCOUNTER — OFFICE VISIT (OUTPATIENT)
Dept: WOUND CARE | Facility: CLINIC | Age: 80
End: 2025-08-18
Payer: MEDICARE

## 2025-08-18 DIAGNOSIS — L97.812 NON-PRESSURE CHRONIC ULCER OF OTHER PART OF RIGHT LOWER LEG WITH FAT LAYER EXPOSED: Primary | ICD-10-CM

## 2025-08-18 PROCEDURE — 11042 DBRDMT SUBQ TIS 1ST 20SQCM/<: CPT

## 2025-08-19 LAB
ALBUMIN SERPL-MCNC: 4.3 G/DL (ref 3.6–5.1)
ALP SERPL-CCNC: 76 U/L (ref 37–153)
ALT SERPL-CCNC: 10 U/L (ref 6–29)
ANION GAP SERPL CALCULATED.4IONS-SCNC: 8 MMOL/L (CALC) (ref 7–17)
AST SERPL-CCNC: 18 U/L (ref 10–35)
BILIRUB SERPL-MCNC: 0.4 MG/DL (ref 0.2–1.2)
BUN SERPL-MCNC: 29 MG/DL (ref 7–25)
CALCIUM SERPL-MCNC: 9.2 MG/DL (ref 8.6–10.4)
CHLORIDE SERPL-SCNC: 109 MMOL/L (ref 98–110)
CHOLEST SERPL-MCNC: 153 MG/DL
CHOLEST/HDLC SERPL: 2.7 (CALC)
CO2 SERPL-SCNC: 25 MMOL/L (ref 20–32)
CREAT SERPL-MCNC: 1.03 MG/DL (ref 0.6–1)
EGFRCR SERPLBLD CKD-EPI 2021: 55 ML/MIN/1.73M2
EST. AVERAGE GLUCOSE BLD GHB EST-MCNC: 123 MG/DL
EST. AVERAGE GLUCOSE BLD GHB EST-SCNC: 6.8 MMOL/L
GLUCOSE SERPL-MCNC: 98 MG/DL (ref 65–99)
HBA1C MFR BLD: 5.9 %
HDLC SERPL-MCNC: 56 MG/DL
LDLC SERPL CALC-MCNC: 83 MG/DL (CALC)
NONHDLC SERPL-MCNC: 97 MG/DL (CALC)
POTASSIUM SERPL-SCNC: 4.7 MMOL/L (ref 3.5–5.3)
PROT SERPL-MCNC: 6.8 G/DL (ref 6.1–8.1)
SODIUM SERPL-SCNC: 142 MMOL/L (ref 135–146)
TRIGL SERPL-MCNC: 60 MG/DL

## 2025-08-24 LAB
BACTERIA SPEC AEROBE CULT: NORMAL
BACTERIA SPEC ANAEROBE CULT: NORMAL

## 2025-08-25 ENCOUNTER — APPOINTMENT (OUTPATIENT)
Dept: WOUND CARE | Facility: CLINIC | Age: 80
End: 2025-08-25
Payer: MEDICARE

## 2025-08-25 ENCOUNTER — APPOINTMENT (OUTPATIENT)
Dept: PRIMARY CARE | Facility: CLINIC | Age: 80
End: 2025-08-25
Payer: MEDICARE

## 2025-08-25 DIAGNOSIS — R73.01 IFG (IMPAIRED FASTING GLUCOSE): ICD-10-CM

## 2025-08-25 DIAGNOSIS — I73.9 PERIPHERAL VASCULAR DISEASE: ICD-10-CM

## 2025-08-25 DIAGNOSIS — F03.A0 MILD DEMENTIA WITHOUT BEHAVIORAL DISTURBANCE, PSYCHOTIC DISTURBANCE, MOOD DISTURBANCE, OR ANXIETY, UNSPECIFIED DEMENTIA TYPE: ICD-10-CM

## 2025-08-25 DIAGNOSIS — F33.42 RECURRENT MAJOR DEPRESSIVE DISORDER, IN FULL REMISSION: ICD-10-CM

## 2025-08-25 DIAGNOSIS — I74.09 OTHER ARTERIAL EMBOLISM AND THROMBOSIS OF ABDOMINAL AORTA (MULTI): ICD-10-CM

## 2025-08-25 DIAGNOSIS — I10 PRIMARY HYPERTENSION: ICD-10-CM

## 2025-08-25 DIAGNOSIS — J44.9 CHRONIC OBSTRUCTIVE PULMONARY DISEASE, UNSPECIFIED COPD TYPE (MULTI): ICD-10-CM

## 2025-08-25 DIAGNOSIS — I73.9 PVD (PERIPHERAL VASCULAR DISEASE): ICD-10-CM

## 2025-08-25 DIAGNOSIS — N18.31 STAGE 3A CHRONIC KIDNEY DISEASE (MULTI): ICD-10-CM

## 2025-08-25 DIAGNOSIS — J44.1 CHRONIC OBSTRUCTIVE PULMONARY DISEASE WITH ACUTE EXACERBATION (MULTI): ICD-10-CM

## 2025-08-25 DIAGNOSIS — I50.32 CHRONIC DIASTOLIC HEART FAILURE: ICD-10-CM

## 2025-08-27 DIAGNOSIS — I10 PRIMARY HYPERTENSION: ICD-10-CM

## 2025-08-28 RX ORDER — METOPROLOL SUCCINATE 100 MG/1
100 TABLET, EXTENDED RELEASE ORAL DAILY
Qty: 90 TABLET | Refills: 1 | OUTPATIENT
Start: 2025-08-28

## 2025-09-02 ENCOUNTER — OFFICE VISIT (OUTPATIENT)
Dept: WOUND CARE | Facility: CLINIC | Age: 80
End: 2025-09-02
Payer: MEDICARE

## 2025-09-02 PROCEDURE — 11042 DBRDMT SUBQ TIS 1ST 20SQCM/<: CPT

## 2025-09-04 ENCOUNTER — APPOINTMENT (OUTPATIENT)
Dept: PRIMARY CARE | Facility: CLINIC | Age: 80
End: 2025-09-04
Payer: MEDICARE

## 2025-09-04 ASSESSMENT — ENCOUNTER SYMPTOMS
HYPERTENSION: 1
LEG PAIN: 1

## 2025-09-04 ASSESSMENT — PATIENT HEALTH QUESTIONNAIRE - PHQ9
SUM OF ALL RESPONSES TO PHQ9 QUESTIONS 1 AND 2: 0
2. FEELING DOWN, DEPRESSED OR HOPELESS: NOT AT ALL
1. LITTLE INTEREST OR PLEASURE IN DOING THINGS: NOT AT ALL

## 2025-09-04 ASSESSMENT — ACTIVITIES OF DAILY LIVING (ADL)
DRESSING: INDEPENDENT
GROCERY_SHOPPING: INDEPENDENT
DOING_HOUSEWORK: INDEPENDENT
MANAGING_FINANCES: INDEPENDENT
BATHING: INDEPENDENT
TAKING_MEDICATION: INDEPENDENT

## 2026-03-04 ENCOUNTER — APPOINTMENT (OUTPATIENT)
Dept: PRIMARY CARE | Facility: CLINIC | Age: 81
End: 2026-03-04
Payer: MEDICARE

## (undated) DEVICE — SHEATH, PINNACLE, 10 CM,  5FR INTRODUCER, 5FR DIA, 2.5 CM DIALATOR

## (undated) DEVICE — GUIDEWIRE, STIFF SHAFT, ANGLE TIP, .035 DIA, 260 CM,  3 CM TIP"

## (undated) DEVICE — CONTROL WIRE, .018 X 300

## (undated) DEVICE — DEVICE, INFLATION, ENCORE 20

## (undated) DEVICE — SHEATH, 45CM W/DILATOR, 5FR ST CURVE W/CROSS-CUT VALVE

## (undated) DEVICE — INTRODUCER/SHEATH, CHECK-FLO FLEXOR, 6FR X 55CM

## (undated) DEVICE — ACCESS KIT, S-MAK MINI, 4FR 10CM 0.018IN 40CM, NT/PT, ECHO ENHANCE NEEDLE

## (undated) DEVICE — COVER, CART, 45 X 27 X 48 IN, CLEAR

## (undated) DEVICE — SHEATH, 45CM, W/DILATOR, 6 FR DIA, ST CURVE STYLE W/CROSS-CUT VALVE

## (undated) DEVICE — GUIDEWIRE, HI-TORQUE, COMMAND ES, 0.014 X 300CM

## (undated) DEVICE — CLOSURE DEVICE, VASCULAR, MYNX CONTROL, 5FR

## (undated) DEVICE — STRAP, CIRCUMFERENTIAL, 2 X 76""

## (undated) DEVICE — CATHETER, 5 FR. 65CM, ANGLE TAPER AT TIP

## (undated) DEVICE — Device

## (undated) DEVICE — ADHESIVE, SKIN, LIQUIBAND EXCEED

## (undated) DEVICE — COVER PROBE, SOFT FLEX W/ GEL, 5 X 48 IN (13X122CM)

## (undated) DEVICE — CATHETER, 5 FR. 100CM, ANGLE TAPER AT TIP

## (undated) DEVICE — TORQUE DEVICE, OLCOTT, OTD-100

## (undated) DEVICE — INTRODUCER SET, MICROPUNCT, STIFF, 4FR 10CM,PLATINUM TIP,NITINOLWIRE

## (undated) DEVICE — INTRODUCER, MICROPUNCTURE, 2.9/4.0 FR, W/ GUIDEWIRE, ECHO

## (undated) DEVICE — CATHETER, CXI SUPPORT, .018 X 150CM, STR TIP

## (undated) DEVICE — WIRE, HT CONNECT, 250T, 018 X 300CM

## (undated) DEVICE — CATHETER, QUICKCROSS SUPPORT .018 X 150CM

## (undated) DEVICE — GUIDEWIRE, COMMAND ST, HI-TORQUE, 0.18 X 300CM

## (undated) DEVICE — SHIELD, PERIPHERAL, RADPAD, 11 X 34IN, W/ ABSORBENT, ORANGE, STERILE

## (undated) DEVICE — CATHETER, QUICKCROSS SUPPORT .018 X 90CM

## (undated) DEVICE — SURVIVAL KIT, EVEREST 30

## (undated) DEVICE — CATHETER, ANGIOGRAPHIC, OMNI-FLUSH, 0.038 IN, 5FR X 70 CM

## (undated) DEVICE — GUIDEWIRE, HI-TORQUE, VERSACORE, 260CM, FLOPPY

## (undated) DEVICE — GUIDEWIRE, SPARTA CORE,  .014 X 300, 5CM

## (undated) DEVICE — GUIDEWIRE, ANGLE TIP,  .035 DIA, 180 CM, 3 CM TIP"

## (undated) DEVICE — CATHETER, ANGIO, IMPULSE, IM, 5 FR X 100 CM (5PK)

## (undated) DEVICE — PROTECTOR, NERVE, ULNAR, PINK

## (undated) DEVICE — CLOSURE DEVICE, VASCULAR, ANGIO-SEAL, VIP, 6FR, LF

## (undated) DEVICE — CATHETER, ARMADA 18PTA, 5.0MM X 200MM X 150CM

## (undated) DEVICE — GLOVE, SURGICAL, PROTEXIS PI MICRO, 7.0, PF, LF

## (undated) DEVICE — CATHETER, BALLOON, INPACT AV, DCB 018 6.0 MM X 150 MM X 130 CM